# Patient Record
Sex: MALE | Race: WHITE | Employment: FULL TIME | URBAN - METROPOLITAN AREA
[De-identification: names, ages, dates, MRNs, and addresses within clinical notes are randomized per-mention and may not be internally consistent; named-entity substitution may affect disease eponyms.]

---

## 2022-06-07 ENCOUNTER — OFFICE VISIT (OUTPATIENT)
Dept: ORTHOPEDIC SURGERY | Age: 57
End: 2022-06-07
Payer: COMMERCIAL

## 2022-06-07 DIAGNOSIS — M25.561 PAIN IN BOTH KNEES, UNSPECIFIED CHRONICITY: Primary | ICD-10-CM

## 2022-06-07 DIAGNOSIS — M24.662 ARTHROFIBROSIS OF KNEE JOINT, LEFT: ICD-10-CM

## 2022-06-07 DIAGNOSIS — M00.062 STAPHYLOCOCCAL ARTHRITIS OF LEFT KNEE (HCC): ICD-10-CM

## 2022-06-07 DIAGNOSIS — M25.562 PAIN IN BOTH KNEES, UNSPECIFIED CHRONICITY: Primary | ICD-10-CM

## 2022-06-07 PROCEDURE — 99204 OFFICE O/P NEW MOD 45 MIN: CPT | Performed by: ORTHOPAEDIC SURGERY

## 2022-06-07 NOTE — PROGRESS NOTES
12 [facility]  History and Physical  Knee Pain    Date:  2022    Name:  Aron Mcguire  Address:  [place] [address]    :  1965      Age:   60 y.o.    SSN:  [id number]      Medical Record Number:  [id number]      Chief Complaint    Knee Pain (left knee pain/infection)      History of Present Illness:  Aron Mcguire is a 60 y.o. male who presents for left knee pain and stiffness. 78-year-old male patient presented with left knee pain and stiffness. Patient who is known to have left knee osteoarthritis and was booked for a total knee replacement in [facility] since he on 2022. Patient was  planning to visit [facility]. He received left knee steroid injection on 2022 before traveling. After few days from the injection he started to complain of increasing left knee pain and swelling. He went to the emergency department in [facility] where knee aspiration showed septic knee. He underwent left knee arthrotomy and washout on 2022. He was started on IV antibiotics for 5 days. Patient went back to [place] on . His flight back was 10 hours total.  Once he was in [place] he was started on IV antibiotics again and finished 6 weeks of antibiotics collectively. During his follow-up, his ID physician requested an ultrasound which showed left leg DVT and deep peroneal vein. He was started on Xarelto 15 mg. Patient now complains of severe stiffness and decreased range of motion associated with pain. Patient was instructed to use crutches to walk. He states that he was not in therapy since . Patient states that cultures showed MSSA infection. Last labs were taken on May 10, 2022 showed ESR of 11 and C-reactive protein of 4.4. The patient denies any new injury. The patient denies any clicking, popping, catching, giving way, joint locking, numbness, paresthesias, or weakness.       Pain Assessment  Location of Pain: Knee  Location Modifiers: Left  Severity of Pain: 0  Duration of Pain: A few minutes  Frequency of Pain: Intermittent  Aggravating Factors: Stretching,Bending  Limiting Behavior: Some  Result of Injury: No  Work-Related Injury: No  Are there other pain locations you wish to document?: No    No past medical history on file. No past surgical history on file. No family history on file. Social History     Socioeconomic History    Marital status:      Spouse name: None    Number of children: None    Years of education: None    Highest education level: None   Occupational History    None   Tobacco Use    Smoking status: Never Smoker    Smokeless tobacco: None   Substance and Sexual Activity    Alcohol use: None    Drug use: None    Sexual activity: None   Other Topics Concern    None   Social History Narrative    None     Social Determinants of Health     Financial Resource Strain:     Difficulty of Paying Living Expenses: Not on file   Food Insecurity:     Worried About Running Out of Food in the Last Year: Not on file    Maddi of Food in the Last Year: Not on file   Transportation Needs:     Lack of Transportation (Medical): Not on file    Lack of Transportation (Non-Medical):  Not on file   Physical Activity:     Days of Exercise per Week: Not on file    Minutes of Exercise per Session: Not on file   Stress:     Feeling of Stress : Not on file   Social Connections:     Frequency of Communication with Friends and Family: Not on file    Frequency of Social Gatherings with Friends and Family: Not on file    Attends Latter-day Services: Not on file    Active Member of Clubs or Organizations: Not on file    Attends Club or Organization Meetings: Not on file    Marital Status: Not on file   Intimate Partner Violence:     Fear of Current or Ex-Partner: Not on file    Emotionally Abused: Not on file    Physically Abused: Not on file   [place] Sexually Abused: Not on file   Housing Stability:     Unable to Pay for Housing in the Last Year: Not on file    Number of [place] in the Last Year: Not on file    Unstable Housing in the Last Year: Not on file       No current outpatient medications on file. No current facility-administered medications for this visit. No Known Allergies      Review of Systems:  A 14 point review of systems was completed by the patient and is available in the media section of the scanned medical record and was reviewed on 6/7/2022. The review is negative with the exception of those things mentioned in the HPI and Past Medical History     Review of Systems   Constitutional: Negative for fever and chills. HENT: Negative for congestion and eye pain. Eyes: Negative for blurred vision and double vision. Respiratory: Negative for cough, shortness of breath and wheezing. Cardiovascular: Negative for chest pain and palpitations. Gastrointestinal: Negative for nausea. Negative for vomiting. Musculoskeletal: Positive for myalgias and joint pain left knee. Skin: Negative for itching and rash. Neurological: Negative for dizziness, sensory change and headaches. Psychiatric/Behavioral: Negative for depression and suicidal ideas. Vital Signs: There were no vitals taken for this visit. General Exam:    General: Singh Becerra is a healthy and well appearing 60y.o. year old male who is sitting comfortably in our office in no acute distress. Neuro: Alert & Oriented x 3,  normal,  no focal deficits noted. Normal mood & affect. Knee Examination: Left    Inspection: Mild effusion, ecchymosis, discoloration, erythema, excessive warmth. , no signs of infection. Palpation: There is entrapment of patellofemoral joint, there is medial joint line tenderness. No other osseous or soft tissue tenderness around the knee.   Negative calf tenderness    Range of Motion:  40-80    Strength: Could not be completely assessed    Special Tests:   No ligament instability, valgus and varus stress test are stable at 0 and 30°. Lachman's exhibits a solid endpoint. Negative posterior drawer. Negative Homans sign    Gait: Walks with flexion on the left knee using crutches  Alignment: No varus deformity    Neuro: Sensation equal bilateral lower extremities    Vascular: 2+ posterior tibialis pulse      Contralateral Knee Comparison Examination: Left    Inspection:  No effusion, ecchymosis, discoloration, erythema, excessive warmth. no gross deformities, no signs of infection. Palpation: There is no patellofemoral crepitus, there is no joint line tenderness. No other osseous or soft tissue tenderness around the knee. Negative calf tenderness    Range of Motion:  0-130 degrees without pain or difficulty    Strength:  5/5 quadriceps, 5/5 hamstrings    Special Tests:   No ligament instability, valgus and varus stress test are stable at 0 and 30°. Lachman's exhibits a solid endpoint. Negative posterior drawer. Negative Homans sign    Gait:  Steady, Non antalgic, without the use of assistive devices    Alignment: No Varus deformity    Neuro: Sensation equal bilateral lower extremities    Vascular: 2+ posterior tibialis pulse      Radiology:     X-rays obtained and reviewed in office: AP and merchant view of both knees and a lateral of the bilateral.         Severe left knee tricompartmental osteoarthritis with mild patella and infra. Moderate right knee tricompartmental osteoarthritis      Office Procedures:  No orders of the defined types were placed in this encounter. Assessment: Patient is a 60 y.o. male post infectious left knee arthrofibrosis with pre-existing severe knee osteoarthritis        No orders of the defined types were placed in this encounter. Medical decision making:  Patient's workup and evaluation were reviewed with the patient in the office today.        All the patient's questions were answered while in the clinic. The patient is understanding of all instructions and agrees with the plan. Treatment Plan:    1. We discussed into details the nature of his problem which is pre-existing knee osteoarthritis with superimposed postinfectious knee arthrofibrosis. His knee clinically does not look infected today, though we will proceed with new labs(CBC, ESR) as baseline. Patient has more than one challenge. He has severe restriction of extension and flexion due to formation of scar tissue both at the anterior and posterior capsule. We believe that the most predictable outcome will be with early surgical intervention in the form of manipulation under anesthesia and limited arthroscopic adhesiolysis followed by drop out cast to regain as much as we can on the extension. We will follow that with physical therapy and reassessment before proceeding with extensive arthroscopic adhesiolysis to regain flexion. We discussed that this is a long procedure and it would not resolve the pre-existing knee osteoarthritis in his knee. They main target of treatment is to regain as much as possible of knee range of motion actively and passively to prepare him for next step in the future which is a knee replacement. 2. Activity modification  3. Ice 20 minutes ever 1-2 hours PRN. Patient will start using weights at home to stretch his knee till date of surgery  4. NSAIDs as needed  5. Rest   6. Elevation at least 2 inches above the heart with pillows supporting the joint underneath  7. Lightweight exercise/low impact exercise  8.  Appropriate diet/weight management      Follow up: Next week for TREVOR and arthroscopic release of adhesions    This patient exhibits high complexity given previous history and physical exam, review of previous surgeries, obtaining independent history from the patient, review of the x-ray imaging and independent interpretation of imaging, and coordinating care with the patient as well as another physician. The patient is high risk for planning of an elective surgery with multiple comorbidities/risks. Total time spent for evaluation, education, and development of treatment plan: 59 minutes. Anne-Marie Rahman MD  Orthopedic Surgeon  [facility]), Clinical Fellow, [address] [place] [address]   6/7/2022      This dictation was performed with a verbal recognition program [facility]) and it was checked for errors. It is possible that there are still dictated errors within this office note. If so, please bring any errors to my attention for an addendum. All efforts were made to ensure that this office note is accurate. This dictation was performed with a verbal recognition program ([employer]ON) and it was checked for errors. It is possible that there are still dictated errors within this office note. If so, please bring any errors to my attention for an addendum. All efforts were made to ensure that this office note is accurate. Supervising Physician Attestation:  I, Dr. Olive Link, personally performed the services described in this documentation as scribed above, and it is both accurate and complete and I agree with all pertinent clinical information. I personally interviewed the patient and performed a physical examination and medical decision making. I discussed the patient's condition and treatment options and have  reviewed and agree with the past medical, family and social history unless otherwise noted. All of the patient's questions were answered.       Board Certified Orthopaedic Surgeon  [address]  [facility] and [address] and [employer]  Professor of Deo Rae

## 2022-06-08 ENCOUNTER — TELEPHONE (OUTPATIENT)
Dept: ORTHOPEDIC SURGERY | Age: 57
End: 2022-06-08

## 2022-06-08 NOTE — TELEPHONE ENCOUNTER
REF FROM ELIZABETH RODGERS TO [address] SPOKE WITH PT. [facility] FOR TOMORROW AT  IN AM WITH TOMMY. ALL QUESTIONS ANSWERED.

## 2022-06-08 NOTE — TELEPHONE ENCOUNTER
Called patient per Dr Mallory Wu and told him putting his sx on hold until he talks to another physician regarding his case. Will get back with him after that.  Also told him received his paper work and will get that out for him

## 2022-06-09 ENCOUNTER — OFFICE VISIT (OUTPATIENT)
Dept: ORTHOPEDIC SURGERY | Age: 57
End: 2022-06-09
Payer: COMMERCIAL

## 2022-06-09 DIAGNOSIS — M24.662 ARTHROFIBROSIS OF KNEE JOINT, LEFT: ICD-10-CM

## 2022-06-09 DIAGNOSIS — M25.561 PAIN IN BOTH KNEES, UNSPECIFIED CHRONICITY: Primary | ICD-10-CM

## 2022-06-09 DIAGNOSIS — M25.562 PAIN IN BOTH KNEES, UNSPECIFIED CHRONICITY: Primary | ICD-10-CM

## 2022-06-09 PROCEDURE — 99215 OFFICE O/P EST HI 40 MIN: CPT | Performed by: ORTHOPAEDIC SURGERY

## 2022-06-13 ENCOUNTER — TELEPHONE (OUTPATIENT)
Dept: ORTHOPEDIC SURGERY | Age: 57
End: 2022-06-13

## 2022-06-13 NOTE — PROGRESS NOTES
6/9/2022     Reason for visit:  Left knee pain and decreased range of motion    History of Present Illness: The patient is a 39-year-old male who presents for evaluation of his left knee. He presents as a referral from my partner Dr. Tj Betts. The patient reports that he underwent a cortisone injection to his left knee from an outside orthopedic group and surgeon on April 4, 2022. This was prior to him traveling to [facility]. Once he got to [facility] he started developing pain and increased swelling. He was notified by the surgeons office telling him that the cortisone that was given was potentially contaminated with bacteria and patients were experiencing infection. He did present to the hospital there were open I&D was performed. He was placed on the antibiotics followed by IV antibiotics once he returned to the [facility]. However since then he has not had physical therapy and he has developed significant decreased range of motion and stiffness. He has also sustained another complication in the form of a DVT for which she takes Xarelto. 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 Medical History:  No past medical history on file. No past surgical history on file. No family history on file.    Social History     Socioeconomic History    Marital status:      Spouse name: Not on file    Number of children: Not on file    Years of education: Not on file    Highest education level: Not on file   Occupational History    Not on file   Tobacco Use    Smoking status: Never Smoker    Smokeless tobacco: Not on file   Substance and Sexual Activity    Alcohol use: Not on file    Drug use: Not on file    Sexual activity: Not on file   Other Topics Concern    Not on file   Social History Narrative    Not on file     Social Determinants of Health     Financial Resource Strain:     Difficulty of Paying Living Expenses: Not on file   Food Insecurity:     Worried About Running Out of Food in the Last Year: Not on file    Ran Out of Food in the Last Year: Not on file   Transportation Needs:     Lack of Transportation (Medical): Not on file    Lack of Transportation (Non-Medical): Not on file   Physical Activity:     Days of Exercise per Week: Not on file    Minutes of Exercise per Session: Not on file   Stress:     Feeling of Stress : Not on file   Social Connections:     Frequency of Communication with Friends and Family: Not on file    Frequency of Social Gatherings with Friends and Family: Not on file    Attends Episcopal Services: Not on file    Active Member of [address] [place] [address] [employer] or Organizations: Not on file    Attends Club or Organization Meetings: Not on file    Marital Status: Not on file   Intimate Partner Violence:     Fear of Current or Ex-Partner: Not on file    Emotionally Abused: Not on file    Physically Abused: Not on file    Sexually Abused: Not on file   Housing Stability:     Unable to Pay for Housing in the Last Year: Not on file    Number of [place] in the Last Year: Not on file    Unstable Housing in the Last Year: Not on file      No current outpatient medications on file prior to visit. No current facility-administered medications on file prior to visit. No Known Allergies     Review of Systems:  Constitutional: Patient is adequately groomed with no evidence of malnutrition  Mental Status: The patient is oriented to time, place and person. The patient's mood and affect are appropriate. Lymphatic: The lymphatic examination bilaterally reveals all areas to be without enlargement or induration. Vascular: Examination reveals no swelling or calf tenderness. Peripheral pulses are palpable and 2+. Neurological: The patient has good coordination. There is no weakness or sensory deficit. Skin:  Head/Neck: inspection reveals no rashes, ulcerations or lesions. Trunk: inspection reveals no rashes, ulcerations or lesions. Objective:   There were no vitals taken for this visit. Physical Exleftam:  The patient is well-appearing and in no apparent distress  Examination of the left knee   There is a small effusion, no gross deformity or skin changes  Range of motion reveals 45 to 80  neg lachman, negative posterior drawer, no pain or laxity with varus or valgus stress at 0 degrees and 30 degrees of flexion  + joint line tenderness  5 out of 5 strength throughout distal muscle groups  Sensation is intact to light touch throughout all distributions  There is no calf swelling or tenderness  Palpable DP pulse, brisk cap refill, 2+ symmetric reflexes    Imaging:  X-rays of his left knee were reviewed. Tricompartmental degenerative changes are present. Assessment:  Left knee degenerative joint disease with history of recent intra-articular infection followed by development of arthrofibrosis as well as recent history of DVT    Plan:  I had a very long discussion with the patient. He has a very complex constellation of findings. He has significantly decreased range of motion with both flexion and extension. This makes this very challenging. He also has advanced degeneration of the knee. He has not made progress with physical therapy and improving his motion recently. I do agree with Dr. Samir Mcconnell and the fact that the patient to likely require multiple surgeries to address his arthrofibrosis. He will require a posterior as well as an anterior surgery with releases and capsular lengthenings to hopefully get back full flexion extension. As result I will reach out to my vascular surgeon on staff to coordinate a potential combined posterior approach procedure in which vascular will safely perform the approach followed by capsular release by me. This will hopefully get him to extension. Once he recovers from that we would then proceed with an anterior base procedure by Dr. Samir Mcconnell to get his flexion back.   It is imperative that we restore his full motion prior to considering total knee arthroplasty and he is aware of this. The risk and benefits of the posterior based surgery were discussed. They were defined as but not limited to infection, bleeding, damage to blood vessels or nerves, continued to decreased range of motion, need for additional surgery, medical complications. He does understand elects proceed. I will be in touch once I coordinate. Greater than 45 minutes were spent with this encounter. Time spent included evaluating the patient's chart prior to arrival.  Evaluating the patient in the office including history, physical examination, imaging reviewing, and counseling on next steps. Lastly, time was spent discussing orders with my staff as well as providing documentation in the chart. Jaun Barragan MD            Orthopaedic Surgery Sports Medicine and [address] and [address] [facility]            Team Physician [facility]      Disclaimer: This note was dictated with voice recognition software. Though review and correction are routine, we apologize for any errors.

## 2022-06-22 ENCOUNTER — OFFICE VISIT (OUTPATIENT)
Dept: VASCULAR SURGERY | Age: 57
End: 2022-06-22
Payer: COMMERCIAL

## 2022-06-22 VITALS
DIASTOLIC BLOOD PRESSURE: 93 MMHG | SYSTOLIC BLOOD PRESSURE: 158 MMHG | HEIGHT: 69 IN | BODY MASS INDEX: 28.88 KG/M2 | WEIGHT: 195 LBS | HEART RATE: 79 BPM

## 2022-06-22 DIAGNOSIS — M24.562 KNEE CONTRACTURE, LEFT: Primary | ICD-10-CM

## 2022-06-22 PROCEDURE — 99204 OFFICE O/P NEW MOD 45 MIN: CPT | Performed by: SURGERY

## 2022-06-22 RX ORDER — MELOXICAM 15 MG/1
15 TABLET ORAL DAILY PRN
COMMUNITY
Start: 2018-04-20

## 2022-06-22 RX ORDER — LISINOPRIL 40 MG/1
40 TABLET ORAL DAILY
COMMUNITY
Start: 2022-06-16

## 2022-06-22 RX ORDER — CYCLOBENZAPRINE HCL 10 MG
TABLET ORAL 3 TIMES DAILY PRN
COMMUNITY
Start: 2018-06-20 | End: 2022-08-04

## 2022-06-22 RX ORDER — RIVAROXABAN 20 MG/1
TABLET, FILM COATED ORAL
COMMUNITY
Start: 2022-06-02

## 2022-06-22 RX ORDER — METOPROLOL SUCCINATE 50 MG/1
50 TABLET, EXTENDED RELEASE ORAL DAILY
COMMUNITY
Start: 2022-06-16

## 2022-06-22 RX ORDER — SILDENAFIL 50 MG/1
50 TABLET, FILM COATED ORAL PRN
COMMUNITY
Start: 2017-06-20

## 2022-06-22 RX ORDER — ACETAMINOPHEN 500 MG
500 TABLET ORAL EVERY 8 HOURS PRN
COMMUNITY
Start: 2022-02-11

## 2022-06-22 ASSESSMENT — ENCOUNTER SYMPTOMS
EYES NEGATIVE: 1
RESPIRATORY NEGATIVE: 1
GASTROINTESTINAL NEGATIVE: 1
ALLERGIC/IMMUNOLOGIC NEGATIVE: 1

## 2022-06-22 NOTE — PROGRESS NOTES
Subjective:      Patient ID: Chito Medina is a 60 y.o. male. HPI Referral from Yonatan Art MD for preop evaluation prior to planned L posterior popliteal approach for L posterior capsular release. 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 Prior to the injection he has significant degenerative joint disease which would require eventual knee replacement. The septic joint developed while on a vacation in [facility] and the patient underwent arthrotomy and drainage. No history of vascular disease or vascular interventions. Long-term the patient will require knee replacement once the contractures have been relieved. In May the patient was diagnosed as having an acute DVT of the peroneal vein left leg for which she was started on Xarelto. The swelling in the leg which prompted the DVT work-up has subsequently resolved significantly. History reviewed. No pertinent past medical history. History reviewed. No pertinent surgical history. No Known Allergies  Current Outpatient Medications   Medication Sig Dispense Refill    lisinopril (PRINIVIL;ZESTRIL) 40 MG tablet       metoprolol succinate (TOPROL XL) 50 MG extended release tablet       XARELTO 20 MG TABS tablet       meloxicam (MOBIC) 15 MG tablet Take 15 mg by mouth daily as needed      sildenafil (VIAGRA) 50 MG tablet Take 50 mg by mouth as needed      cyclobenzaprine (FLEXERIL) 10 MG tablet       esomeprazole (NEXIUM) 20 MG delayed release capsule Take 20 mg by mouth      acetaminophen (TYLENOL) 500 MG tablet Take 500 mg by mouth every 8 hours as needed       No current facility-administered medications for this visit.      Social History     Socioeconomic History    Marital status:      Spouse name: Not on file    Number of children: Not on file    Years of education: Not on file    Highest education level: Not on file   Occupational History    Not on file   Tobacco Use    Smoking status: Never Smoker    Smokeless tobacco: Never Used   Vaping Use    Vaping Use: Never used   Substance and Sexual Activity    Alcohol use: Yes     Alcohol/week: 10.0 - 12.0 standard drinks     Types: 10 - 12 Shots of liquor per week    Drug use: Never    Sexual activity: Not on file   Other Topics Concern    Not on file   Social History Narrative    Not on file     Social Determinants of Health     Financial Resource Strain:     Difficulty of Paying Living Expenses: Not on file   Food Insecurity:     Worried About Running Out of Food in the Last Year: Not on file    Maddi of Food in the Last Year: Not on file   Transportation Needs:     Lack of Transportation (Medical): Not on file    Lack of Transportation (Non-Medical): Not on file   Physical Activity:     Days of Exercise per Week: Not on file    Minutes of Exercise per Session: Not on file   Stress:     Feeling of Stress : Not on file   Social Connections:     Frequency of Communication with Friends and Family: Not on file    Frequency of Social Gatherings with Friends and Family: Not on file    Attends [employer] Services: Not on file    Active Member of [address] [place] [address] [employer] or Organizations: Not on file    Attends Club or Organization Meetings: Not on file    Marital Status: Not on file   Intimate Partner Violence:     Fear of Current or Ex-Partner: Not on file    Emotionally Abused: Not on file    Physically Abused: Not on file    Sexually Abused: Not on file   Housing Stability:     Unable to Pay for Housing in the Last Year: Not on file    Number of [place] in the Last Year: Not on file    Unstable Housing in the Last Year: Not on file     History reviewed. No pertinent family history. Review of Systems   Constitutional: Negative. HENT: Negative. Eyes: Negative. Respiratory: Negative. Cardiovascular: Negative. Gastrointestinal: Negative. Endocrine: Negative. Genitourinary: Negative. Musculoskeletal: Positive for joint swelling. Skin: Negative. Allergic/Immunologic: Negative. Hematological: Negative. Psychiatric/Behavioral: Negative. Objective:   Physical Exam  Vitals and nursing note reviewed. Exam conducted with a chaperone present (wife). Constitutional:       Appearance: Normal appearance. He is normal weight. HENT:      Head: Normocephalic and atraumatic. Right Ear: External ear normal.      Left Ear: External ear normal.      Nose: Nose normal.      Mouth/Throat:      Mouth: Mucous membranes are moist.      Pharynx: Oropharynx is clear. Eyes:      Extraocular Movements: Extraocular movements intact. Conjunctiva/sclera: Conjunctivae normal.   Cardiovascular:      Rate and Rhythm: Normal rate and regular rhythm. Heart sounds: Normal heart sounds. Pulmonary:      Effort: Pulmonary effort is normal.      Breath sounds: Normal breath sounds. Abdominal:      General: Abdomen is flat. Bowel sounds are normal.      Palpations: Abdomen is soft. There is no mass. Hernia: No hernia is present. Musculoskeletal:         General: Swelling (L knee) and deformity (L knee flexion contracture at 45 degrees with limited ROM) present. Cervical back: Normal range of motion. Right lower leg: No edema. Left lower leg: Edema (trace ankle) present. Skin:     General: Skin is warm and dry. Capillary Refill: Capillary refill takes less than 2 seconds. Findings: No erythema, lesion or rash. Neurological:      General: No focal deficit present. Mental Status: He is alert and oriented to person, place, and time. Cranial Nerves: No cranial nerve deficit. Sensory: No sensory deficit. Motor: No weakness. Coordination: Coordination normal.      Gait: Gait normal.   Psychiatric:         Mood and Affect: Mood normal.         Behavior: Behavior normal.         Thought Content:  Thought content normal.         Judgment: Judgment normal.       Pulses:   R bruit  L bruit   2   carotid 2    2   brachial 2    2   radial 2    2   femoral 2    2   popliteal cont    2   posterior tibial 2    2   dorsalis pedis 2    na   bypass graft na      Venous duplex scan 5/12/2022 at [address]* Acute, occlusive deep vein thrombosis of the left peroneal vein. No evidence of deep or superficial venous thrombosis in the remainder of the   sampled veins in the eft lower extremity or right common femoral vein. Assessment:      1) Contracture L knee S/P septic joint  2) DVT L peroneal vein      Plan:      Explained my role in the operation to Mr. Herrera and his wife. Associated risks include venous and arterial injury as well as nerve injury. Plan will be to perform a S shaped incision to prevent skin contracture. Long-term plans are clearly in the hands of our orthopedic colleagues. Prior to surgery he will undergo a venous duplex scan to see whether or not the peroneal clot has resolved or if it is extended into the popliteal vein which would be important to understand for this approach. If the clot has resolved discontinuation of the Xarelto will be possible around the time of surgery. Study will be obtained at our [facility] office and we will contact him for results. I will speak personally with Dr. Natalie Kang regarding approach specifics. Planning within the next 2 to 3 weeks at [facility].

## 2022-06-22 NOTE — Clinical Note
Clare Wood saw Marly Lezama in the office today for evaluation prior to the planned posterior approach for capsulotomy. I can see no surgical or vascular contraindications. He will undergo a venous duplex scan to reassess the previously documented peroneal clot as this may influence our approach and use of anticoagulants. I will speak you personally regards to timing. Thanks for asked me to be involved in his case. Also me know if I can help with any other patients in the future.     Nessa Lucero

## 2022-06-24 DIAGNOSIS — I82.402 ACUTE DEEP VEIN THROMBOSIS (DVT) OF LEFT LOWER EXTREMITY, UNSPECIFIED VEIN (HCC): Primary | ICD-10-CM

## 2022-06-28 ENCOUNTER — PROCEDURE VISIT (OUTPATIENT)
Dept: VASCULAR SURGERY | Age: 57
End: 2022-06-28
Payer: COMMERCIAL

## 2022-06-28 DIAGNOSIS — I82.402 ACUTE DEEP VEIN THROMBOSIS (DVT) OF LEFT LOWER EXTREMITY, UNSPECIFIED VEIN (HCC): ICD-10-CM

## 2022-06-28 PROCEDURE — 93971 EXTREMITY STUDY: CPT | Performed by: SURGERY

## 2022-06-29 ENCOUNTER — TELEPHONE (OUTPATIENT)
Dept: ORTHOPEDIC SURGERY | Age: 57
End: 2022-06-29

## 2022-06-29 DIAGNOSIS — M24.662 ARTHROFIBROSIS OF KNEE JOINT, LEFT: Primary | ICD-10-CM

## 2022-06-29 NOTE — TELEPHONE ENCOUNTER
Other PATIENT NEEDING ATTENDING PHYSICIAN STATEMENT FOR EMPLOYER WITH HOW MUCH LONGER HE WILL BE OUT OF WORK. STATES THAT HE SEEN THE VASCULAR MD A WEEK AGO AND HE HAD DOPPLER YESTERDAY.  PLS CALL TO ADVISE [phone]

## 2022-06-29 NOTE — TELEPHONE ENCOUNTER
I SPOKE WITH PT.  CONFIRMED SX INFO. I WILL SEND HIM A PACK WITH INFO AS WELL. HE NEEDS H&P AND TO TALK WITH INFECTIOUS DISEASE IN REGARDS TO [facility] PRIOR TO SX. WILL MAIL PACKET TODAY FOR PT.  SX 7/15 AT 7:30.  ARRIVE 6 AM.

## 2022-06-30 ENCOUNTER — TELEPHONE (OUTPATIENT)
Dept: ORTHOPEDIC SURGERY | Age: 57
End: 2022-06-30

## 2022-06-30 NOTE — TELEPHONE ENCOUNTER
PATIENT WOULD LIKE TO DO HIS PHYSICAL THERAPY STARTING Monday AFTER SX. I GAVE PATIENT INFO FOR YANELI MAURICE PT. ORDER PLACED.

## 2022-06-30 NOTE — TELEPHONE ENCOUNTER
CPT: 73086  BODY PART: left knee  STATUS: outpatient  LOCATION: [place]  AUTHORIZATION: [employer]    Per [employer] website, [address] [facility]

## 2022-07-07 ENCOUNTER — TELEPHONE (OUTPATIENT)
Dept: CARDIOTHORACIC SURGERY | Age: 57
End: 2022-07-07

## 2022-07-07 NOTE — TELEPHONE ENCOUNTER
Dr. Fabiola Marc office called regarding medications prior to the patient's upcoming surgery. Does the patient need to be put on Lovenox prior to sx, currently on Xarelto?

## 2022-07-11 NOTE — PROGRESS NOTES
Name_______________________________________Printed:____________________  Date and time of surgery_7/15/2022______0730_________________Arrival Time:__0600  masc______________   1. The instructions given regarding when and if a patient needs to stop oral intake prior to surgery varies. Follow the specific instructions you were given                  _x__Nothing to eat or to drink after Midnight the night before.                   ____Carbo loading or ERAS instructions will be given to select patients-if you have been given those instructions -please do the following                           The evening before your surgery after dinner before midnight drink 40 ounces of gatorade. If you are diabetic use sugar free. The morning of surgery drink 40 ounces of water. This needs to be finished 3 hours prior to your surgery start time. 2. Take the following pills with a small sip of water on the morning of surgery__metoprolol, nexium_________________________________________________                  Do not take blood pressure medications ending in pril or sartan the sarah prior to surgery or the morning of surgery_   3. Aspirin, Ibuprofen, Advil, Naproxen, Vitamin E and other Anti-inflammatory products and supplements should be stopped for 5 -7days before surgery or as directed by your physician. 4. Check with your Doctor regarding stopping Plavix, Coumadin,Eliquis, Lovenox,Effient,Pradaxa,Xarelto, Fragmin or other blood thinners and follow their instructions. 5. Do not smoke, and do not drink any alcoholic beverages 24 hours prior to surgery. This includes NA Beer. Refrain from the usage of any recreational drugs. 6. You may brush your teeth and gargle the morning of surgery. DO NOT SWALLOW WATER   7. You MUST make arrangements for a responsible adult to stay on site while you are here and take you home after your surgery. You will not be allowed to leave alone or drive yourself home.   It is strongly suggested someone stay with you the first 24 hrs. Your surgery will be cancelled if you do not have a ride home. 8. A parent/legal guardian must accompany a child scheduled for surgery and plan to stay at the hospital until the child is discharged. Please do not bring other children with you. 9. Please wear simple, loose fitting clothing to the hospital.  Scar Found not bring valuables (money, credit cards, checkbooks, etc.) Do not wear any makeup (including no eye makeup) or nail polish on your fingers or toes. 10. DO NOT wear any jewelry or piercings on day of surgery. All body piercing jewelry must be removed. 11. If you have ___dentures, they will be removed before going to the OR; we will provide you a container. If you wear ___contact lenses or ___glasses, they will be removed; please bring a case for them. 12. Please see your family doctor/pediatrician for a history & physical and/or concerning medications. Bring any test results/reports from your physician's office. PCP__________________Phone___________H&P Appt. Date________             13 If you  have a Living Will and Durable Power of  for Healthcare, please bring in a copy. 15. Notify your Surgeon if you develop any illness between now and surgery  time, cough, cold, fever, sore throat, nausea, vomiting, etc.  Please notify your surgeon if you experience dizziness, shortness of breath or blurred vision between now & the time of your surgery             15. DO NOT shave your operative site 96 hours prior to surgery. For face & neck surgery, men may use an electric razor 48 hours prior to surgery. 16. Shower the night before or morning of surgery using an antibacterial soap or as you have been instructed. 17. To provide excellent care visitors will be limited to one in the room at any given time. 18.  Please bring picture ID and insurance card.              19.  Visit our web site for additional information:  [email or web address]              20.During flu season no children under the age of 15 are permitted in the hospital for the safety of all patients. 21. If you take a long acting insulin in the evening only  take half of your usual  dose the night  before your procedure              22. If you use a c-pap please bring DOS if staying overnight,             23.For your convenience [facility] has a pharmacy on site to fill your prescriptions. 24. If you use oxygen and have a portable tank please bring it  with you the DOS             25. Bring a complete list of all your medications with name and dose include any supplements. 26. Other__________________________________________   *Please call pre admission testing if you any further questions   [facility]   [address]    [facility] [address]  [phone]   [address] [place] [address]       VISITOR POLICY(subject to change)    Current policy is 2 visitors per patient. No children. A mask is required. Visiting hours are 8a-8p. Overnight visitors will be at the discretion of the nurse. All above information reviewed with patient in person or by phone. Patient verbalizes understanding. All questions and concerns addressed.                                                                                                  Patient/Rep__patient__________________                                                                                                                                    PRE OP INSTRUCTIONS

## 2022-07-11 NOTE — PROGRESS NOTES
Per [facility] @ office, patient needs a new H&P.  VM left for patient, he needs new H&P, can not update outdated H&P.

## 2022-07-13 DIAGNOSIS — G89.18 POST-OPERATIVE PAIN: Primary | ICD-10-CM

## 2022-07-13 NOTE — TELEPHONE ENCOUNTER
Sent patient MyChart message with update to sx time.   Sx at [address] [place] [address], arrival of 6AM.

## 2022-07-14 RX ORDER — ONDANSETRON 4 MG/1
4 TABLET, FILM COATED ORAL EVERY 8 HOURS PRN
Qty: 21 TABLET | Refills: 0 | Status: SHIPPED | OUTPATIENT
Start: 2022-07-15

## 2022-07-14 RX ORDER — ASPIRIN 325 MG
325 TABLET, DELAYED RELEASE (ENTERIC COATED) ORAL DAILY
Qty: 14 TABLET | Refills: 0 | Status: SHIPPED | OUTPATIENT
Start: 2022-07-15 | End: 2022-07-29

## 2022-07-14 RX ORDER — HYDROCODONE BITARTRATE AND ACETAMINOPHEN 10; 325 MG/1; MG/1
1 TABLET ORAL EVERY 4 HOURS PRN
Qty: 30 TABLET | Refills: 0 | Status: SHIPPED | OUTPATIENT
Start: 2022-07-15 | End: 2022-07-20

## 2022-07-15 ENCOUNTER — HOSPITAL ENCOUNTER (OUTPATIENT)
Age: 57
Setting detail: OUTPATIENT SURGERY
Discharge: HOME | End: 2022-07-15
Attending: ORTHOPAEDIC SURGERY | Admitting: ORTHOPAEDIC SURGERY
Payer: COMMERCIAL

## 2022-07-15 ENCOUNTER — ANESTHESIA EVENT (OUTPATIENT)
Dept: OPERATING ROOM | Age: 57
End: 2022-07-15
Payer: COMMERCIAL

## 2022-07-15 ENCOUNTER — APPOINTMENT (OUTPATIENT)
Dept: GENERAL RADIOLOGY | Age: 57
End: 2022-07-15
Attending: ORTHOPAEDIC SURGERY
Payer: COMMERCIAL

## 2022-07-15 ENCOUNTER — ANESTHESIA (OUTPATIENT)
Dept: OPERATING ROOM | Age: 57
End: 2022-07-15
Payer: COMMERCIAL

## 2022-07-15 VITALS
RESPIRATION RATE: 16 BRPM | TEMPERATURE: 97 F | DIASTOLIC BLOOD PRESSURE: 72 MMHG | OXYGEN SATURATION: 100 % | HEIGHT: 69 IN | SYSTOLIC BLOOD PRESSURE: 118 MMHG | HEART RATE: 67 BPM | WEIGHT: 194 LBS | BODY MASS INDEX: 28.73 KG/M2

## 2022-07-15 DIAGNOSIS — M24.662 ARTHROFIBROSIS OF KNEE JOINT, LEFT: ICD-10-CM

## 2022-07-15 PROCEDURE — 87070 CULTURE OTHR SPECIMN AEROBIC: CPT

## 2022-07-15 PROCEDURE — 7100000010 HC PHASE II RECOVERY - FIRST 15 MIN: Performed by: ORTHOPAEDIC SURGERY

## 2022-07-15 PROCEDURE — 2580000003 HC RX 258: Performed by: ORTHOPAEDIC SURGERY

## 2022-07-15 PROCEDURE — 6360000002 HC RX W HCPCS: Performed by: ORTHOPAEDIC SURGERY

## 2022-07-15 PROCEDURE — 7100000011 HC PHASE II RECOVERY - ADDTL 15 MIN: Performed by: ORTHOPAEDIC SURGERY

## 2022-07-15 PROCEDURE — 3700000001 HC ADD 15 MINUTES (ANESTHESIA): Performed by: ORTHOPAEDIC SURGERY

## 2022-07-15 PROCEDURE — 6370000000 HC RX 637 (ALT 250 FOR IP): Performed by: ANESTHESIOLOGY

## 2022-07-15 PROCEDURE — 3209999900 FLUORO FOR SURGICAL PROCEDURES

## 2022-07-15 PROCEDURE — 7100000000 HC PACU RECOVERY - FIRST 15 MIN: Performed by: ORTHOPAEDIC SURGERY

## 2022-07-15 PROCEDURE — 3600000004 HC SURGERY LEVEL 4 BASE: Performed by: ORTHOPAEDIC SURGERY

## 2022-07-15 PROCEDURE — 2500000003 HC RX 250 WO HCPCS: Performed by: NURSE ANESTHETIST, CERTIFIED REGISTERED

## 2022-07-15 PROCEDURE — 2500000003 HC RX 250 WO HCPCS: Performed by: ORTHOPAEDIC SURGERY

## 2022-07-15 PROCEDURE — 3700000000 HC ANESTHESIA ATTENDED CARE: Performed by: ORTHOPAEDIC SURGERY

## 2022-07-15 PROCEDURE — 87076 CULTURE ANAEROBE IDENT EACH: CPT

## 2022-07-15 PROCEDURE — 3600000014 HC SURGERY LEVEL 4 ADDTL 15MIN: Performed by: ORTHOPAEDIC SURGERY

## 2022-07-15 PROCEDURE — 87075 CULTR BACTERIA EXCEPT BLOOD: CPT

## 2022-07-15 PROCEDURE — 2709999900 HC NON-CHARGEABLE SUPPLY: Performed by: ORTHOPAEDIC SURGERY

## 2022-07-15 PROCEDURE — 7100000001 HC PACU RECOVERY - ADDTL 15 MIN: Performed by: ORTHOPAEDIC SURGERY

## 2022-07-15 PROCEDURE — 6360000002 HC RX W HCPCS: Performed by: NURSE ANESTHETIST, CERTIFIED REGISTERED

## 2022-07-15 PROCEDURE — 37799 UNLISTED PX VASCULAR SURGERY: CPT | Performed by: SURGERY

## 2022-07-15 PROCEDURE — 87205 SMEAR GRAM STAIN: CPT

## 2022-07-15 RX ORDER — ACETAMINOPHEN 325 MG/1
650 TABLET ORAL ONCE
Status: COMPLETED | OUTPATIENT
Start: 2022-07-15 | End: 2022-07-15

## 2022-07-15 RX ORDER — PROPOFOL 10 MG/ML
INJECTION, EMULSION INTRAVENOUS PRN
Status: DISCONTINUED | OUTPATIENT
Start: 2022-07-15 | End: 2022-07-15 | Stop reason: SDUPTHER

## 2022-07-15 RX ORDER — CLINDAMYCIN HYDROCHLORIDE 300 MG/1
300 CAPSULE ORAL 2 TIMES DAILY
Qty: 14 CAPSULE | Refills: 0 | Status: SHIPPED | OUTPATIENT
Start: 2022-07-15 | End: 2022-07-20

## 2022-07-15 RX ORDER — MIDAZOLAM HYDROCHLORIDE 1 MG/ML
INJECTION INTRAMUSCULAR; INTRAVENOUS PRN
Status: DISCONTINUED | OUTPATIENT
Start: 2022-07-15 | End: 2022-07-15 | Stop reason: SDUPTHER

## 2022-07-15 RX ORDER — KETAMINE HCL IN NACL, ISO-OSM 100MG/10ML
SYRINGE (ML) INJECTION PRN
Status: DISCONTINUED | OUTPATIENT
Start: 2022-07-15 | End: 2022-07-15 | Stop reason: SDUPTHER

## 2022-07-15 RX ORDER — ONDANSETRON 2 MG/ML
INJECTION INTRAMUSCULAR; INTRAVENOUS PRN
Status: DISCONTINUED | OUTPATIENT
Start: 2022-07-15 | End: 2022-07-15 | Stop reason: SDUPTHER

## 2022-07-15 RX ORDER — OXYCODONE HYDROCHLORIDE 5 MG/1
5 TABLET ORAL
Status: COMPLETED | OUTPATIENT
Start: 2022-07-15 | End: 2022-07-15

## 2022-07-15 RX ORDER — LIDOCAINE HYDROCHLORIDE 10 MG/ML
1 INJECTION, SOLUTION EPIDURAL; INFILTRATION; INTRACAUDAL; PERINEURAL
Status: CANCELLED | OUTPATIENT
Start: 2022-07-15 | End: 2022-07-15

## 2022-07-15 RX ORDER — HYDROMORPHONE HCL 110MG/55ML
0.25 PATIENT CONTROLLED ANALGESIA SYRINGE INTRAVENOUS EVERY 5 MIN PRN
Status: DISCONTINUED | OUTPATIENT
Start: 2022-07-15 | End: 2022-07-15 | Stop reason: HOSPADM

## 2022-07-15 RX ORDER — LIDOCAINE HYDROCHLORIDE 20 MG/ML
INJECTION, SOLUTION EPIDURAL; INFILTRATION; INTRACAUDAL; PERINEURAL PRN
Status: DISCONTINUED | OUTPATIENT
Start: 2022-07-15 | End: 2022-07-15 | Stop reason: SDUPTHER

## 2022-07-15 RX ORDER — SODIUM CHLORIDE 9 MG/ML
INJECTION, SOLUTION INTRAVENOUS CONTINUOUS
Status: CANCELLED | OUTPATIENT
Start: 2022-07-15

## 2022-07-15 RX ORDER — HYDROMORPHONE HCL 110MG/55ML
0.5 PATIENT CONTROLLED ANALGESIA SYRINGE INTRAVENOUS EVERY 5 MIN PRN
Status: DISCONTINUED | OUTPATIENT
Start: 2022-07-15 | End: 2022-07-15 | Stop reason: HOSPADM

## 2022-07-15 RX ORDER — SODIUM CHLORIDE, SODIUM LACTATE, POTASSIUM CHLORIDE, CALCIUM CHLORIDE 600; 310; 30; 20 MG/100ML; MG/100ML; MG/100ML; MG/100ML
INJECTION, SOLUTION INTRAVENOUS CONTINUOUS
Status: DISCONTINUED | OUTPATIENT
Start: 2022-07-15 | End: 2022-07-15 | Stop reason: HOSPADM

## 2022-07-15 RX ORDER — ONDANSETRON 2 MG/ML
4 INJECTION INTRAMUSCULAR; INTRAVENOUS
Status: DISCONTINUED | OUTPATIENT
Start: 2022-07-15 | End: 2022-07-15 | Stop reason: HOSPADM

## 2022-07-15 RX ORDER — ENOXAPARIN SODIUM 300 MG/3ML
INJECTION INTRAVENOUS; SUBCUTANEOUS DAILY
COMMUNITY

## 2022-07-15 RX ORDER — PHENYLEPHRINE HYDROCHLORIDE 10 MG/ML
INJECTION INTRAVENOUS PRN
Status: DISCONTINUED | OUTPATIENT
Start: 2022-07-15 | End: 2022-07-15 | Stop reason: SDUPTHER

## 2022-07-15 RX ORDER — DEXAMETHASONE SODIUM PHOSPHATE 4 MG/ML
INJECTION, SOLUTION INTRA-ARTICULAR; INTRALESIONAL; INTRAMUSCULAR; INTRAVENOUS; SOFT TISSUE PRN
Status: DISCONTINUED | OUTPATIENT
Start: 2022-07-15 | End: 2022-07-15 | Stop reason: SDUPTHER

## 2022-07-15 RX ORDER — ROCURONIUM BROMIDE 10 MG/ML
INJECTION, SOLUTION INTRAVENOUS PRN
Status: DISCONTINUED | OUTPATIENT
Start: 2022-07-15 | End: 2022-07-15 | Stop reason: SDUPTHER

## 2022-07-15 RX ORDER — SODIUM CHLORIDE 9 MG/ML
INJECTION, SOLUTION INTRAVENOUS CONTINUOUS
Status: DISCONTINUED | OUTPATIENT
Start: 2022-07-15 | End: 2022-07-15 | Stop reason: HOSPADM

## 2022-07-15 RX ORDER — DEXMEDETOMIDINE HYDROCHLORIDE 100 UG/ML
INJECTION, SOLUTION INTRAVENOUS PRN
Status: DISCONTINUED | OUTPATIENT
Start: 2022-07-15 | End: 2022-07-15 | Stop reason: SDUPTHER

## 2022-07-15 RX ORDER — LIDOCAINE HYDROCHLORIDE 10 MG/ML
0.5 INJECTION, SOLUTION EPIDURAL; INFILTRATION; INTRACAUDAL; PERINEURAL ONCE
Status: DISCONTINUED | OUTPATIENT
Start: 2022-07-15 | End: 2022-07-15 | Stop reason: HOSPADM

## 2022-07-15 RX ORDER — FENTANYL CITRATE 50 UG/ML
INJECTION, SOLUTION INTRAMUSCULAR; INTRAVENOUS PRN
Status: DISCONTINUED | OUTPATIENT
Start: 2022-07-15 | End: 2022-07-15 | Stop reason: SDUPTHER

## 2022-07-15 RX ORDER — LABETALOL HYDROCHLORIDE 5 MG/ML
5 INJECTION, SOLUTION INTRAVENOUS
Status: DISCONTINUED | OUTPATIENT
Start: 2022-07-15 | End: 2022-07-15 | Stop reason: HOSPADM

## 2022-07-15 RX ADMIN — FENTANYL CITRATE 50 MCG: 50 INJECTION, SOLUTION INTRAMUSCULAR; INTRAVENOUS at 10:02

## 2022-07-15 RX ADMIN — PROPOFOL 40 MG: 10 INJECTION, EMULSION INTRAVENOUS at 08:24

## 2022-07-15 RX ADMIN — ONDANSETRON 4 MG: 2 INJECTION INTRAMUSCULAR; INTRAVENOUS at 09:34

## 2022-07-15 RX ADMIN — LIDOCAINE HYDROCHLORIDE 100 MG: 20 INJECTION, SOLUTION EPIDURAL; INFILTRATION; INTRACAUDAL; PERINEURAL at 08:18

## 2022-07-15 RX ADMIN — FENTANYL CITRATE 50 MCG: 50 INJECTION, SOLUTION INTRAMUSCULAR; INTRAVENOUS at 08:18

## 2022-07-15 RX ADMIN — MIDAZOLAM 2 MG: 1 INJECTION INTRAMUSCULAR; INTRAVENOUS at 08:15

## 2022-07-15 RX ADMIN — PROPOFOL 30 MG: 10 INJECTION, EMULSION INTRAVENOUS at 08:21

## 2022-07-15 RX ADMIN — SUGAMMADEX 200 MG: 100 INJECTION, SOLUTION INTRAVENOUS at 10:06

## 2022-07-15 RX ADMIN — SODIUM CHLORIDE, POTASSIUM CHLORIDE, SODIUM LACTATE AND CALCIUM CHLORIDE: 600; 310; 30; 20 INJECTION, SOLUTION INTRAVENOUS at 07:13

## 2022-07-15 RX ADMIN — ACETAMINOPHEN 650 MG: 325 TABLET ORAL at 07:34

## 2022-07-15 RX ADMIN — DEXMEDETOMIDINE HYDROCHLORIDE 8 MCG: 100 INJECTION, SOLUTION INTRAVENOUS at 09:27

## 2022-07-15 RX ADMIN — DEXMEDETOMIDINE HYDROCHLORIDE 8 MCG: 100 INJECTION, SOLUTION INTRAVENOUS at 09:34

## 2022-07-15 RX ADMIN — ROCURONIUM BROMIDE 30 MG: 10 INJECTION, SOLUTION INTRAVENOUS at 09:29

## 2022-07-15 RX ADMIN — DEXAMETHASONE SODIUM PHOSPHATE 10 MG: 4 INJECTION, SOLUTION INTRAMUSCULAR; INTRAVENOUS at 08:45

## 2022-07-15 RX ADMIN — PROPOFOL 180 MG: 10 INJECTION, EMULSION INTRAVENOUS at 08:18

## 2022-07-15 RX ADMIN — CEFAZOLIN 2000 MG: 2 INJECTION, POWDER, FOR SOLUTION INTRAMUSCULAR; INTRAVENOUS at 09:16

## 2022-07-15 RX ADMIN — OXYCODONE 5 MG: 5 TABLET ORAL at 11:32

## 2022-07-15 RX ADMIN — Medication 30 MG: at 08:45

## 2022-07-15 RX ADMIN — PHENYLEPHRINE HYDROCHLORIDE 50 MCG: 10 INJECTION INTRAVENOUS at 09:13

## 2022-07-15 RX ADMIN — ROCURONIUM BROMIDE 50 MG: 10 INJECTION, SOLUTION INTRAVENOUS at 08:18

## 2022-07-15 RX ADMIN — ROCURONIUM BROMIDE 20 MG: 10 INJECTION, SOLUTION INTRAVENOUS at 08:45

## 2022-07-15 RX ADMIN — PHENYLEPHRINE HYDROCHLORIDE 50 MCG: 10 INJECTION INTRAVENOUS at 09:10

## 2022-07-15 RX ADMIN — Medication 20 MG: at 09:31

## 2022-07-15 RX ADMIN — SODIUM CHLORIDE, POTASSIUM CHLORIDE, SODIUM LACTATE AND CALCIUM CHLORIDE: 600; 310; 30; 20 INJECTION, SOLUTION INTRAVENOUS at 09:40

## 2022-07-15 ASSESSMENT — PAIN DESCRIPTION - ORIENTATION
ORIENTATION: LEFT
ORIENTATION: LEFT

## 2022-07-15 ASSESSMENT — PAIN DESCRIPTION - LOCATION: LOCATION: KNEE

## 2022-07-15 ASSESSMENT — PAIN SCALES - GENERAL
PAINLEVEL_OUTOF10: 6
PAINLEVEL_OUTOF10: 6
PAINLEVEL_OUTOF10: 2

## 2022-07-15 ASSESSMENT — PAIN - FUNCTIONAL ASSESSMENT: PAIN_FUNCTIONAL_ASSESSMENT: 0-10

## 2022-07-15 ASSESSMENT — PAIN DESCRIPTION - DESCRIPTORS
DESCRIPTORS: ACHING
DESCRIPTORS: ACHING

## 2022-07-15 NOTE — PROGRESS NOTES
Pt awake and alert at this time. Pt on RA, and VSS. Pt denies nausea, tolerating PO. Pt states pain is tolerable at this time. Skin warm to left foot, palpable pulses and able to wiggle left toes. Pt meets criteria to be discharged from Phase 1.

## 2022-07-15 NOTE — ANESTHESIA PRE PROCEDURE
Department of Anesthesiology  Preprocedure Note       Name:  Karla Pop   Age:  60 y.o.  :  1965                                          MRN:  [id number]         Date:  7/15/2022      Surgeon: Lord Cheung):  MD Leon Putnam MD    Procedure: Procedure(s):  OPEN POSTERIOR KNEE DISSECTION, POSTERIOR CAPSULAR RELEASE  EXPOSE AND CLOSE    Medications prior to admission:   Prior to Admission medications    Medication Sig Start Date End Date Taking? Authorizing Provider   enoxaparin (LOVENOX) 300 MG/3ML injection Inject into the skin daily   Yes Historical Provider, MD   HYDROcodone-acetaminophen (NORCO)  MG per tablet Take 1 tablet by mouth every 4 hours as needed for Pain for up to 5 days.  7/15/22 7/20/22  Sariah Fleming MD   aspirin 325 MG EC tablet Take 1 tablet by mouth daily for 14 days 7/15/22 7/29/22  Sariah Fleming MD   ondansetron [facility]) 4 MG tablet Take 1 tablet by mouth every 8 hours as needed for Nausea  Patient not taking: Reported on 7/15/2022 7/15/22   Sariah Fleming MD   lisinopril (PRINIVIL;ZESTRIL) 40 MG tablet  22   Historical Provider, MD   metoprolol succinate (TOPROL XL) 50 MG extended release tablet  22   Historical Provider, MD   XARELTO 20 MG TABS tablet  22   Historical Provider, MD   meloxicam (MOBIC) 15 MG tablet Take 15 mg by mouth daily as needed  Patient not taking: Reported on 7/15/2022 4/20/18   Historical Provider, MD   sildenafil (VIAGRA) 50 MG tablet Take 50 mg by mouth as needed 17   Historical Provider, MD   cyclobenzaprine (FLEXERIL) 10 MG tablet 3 times daily as needed  18   Historical Provider, MD   esomeprazole (NEXIUM) 20 MG delayed release capsule Take 20 mg by mouth 08   Historical Provider, MD   acetaminophen (TYLENOL) 500 MG tablet Take 500 mg by mouth every 8 hours as needed 22   Historical Provider, MD       Current medications:    Current Facility-Administered Medications   Medication Dose Route Frequency Provider Last Rate Last Admin    lactated ringers infusion   IntraVENous Continuous Diane Lugo MD 50 mL/hr at 07/15/22 0713 New Bag at 07/15/22 0713    lidocaine PF 1 % injection 0.5 mL  0.5 mL IntraDERmal Once Diane Lugo MD        ceFAZolin (ANCEF) 2,000 mg in dextrose 5 % 50 mL IVPB (mini-bag)  2,000 mg IntraVENous On Call to [address] Willie Chandra MD           Allergies:  No Known Allergies    Problem List:    Patient Active Problem List   Diagnosis Code    GERD (gastroesophageal reflux disease) K21.9       Past Medical History:        Diagnosis Date    DVT of lower extremity (deep venous thrombosis) ([facility] [address]     GERD (gastroesophageal reflux disease)     Hypertension        Past Surgical History:        Procedure Laterality Date    CERVICAL FUSION      anterior fusion c6-7    COLONOSCOPY      FOOT AMPUTATION Right 1983    partial    KNEE ARTHROSCOPY      KNEE ARTHROSCOPY Right        Social History:    Social History     Tobacco Use    Smoking status: Never    Smokeless tobacco: Never   Substance Use Topics    Alcohol use:  Yes     Alcohol/week: 10.0 - 12.0 standard drinks     Types: 10 - 12 Shots of liquor per week                                Counseling given: Not Answered      Vital Signs (Current):   Vitals:    07/11/22 1022 07/15/22 0633   BP:  135/81   Pulse:  64   Resp:  16   Temp:  97.1 °F (36.2 °C)   TempSrc:  Temporal   SpO2:  97%   Weight: 195 lb (88.5 kg) 194 lb (88 kg)   Height: 5' 9\" (1.753 m) 5' 9\" (1.753 m)                                              BP Readings from Last 3 Encounters:   07/15/22 135/81   06/22/22 (!) 158/93       NPO Status: Time of last liquid consumption: 2100                        Time of last solid consumption: 1930                        Date of last liquid consumption: 07/14/22                        Date of last solid food consumption: 07/14/22    BMI:   Wt Readings from Last 3 Encounters:   07/15/22 194 lb (88 kg)   06/22/22 195 lb (88.5 kg)     Body mass index is 28.65 kg/m². CBC: No results found for: WBC, RBC, HGB, HCT, MCV, RDW, PLT    CMP: No results found for: NA, K, CL, CO2, BUN, CREATININE, GFRAA, AGRATIO, LABGLOM, GLUCOSE, GLU, PROT, CALCIUM, BILITOT, ALKPHOS, AST, ALT    POC Tests: No results for input(s): POCGLU, POCNA, POCK, POCCL, POCBUN, POCHEMO, POCHCT in the last 72 hours. Coags: No results found for: PROTIME, INR, APTT    HCG (If Applicable): No results found for: PREGTESTUR, PREGSERUM, HCG, HCGQUANT     ABGs: No results found for: PHART, PO2ART, FJT1FMX, TUT9UAX, BEART, F9XXFRDO     Type & Screen (If Applicable):  No results found for: LABABO, LABRH    Drug/Infectious Status (If Applicable):  No results found for: HIV, HEPCAB    COVID-19 Screening (If Applicable): No results found for: COVID19        Anesthesia Evaluation  Patient summary reviewed and Nursing notes reviewed no history of anesthetic complications:   Airway: Mallampati: II  TM distance: >3 FB   Neck ROM: full  Mouth opening: > = 3 FB   Dental: normal exam     Comment: Missing some lower teeth    Pulmonary: breath sounds clear to auscultation                            ROS comment: H/o covid. Has not used inhalers since recovered. Cardiovascular:    (+) hypertension:,     (-) CABG/stent, dysrhythmias and  angina      Rhythm: regular  Rate: normal                    Neuro/Psych:      (-) seizures, TIA and CVA            ROS comment: Cervical spine fusion GI/Hepatic/Renal:   (+) GERD: well controlled,           Endo/Other:    (+) blood dyscrasia: anticoagulation therapy:., .    (-) hypothyroidism, hyperthyroidism               Abdominal:             Vascular:   + DVT, . Other Findings:           Anesthesia Plan      general     ASA 2     (Video scope for intubation)  Induction: intravenous. MIPS: Postoperative opioids intended and Prophylactic antiemetics administered. Anesthetic plan and risks discussed with patient.       Plan discussed with Darryl Patricio MD   7/15/2022

## 2022-07-15 NOTE — PROGRESS NOTES
CLINICAL PHARMACY NOTE: MEDS TO BEDS    Total # of Prescriptions Filled: 4   The following medications were delivered to the patient:  Norco  mg  Zofran 4 mg  Aspirin 325 mg  Clindamycin 300 mg    Additional Documentation:  Delivered to patient wife=signed  Speedy Zapata CPhT

## 2022-07-15 NOTE — ANESTHESIA POSTPROCEDURE EVALUATION
Department of Anesthesiology  Postprocedure Note    Patient: Jovani Shaw  MRN: [id number]  YOB: 1965  Date of evaluation: 7/15/2022      Procedure Summary     Date: 07/15/22 Room / Location: [address]    Anesthesia Start: 0815 Anesthesia Stop: 1020    Procedures:       OPEN POSTERIOR KNEE DISSECTION, POSTERIOR CAPSULAR RELEASE (Left: Knee)      EXPOSE AND CLOSE (Left) Diagnosis:       Arthrofibrosis of knee joint, left      (M24.662 ARTHROFIBROSIS, LEFT KNEE)    Surgeons: Carolina Crow MD; Claudia Gibson MD Responsible Provider: Jean Claude Ordoñez MD    Anesthesia Type: general ASA Status: 2          Anesthesia Type: No value filed.     Joan Phase I: Joan Score: 10    Joan Phase II: Joan Score: 10      Anesthesia Post Evaluation    Patient location during evaluation: PACU  Patient participation: complete - patient participated  Level of consciousness: awake  Airway patency: patent  Nausea & Vomiting: no vomiting  Complications: no  Cardiovascular status: hemodynamically stable  Respiratory status: acceptable  Hydration status: euvolemic  Multimodal analgesia pain management approach

## 2022-07-15 NOTE — OP NOTE
[address]                     [facility], [address] [place] [address]                                OPERATIVE REPORT    PATIENT NAME: Ankit Esparza                       :        1965  MED REC NO:   [id number]                          ROOM:  ACCOUNT NO:   [de-identified]                           ADMIT DATE: 07/15/2022  PROVIDER:     Gus Garner MD    DATE OF PROCEDURE:  07/15/2022    PREOPERATIVE DIAGNOSIS:  Arthrofibrosis left knee with contraction. POSTOPERATIVE DIAGNOSIS:  Arthrofibrosis left knee with contraction. OPERATION PERFORMED:  Left popliteal fossa exposure for left knee  capsular release. SURGEON:  Gus Garner MD and Dr. Dre Hedrick. ANESTHESIA:  General endotracheal.    ESTIMATED BLOOD LOSS:  Less than 50 mL. HISTORY:  The patient is a 78-year-old gentleman who has suffered from  left knee septic arthritis and developed a knee flexion contracture. I  was asked to provide posterior popliteal fossa exposure to allow for  posterior capsular release on this patient. He understood my role in  the operation and risks associated with it. TECHNIQUE:  The patient was brought to the operating room and placed on  the operating room table in a prone position. The left knee and leg  were circumferentially prepped and draped. A S-shape incision was made  centered over the popliteal crease. Subcutaneous tissue was divided and  the lesser saphenous vein was identified. It was divided between 2-0  silks. The fascia of the popliteal fossa was then opened along the  course of this S-shaped incision using electrocautery. The tibial nerve  and its large branches were identified and carefully dissected  preserving all branches. It was circumferentially controlled with a  large vessel loop. Deep to this was identified the popliteal vein. It  was mobilized both laterally and medially dividing one large venous  branch with 2-0 silks.   The popliteal artery was then identified deep to  this and left minimally dissected. After adequate mobilization of the  neurovascular bundle both laterally and medially, it was protected with  a finger retractor. Remaining soft tissue in the posterior fossa  between the vessels and the knee was divided using electrocautery and  blunt dissection. At this point, Dr. Dre Hedrick marked his desired level  of capsular release and it was confirmed with fluoroscopy. This portion  of the procedure will be dictated separately by Dr. Dre Hedrick. After  having completed his release, a round channel drain was placed deep in  the fossa for drainage of any blood collection and brought to the  inferior flap and sutured in place with a 2-0 silk and applied  eventually to suction. There was noted to be good dorsalis pedis pulse  palpable through the foot dressing. There was no evidence of any  vascular injury or nerve injury. The fascia of the popliteal fossa was  then closed using interrupted 3-0 Vicryls. 3-0 Vicryls were used for  subcuticular tissues closure and then the skin was closed using a  running 4-0 Monocryl with Prineo dressing. Clean sterile dressing was  applied with the knee immobilized per Dr. Montana Walden instructions.         Yoly Sin MD    D: 07/15/2022 10:05:03       T: 07/15/2022 14:02:37     [id number]  Job#: [id number]     Doc#: [id number]    CC:

## 2022-07-15 NOTE — DISCHARGE INSTRUCTIONS
Orthopedic Surgery Discharge Instructions    Medications:   A pain medication has been prescribed for you. Please take this as instructed by the pharmacist.  An anti-nausea medication has been prescribed for you to use as needed for nausea. Either aspirin or a blood thinner medication may have been prescribed for you. Please take this as instructed. An antibiotic has been prescribed for you, please take as instructed and finish completely. You may restart your Xarelto tomorrow. Activity:  Weightbearing as tolerated with crutches as needed. Must remain in knee immobilizer at all times except for hygiene. Incision/dressings:  Please keep knee brace, dressing and drain intact until follow up appointment on Monday. Please keep dressing clean and dry. Following removal dressing please apply dry dressing daily. You may shower in 72 days and allow the water to run over the incision. However no submerging underwater or getting in pools. A drain has been placed within the posterior aspect of your knee. Please emptied his drain as instructed by staff when full. This will be removed at your follow up on Monday. Follow-up:  You need to go to the office on Monday. If you do not already have a postoperative follow-up appointment scheduled you should call to schedule an appointment within 10 to 14 days of surgery. Emergency or after hours questions:  Please call the main call center at [phone] for all questions or concerns during evening and weekend hours. The call center will direct your call to the on-call physician to answer your questions and concerns. During weekly office hours you may call my assistant, Anjana, at [phone].                            Megan Pak MD            Orthopaedic Surgery Sports Medicine and [address] [place] [address] Team Physician [facility] ([place]

## 2022-07-15 NOTE — PROGRESS NOTES
Discharge instructions review with patient and pt wife bedside with pt. All home medications have been reviewed, pt v/u. Pt discharged via wheelchair. Pt discharged with instructions, prescriptions delivered by pharmacy and all belongings, including home crutches. Wife taking stable pt home.

## 2022-07-18 ENCOUNTER — EVALUATION (OUTPATIENT)
Dept: PHYSICAL THERAPY | Age: 57
End: 2022-07-18

## 2022-07-18 ENCOUNTER — HOSPITAL ENCOUNTER (OUTPATIENT)
Dept: PHYSICAL THERAPY | Age: 57
Setting detail: THERAPIES SERIES
Discharge: HOME | End: 2022-07-18

## 2022-07-18 ENCOUNTER — OFFICE VISIT (OUTPATIENT)
Dept: ORTHOPEDIC SURGERY | Age: 57
End: 2022-07-18

## 2022-07-18 ENCOUNTER — TELEPHONE (OUTPATIENT)
Dept: ORTHOPEDIC SURGERY | Age: 57
End: 2022-07-18

## 2022-07-18 VITALS — HEIGHT: 69 IN | WEIGHT: 194 LBS | BODY MASS INDEX: 28.73 KG/M2

## 2022-07-18 DIAGNOSIS — G89.18 POST-OPERATIVE PAIN: Primary | ICD-10-CM

## 2022-07-18 DIAGNOSIS — M25.562 LEFT KNEE PAIN, UNSPECIFIED CHRONICITY: Primary | ICD-10-CM

## 2022-07-18 PROCEDURE — 99024 POSTOP FOLLOW-UP VISIT: CPT | Performed by: ORTHOPAEDIC SURGERY

## 2022-07-18 NOTE — PROGRESS NOTES
7/18/2022     Reason for visit:  Status post left knee posterior capsular release on 7/15/2022    History of Present Illness: The patient returns for postop evaluation. Overall he is doing well. He denies fever or chills. No numbness or tingling. He has been using the knee immobilizer as instructed. Objective:  Ht 5' 9\" (1.753 m)   Wt 194 lb (88 kg)   BMI 28.65 kg/m²      Physical Exam:  The patient is well-appearing and in no apparent distress  Examination of the left knee   There is a small effusion, no gross deformity or skin changes  Incision sites are well-healed  Patient does still lack about 15 to 20 degrees of extension today in the office  5 out of 5 strength throughout distal muscle groups  Sensation is intact to light touch throughout all distributions  There is no calf swelling or tenderness  Palpable DP pulse, brisk cap refill, 2+ symmetric reflexes     Assessment:  Status post left knee posterior capsular release on 7/15/2022    Plan:  I did tell the patient that in the operative room we were able to get him close to near full extension. At this point the drain was removed successfully. We will coordinate a visit with our physical therapist tomorrow in order to hopefully start serial casting in order to hopefully get him to full extension over the course the next several weeks. He will return to see me as next scheduled appointment. Darrel Rivas MD            Orthopaedic Surgery Sports Medicine and [address] and [address] [facility]            Team Physician [facility] ([place]      Disclaimer: This note was dictated with voice recognition software. Though review and correction are routine, we apologize for any errors.

## 2022-07-18 NOTE — OP NOTE
stiffness. He has also sustained another complication in the form of a DVT for which she takes Xarelto. 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 I had a very long discussion with the patient. He has a very complex constellation of findings. He has significantly decreased range of motion with both flexion and extension. This makes this very challenging. He also has advanced degeneration of the knee. He has not made progress with physical therapy and improving his motion recently. I do agree with Dr. Faby David and the fact that the patient to likely require multiple surgeries to address his arthrofibrosis. He will require a posterior as well as an anterior surgery with releases and capsular lengthenings to hopefully get back full flexion extension. As result I will reach out to my vascular surgeon on staff to coordinate a potential combined posterior approach procedure in which vascular will safely perform the approach followed by capsular release by me. This will hopefully get him to extension. Once he recovers from that we would then proceed with an anterior base procedure by Dr. Faby David to get his flexion back. It is imperative that we restore his full motion prior to considering total knee arthroplasty and he is aware of this. The risk and benefits of the posterior based surgery were discussed. They were defined as but not limited to infection, bleeding, damage to blood vessels or nerves, continued to decreased range of motion, need for additional surgery, medical complications. He does understand elects proceed. Please note that this was a combined surgery. Dr. Amor Connolly will be dictating the portion of his case separately. The patient did undergo initial posterior approach by Dr. Amor Connolly. I was present for this portion of the case. Once Dr. Amor Connolly was able to safely bring me down to the posterior apsule I then proceeded with my portion of the case.     At that point fluoroscopy was used in order to determine where we are at in regards to the joint line. I did perform a capsulotomy medially and laterally at the level of the distal femur just below the origin of the gastrocnemius medially and laterally. We did obtain cultures given his history of previous infection. Following this capsulotomy I did use a Meek elevator in order to perform periosteal elevation and stripping to perform a thorough capsular release both medially and laterally. Throughout this we did ensure we were protecting the neurovascular bundle. Following this I did perform a gentle manipulation under anesthesia. Following this we were able to bring the patient close to full extension. We were probably about 5 degrees shy of this. Following that the wound was thoroughly irrigated. Hemostasis was achieved. Closure was performed per Dr. Ladi Bird. Following the case the patient was successfully exposed and taken recovery room in excellent condition. At the end of procedure all counts were correct and I was present for entire case. Postoperatively the patient was placed into a knee immobilizer and we sent him home with a drain with instructions. He will return to see me on Monday. At that time we will plan to remove the drain and start serial casting.     Electronically signed by Crissy Villa MD on 7/18/2022 at 1:31 PM

## 2022-07-18 NOTE — PROGRESS NOTES
[facility]   Phone: [phone]    Fax: [phone]            Physical Therapy  Cancellation/No-show Note  Patient Name:  Maicol Galdamez  :  1965   Date:  2022  Cancelled visits to date: 1  No-shows to date: 0    For today's appointment patient:  [x]  Cancelled  []  Rescheduled appointment  []  No-show     Reason given by patient:  []  Patient ill  []  Conflicting appointment  []  No transportation    []  Conflict with work  []  No reason given  [x]  Other:  Pt still had drains in L knee after surg and MD did not want him seen until they were removed. Pt was seeing MD today so pt will schedule a PT appt after approval by MD.    Comments:      Phone call information:   []  Phone call made today to patient at _ time at number provided:      []  Patient answered, conversation as follows:    []  Patient did not answer, message left as follows:  []  Phone call not made today  [x]  Phone call not needed - pt contacted us to cancel and provided reason for cancellation.      Electronically signed by:  Pat Russ, PT, Tiffany Knox 85, OMT-C    Physical Therapist [place] license #[id number]  Physical Therapist [place] license #[id number]

## 2022-07-19 ENCOUNTER — OFFICE VISIT (OUTPATIENT)
Dept: ORTHOPEDIC SURGERY | Age: 57
End: 2022-07-19
Payer: COMMERCIAL

## 2022-07-19 VITALS — BODY MASS INDEX: 28.73 KG/M2 | HEIGHT: 69 IN | WEIGHT: 194 LBS

## 2022-07-19 DIAGNOSIS — M25.662 DECREASED ROM OF LEFT KNEE: ICD-10-CM

## 2022-07-19 DIAGNOSIS — Z98.890 STATUS POST LEFT KNEE SURGERY: ICD-10-CM

## 2022-07-19 DIAGNOSIS — Z09 POSTOP CHECK: Primary | ICD-10-CM

## 2022-07-19 PROCEDURE — 29365 APPL CYLINDER CAST: CPT | Performed by: PHYSICIAN ASSISTANT

## 2022-07-19 PROCEDURE — 99024 POSTOP FOLLOW-UP VISIT: CPT | Performed by: PHYSICIAN ASSISTANT

## 2022-07-19 NOTE — PROGRESS NOTES
This patient is 4 days status post a left open posterior knee dissection and posterior capsular release by Dr. Jong Benson. Preoperatively the patient had decreased range of motion due to arthrofibrosis. His range of motion preoperatively was 45 to 80 degrees. He presents today for a placement of a cylinder cast to assist in achieving additional left knee extension. Cylinder cast placement with Beth Hubbard PTA and Larisa Isidro  After risks and benefits were discussed and informed consent acquired the patient was positioned supine on the treatment table. The left lower extremity was ensured to be clean with out any ulcers or open wounds. Postop wounds exhibit no evidence of infection or dehiscence. The leg extended and propped up at the heel a stockinette was placed over the right lower extremity. Drain has been pulled and Steri-Strip placed over the drain site. Sterile bandaging was placed over the surgical site with additional gauze. Cast padding was applied from the proximal thigh to the ankle. Several rolls of casting tape were applied to the left lower extremity from the mid thigh to 1 inch below the ankle joint line. Cast was smoothed over ensuring no bulges, divots or pressure points. All sharp edges were covered with tape. Patient tolerated the procedure well. The patient was neurovascularly intact after cast application. Capsule remain in place for 48 hours. The patient has contact information to reach us for any questions or concerns. We are able to remove the cath sooner if needed. The patient should go to the emergency department should he feel he is having a life or limb threatening condition. Patient was neurovascularly intact prior to and after cast placement. The patient expressed understanding of all instructions and agrees with this plan. Encounter Diagnoses   Name Primary?     Postop check Yes    Status post left knee surgery     Decreased ROM of left knee

## 2022-07-21 ENCOUNTER — OFFICE VISIT (OUTPATIENT)
Dept: ORTHOPEDIC SURGERY | Age: 57
End: 2022-07-21
Payer: COMMERCIAL

## 2022-07-21 VITALS — WEIGHT: 194 LBS | HEIGHT: 69 IN | BODY MASS INDEX: 28.73 KG/M2

## 2022-07-21 DIAGNOSIS — M24.662 ARTHROFIBROSIS OF KNEE JOINT, LEFT: ICD-10-CM

## 2022-07-21 DIAGNOSIS — Z09 POSTOP CHECK: Primary | ICD-10-CM

## 2022-07-21 DIAGNOSIS — M25.662 DECREASED ROM OF LEFT KNEE: ICD-10-CM

## 2022-07-21 DIAGNOSIS — Z98.890 STATUS POST LEFT KNEE SURGERY: ICD-10-CM

## 2022-07-21 PROCEDURE — 29705 RMVL/BIVLV FULL ARM/LEG CAST: CPT | Performed by: PHYSICIAN ASSISTANT

## 2022-07-21 PROCEDURE — 99024 POSTOP FOLLOW-UP VISIT: CPT | Performed by: PHYSICIAN ASSISTANT

## 2022-07-25 ENCOUNTER — OFFICE VISIT (OUTPATIENT)
Dept: ORTHOPEDIC SURGERY | Age: 57
End: 2022-07-25

## 2022-07-25 ENCOUNTER — PATIENT MESSAGE (OUTPATIENT)
Dept: ORTHOPEDIC SURGERY | Age: 57
End: 2022-07-25

## 2022-07-25 ENCOUNTER — HOSPITAL ENCOUNTER (OUTPATIENT)
Dept: PHYSICAL THERAPY | Age: 57
Setting detail: THERAPIES SERIES
Discharge: HOME | End: 2022-07-25

## 2022-07-25 VITALS — BODY MASS INDEX: 28.73 KG/M2 | HEIGHT: 69 IN | WEIGHT: 194 LBS

## 2022-07-25 DIAGNOSIS — M25.559 HIP PAIN: Primary | ICD-10-CM

## 2022-07-25 PROCEDURE — 99024 POSTOP FOLLOW-UP VISIT: CPT | Performed by: ORTHOPAEDIC SURGERY

## 2022-07-25 RX ORDER — METHYLPREDNISOLONE 4 MG/1
4 TABLET ORAL SEE ADMIN INSTRUCTIONS
Qty: 1 KIT | Refills: 0 | Status: CANCELLED | OUTPATIENT
Start: 2022-07-25 | End: 2022-07-31

## 2022-07-25 NOTE — TELEPHONE ENCOUNTER
From: Karla Pop  To: Dr. Fregoso Merissa: 7/25/2022 2:10 PM EDT  Subject: Knee Surgery    During my post Op visit I was meaning to ask if there would be any issue with me getting in our swimming pool? I wanted to make sure theres no issue with the incision.

## 2022-07-25 NOTE — TELEPHONE ENCOUNTER
I spoke with regarding his questions about going into the pool. Per Dr. Luis Manuel Draper he is ok with the Pt going into the pool 2 weeks from date of sx which was 7/15/22.  Pt understood and all questions were answered

## 2022-07-26 NOTE — PROGRESS NOTES
7/25/2022     Reason for visit:  Status post left knee posterior capsular release on 7/15/2022    History of Present Illness: The patient returns for postop evaluation. Overall he is doing well. He denies fever or chills. No numbness or tingling. Have started serial casting with our physical therapist athletic trainers. He does report that he is seeing improvements in his extension. Objective:  Ht 5' 9\" (1.753 m)   Wt 194 lb (88 kg)   BMI 28.65 kg/m²      Physical Exam:  The patient is well-appearing and in no apparent distress  Examination of the left knee   There is a small effusion, no gross deformity or skin changes  Incision sites are well-healed  Patient does still lack about 10-15 degrees of extension today in the office  5 out of 5 strength throughout distal muscle groups  Sensation is intact to light touch throughout all distributions  There is no calf swelling or tenderness  Palpable DP pulse, brisk cap refill, 2+ symmetric reflexes     Assessment:  Status post left knee posterior capsular release on 7/15/2022    Plan:  Overall the patient does report improvements which I have seen as well. However this is a slow process. We will continue serial casting. We will also incorporate some more active physical therapy as well as an extension chad device at home. Return to see me in 1 week. Carolina Crow MD            Orthopaedic Surgery Sports Medicine and [address] and [address] [facility]            Team Physician [facility] ([place])      Disclaimer: This note was dictated with voice recognition software. Though review and correction are routine, we apologize for any errors.

## 2022-07-27 ENCOUNTER — EVALUATION (OUTPATIENT)
Dept: PHYSICAL THERAPY | Age: 57
End: 2022-07-27
Payer: COMMERCIAL

## 2022-07-27 DIAGNOSIS — R26.2 DIFFICULTY WALKING: ICD-10-CM

## 2022-07-27 DIAGNOSIS — M24.662 FIBROSIS OF KNEE JOINT, LEFT: ICD-10-CM

## 2022-07-27 DIAGNOSIS — M25.562 LEFT KNEE PAIN, UNSPECIFIED CHRONICITY: Primary | ICD-10-CM

## 2022-07-27 PROCEDURE — 97162 PT EVAL MOD COMPLEX 30 MIN: CPT | Performed by: PHYSICAL THERAPIST

## 2022-07-27 PROCEDURE — 97530 THERAPEUTIC ACTIVITIES: CPT | Performed by: PHYSICAL THERAPIST

## 2022-07-27 PROCEDURE — 97110 THERAPEUTIC EXERCISES: CPT | Performed by: PHYSICAL THERAPIST

## 2022-07-27 NOTE — PROGRESS NOTES
[facility]   Phone: [phone]    Fax: [phone]          Physical Therapy Certification       Dear Referring Practitioner: Dr. Randall Moreno,    We had the pleasure of evaluating the following patient for physical therapy services at [address] [place] [address]. A summary of our findings can be found in the initial assessment below. This includes our plan of care. If you have any questions or concerns regarding these findings, please do not hesitate to contact me at the office phone number checked above. Thank you for the referral.       Physician Signature:_______________________________Date:__________________  By signing above (or electronic signature), therapists plan is approved by physician      Patient: Santina Favre   : 1965   MRN: [id number]  Referring Physician: Referring Practitioner: Dr. 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   [] Yes, Patient intake triggered further evaluation      [] C-SSRS Screening completed  [] PCP notified via Plan of Care  [] Emergency services notified       Preferred Language for Healthcare:   [x]English       []other:      SUBJECTIVE: Patient stated complaint: L knee pain. Pt has been having pain in L knee for awhile and had an cortisone injection into L knee in April prior to taking a trip to [facility].  Once pt was in [place], he started having more pain in L knee and went to ER. Pt had an infection and underwent a debridement of L knee and put on an IV antibiotic. He continued w/ antibiotic for 6 weeks. Pt underwent a posterior capsular debridement on 7/18/22 due to L knee flex contracture. Pt has been started on serial casting and wearing cast at home. He took it off today so he could drive to clinic. DVT diagnosis after plane ride back and was on blood thinner. He is taking aspirin as directed since surg. Pt is managing pain w/ OTC and icing currently. Pt lives at home w/ wife for assistance. Relevant Medical History:prostate cancer, controlled HTN; COVID x2 w/ full r  Functional Scale/Score: FOTO 29    Pain Scale: 2-6/10  Easing factors: activity modification  Provocative factors: straightening L knee, WBing,      Type: [x]Constant   []Intermittent  []Radiating [x]Localized []other:     Numbness/Tingling: tingling in posterior calf since surg into the ankle, laterally; Occupation/School: - sedentary but walking on concrete and gravel and stairs; fishing, hunting and boating, lifting wts;     Living Status/Prior Level of Function: Independent with ADLs and IADLs, unlimited w/ ADL's and function since onset of recent symptoms;    OBJECTIVE:     Flexibility L R Comment   Hamstring severe tightness Mon tightness 7/27/2022   Gastroc Mod tightness Min tightness    ITB      Quad                ROM LEFT RIGHT   HIP Flex 75%+ WNL   HIP Abd     HIP Ext     HIP IR     HIP ER 50% 75%   Knee ext -20 Passive; -15 Active -2 active   Knee Flex  125 deg   Ankle PF     Ankle DF     Ankle In     Ankle Ev     Strength  LEFT RIGHT   HIP Flexors     HIP Abductors     HIP Ext     Hip ER     Knee EXT (quad) fair good   Knee Flex (HS)     Ankle DF     Ankle PF     Ankle Inv     Ankle EV          Circumference  Mid apex  7 cm prox             Reflexes/Sensation:    []Dermatomes/Myotomes intact    []Reflexes equal and normal bilaterally   [x]Other: intact to light touch; Joint mobility:    []Normal    [x]Hypo L knee   []Hyper    Palpation: edema noted L knee region around joint; Functional Mobility/Transfers: cautious w/ extended time for transfers w/o UE needed;    Posture: mesomorph body structure;    Bandages/Dressings/Incisions: clear taping    Gait: (include devices/WB status) amb w/ 2 crutches and TTWB w/o knee ext and slow kushal;    Orthopedic Special Tests: 7/27/22 deferred due to surg;         TEST    GOAL   SINGLE LEG STANCE TIME    >25 SECONDS   TIMED UP And GO    61-75 y/o: <9sec  66-76 y/o: <10sec  80-81 y/o: <12 sec   6 MINUTE WALK TEST                      M             F          60-68 y/o: >.31mile,  >.30mile  66-76 y/o: >.28 mile, >.26 mile  80-81 y/o: >.21mile,  >.20 mile   STAIR CLIMBING TEST    < 13 SEC (M&F)                        [x] Patient history, allergies, meds reviewed. Medical chart reviewed. See intake form. Review Of Systems (ROS):  [x]Performed Review of systems (Integumentary, CardioPulmonary, Neurological) by intake and observation. Intake form has been scanned into medical record. Patient has been instructed to contact their primary care physician regarding ROS issues if not already being addressed at this time.       Co-morbidities/Complexities (which will affect course of rehabilitation):   []None           Arthritic conditions   []Rheumatoid arthritis (M05.9)  []Osteoarthritis (M19.91)   Cardiovascular conditions   [x]Hypertension (I10)  []Hyperlipidemia (E78.5)  []Angina pectoris (I20)  []Atherosclerosis (I70)  []CVA Musculoskeletal conditions   []Disc pathology   []Congenital spine pathologies   []Prior surgical intervention  []Osteoporosis (M81.8)  []Osteopenia (M85.8)   Endocrine conditions   []Hypothyroid (E03.9)  []Hyperthyroid Gastrointestinal conditions   []Constipation (N64.82)   Metabolic conditions   []Morbid obesity (E66.01)  []Diabetes type 1(E10.65) or 2 (E11.65)   []Neuropathy (G60.9)     Pulmonary conditions []Asthma (J45)  []Coughing   []COPD (J44.9)   Psychological Disorders  []Anxiety (F41.9)  []Depression (F32.9)   []Other:   []Other:     Prostate CA  COVID     Barriers to/and or personal factors that will affect rehab potential:              []Age  []Sex    []Smoker              [x]Motivation/Lack of Motivation                        [x]Co-Morbidities              [x]Cognitive Function, education/learning barriers              []Environmental, home barriers              []profession/work barriers  []past PT/medical experience  []other:  Justification:     Falls Risk Assessment (30 days):   [x] Falls Risk assessed and no intervention required. [] Falls Risk assessed and Patient requires intervention due to being higher risk   TUG score (>12s at risk):     [] Falls education provided, including         ASSESSMENT: Pt is 61 y/o male s/p L knee posterior capsule release 1 week ago. Pt has had limitations in L knee for both flex and ext ranges. Pain is primary around ant L knee. Poor patella movement due to old scar medially and contracture that limits superior/inferior direction. Pt has very complex condition that will need extensive therapy to address deficits. Pt is highly motivated and in otherwise, good physical shape. Pt should benefit from therapy to address deficits in L knee.      Functional Impairments:     []Noted lumbar/proximal hip/LE hypomobility   [x]Decreased LE functional ROM   [x]Decreased core/proximal hip strength and neuromuscular control   [x]Decreased LE functional strength   [x]Reduced balance/proprioceptive control   []other:      Functional Activity Limitations (from functional questionnaire and intake)   []Reduced ability to tolerate prolonged functional positions   [x]Reduced ability or difficulty with changes of positions or transfers between positions   []Reduced ability to maintain good posture and demonstrate good body mechanics with sitting, bending, and lifting   []Reduced ability to sleep   [x] Reduced ability or tolerance with driving and/or computer work   [x]Reduced ability to perform lifting, carrying tasks   [x]Reduced ability to squat   [x]Reduced ability to forward bend   [x]Reduced ability to ambulate prolonged functional periods/distances/surfaces   [x]Reduced ability to ascend/descend stairs   [x]Reduced ability to run, hop or jump   []other:     Participation Restrictions   [x]Reduced participation in self care activities   [x]Reduced participation in home management activities   [x]Reduced participation in work activities   [x]Reduced participation in social activities. [x]Reduced participation in sport activities. Classification :    [x]Signs/symptoms consistent with post-surgical status including decreased ROM, strength and function.    []Signs/symptoms consistent with joint sprain/strain   []Signs/symptoms consistent with patella-femoral syndrome   [x]Signs/symptoms consistent with knee OA/hip OA   [x]Signs/symptoms consistent with internal derangement of knee/Hip   [x]Signs/symptoms consistent with functional hip weakness/NMR control      []Signs/symptoms consistent with tendinitis/tendinosis    []signs/symptoms consistent with pathology which may benefit from Dry needling      []other:      Prognosis/Rehab Potential:      []Excellent   [x]Good    []Fair   []Poor    Tolerance of evaluation/treatment:    []Excellent   [x]Good    [x]Fair   []Poor    Physical Therapy Evaluation Complexity Justification  [x] A history of present problem with:  [] no personal factors and/or comorbidities that impact the plan of care;  [x]1-2 personal factors and/or comorbidities that impact the plan of care  []3 personal factors and/or comorbidities that impact the plan of care  [x] An examination of body systems using standardized tests and measures addressing any of the following: body structures and functions (impairments), activity limitations, and/or participation restrictions;:  [] a total of 1-2 or more elements   [] a total of 3 or more elements   [x] a total of 4 or more elements   [x] A clinical presentation with:  [] stable and/or uncomplicated characteristics   [x] evolving clinical presentation with changing characteristics  [] unstable and unpredictable characteristics;   [x] Clinical decision making of [] low, [x] moderate, [] high complexity using standardized patient assessment instrument and/or measurable assessment of functional outcome. [] EVAL (LOW) 13142 (typically 20 minutes face-to-face)  [x] EVAL (MOD) 61431 (typically 30 minutes face-to-face)  [] EVAL (HIGH) 36792 (typically 45 minutes face-to-face)  [] RE-EVAL     PLAN:  Frequency/Duration:  3 days per week for 6 Weeks:  Interventions:  [x]  Therapeutic exercise including: strength training, ROM, for Lower extremity and core   [x]  NMR activation and proprioception for LE, Glutes and Core   [x]  Manual therapy as indicated for LE, Hip and spine to include: Dry Needling/IASTM, STM, PROM, Gr I-IV mobilizations, manipulation. [x] Modalities as needed that may include: thermal agents, E-stim, Biofeedback, US, iontophoresis as indicated  [x] Patient education on joint protection, postural re-education, activity modification, progression of HEP. HEP instruction: Instructed patient in a HEP. Patient demonstrated exercises correctly. Handout with  pictures and # of reps/sets was given to pt. Exercises included those listed above. PT's business card with information given to pt for any questions or problems that may occur before next visit. GOALS:  Patient stated goal: return to outdoor activities for walking and climbing, along with work  [] Progressing: [] Met: [] Not Met: [] Adjusted    Therapist goals for Patient:   Short Term Goals: To be achieved in: 2 weeks  1. Independent in HEP and progression per patient tolerance, in order to prevent re-injury. [] Progressing: [] Met: [] Not Met: [] Adjusted  2.  Patient will have a decrease in pain to facilitate improvement in movement, function, and ADLs as indicated by Functional Deficits. [] Progressing: [] Met: [] Not Met: [] Adjusted    Long Term Goals: To be achieved in: 12+ weeks  1. Functional index score of 67 or more for FOTO to assist with reaching prior level of function. [] Progressing: [] Met: [] Not Met: [] Adjusted  2. Patient will demonstrate increased AROM to 0-125 deg to allow for proper joint functioning as indicated by patients Functional Deficits. [] Progressing: [] Met: [] Not Met: [] Adjusted  3. Patient will demonstrate an increase in Strength to good proximal hip strength and control, within 5lb HHD in LE to allow for proper functional mobility as indicated by patients Functional Deficits. [] Progressing: [] Met: [] Not Met: [] Adjusted  4. Patient will return to 90% functional activities without increased symptoms or restriction. [] Progressing: [] Met: [] Not Met: [] Adjusted  5.  Pt will be able to walk 45 min on uneven surfaces for hiking and climbing. (patient specific functional goal)    [] Progressing: [] Met: [] Not Met: [] Adjusted     Electronically signed by:  Kike Gates PT, Tiffany Knox 85, OMT-C    Physical Therapist [id number] [address] license #[id number]  Physical Therapist [place] license #[id number]

## 2022-07-27 NOTE — PROGRESS NOTES
[facility]   Phone: [phone]    Fax: [phone]      Physical Therapy Treatment Note/ Progress Report:       Date:  2022    Patient Name:  Frank Mojica    :  1965  MRN: [id number]  Restrictions/Precautions:    Medical/Treatment Diagnosis Information:  Diagnosis: M24.662 arthofibrous L knee   DOS 22 posterior capsule release  Treating Diagnosis: limited walking, limited knee ROM, pain in knee, weakness in L LE    Insurance/Certification information:  PT Insurance Information: anthem  Physician Information:  Referring Practitioner: Dr. Sofía Clay  Has the plan of care been signed (Y/N):        []  Yes  [x]  No     Date of Patient follow up with Physician: 22      Is this a Progress Report:     []  Yes  [x]  No        If Yes:  Date Range for reporting period:  Beginning 22  Ending 22    Progress report will be due (10 Rx or 30 days whichever is less): 52       Recertification will be due (POC Duration  / 90 days whichever is less): 22        Visit # Insurance Allowable Auth Required   1 BMN []  Yes [x]  No        Functional Scale: FOTO 29; LEFS 13;    Date assessed:  22      Latex Allergy:  [x]NO      []YES  Preferred Language for Healthcare:   [x]English       []other:    Pain level:  3-6/10     SUBJECTIVE:  See eval    OBJECTIVE: See eval  Observation:   Test measurements:      RESTRICTIONS/PRECAUTIONS: 22 posterior L knee capsule release; Limited ROM In L knee after injection in April that was limited prior to surg for flex and ext;     Exercises/Interventions:   Exercise/Equipment Resistance/Repetitions Other comments   Cardio/Warm-up     Bike NPV- warm up  oscillations    Treadmill          Stretching     Hamstring Seated propped 10\"x10    Hip Flexion     ITB     Grion     Quad     Inclined Calf     Towel Pull 30\"x5         ROM     Passive ERMI 10\"x10 for flex    Active     Weight Shift     Weight Hangs     Sheet Pulls     Ankle Pumps      Extensionator W/ icing using strap for overpressure b/c cuff would not hold air         Patellar Glides     Medial X 10    Superior X 10    Inferior X 10         STRENGTH     SLR     Supine     Prone     Abduction     Adducton     SLR+          Isometrics     Quad sets 10\"x10 over foam roll         CKC     Calf raises     Wall sits     Step ups     1 leg stand     Squatting     CC TKE     Balance          PRE     Extension  RANGE:   Flexion  RANGE:   Leg Press  RANGE:        Cable Column     Ed given on POC, expectations and goals x10'         Manual/Modalities                 Therapeutic Exercise and NMR EXR  [x] (31767) Provided verbal/tactile cueing for activities related to strengthening, flexibility, endurance, ROM for improvements in LE, proximal hip, and core control with self care, mobility, lifting, ambulation.  [] (43602) Provided verbal/tactile cueing for activities related to improving balance, coordination, kinesthetic sense, posture, motor skill, proprioception  to assist with LE, proximal hip, and core control in self care, mobility, lifting, ambulation and eccentric single leg control.      NMR and Therapeutic Activities:    [x] (28573 or 91820) Provided verbal/tactile cueing for activities related to improving balance, coordination, kinesthetic sense, posture, motor skill, proprioception and motor activation to allow for proper function of core, proximal hip and LE with self care and ADLs  [] (80641) Gait Re-education- Provided training and instruction to the patient for proper LE, core and proximal hip recruitment and positioning and eccentric body weight control with ambulation re-education including up and down stairs     Home Exercise Program:    [x] (10372) Reviewed/Progressed HEP activities related to strengthening, flexibility, endurance, ROM of core, proximal hip and LE for functional self-care, mobility, lifting and ambulation/stair navigation            Written HEP est  [] (61971)Reviewed/Progressed HEP activities related to improving balance, coordination, kinesthetic sense, posture, motor skill, proprioception of core, proximal hip and LE for self care, mobility, lifting, and ambulation/stair navigation      Manual Treatments:  PROM / STM / Oscillations-Mobs:  G-I, II, III, IV (PA's, Inf., Post.)  [] (46143) Provided manual therapy to mobilize LE, proximal hip and/or LS spine soft tissue/joints for the purpose of modulating pain, promoting relaxation,  increasing ROM, reducing/eliminating soft tissue swelling/inflammation/restriction, improving soft tissue extensibility and allowing for proper ROM for normal function with self care, mobility, lifting and ambulation. Modalities:  CP x10 min w/ ext stretch in extensionator   [] GAME READY (VASO)- for significant edema, swelling, pain control. Charges:  Timed Code Treatment Minutes: 35   Total Treatment Minutes: 60     [] EVAL (LOW) 04109 (typically 20 minutes face-to-face)  [x] EVAL (MOD) 43218 (typically 30 minutes face-to-face)  [] EVAL (HIGH) 42599 (typically 45 minutes face-to-face)  [] RE-EVAL   [x] DH(13016) 1x  20'   [] IONTO  [] NMR (46432) x     [] VASO  [] Manual (35126) x      [] Other:  [x] TA (91563) 1x   15'   [] Mech Traction (29727)  [] ES(attended) (78673)      [] ES (un) (49089):     GOALS:  Patient stated goal: return to outdoor activities for walking and climbing, along with work  [] Progressing: [] Met: [] Not Met: [] Adjusted     Therapist goals for Patient:  Short Term Goals: To be achieved in: 2 weeks  1. Independent in HEP and progression per patient tolerance, in order to prevent re-injury. [] Progressing: [] Met: [] Not Met: [] Adjusted  2. Patient will have a decrease in pain to facilitate improvement in movement, function, and ADLs as indicated by Functional Deficits. [] Progressing: [] Met: [] Not Met: [] Adjusted     Long Term Goals: To be achieved in: 12+ weeks  1.  Functional index score of 67 or more for FOTO to assist with reaching prior level of function. [] Progressing: [] Met: [] Not Met: [] Adjusted  2. Patient will demonstrate increased AROM to 0-125 deg to allow for proper joint functioning as indicated by patients Functional Deficits. [] Progressing: [] Met: [] Not Met: [] Adjusted  3. Patient will demonstrate an increase in Strength to good proximal hip strength and control, within 5lb HHD in LE to allow for proper functional mobility as indicated by patients Functional Deficits. [] Progressing: [] Met: [] Not Met: [] Adjusted  4. Patient will return to 90% functional activities without increased symptoms or restriction. [] Progressing: [] Met: [] Not Met: [] Adjusted  5. Pt will be able to walk 45 min on uneven surfaces for hiking and climbing. (patient specific functional goal)    [] Progressing: [] Met: [] Not Met: [] Adjusted         Overall Progression Towards Functional goals/ Treatment Progress Update:  [] Patient is progressing as expected towards functional goals listed. [] Progression is slowed due to complexities/Impairments listed. [] Progression has been slowed due to co-morbidities.   [x] Plan just implemented, too soon to assess goals progression <30days   [] Goals require adjustment due to lack of progress  [] Patient is not progressing as expected and requires additional follow up with physician  [] Other    ASSESSMENT:  See eval    Treatment/Activity Tolerance:  [] Patient tolerated treatment well [] Patient limited by fatique  [] Patient limited by pain  [x] Patient limited by other medical complications- tightness  [] Other:     Prognosis: [x] Good [x] Fair  [] Poor    Patient Requires Follow-up: [x] Yes  [] No    PLAN: See eval  [x] Continue per plan of care [] Alter current plan (see comments)  [x] Plan of care initiated [] Hold pending MD visit [] Discharge    Electronically signed by: Rob Vela, PT, MS, OMT-C    Physical Therapist [facility] license #[id number]  Physical Therapist [place] license #[id number]    Note: If patient does not return for scheduled/ recommended follow up visits, this note will serve as a discharge from care along with most recent update on progress.

## 2022-07-28 ENCOUNTER — TELEPHONE (OUTPATIENT)
Dept: ORTHOPEDIC SURGERY | Age: 57
End: 2022-07-28

## 2022-07-29 ENCOUNTER — TREATMENT (OUTPATIENT)
Dept: PHYSICAL THERAPY | Age: 57
End: 2022-07-29
Payer: COMMERCIAL

## 2022-07-29 DIAGNOSIS — M24.662 FIBROSIS OF KNEE JOINT, LEFT: ICD-10-CM

## 2022-07-29 DIAGNOSIS — M25.562 LEFT KNEE PAIN, UNSPECIFIED CHRONICITY: Primary | ICD-10-CM

## 2022-07-29 PROCEDURE — 97110 THERAPEUTIC EXERCISES: CPT | Performed by: PHYSICAL THERAPIST

## 2022-07-29 PROCEDURE — 97140 MANUAL THERAPY 1/> REGIONS: CPT | Performed by: PHYSICAL THERAPIST

## 2022-07-29 PROCEDURE — 97530 THERAPEUTIC ACTIVITIES: CPT | Performed by: PHYSICAL THERAPIST

## 2022-07-29 NOTE — PROGRESS NOTES
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 Joaquim Guzman  Has the plan of care been signed (Y/N):        []  Yes  [x]  No     Date of Patient follow up with Physician: 22      Is this a Progress Report:     []  Yes  [x]  No        If Yes:  Date Range for reporting period:  Beginning 22  Ending 22    Progress report will be due (10 Rx or 30 days whichever is less):        Recertification will be due (POC Duration  / 90 days whichever is less): 22        Visit # Insurance Allowable Auth Required   2 BMN []  Yes [x]  No        Functional Scale: FOTO 29; LEFS 13;    Date assessed:  22      Latex Allergy:  [x]NO      []YES  Preferred Language for Healthcare:   [x]English       []other:    Pain level:  3-6/10     SUBJECTIVE:  2 weeks post op  Order for [facility] extensionator has been submitted to [facility] rep. Awaiting response. He complains of numbness/tingling in the back of his calf. Also says that most of his knee pain is in the front of his knee. He did take some Tylenol before he came into therapy today. He does still use his serial cast at home for extension. He tries to use it 3-4 hours at a time (8-12 hours total per day). States he is unable to sleep in it.          OBJECTIVE:       ROM PROM AROM Overpressure Comment    L R L R L R 2022   Flexion 85° ERMI  70° cold 125°      Extension -18° after stretch -30° cold   -2°        Girth  7/29/2022 L R   Mid Patella 44 cm 39.3 cm   Suprapatellar     5cm above     15cm above         Strength L R Comment: majority deferred secondary to surgery   Quad      Hamstring      Abduction      Quad tone FAIR GOOD 7/29/2022       Special Test Results/Comment: majority deferred secondary to surgery   Homans  7/29/2022 (-)         RESTRICTIONS/PRECAUTIONS: 7/18/22 posterior L knee capsule release; Limited ROM In L knee after injection in April that was limited prior to surg for flex and ext;  DVT left lower leg (still on blood thinners till about Oct)    Exercises/Interventions:   Exercise/Equipment Resistance/Repetitions Other comments   Cardio/Warm-up     Bike Rec Seat #8 x5' warm up oscillations   Treadmill          Stretching     Hamstring Seated propped 10\"x10 5# overpressure   Hip Flexion     ITB     Grion     Quad     Inclined Calf     Towel Pull 30\"x5 5# overpressure        ROM     Passive ERMI 10\"x10 for flex    Active     Prone knee hang x6'   3# OP on ankle Inc weight/time npv   Weight Hangs     Sheet Pulls     Ankle Pumps      Extensionator x10' W/ icing using strap for overpressure b/c cuff would not hold air 10# overpressure        Patellar Glides     Medial x2'    Superior x2'    Inferior x2'         STRENGTH     SLR     Supine     Prone     Abduction     Adducton     SLR+          Isometrics     Quad sets 10\"x10 over 1/2 foam roll         CKC     Calf raises     Wall sits     Step ups     1 leg stand     Squatting     CC TKE     Balance          PRE     Extension  RANGE:   Flexion  RANGE:   Leg Press  RANGE:        Cable Column     Ed given on POC, expectations and goals x10'         Manual/Modalities Manual stretching sitting off edge/distractions 10'                Therapeutic Exercise and NMR EXR  [x] (63276) Provided verbal/tactile cueing for activities related to strengthening, flexibility, endurance, ROM for improvements in LE, proximal hip, and core control with self care, mobility, lifting, ambulation.  [] (11176) Provided verbal/tactile cueing for activities related to improving balance, coordination, kinesthetic sense, posture, motor skill, proprioception  to assist with LE, proximal hip, and core control in self care, mobility, lifting, ambulation and eccentric single leg control. NMR and Therapeutic Activities:    [x] (82331 or 54589) Provided verbal/tactile cueing for activities related to improving balance, coordination, kinesthetic sense, posture, motor skill, proprioception and motor activation to allow for proper function of core, proximal hip and LE with self care and ADLs  [] (20107) Gait Re-education- Provided training and instruction to the patient for proper LE, core and proximal hip recruitment and positioning and eccentric body weight control with ambulation re-education including up and down stairs     Home Exercise Program:    [x] (87820) Reviewed/Progressed HEP activities related to strengthening, flexibility, endurance, ROM of core, proximal hip and LE for functional self-care, mobility, lifting and ambulation/stair navigation            Written HEP est  [] (85816)Reviewed/Progressed HEP activities related to improving balance, coordination, kinesthetic sense, posture, motor skill, proprioception of core, proximal hip and LE for self care, mobility, lifting, and ambulation/stair navigation      Manual Treatments:  PROM / STM / Oscillations-Mobs:  G-I, II, III, IV (PA's, Inf., Post.)  [x] (01806) Provided manual therapy to mobilize LE, proximal hip and/or LS spine soft tissue/joints for the purpose of modulating pain, promoting relaxation,  increasing ROM, reducing/eliminating soft tissue swelling/inflammation/restriction, improving soft tissue extensibility and allowing for proper ROM for normal function with self care, mobility, lifting and ambulation.      Modalities:  CP x10 min w/ ext stretch in extensionator   [] GAME READY (VASO)- for significant edema, swelling, pain control. Charges:  Timed Code Treatment Minutes: 45   Total Treatment Minutes: 60     [] EVAL (LOW) 02989 (typically 20 minutes face-to-face)  [] EVAL (MOD) 13464 (typically 30 minutes face-to-face)  [] EVAL (HIGH) 45657 (typically 45 minutes face-to-face)  [] RE-EVAL   [x] YD(10435) 1x  20'   [] IONTO  [] NMR (44999) x     [] VASO  [x] Manual (72300) x 1  x10'     [] Other:  [x] TA (93486) 1x   15'   [] Mech Traction (78524)  [] ES(attended) (03218)      [] ES (un) (96688):     GOALS:  Patient stated goal: return to outdoor activities for walking and climbing, along with work  [] Progressing: [] Met: [] Not Met: [] Adjusted     Therapist goals for Patient:  Short Term Goals: To be achieved in: 2 weeks  1. Independent in HEP and progression per patient tolerance, in order to prevent re-injury. [] Progressing: [] Met: [] Not Met: [] Adjusted  2. Patient will have a decrease in pain to facilitate improvement in movement, function, and ADLs as indicated by Functional Deficits. [] Progressing: [] Met: [] Not Met: [] Adjusted     Long Term Goals: To be achieved in: 12+ weeks  1. Functional index score of 67 or more for FOTO to assist with reaching prior level of function. [] Progressing: [] Met: [] Not Met: [] Adjusted  2. Patient will demonstrate increased AROM to 0-125 deg to allow for proper joint functioning as indicated by patients Functional Deficits. [] Progressing: [] Met: [] Not Met: [] Adjusted  3. Patient will demonstrate an increase in Strength to good proximal hip strength and control, within 5lb HHD in LE to allow for proper functional mobility as indicated by patients Functional Deficits. [] Progressing: [] Met: [] Not Met: [] Adjusted  4. Patient will return to 90% functional activities without increased symptoms or restriction. [] Progressing: [] Met: [] Not Met: [] Adjusted  5.  Pt will be able to walk 45 min on uneven surfaces for hiking and climbing. (patient specific functional goal)    [] Progressing: [] Met: [] Not Met: [] Adjusted         Overall Progression Towards Functional goals/ Treatment Progress Update:  [] Patient is progressing as expected towards functional goals listed. [] Progression is slowed due to complexities/Impairments listed. [] Progression has been slowed due to co-morbidities. [x] Plan just implemented, too soon to assess goals progression <30days   [] Goals require adjustment due to lack of progress  [] Patient is not progressing as expected and requires additional follow up with physician  [] Other    ASSESSMENT:    Reviewed his HEP today. ROM is significantly limited into both directions. He is able to recruit his quads some with isometric setting. He states he feels more muscle activation now than he did 2 weeks ago. His ROM did improve with stretching and manual techniques. He has good tolerance to activities, but says he feels it all in the front of the knee, even when doing extension stretching. Treatment/Activity Tolerance:  [] Patient tolerated treatment well [] Patient limited by fatique  [] Patient limited by pain  [x] Patient limited by other medical complications- tightness  [] Other:     Prognosis: [x] Good [x] Fair  [] Poor    Patient Requires Follow-up: [x] Yes  [] No    PLAN: See eval.   Cont 3x/week for ROM restoration. WB progression/gait activities  Home extensionator has been ordered  [x] Continue per plan of care [] Alter current plan (see comments)  [x] Plan of care initiated [] Hold pending MD visit [] Discharge    Electronically signed by:     Naye Mcduffie PT      Board Certified Orthopaedic Clinical Specialist  [facility] Certified  Physical Therapist    [place] PT #[id number]  [place] PT #[id number]        Note: If patient does not return for scheduled/ recommended follow up visits, this note will serve as a discharge from care along with most recent update on progress.

## 2022-08-01 ENCOUNTER — TREATMENT (OUTPATIENT)
Dept: PHYSICAL THERAPY | Age: 57
End: 2022-08-01

## 2022-08-01 DIAGNOSIS — R26.2 DIFFICULTY WALKING: ICD-10-CM

## 2022-08-01 DIAGNOSIS — M24.662 FIBROSIS OF KNEE JOINT, LEFT: ICD-10-CM

## 2022-08-01 DIAGNOSIS — M25.562 LEFT KNEE PAIN, UNSPECIFIED CHRONICITY: Primary | ICD-10-CM

## 2022-08-01 NOTE — PROGRESS NOTES
Evan Porter [facility]   Phone: [phone]    Fax: [phone]            Physical Therapy  Cancellation/No-show Note  Patient Name:  Clare Mckinney  :  1965   Date:  2022  Cancelled visits to date: 1  No-shows to date: 0    For today's appointment patient:  [x]  Cancelled  []  Rescheduled appointment  []  No-show     Reason given by patient:  []  Patient ill  []  Conflicting appointment  []  No transportation    []  Conflict with work  []  No reason given  [x]  Other:  Pt had a  to attend in [place] and was unable to make it back in [facility] for therapy visit. Pt is scheduled for follow up appt on Wed and will focus on HEP until then, along w/ casting at home. Comments:      Phone call information:   []  Phone call made today to patient at _ time at number provided:      []  Patient answered, conversation as follows:    []  Patient did not answer, message left as follows:  []  Phone call not made today  [x]  Phone call not needed - pt contacted us to cancel and provided reason for cancellation.      Electronically signed by:  Mary Lopez PT, Tiffany Knox 85, OMT-C    Physical Therapist [place] license #[id number]  Physical Therapist [place] license #[id number]

## 2022-08-03 ENCOUNTER — TELEPHONE (OUTPATIENT)
Dept: ORTHOPEDIC SURGERY | Age: 57
End: 2022-08-03

## 2022-08-03 ENCOUNTER — TREATMENT (OUTPATIENT)
Dept: PHYSICAL THERAPY | Age: 57
End: 2022-08-03
Payer: COMMERCIAL

## 2022-08-03 DIAGNOSIS — R26.2 DIFFICULTY WALKING: ICD-10-CM

## 2022-08-03 DIAGNOSIS — M25.562 LEFT KNEE PAIN, UNSPECIFIED CHRONICITY: Primary | ICD-10-CM

## 2022-08-03 DIAGNOSIS — M24.662 FIBROSIS OF KNEE JOINT, LEFT: ICD-10-CM

## 2022-08-03 LAB
ANAEROBIC CULTURE: ABNORMAL
CULTURE SURGICAL: ABNORMAL
CULTURE SURGICAL: ABNORMAL
GRAM STAIN RESULT: ABNORMAL
ORGANISM: ABNORMAL

## 2022-08-03 PROCEDURE — 97110 THERAPEUTIC EXERCISES: CPT | Performed by: PHYSICAL THERAPIST

## 2022-08-03 PROCEDURE — 97140 MANUAL THERAPY 1/> REGIONS: CPT | Performed by: PHYSICAL THERAPIST

## 2022-08-03 PROCEDURE — 97530 THERAPEUTIC ACTIVITIES: CPT | Performed by: PHYSICAL THERAPIST

## 2022-08-03 NOTE — PROGRESS NOTES
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 Ruchi Manrique  Has the plan of care been signed (Y/N):        []  Yes  [x]  No     Date of Patient follow up with Physician:       Is this a Progress Report:     []  Yes  [x]  No        If Yes:  Date Range for reporting period:  Beginning 22  Ending 22    Progress report will be due (10 Rx or 30 days whichever is less): 89       Recertification will be due (POC Duration  / 90 days whichever is less): 22        Visit # Insurance Allowable Auth Required   3 BMN []  Yes [x]  No        Functional Scale: FOTO 29; LEFS 13;    Date assessed:  22      Latex Allergy:  [x]NO      []YES  Preferred Language for Healthcare:   [x]English       []other:    Pain level:  3-6/10     SUBJECTIVE:  2+ weeks post op  Pt was contacted by [facility] for extensionator. He declined to take it b/c insurance was not going to cover it and it was $150 fitting fee and $300 per month rental. Pt has been compliant w/ stretching and casting as directed. He is going to MD visit on Thursday instead of next week. Pt is managing pain well. Pt does have home muscle stim machine that he is using regularly for quad contraction. He does still use his serial cast at home for extension. He tries to use it 3-4 hours at a time (8-12 hours total per day). States he is unable to sleep in it. F/u surg is planned to work on flex and remove scar tissue that has not been scheduled yet;       OBJECTIVE:   Swelling noted in ankle and lower leg on 8/3/22; incision healing w/ prineo covering site and no signs or symptoms of infection noted; amb w/ flex knee using 2 crutches;      ROM PROM AROM Overpressure Comment    L 8/3/22 R L R L R 7/29/2022   Flexion 85° ERMI  70° cold 125°      Extension -10° after stretch w/ op  -30° cold   -2°        Girth  7/29/2022 L R   Mid Patella 44 cm 39.3 cm   Suprapatellar     5cm above     15cm above         Strength L R Comment: majority deferred secondary to surgery   Quad      Hamstring      Abduction      Quad tone FAIR GOOD 7/29/2022       Special Test Results/Comment: majority deferred secondary to surgery   Homans  7/29/2022 (-)         RESTRICTIONS/PRECAUTIONS: 7/18/22 posterior L knee capsule release; Limited ROM In L knee after injection in April that was limited prior to surg for flex and ext;  DVT left lower leg (still on blood thinners till about Oct)    Exercises/Interventions:   Exercise/Equipment Resistance/Repetitions Other comments   Cardio/Warm-up     Bike Rec Seat #8 x5' warm up oscillations   Treadmill          Stretching     Hamstring Seated propped 10\"x10 5# overpressure   Hip Flexion     ITB     Grion     Quad     Inclined Calf On floor 10\"x10    Towel Pull 30\"x5 over foam roll 5# overpressure        ROM     Passive ERMI 10\"x10 for flex    Active     Prone knee hang x10'   5# OP on ankle    Weight Hangs     Sheet Pulls     Ankle Pumps      Extensionator x10' W/ icing using strap for overpressure b/c cuff would not hold air 10# overpressure        Patellar Glides     Medial x2'    Superior x2'    Inferior x2'         STRENGTH     SLR     Supine     Prone     Abduction     Adducton     SLR+          Isometrics     Quad sets 10\"x10 over 1/2 foam roll         CKC     Calf raises     Wall sits     Step ups     1 leg stand     Squatting     CC TKE 25# 10\"x10 Silver TB given for HEP   Balance          PRE     Extension  RANGE:   Flexion  RANGE:   Leg Press  RANGE:        Cable Column     Ed given on POC, expectations and goals X10'; 8/3/22 reviewed    Gt training  x5' Using crutches but focus on knee ext        Manual/Modalities     Manual stretching in sitting off edge/distraction x10'    Deep tissue work to HS in prone position x5'                Therapeutic Exercise and NMR EXR  [x] (27115) Provided verbal/tactile cueing for activities related to strengthening, flexibility, endurance, ROM for improvements in LE, proximal hip, and core control with self care, mobility, lifting, ambulation.  [] (11475) Provided verbal/tactile cueing for activities related to improving balance, coordination, kinesthetic sense, posture, motor skill, proprioception  to assist with LE, proximal hip, and core control in self care, mobility, lifting, ambulation and eccentric single leg control.      NMR and Therapeutic Activities:    [x] (24001 or 81179) Provided verbal/tactile cueing for activities related to improving balance, coordination, kinesthetic sense, posture, motor skill, proprioception and motor activation to allow for proper function of core, proximal hip and LE with self care and ADLs  [] (73478) Gait Re-education- Provided training and instruction to the patient for proper LE, core and proximal hip recruitment and positioning and eccentric body weight control with ambulation re-education including up and down stairs     Home Exercise Program:    [x] (30038) Reviewed/Progressed HEP activities related to strengthening, flexibility, endurance, ROM of core, proximal hip and LE for functional self-care, mobility, lifting and ambulation/stair navigation            Written HEP est  [] (95706)Reviewed/Progressed HEP activities related to improving balance, coordination, kinesthetic sense, posture, motor skill, proprioception of core, proximal hip and LE for self care, mobility, lifting, and ambulation/stair navigation      Manual Treatments:  PROM / STM / Oscillations-Mobs:  G-I, II, III, IV (PA's, Inf., Post.)  [x] (34108) Provided manual therapy to mobilize LE, proximal hip and/or LS spine soft tissue/joints for the purpose of modulating pain, promoting relaxation,  increasing ROM, reducing/eliminating soft tissue swelling/inflammation/restriction, improving soft tissue extensibility and allowing for proper ROM for normal function with self care, mobility, lifting and ambulation. Modalities:  CP x10 min w/ ext stretch in extensionator   [] GAME READY (VASO)- for significant edema, swelling, pain control. Charges:  Timed Code Treatment Minutes: 45   Total Treatment Minutes: 60     [] EVAL (LOW) 00800 (typically 20 minutes face-to-face)  [] EVAL (MOD) 91641 (typically 30 minutes face-to-face)  [] EVAL (HIGH) 48342 (typically 45 minutes face-to-face)  [] RE-EVAL   [x] KU(38574) 1x  20'   [] IONTO  [] NMR (46125) x     [] VASO  [x] Manual (69498) x 1  x10'     [] Other:  [x] TA (74267) 1x   15'   [] Mech Traction (19172)  [] ES(attended) (67202)      [] ES (un) (13098):     GOALS:  Patient stated goal: return to outdoor activities for walking and climbing, along with work  [] Progressing: [] Met: [] Not Met: [] Adjusted     Therapist goals for Patient:  Short Term Goals: To be achieved in: 2 weeks  1. Independent in HEP and progression per patient tolerance, in order to prevent re-injury. [] Progressing: [] Met: [] Not Met: [] Adjusted  2. Patient will have a decrease in pain to facilitate improvement in movement, function, and ADLs as indicated by Functional Deficits. [] Progressing: [] Met: [] Not Met: [] Adjusted     Long Term Goals: To be achieved in: 12+ weeks  1. Functional index score of 67 or more for FOTO to assist with reaching prior level of function. [] Progressing: [] Met: [] Not Met: [] Adjusted  2.  Patient will demonstrate increased AROM to 0-125 deg to allow for proper joint functioning as indicated by patients Functional Deficits. [] Progressing: [] Met: [] Not Met: [] Adjusted  3. Patient will demonstrate an increase in Strength to good proximal hip strength and control, within 5lb HHD in LE to allow for proper functional mobility as indicated by patients Functional Deficits. [] Progressing: [] Met: [] Not Met: [] Adjusted  4. Patient will return to 90% functional activities without increased symptoms or restriction. [] Progressing: [] Met: [] Not Met: [] Adjusted  5. Pt will be able to walk 45 min on uneven surfaces for hiking and climbing. (patient specific functional goal)    [] Progressing: [] Met: [] Not Met: [] Adjusted         Overall Progression Towards Functional goals/ Treatment Progress Update:  [] Patient is progressing as expected towards functional goals listed. [] Progression is slowed due to complexities/Impairments listed. [] Progression has been slowed due to co-morbidities. [x] Plan just implemented, too soon to assess goals progression <30days   [] Goals require adjustment due to lack of progress  [] Patient is not progressing as expected and requires additional follow up with physician  [] Other    ASSESSMENT:    Pt was able to tolerate a more aggressive stretch into ext today. ROM into flex is still limtied w/ pt struggling to reach prior range. Pt did have more edema noted in ankle and L knee region. Incision has adhesions so scar massage performed through covering w/ distal incision the tightest. Pt was walking on flex knee w/ crutches and early heel off. Cuing and practice given for walking deviation that should help w/ carry over from sessions to gain knee ext. Quad contraction visible today w/ quad sets. Pt is handling pain very well.       Treatment/Activity Tolerance:  [] Patient tolerated treatment well [] Patient limited by fatique  [] Patient limited by pain  [x] Patient limited by other medical complications- tightness  [] Other: Prognosis: [x] Good [x] Fair  [] Poor    Patient Requires Follow-up: [x] Yes  [] No    PLAN: See eval.   Cont 3x/week for ROM restoration. WB progression/gait activities    [x] Continue per plan of care [] Alter current plan (see comments)  [x] Plan of care initiated [] Hold pending MD visit [] Discharge    Electronically signed by: Zarina Spivey PT, MS, OMT-C    Physical Therapist [place] license #[id number]  Physical Therapist [place] license #[id number]             Note: If patient does not return for scheduled/ recommended follow up visits, this note will serve as a discharge from care along with most recent update on progress.

## 2022-08-03 NOTE — TELEPHONE ENCOUNTER
SPOKE WITH PT AND RELAYED INFO FROM DR. SANDERS THAT CULTURE FROM SX CAME BACK POSITIVE AND HE WOULD LIKE TO SEE PATIENT PRIOR TO BEING OUT OF OFFICE. PATIENT IS SCHEDULED FOR TOMORROW AT 10AM AT  OFFICE. ALL QUESTIONS ANSWERED. PATIENT EXPRESSED UNDERSTANDING.

## 2022-08-04 ENCOUNTER — TELEPHONE (OUTPATIENT)
Dept: ORTHOPEDIC SURGERY | Age: 57
End: 2022-08-04

## 2022-08-04 ENCOUNTER — OFFICE VISIT (OUTPATIENT)
Dept: ORTHOPEDIC SURGERY | Age: 57
End: 2022-08-04

## 2022-08-04 VITALS — BODY MASS INDEX: 28.73 KG/M2 | WEIGHT: 194 LBS | HEIGHT: 69 IN

## 2022-08-04 DIAGNOSIS — M00.9 INFECTION OF KNEE (HCC): ICD-10-CM

## 2022-08-04 DIAGNOSIS — M00.9 INFECTION OF KNEE (HCC): Primary | ICD-10-CM

## 2022-08-04 DIAGNOSIS — Z86.718 HISTORY OF DVT (DEEP VEIN THROMBOSIS): Primary | ICD-10-CM

## 2022-08-04 DIAGNOSIS — G89.18 POST-OPERATIVE PAIN: Primary | ICD-10-CM

## 2022-08-04 PROCEDURE — 99024 POSTOP FOLLOW-UP VISIT: CPT | Performed by: ORTHOPAEDIC SURGERY

## 2022-08-04 RX ORDER — HYDROCODONE BITARTRATE AND ACETAMINOPHEN 10; 325 MG/1; MG/1
1 TABLET ORAL EVERY 4 HOURS PRN
Qty: 30 TABLET | Refills: 0 | Status: SHIPPED | OUTPATIENT
Start: 2022-08-04 | End: 2022-08-09

## 2022-08-04 RX ORDER — AMOXICILLIN 500 MG/1
500 CAPSULE ORAL 2 TIMES DAILY
Qty: 42 CAPSULE | Refills: 0 | Status: SHIPPED | OUTPATIENT
Start: 2022-08-05 | End: 2022-08-26

## 2022-08-04 RX ORDER — ONDANSETRON 4 MG/1
4 TABLET, FILM COATED ORAL EVERY 8 HOURS PRN
Qty: 21 TABLET | Refills: 0 | Status: SHIPPED | OUTPATIENT
Start: 2022-08-04

## 2022-08-04 NOTE — DISCHARGE INSTRUCTIONS
Orthopedic Surgery Discharge Instructions    Medications:   A pain medication has been prescribed for you. Please take this as instructed by the pharmacist.  An anti-nausea medication has been prescribed for you to use as needed for nausea. Either aspirin or a blood thinner medication may have been prescribed for you. Please take this as instructed. Activity:  Weightbearing as tolerated with crutches as needed. Should stay in a knee immobilizer for the first 24 hours. After that can transition back into your brace that has been constructed by a physical therapist to work on full extension. Incision/dressings: You may remove your dressing in 72 hours. Until then the dressing needs to remain clean and dry. Following removal dressing please apply dry dressing daily. You may shower in 72 hours and allow the water to run over the incision. However no submerging underwater or getting in pools. Follow-up: If you do not already have a postoperative follow-up appointment scheduled you should call to schedule an appointment within 10 to 14 days of surgery. Emergency or after hours questions:  Please call the main call center at [phone] for all questions or concerns during evening and weekend hours. The call center will direct your call to the on-call physician to answer your questions and concerns. During weekly office hours you may call my assistant, Anjana, at [phone]. Maggie Yee MD            Orthopaedic Surgery Sports Medicine and [address] and [address] [facility] Physician [facility]      Rmasey Rivero    Follow your surgeons instructions. Make follow-up appointment.   Observe operative area for signs of excessive bleeding such as a slow general ooze that saturates the dressing or bright red bleeding. In either case, apply pressure to the area and elevate if possible and call your surgeon right away. Observe the affected extremity for circulation or nerve impairment such as a change in color, numbness, tingling, coldness or increased pain. If any of these symptoms are present call your surgeon. Observe operative site for any signs of infection such as increased pain, redness, fever greater than 101 degrees, swelling, foul odor or drainage. Contact surgeon if any of these symptoms are present. If you become short of breath call your surgeon or go to the nearest emergency room. Remove dressing if directed by surgeon. Leave steristips or sutures or staples in place. You may loosen your ace wrap if it feels too tight, or if you have severe pain, or if it has swelling. Elevate extremity as directed by surgeon. You may shower when directed by surgeon. Use ice pack as directed by surgeon. Do not use heat. Avoid stress to suture line such as pulling, pushing or tugging. Use crutches as directed by surgeon  Take medications as ordered. Take pain medication with food. Do not drive or operate machinery while taking narcotics. Call your surgeon for any questions or problems. ANESTHESIA DISCHARGE INSTRUCTIONS    Wear your seatbelt home. You are under the influence of drugs-do not drink alcohol, drive, operate machinery, make any important decisions or sign any legal documents for 24 hours. Children should not ride bikes, [place] or play on gym sets for 24 hours after surgery. A responsible adult needs to be with you for 24 hours. You may experience lightheadedness, dizziness, or sleepiness following surgery. Rest at home today- increase activity as tolerated. Progress slowly to a regular diet unless your physician has instructed you otherwise. Drink plenty of water. If persistent nausea and vomiting becomes a problem, call your physician.   Coughing, sore throat and muscle aches are other side effects of anesthesia, and should improve with time. Do not drive or operate machinery while taking narcotics. Females of childbearing potential and on hormonal birth control, should use two forms of contraception following procedure if given a medication called sugammadex and/or emend. Additional contraception should be used for 7 days for sugammadex and/or 28 days for emend. These medications have a potential to reduce the effectiveness of hormonal birth control.

## 2022-08-04 NOTE — H&P
Reason for visit:  Status post left knee posterior capsular release on 7/15/2022     History of Present Illness: The patient returns for postop evaluation. Overall he is doing well. He denies fever or chills. No numbness or tingling. Have started serial casting with our physical therapist athletic trainers. He does report that he is seeing improvements in his extension. He is being seen today because recent cultures from his knee has been positive for C acnes. Objective:  Ht 5' 9\" (1.753 m)   Wt 194 lb (88 kg)   BMI 28.65 kg/m²       Physical Exam:  The patient is well-appearing and in no apparent distress  CV-RRR  Pulm-CTAB    Examination of the left knee  There is a small effusion, no gross deformity or skin changes  Incision sites are well-healed  Patient does still lack about 10-15 degrees of extension today in the office  5 out of 5 strength throughout distal muscle groups  Sensation is intact to light touch throughout all distributions  There is no calf swelling or tenderness  Palpable DP pulse, brisk cap refill, 2+ symmetric reflexes     Assessment:  Status post left knee posterior capsular release on 7/15/2022     Plan:  I had a very long discussion with the patient. The cultures from the recent surgery did become positive for C acnes. This point it is difficult to discern whether or not that is a true pathologic infection versus contaminant. I did speak with his infectious disease doctor and we both agree that the best approach would be to assume that this is a true pathogen and proceed with treatment. I would recommend a left knee arthroscopy with arthroscopic I&D with also lysis of adhesions and manipulation under anesthesia. Prior to that we would obtain cultures. We would prophylactically treat him for a presumed C acnes infection with amoxicillin per his infectious disease doctor.   The results from this procedure would dictate further additional treatment with IV antibiotics versus discontinuing the oral antibiotic because in that case it would likely be a contaminant if the cultures are negative. The risk were defined as but not limited to infection, bleeding, damage with exertion nerves, need for additional surgery. He does understand like proceed. Lastly, he does have a history of DVT. He is on treatment for this. He will hold his anticoagulant today. We will get a stat ultrasound to see if the DVT is still present. If it is then I would advocate for an IVC filter prior to surgical intervention. He is in agreement with this.

## 2022-08-04 NOTE — PROGRESS NOTES
Name_______________________________________Printed:____________________  Date and time of surgery__8/5/2022____1600__________________Arrival Time:_1200__( prior to 1300 IVCF insertion)_____________   1. The instructions given regarding when and if a patient needs to stop oral intake prior to surgery varies. Follow the specific instructions you were given                  __x_Nothing to eat or to drink after Midnight the night before.                   ____Carbo loading or ERAS instructions will be given to select patients-if you have been given those instructions -please do the following                           The evening before your surgery after dinner before midnight drink 40 ounces of gatorade. If you are diabetic use sugar free. The morning of surgery drink 40 ounces of water. This needs to be finished 3 hours prior to your surgery start time. 2. Take the following pills with a small sip of water on the morning of surgery__metoprolol, nexium_________________________________________________                  Do not take blood pressure medications ending in pril or sartan the sarah prior to surgery or the morning of surgery_   3. Aspirin, Ibuprofen, Advil, Naproxen, Vitamin E and other Anti-inflammatory products and supplements should be stopped for 5 -7days before surgery or as directed by your physician. 4. Check with your Doctor regarding stopping Plavix, Coumadin,Eliquis, Lovenox,Effient,Pradaxa,Xarelto, Fragmin or other blood thinners and follow their instructions. 5. Do not smoke, and do not drink any alcoholic beverages 24 hours prior to surgery. This includes NA Beer. Refrain from the usage of any recreational drugs. 6. You may brush your teeth and gargle the morning of surgery. DO NOT SWALLOW WATER   7. You MUST make arrangements for a responsible adult to stay on site while you are here and take you home after your surgery. You will not be allowed to leave alone or drive yourself home.   It is strongly suggested someone stay with you the first 24 hrs. Your surgery will be cancelled if you do not have a ride home. 8. A parent/legal guardian must accompany a child scheduled for surgery and plan to stay at the hospital until the child is discharged. Please do not bring other children with you. 9. Please wear simple, loose fitting clothing to the hospital.  Crystal Morrell not bring valuables (money, credit cards, checkbooks, etc.) Do not wear any makeup (including no eye makeup) or nail polish on your fingers or toes. 10. DO NOT wear any jewelry or piercings on day of surgery. All body piercing jewelry must be removed. 11. If you have ___dentures, they will be removed before going to the OR; we will provide you a container. If you wear ___contact lenses or ___glasses, they will be removed; please bring a case for them. 12. Please see your family doctor/pediatrician for a history & physical and/or concerning medications. Bring any test results/reports from your physician's office. PCP__________________Phone___________H&P Appt. Date________             13 If you  have a Living Will and Durable Power of  for Healthcare, please bring in a copy. 15. Notify your Surgeon if you develop any illness between now and surgery  time, cough, cold, fever, sore throat, nausea, vomiting, etc.  Please notify your surgeon if you experience dizziness, shortness of breath or blurred vision between now & the time of your surgery             15. DO NOT shave your operative site 96 hours prior to surgery. For face & neck surgery, men may use an electric razor 48 hours prior to surgery. 16. Shower the night before or morning of surgery using an antibacterial soap or as you have been instructed. 17. To provide excellent care visitors will be limited to one in the room at any given time. 18.  Please bring picture ID and insurance card. 19.  Visit our web site for additional information:  [email or web address]              20.During flu season no children under the age of 15 are permitted in the hospital for the safety of all patients. 21. If you take a long acting insulin in the evening only  take half of your usual  dose the night  before your procedure              22. If you use a c-pap please bring DOS if staying overnight,             23.For your convenience [id number] [facility] has a pharmacy on site to fill your prescriptions. 24. If you use oxygen and have a portable tank please bring it  with you the DOS             25. Bring a complete list of all your medications with name and dose include any supplements. 26. Other__________________________________________   *Please call pre admission testing if you any further questions   [address]    [facility] HEART AND LUNG [place]  [phone]   [address] [place] [address]       VISITOR POLICY(subject to change)    Current policy is 2 visitors per patient. No children. A mask is required. Visiting hours are 8a-8p. Overnight visitors will be at the discretion of the nurse. All above information reviewed with patient in person or by phone. Patient verbalizes understanding. All questions and concerns addressed.                                                                                                  Patient/Rep__patient__________________                                                                                                                                    PRE OP INSTRUCTIONS

## 2022-08-04 NOTE — TELEPHONE ENCOUNTER
SPOKE WITH INTERVENTIONAL RADIOLOGY. PATIENT PERONEAL CLOT IS STILL PRESENT. HE WILL NEED AN IVC PRIOR TO SX. HE IS TO ARRIVE AT [facility] AT 12 PM. HIS SX IS AT 4 PM.  RELAYED THIS INFORMATION TO PATIENT. ALL QUESTIONS ANSWERED. PATIENT EXPRESSED UNDERSTANDING.

## 2022-08-04 NOTE — PROGRESS NOTES
Spoke with patient. Cant talk now. Having an [address]. Also having IVCF placed tomorrow @ 1300.

## 2022-08-05 ENCOUNTER — HOSPITAL ENCOUNTER (OUTPATIENT)
Age: 57
Setting detail: OUTPATIENT SURGERY
Discharge: HOME | End: 2022-08-05
Attending: ORTHOPAEDIC SURGERY | Admitting: ORTHOPAEDIC SURGERY
Payer: COMMERCIAL

## 2022-08-05 ENCOUNTER — ANESTHESIA EVENT (OUTPATIENT)
Dept: OPERATING ROOM | Age: 57
End: 2022-08-05
Payer: COMMERCIAL

## 2022-08-05 ENCOUNTER — HOSPITAL ENCOUNTER (OUTPATIENT)
Dept: INTERVENTIONAL RADIOLOGY/VASCULAR | Age: 57
Discharge: HOME | End: 2022-08-05
Payer: COMMERCIAL

## 2022-08-05 ENCOUNTER — ANESTHESIA (OUTPATIENT)
Dept: OPERATING ROOM | Age: 57
End: 2022-08-05
Payer: COMMERCIAL

## 2022-08-05 VITALS
HEIGHT: 69 IN | WEIGHT: 195 LBS | HEART RATE: 65 BPM | DIASTOLIC BLOOD PRESSURE: 88 MMHG | BODY MASS INDEX: 28.88 KG/M2 | SYSTOLIC BLOOD PRESSURE: 163 MMHG | TEMPERATURE: 97 F | OXYGEN SATURATION: 98 % | RESPIRATION RATE: 16 BRPM

## 2022-08-05 VITALS
SYSTOLIC BLOOD PRESSURE: 154 MMHG | RESPIRATION RATE: 18 BRPM | DIASTOLIC BLOOD PRESSURE: 77 MMHG | HEART RATE: 86 BPM | TEMPERATURE: 98.8 F | OXYGEN SATURATION: 98 %

## 2022-08-05 DIAGNOSIS — Z86.718 HISTORY OF DVT (DEEP VEIN THROMBOSIS): ICD-10-CM

## 2022-08-05 DIAGNOSIS — M00.9 INFECTION OF LEFT KNEE (HCC): ICD-10-CM

## 2022-08-05 LAB
ANION GAP SERPL CALCULATED.3IONS-SCNC: 10 MMOL/L (ref 3–16)
APTT: 26.2 SEC (ref 23–34.3)
BUN BLDV-MCNC: 15 MG/DL (ref 7–20)
CALCIUM SERPL-MCNC: 9.4 MG/DL (ref 8.3–10.6)
CHLORIDE BLD-SCNC: 105 MMOL/L (ref 99–110)
CO2: 28 MMOL/L (ref 21–32)
CREAT SERPL-MCNC: 0.9 MG/DL (ref 0.9–1.3)
EKG ATRIAL RATE: 73 BPM
EKG DIAGNOSIS: NORMAL
EKG P AXIS: -17 DEGREES
EKG P-R INTERVAL: 150 MS
EKG Q-T INTERVAL: 396 MS
EKG QRS DURATION: 90 MS
EKG QTC CALCULATION (BAZETT): 436 MS
EKG R AXIS: 0 DEGREES
EKG T AXIS: -9 DEGREES
EKG VENTRICULAR RATE: 73 BPM
GFR AFRICAN AMERICAN: >60
GFR NON-AFRICAN AMERICAN: >60
GLUCOSE BLD-MCNC: 111 MG/DL (ref 70–99)
HCT VFR BLD CALC: 40.1 % (ref 40.5–52.5)
HEMOGLOBIN: 13.6 G/DL (ref 13.5–17.5)
INR BLD: 1 (ref 0.87–1.14)
MCH RBC QN AUTO: 28.9 PG (ref 26–34)
MCHC RBC AUTO-ENTMCNC: 33.9 G/DL (ref 31–36)
MCV RBC AUTO: 85.1 FL (ref 80–100)
PDW BLD-RTO: 13.3 % (ref 12.4–15.4)
PLATELET # BLD: 277 K/UL (ref 135–450)
PMV BLD AUTO: 8 FL (ref 5–10.5)
POTASSIUM SERPL-SCNC: 4.6 MMOL/L (ref 3.5–5.1)
PROTHROMBIN TIME: 13.1 SEC (ref 11.7–14.5)
RBC # BLD: 4.71 M/UL (ref 4.2–5.9)
SODIUM BLD-SCNC: 143 MMOL/L (ref 136–145)
WBC # BLD: 5.8 K/UL (ref 4–11)

## 2022-08-05 PROCEDURE — 2709999900 IR GUIDED IVC FILTER PLACEMENT

## 2022-08-05 PROCEDURE — 6360000002 HC RX W HCPCS: Performed by: ANESTHESIOLOGY

## 2022-08-05 PROCEDURE — 37191 INS ENDOVAS VENA CAVA FILTR: CPT

## 2022-08-05 PROCEDURE — 6360000002 HC RX W HCPCS: Performed by: RADIOLOGY

## 2022-08-05 PROCEDURE — 7100000011 HC PHASE II RECOVERY - ADDTL 15 MIN: Performed by: ORTHOPAEDIC SURGERY

## 2022-08-05 PROCEDURE — 85610 PROTHROMBIN TIME: CPT

## 2022-08-05 PROCEDURE — 85730 THROMBOPLASTIN TIME PARTIAL: CPT

## 2022-08-05 PROCEDURE — 93010 ELECTROCARDIOGRAM REPORT: CPT | Performed by: INTERNAL MEDICINE

## 2022-08-05 PROCEDURE — 7100000001 HC PACU RECOVERY - ADDTL 15 MIN: Performed by: ORTHOPAEDIC SURGERY

## 2022-08-05 PROCEDURE — 7100000000 HC PACU RECOVERY - FIRST 15 MIN: Performed by: ORTHOPAEDIC SURGERY

## 2022-08-05 PROCEDURE — 2500000003 HC RX 250 WO HCPCS: Performed by: RADIOLOGY

## 2022-08-05 PROCEDURE — 2580000003 HC RX 258: Performed by: ORTHOPAEDIC SURGERY

## 2022-08-05 PROCEDURE — 80048 BASIC METABOLIC PNL TOTAL CA: CPT

## 2022-08-05 PROCEDURE — 87076 CULTURE ANAEROBE IDENT EACH: CPT

## 2022-08-05 PROCEDURE — 99152 MOD SED SAME PHYS/QHP 5/>YRS: CPT

## 2022-08-05 PROCEDURE — 2500000003 HC RX 250 WO HCPCS: Performed by: NURSE ANESTHETIST, CERTIFIED REGISTERED

## 2022-08-05 PROCEDURE — 87176 TISSUE HOMOGENIZATION CULTR: CPT

## 2022-08-05 PROCEDURE — 99153 MOD SED SAME PHYS/QHP EA: CPT

## 2022-08-05 PROCEDURE — 3600000014 HC SURGERY LEVEL 4 ADDTL 15MIN: Performed by: ORTHOPAEDIC SURGERY

## 2022-08-05 PROCEDURE — 6360000002 HC RX W HCPCS: Performed by: ORTHOPAEDIC SURGERY

## 2022-08-05 PROCEDURE — 6360000002 HC RX W HCPCS: Performed by: NURSE ANESTHETIST, CERTIFIED REGISTERED

## 2022-08-05 PROCEDURE — 93005 ELECTROCARDIOGRAM TRACING: CPT | Performed by: RADIOLOGY

## 2022-08-05 PROCEDURE — 2709999900 HC NON-CHARGEABLE SUPPLY: Performed by: ORTHOPAEDIC SURGERY

## 2022-08-05 PROCEDURE — 87075 CULTR BACTERIA EXCEPT BLOOD: CPT

## 2022-08-05 PROCEDURE — 3600000004 HC SURGERY LEVEL 4 BASE: Performed by: ORTHOPAEDIC SURGERY

## 2022-08-05 PROCEDURE — 7100000010 HC PHASE II RECOVERY - FIRST 15 MIN: Performed by: ORTHOPAEDIC SURGERY

## 2022-08-05 PROCEDURE — 3700000001 HC ADD 15 MINUTES (ANESTHESIA): Performed by: ORTHOPAEDIC SURGERY

## 2022-08-05 PROCEDURE — 6360000004 HC RX CONTRAST MEDICATION: Performed by: RADIOLOGY

## 2022-08-05 PROCEDURE — 2500000003 HC RX 250 WO HCPCS: Performed by: ORTHOPAEDIC SURGERY

## 2022-08-05 PROCEDURE — 87205 SMEAR GRAM STAIN: CPT

## 2022-08-05 PROCEDURE — 3700000000 HC ANESTHESIA ATTENDED CARE: Performed by: ORTHOPAEDIC SURGERY

## 2022-08-05 PROCEDURE — 87070 CULTURE OTHR SPECIMN AEROBIC: CPT

## 2022-08-05 PROCEDURE — 36415 COLL VENOUS BLD VENIPUNCTURE: CPT

## 2022-08-05 PROCEDURE — 85027 COMPLETE CBC AUTOMATED: CPT

## 2022-08-05 RX ORDER — LIDOCAINE HYDROCHLORIDE 10 MG/ML
1 INJECTION, SOLUTION EPIDURAL; INFILTRATION; INTRACAUDAL; PERINEURAL
Status: DISCONTINUED | OUTPATIENT
Start: 2022-08-05 | End: 2022-08-05 | Stop reason: HOSPADM

## 2022-08-05 RX ORDER — LABETALOL HYDROCHLORIDE 5 MG/ML
5 INJECTION, SOLUTION INTRAVENOUS
Status: DISCONTINUED | OUTPATIENT
Start: 2022-08-05 | End: 2022-08-05 | Stop reason: HOSPADM

## 2022-08-05 RX ORDER — FENTANYL CITRATE 50 UG/ML
INJECTION, SOLUTION INTRAMUSCULAR; INTRAVENOUS
Status: COMPLETED | OUTPATIENT
Start: 2022-08-05 | End: 2022-08-05

## 2022-08-05 RX ORDER — HYDROMORPHONE HCL 110MG/55ML
0.5 PATIENT CONTROLLED ANALGESIA SYRINGE INTRAVENOUS EVERY 5 MIN PRN
Status: DISCONTINUED | OUTPATIENT
Start: 2022-08-05 | End: 2022-08-05 | Stop reason: HOSPADM

## 2022-08-05 RX ORDER — DEXAMETHASONE SODIUM PHOSPHATE 4 MG/ML
INJECTION, SOLUTION INTRA-ARTICULAR; INTRALESIONAL; INTRAMUSCULAR; INTRAVENOUS; SOFT TISSUE PRN
Status: DISCONTINUED | OUTPATIENT
Start: 2022-08-05 | End: 2022-08-05 | Stop reason: SDUPTHER

## 2022-08-05 RX ORDER — LIDOCAINE HYDROCHLORIDE 10 MG/ML
10 INJECTION, SOLUTION EPIDURAL; INFILTRATION; INTRACAUDAL; PERINEURAL ONCE
Status: COMPLETED | OUTPATIENT
Start: 2022-08-05 | End: 2022-08-05

## 2022-08-05 RX ORDER — LIDOCAINE HYDROCHLORIDE 10 MG/ML
0.5 INJECTION, SOLUTION EPIDURAL; INFILTRATION; INTRACAUDAL; PERINEURAL ONCE
Status: DISCONTINUED | OUTPATIENT
Start: 2022-08-05 | End: 2022-08-05 | Stop reason: HOSPADM

## 2022-08-05 RX ORDER — MIDAZOLAM HYDROCHLORIDE 5 MG/ML
INJECTION, SOLUTION INTRAMUSCULAR; INTRAVENOUS
Status: COMPLETED | OUTPATIENT
Start: 2022-08-05 | End: 2022-08-05

## 2022-08-05 RX ORDER — HYDROMORPHONE HCL 110MG/55ML
0.25 PATIENT CONTROLLED ANALGESIA SYRINGE INTRAVENOUS EVERY 5 MIN PRN
Status: DISCONTINUED | OUTPATIENT
Start: 2022-08-05 | End: 2022-08-05 | Stop reason: HOSPADM

## 2022-08-05 RX ORDER — BUPIVACAINE HYDROCHLORIDE 5 MG/ML
INJECTION, SOLUTION EPIDURAL; INTRACAUDAL
Status: COMPLETED | OUTPATIENT
Start: 2022-08-05 | End: 2022-08-05

## 2022-08-05 RX ORDER — ACETAMINOPHEN 325 MG/1
650 TABLET ORAL EVERY 4 HOURS PRN
Status: DISCONTINUED | OUTPATIENT
Start: 2022-08-05 | End: 2022-08-06 | Stop reason: HOSPADM

## 2022-08-05 RX ORDER — FENTANYL CITRATE 50 UG/ML
INJECTION, SOLUTION INTRAMUSCULAR; INTRAVENOUS PRN
Status: DISCONTINUED | OUTPATIENT
Start: 2022-08-05 | End: 2022-08-05 | Stop reason: SDUPTHER

## 2022-08-05 RX ORDER — ONDANSETRON 2 MG/ML
4 INJECTION INTRAMUSCULAR; INTRAVENOUS
Status: DISCONTINUED | OUTPATIENT
Start: 2022-08-05 | End: 2022-08-05 | Stop reason: HOSPADM

## 2022-08-05 RX ORDER — HEPARIN SODIUM 200 [USP'U]/100ML
30 INJECTION, SOLUTION INTRAVENOUS CONTINUOUS
Status: DISCONTINUED | OUTPATIENT
Start: 2022-08-05 | End: 2022-08-05 | Stop reason: HOSPADM

## 2022-08-05 RX ORDER — PROPOFOL 10 MG/ML
INJECTION, EMULSION INTRAVENOUS PRN
Status: DISCONTINUED | OUTPATIENT
Start: 2022-08-05 | End: 2022-08-05 | Stop reason: SDUPTHER

## 2022-08-05 RX ORDER — OXYCODONE HYDROCHLORIDE 5 MG/1
5 TABLET ORAL
Status: DISCONTINUED | OUTPATIENT
Start: 2022-08-05 | End: 2022-08-05 | Stop reason: HOSPADM

## 2022-08-05 RX ORDER — ONDANSETRON 2 MG/ML
INJECTION INTRAMUSCULAR; INTRAVENOUS PRN
Status: DISCONTINUED | OUTPATIENT
Start: 2022-08-05 | End: 2022-08-05 | Stop reason: SDUPTHER

## 2022-08-05 RX ORDER — KETAMINE HCL IN NACL, ISO-OSM 100MG/10ML
SYRINGE (ML) INJECTION PRN
Status: DISCONTINUED | OUTPATIENT
Start: 2022-08-05 | End: 2022-08-05 | Stop reason: SDUPTHER

## 2022-08-05 RX ORDER — SODIUM CHLORIDE 9 MG/ML
INJECTION, SOLUTION INTRAVENOUS CONTINUOUS
Status: DISCONTINUED | OUTPATIENT
Start: 2022-08-05 | End: 2022-08-05 | Stop reason: HOSPADM

## 2022-08-05 RX ORDER — SODIUM CHLORIDE, SODIUM LACTATE, POTASSIUM CHLORIDE, CALCIUM CHLORIDE 600; 310; 30; 20 MG/100ML; MG/100ML; MG/100ML; MG/100ML
INJECTION, SOLUTION INTRAVENOUS CONTINUOUS
Status: DISCONTINUED | OUTPATIENT
Start: 2022-08-05 | End: 2022-08-05 | Stop reason: HOSPADM

## 2022-08-05 RX ORDER — MIDAZOLAM HYDROCHLORIDE 1 MG/ML
INJECTION INTRAMUSCULAR; INTRAVENOUS PRN
Status: DISCONTINUED | OUTPATIENT
Start: 2022-08-05 | End: 2022-08-05 | Stop reason: SDUPTHER

## 2022-08-05 RX ADMIN — Medication 30 MG: at 17:25

## 2022-08-05 RX ADMIN — MIDAZOLAM HYDROCHLORIDE 1 MG: 5 INJECTION, SOLUTION INTRAMUSCULAR; INTRAVENOUS at 13:33

## 2022-08-05 RX ADMIN — HEPARIN SODIUM IN SODIUM CHLORIDE 30 ML/HR: 200 INJECTION INTRAVENOUS at 14:15

## 2022-08-05 RX ADMIN — MIDAZOLAM 2 MG: 1 INJECTION INTRAMUSCULAR; INTRAVENOUS at 17:13

## 2022-08-05 RX ADMIN — LIDOCAINE HYDROCHLORIDE 10 ML: 10 INJECTION, SOLUTION EPIDURAL; INFILTRATION; INTRACAUDAL; PERINEURAL at 14:17

## 2022-08-05 RX ADMIN — SODIUM CHLORIDE, POTASSIUM CHLORIDE, SODIUM LACTATE AND CALCIUM CHLORIDE: 600; 310; 30; 20 INJECTION, SOLUTION INTRAVENOUS at 14:50

## 2022-08-05 RX ADMIN — MIDAZOLAM HYDROCHLORIDE 1 MG: 5 INJECTION, SOLUTION INTRAMUSCULAR; INTRAVENOUS at 13:37

## 2022-08-05 RX ADMIN — ONDANSETRON 4 MG: 2 INJECTION INTRAMUSCULAR; INTRAVENOUS at 17:22

## 2022-08-05 RX ADMIN — PROPOFOL 250 MG: 10 INJECTION, EMULSION INTRAVENOUS at 17:17

## 2022-08-05 RX ADMIN — FENTANYL CITRATE 50 MCG: 50 INJECTION, SOLUTION INTRAMUSCULAR; INTRAVENOUS at 13:33

## 2022-08-05 RX ADMIN — HYDROMORPHONE HYDROCHLORIDE 0.5 MG: 2 INJECTION, SOLUTION INTRAMUSCULAR; INTRAVENOUS; SUBCUTANEOUS at 18:48

## 2022-08-05 RX ADMIN — HYDROMORPHONE HYDROCHLORIDE 0.5 MG: 2 INJECTION, SOLUTION INTRAMUSCULAR; INTRAVENOUS; SUBCUTANEOUS at 19:02

## 2022-08-05 RX ADMIN — Medication 20 MG: at 17:39

## 2022-08-05 RX ADMIN — FENTANYL CITRATE 100 MCG: 50 INJECTION, SOLUTION INTRAMUSCULAR; INTRAVENOUS at 17:17

## 2022-08-05 RX ADMIN — FENTANYL CITRATE 50 MCG: 50 INJECTION, SOLUTION INTRAMUSCULAR; INTRAVENOUS at 13:37

## 2022-08-05 RX ADMIN — IOPAMIDOL 50 ML: 755 INJECTION, SOLUTION INTRAVENOUS at 14:16

## 2022-08-05 RX ADMIN — CEFAZOLIN 2000 MG: 2 INJECTION, POWDER, FOR SOLUTION INTRAMUSCULAR; INTRAVENOUS at 17:43

## 2022-08-05 RX ADMIN — DEXAMETHASONE SODIUM PHOSPHATE 8 MG: 4 INJECTION, SOLUTION INTRAMUSCULAR; INTRAVENOUS at 17:22

## 2022-08-05 ASSESSMENT — PAIN DESCRIPTION - ORIENTATION
ORIENTATION: LEFT

## 2022-08-05 ASSESSMENT — PAIN DESCRIPTION - DESCRIPTORS
DESCRIPTORS: ACHING

## 2022-08-05 ASSESSMENT — PAIN DESCRIPTION - LOCATION
LOCATION: KNEE

## 2022-08-05 ASSESSMENT — PAIN SCALES - GENERAL
PAINLEVEL_OUTOF10: 2
PAINLEVEL_OUTOF10: 7
PAINLEVEL_OUTOF10: 8

## 2022-08-05 ASSESSMENT — PAIN - FUNCTIONAL ASSESSMENT: PAIN_FUNCTIONAL_ASSESSMENT: 0-10

## 2022-08-05 NOTE — ANESTHESIA PRE PROCEDURE
Department of Anesthesiology  Preprocedure Note       Name:  Santina Favre   Age:  60 y.o.  :  1965                                          MRN:  [id number]         Date:  2022      Surgeon: Sawyer Perez):  Betty Ramírez MD    Procedure: Procedure(s):  ARTHROSCOPIC INCISION AND DRAINAGE LEFT KNEE    Medications prior to admission:   Prior to Admission medications    Medication Sig Start Date End Date Taking? Authorizing Provider   ondansetron (ZOFRAN) 4 MG tablet Take 1 tablet by mouth every 8 hours as needed for Nausea 22   Betty Ramírez MD   HYDROcodone-acetaminophen (NORCO)  MG per tablet Take 1 tablet by mouth every 4 hours as needed for Pain for up to 5 days. 22  Betty Ramírez MD   amoxicillin (AMOXIL) 500 MG capsule Take 1 capsule by mouth in the morning and 1 capsule before bedtime. Do all this for 21 days.  22  Betty Ramírez MD   enoxaparin (LOVENOX) 300 MG/3ML injection Inject into the skin daily  Patient not taking: Reported on 2022    Historical Provider, MD   aspirin 325 MG EC tablet Take 1 tablet by mouth daily for 14 days  Patient not taking: Reported on 2022 7/15/22 7/29/22  Betty Ramírez MD   ondansetron [facility]) 4 MG tablet Take 1 tablet by mouth every 8 hours as needed for Nausea  Patient not taking: Reported on 7/15/2022 7/15/22   Betty Ramírez MD   lisinopril (PRINIVIL;ZESTRIL) 40 MG tablet  22   Historical Provider, MD   metoprolol succinate (TOPROL XL) 50 MG extended release tablet  22   Historical Provider, MD   XARELTO 20 MG TABS tablet  22   Historical Provider, MD   meloxicam (MOBIC) 15 MG tablet Take 15 mg by mouth daily as needed  Patient not taking: Reported on 7/15/2022 4/20/18   Historical Provider, MD   sildenafil (VIAGRA) 50 MG tablet Take 50 mg by mouth as needed 17   Historical Provider, MD   esomeprazole (NEXIUM) 20 MG delayed release capsule Take 20 mg by mouth 08   Historical Provider, MD acetaminophen (TYLENOL) 500 MG tablet Take 500 mg by mouth every 8 hours as needed 2/11/22   Historical Provider, MD       Current medications:    No current facility-administered medications for this encounter. Facility-Administered Medications Ordered in Other Encounters   Medication Dose Route Frequency Provider Last Rate Last Admin    acetaminophen (TYLENOL) tablet 650 mg  650 mg Oral Q4H PRN Corwin Sanchez MD        heparin infusion 2 units/mL in 0.9% NaCl  30 mL/hr IntraVENous Continuous Corwin Sanchez MD 30 mL/hr at 08/05/22 1415 30 mL/hr at 08/05/22 1415       Allergies:  No Known Allergies    Problem List:    Patient Active Problem List   Diagnosis Code    GERD (gastroesophageal reflux disease) K21.9    Arthrofibrosis of knee joint, left M24.662       Past Medical History:        Diagnosis Date    DVT of lower extremity (deep venous thrombosis) (HCC)     left lower leg, diagnosed 5/2022    GERD (gastroesophageal reflux disease)     Hyperlipidemia     Hypertension        Past Surgical History:        Procedure Laterality Date    CERVICAL FUSION      anterior fusion c6-7    COLONOSCOPY      FOOT AMPUTATION Right 1983    partial    KNEE ARTHROSCOPY      KNEE ARTHROSCOPY Right     KNEE ARTHROTOMY Left 7/15/2022    OPEN POSTERIOR KNEE DISSECTION, POSTERIOR CAPSULAR RELEASE performed by Landon Muller MD at [address] [place] [address] History:    Social History     Tobacco Use    Smoking status: Never    Smokeless tobacco: Never   Substance Use Topics    Alcohol use:  Yes     Alcohol/week: 10.0 - 12.0 standard drinks     Types: 10 - 12 Shots of liquor per week                                Counseling given: Not Answered      Vital Signs (Current):   Vitals:    08/04/22 1555 08/04/22 1618   Weight: 195 lb (88.5 kg) 195 lb (88.5 kg)   Height: 5' 9\" (1.753 m) 5' 9\" (1.753 m)                                              BP Readings from Last 3 Encounters:   08/05/22 (!) 154/77   07/15/22 118/72 06/22/22 (!) 158/93       NPO Status:                                                                                 BMI:   Wt Readings from Last 3 Encounters:   08/04/22 195 lb (88.5 kg)   08/04/22 194 lb (88 kg)   07/25/22 194 lb (88 kg)     Body mass index is 28.8 kg/m². CBC:   Lab Results   Component Value Date/Time    WBC 5.8 08/05/2022 12:20 PM    RBC 4.71 08/05/2022 12:20 PM    HGB 13.6 08/05/2022 12:20 PM    HCT 40.1 08/05/2022 12:20 PM    MCV 85.1 08/05/2022 12:20 PM    RDW 13.3 08/05/2022 12:20 PM     08/05/2022 12:20 PM       CMP:   Lab Results   Component Value Date/Time     08/05/2022 12:20 PM    K 4.6 08/05/2022 12:20 PM     08/05/2022 12:20 PM    CO2 28 08/05/2022 12:20 PM    BUN 15 08/05/2022 12:20 PM    CREATININE 0.9 08/05/2022 12:20 PM    GFRAA >60 08/05/2022 12:20 PM    LABGLOM >60 08/05/2022 12:20 PM    GLUCOSE 111 08/05/2022 12:20 PM    CALCIUM 9.4 08/05/2022 12:20 PM       POC Tests: No results for input(s): POCGLU, POCNA, POCK, POCCL, POCBUN, POCHEMO, POCHCT in the last 72 hours.     Coags:   Lab Results   Component Value Date/Time    PROTIME 13.1 08/05/2022 12:20 PM    INR 1.00 08/05/2022 12:20 PM    APTT 26.2 08/05/2022 12:20 PM       HCG (If Applicable): No results found for: PREGTESTUR, PREGSERUM, HCG, HCGQUANT     ABGs: No results found for: PHART, PO2ART, KIM2SVV, OIJ6YXI, BEART, O7SQXLKV     Type & Screen (If Applicable):  No results found for: LABABO, LABRH    Drug/Infectious Status (If Applicable):  No results found for: HIV, HEPCAB    COVID-19 Screening (If Applicable): No results found for: COVID19        Anesthesia Evaluation  Patient summary reviewed and Nursing notes reviewed no history of anesthetic complications:   Airway: Mallampati: II  TM distance: >3 FB   Neck ROM: full  Mouth opening: > = 3 FB   Dental: normal exam         Pulmonary:       (-) COPD, asthma and wheezes                           Cardiovascular:    (+) hypertension:,     (-) CABG/stent, dysrhythmias and  angina      Rhythm: regular  Rate: normal                    Neuro/Psych:      (-) seizures, TIA and CVA           GI/Hepatic/Renal:   (+) GERD:,          ROS comment: Denies gerd sx or n/v today. Endo/Other:    (+) blood dyscrasia: anticoagulation therapy:., .                 Abdominal:             Vascular:   + DVT, .  - PE.       ROS comment: IVC filter placed today. Other Findings:           Anesthesia Plan      general     ASA 3       Induction: intravenous. MIPS: Postoperative opioids intended and Prophylactic antiemetics administered. Anesthetic plan and risks discussed with patient. Plan discussed with CRNA.                     Elfida Brittle, MD   8/5/2022

## 2022-08-05 NOTE — PRE SEDATION
Sedation Pre-Procedure Note    Patient Name: Britney Mckeon   YOB: 1965  Room/Bed: Room/bed info not found  Medical Record Number: [id number]  Date: 8/5/2022   Time: 2:06 PM       Indication:  IVC filter placement. Consent: I have discussed with the patient and/or the patient representative the indication, alternatives, and the possible risks and/or complications of the planned procedure and the anesthesia methods. The patient and/or patient representative appear to understand and agree to proceed. Vital Signs:   Vitals:    08/05/22 1356   BP: (!) 154/77   Pulse: 86   Resp: 18   Temp:    SpO2: 98%       Past Medical History:   has a past medical history of DVT of lower extremity (deep venous thrombosis) ([address] GERD (gastroesophageal reflux disease), Hyperlipidemia, and Hypertension. Past Surgical History:   has a past surgical history that includes Knee arthroscopy; Colonoscopy; Knee arthroscopy (Right); cervical fusion; Foot Amputation (Right, 1983); and Knee Arthrotomy (Left, 7/15/2022). Medications:   Scheduled Meds:   Continuous Infusions:   PRN Meds:   Home Meds:   Prior to Admission medications    Medication Sig Start Date End Date Taking? Authorizing Provider   ondansetron (ZOFRAN) 4 MG tablet Take 1 tablet by mouth every 8 hours as needed for Nausea 8/4/22   Gayle Cole MD   HYDROcodone-acetaminophen (NORCO)  MG per tablet Take 1 tablet by mouth every 4 hours as needed for Pain for up to 5 days. 8/4/22 8/9/22  Gayle Cole MD   amoxicillin (AMOXIL) 500 MG capsule Take 1 capsule by mouth in the morning and 1 capsule before bedtime. Do all this for 21 days.  8/5/22 8/26/22  Gayle Cole MD   enoxaparin (LOVENOX) 300 MG/3ML injection Inject into the skin daily  Patient not taking: Reported on 8/4/2022    Historical Provider, MD   aspirin 325 MG EC tablet Take 1 tablet by mouth daily for 14 days  Patient not taking: Reported on 8/4/2022 7/15/22 7/29/22  Gayle Cole MD

## 2022-08-05 NOTE — ANESTHESIA POSTPROCEDURE EVALUATION
Department of Anesthesiology  Postprocedure Note    Patient: Diana Hdz  MRN: [id number]  YOB: 1965  Date of evaluation: 8/5/2022      Procedure Summary     Date: 08/05/22 Room / Location: [address]    Anesthesia Start: 4253 Anesthesia Stop: 1805    Procedure: ARTHROSCOPIC INCISION AND DRAINAGE LEFT KNEE (Left: Knee) Diagnosis:       Infection of left knee ([address]      (Infection of left knee ([address] [M00.9])    Surgeons: Raz Goodwin MD Responsible Provider: Maria Teresa Durán MD    Anesthesia Type: general ASA Status: 3          Anesthesia Type: No value filed.     Joan Phase I: Joan Score: 7    Joan Phase II:        Anesthesia Post Evaluation    Patient location during evaluation: PACU  Patient participation: complete - patient participated  Level of consciousness: awake and alert  Airway patency: patent  Nausea & Vomiting: no nausea and no vomiting  Complications: no  Cardiovascular status: blood pressure returned to baseline  Respiratory status: acceptable  Hydration status: euvolemic  Multimodal analgesia pain management approach

## 2022-08-05 NOTE — PROGRESS NOTES
Katelyn RN for Interventional radiology phoned over @ 0, stated patient may remove right groin dressing in 24 hrs, no soaking site in tub or pool x least week & call [facility] radiology for any problems & patient and his wife verbalized understanding.

## 2022-08-05 NOTE — PROGRESS NOTES
Discharge instructions went over with patient and patient's wife. Patient has crutches. Prescriptions filled. Call out to Dr. Niranjan Beckford re: when to resume xarelto.

## 2022-08-05 NOTE — PROGRESS NOTES
Patient arrived from OR to PACU. Oral airway in place. NSR on the monitor. VSS. On 6 L simple mask. Drsg to left leg CDI. Immobilizer in place.

## 2022-08-05 NOTE — PROGRESS NOTES
Dr Ortiz Faith stated aware L leg clot still showing up by ultrasound yesterday, stated can use jamin avila, DAMION R leg for surgery, so applied.

## 2022-08-05 NOTE — PROGRESS NOTES
Patient's awake and alert. On room air. NSR on the monitor. VSS. Drsg to left leg CDI. Skin warm, brisk cap refill, pedal pulse palpable. Pain tolerable. Rating 2/10. Denies nausea, tolerating PO. Patient meets criteria to be discharged from phase 1.  Will prepare for discharge home in phase 2

## 2022-08-08 ENCOUNTER — TREATMENT (OUTPATIENT)
Dept: PHYSICAL THERAPY | Age: 57
End: 2022-08-08
Payer: COMMERCIAL

## 2022-08-08 ENCOUNTER — TELEPHONE (OUTPATIENT)
Dept: ORTHOPEDIC SURGERY | Age: 57
End: 2022-08-08

## 2022-08-08 DIAGNOSIS — M24.662 FIBROSIS OF KNEE JOINT, LEFT: ICD-10-CM

## 2022-08-08 DIAGNOSIS — R26.2 DIFFICULTY WALKING: ICD-10-CM

## 2022-08-08 DIAGNOSIS — M25.562 LEFT KNEE PAIN, UNSPECIFIED CHRONICITY: Primary | ICD-10-CM

## 2022-08-08 PROCEDURE — 97110 THERAPEUTIC EXERCISES: CPT | Performed by: PHYSICAL THERAPIST

## 2022-08-08 PROCEDURE — 97530 THERAPEUTIC ACTIVITIES: CPT | Performed by: PHYSICAL THERAPIST

## 2022-08-08 PROCEDURE — 97140 MANUAL THERAPY 1/> REGIONS: CPT | Performed by: PHYSICAL THERAPIST

## 2022-08-08 PROCEDURE — 97164 PT RE-EVAL EST PLAN CARE: CPT | Performed by: PHYSICAL THERAPIST

## 2022-08-08 NOTE — TELEPHONE ENCOUNTER
=================2522=================    Taken 07:35:19 pm 08/05/22 Oper. 07    Dlvrd 07:36:51 pm 08/05/22 To CMD            ALPHA-NUMERIC PAGE               Terminal No. : 30          Pager Number : [phone]         Message : Nicci MCKEON/DEEJAY HAAS [id number]    [id number] PT:REFUGIO MILLER [birth year] HA    D PROCEDURE WITH DR.CHILEILLI INDER AVELAR ON MEDICATION PLEASE CALL.    ___________________________________    LM FOR PT TO RETURN CALL IF HE HAS ADDITIONAL MEDICATION QUESTIONS

## 2022-08-08 NOTE — PROGRESS NOTES
sleep in it.       F/u surg is planned to work on flex and remove scar tissue that has not been scheduled yet;       OBJECTIVE:   Swelling noted in ankle and lower leg on 8/8/22; incision in posterior joint healing w/o any signs or symptoms of infection; dressings removed from knee and portal sites in ant knee w/o signs or symptoms of infection noted;     ROM PROM AROM Overpressure Comment    L 8/8/22 R L R L R 7/29/2022   Flexion 85° ERMI  70° cold 125°      Extension -5° after stretch w/ op  -30° cold   -2°        Girth  7/29/2022 L R   Mid Patella 44 cm 39.3 cm   Suprapatellar     5cm above     15cm above         Strength L R Comment: majority deferred secondary to surgery   Quad      Hamstring      Abduction      Quad tone FA[facility] GOOD 8/8/2022       Special Test Results/Comment: majority deferred secondary to surgery   Homans  8/8/2022 Known DVT in calf       RESTRICTIONS/PRECAUTIONS: 7/18/22 posterior L knee capsule release; Limited ROM In L knee after injection in April that was limited prior to surg for flex and ext;  DVT left lower leg (still on blood thinners till about Oct)    Exercises/Interventions:   Exercise/Equipment Resistance/Repetitions Other comments   Cardio/Warm-up     Bike Treadmill          Stretching     Hamstring Seated propped 10\"x10 Hip Flexion     ITB     Grion     Quad     Inclined Calf    Towel Pull 30\"x5 over foam roll      ROM     Passive    Active     ROM @EOB Flex & ext x10    Prone knee hang    Weight Hangs     Sheet Pulls     Ankle Pumps      Extensionator x10' W/ icing using strap for overpressure b/c cuff would not hold air      Patellar Glides     Medial    Superior x2'    Inferior x2'         STRENGTH     SLR     Supine     Prone     Abduction     Adducton     SLR+          Isometrics     Quad sets 10\"x10' over 1/2 foam roll W/ biofeedback        CKC     Calf raises     Wall sits     Step ups     1 leg stand     Squatting     CC TKE Silver 10\"x10    Balance          PRE Extension  RANGE:   Flexion  RANGE:   Leg Press  RANGE:        Cable Column     Ed given on POC, expectations and goals X10'; 8/3/22 reviewed    Gt training  x5' Using crutches but focus on knee ext w/ cuing        Manual/Modalities     Manual stretching in sitting off edge/distraction x10'    Deep tissue work to HS in prone position                Therapeutic Exercise and NMR EXR  [x] (48422) Provided verbal/tactile cueing for activities related to strengthening, flexibility, endurance, ROM for improvements in LE, proximal hip, and core control with self care, mobility, lifting, ambulation.  [] (87183) Provided verbal/tactile cueing for activities related to improving balance, coordination, kinesthetic sense, posture, motor skill, proprioception  to assist with LE, proximal hip, and core control in self care, mobility, lifting, ambulation and eccentric single leg control.      NMR and Therapeutic Activities:    [x] (52979 or 38928) Provided verbal/tactile cueing for activities related to improving balance, coordination, kinesthetic sense, posture, motor skill, proprioception and motor activation to allow for proper function of core, proximal hip and LE with self care and ADLs  [] (26920) Gait Re-education- Provided training and instruction to the patient for proper LE, core and proximal hip recruitment and positioning and eccentric body weight control with ambulation re-education including up and down stairs     Home Exercise Program:    [x] (76839) Reviewed/Progressed HEP activities related to strengthening, flexibility, endurance, ROM of core, proximal hip and LE for functional self-care, mobility, lifting and ambulation/stair navigation            Written HEP est  [] (63382)Reviewed/Progressed HEP activities related to improving balance, coordination, kinesthetic sense, posture, motor skill, proprioception of core, proximal hip and LE for self care, mobility, lifting, and ambulation/stair navigation      Manual Treatments:  PROM / STM / Oscillations-Mobs:  G-I, II, III, IV (PA's, Inf., Post.)  [x] (04916) Provided manual therapy to mobilize LE, proximal hip and/or LS spine soft tissue/joints for the purpose of modulating pain, promoting relaxation,  increasing ROM, reducing/eliminating soft tissue swelling/inflammation/restriction, improving soft tissue extensibility and allowing for proper ROM for normal function with self care, mobility, lifting and ambulation. Modalities:  CP x10 min w/ ext stretch in extensionator   [] GAME READY (VASO)- for significant edema, swelling, pain control. Charges:  Timed Code Treatment Minutes: 60   Total Treatment Minutes: 70     [] EVAL (LOW) 17877 (typically 20 minutes face-to-face)  [] EVAL (MOD) 80272 (typically 30 minutes face-to-face)  [] EVAL (HIGH) 05582 (typically 45 minutes face-to-face)  [x] RE-EVAL   [x] SR(08071) 1x  20'   [] IONTO  [] NMR (06287) x     [] VASO  [x] Manual (29312) x 1  x10'     [] Other:  [x] TA (58594) 1x   15'   [] Mech Traction (32634)  [] ES(attended) (47775)      [] ES (un) (80264):     GOALS:  Patient stated goal: return to outdoor activities for walking and climbing, along with work  [] Progressing: [] Met: [] Not Met: [] Adjusted     Therapist goals for Patient:  Short Term Goals: To be achieved in: 2 weeks  1. Independent in HEP and progression per patient tolerance, in order to prevent re-injury. [] Progressing: [] Met: [] Not Met: [] Adjusted  2. Patient will have a decrease in pain to facilitate improvement in movement, function, and ADLs as indicated by Functional Deficits. [] Progressing: [] Met: [] Not Met: [] Adjusted     Long Term Goals: To be achieved in: 12+ weeks  1. Functional index score of 67 or more for FOTO to assist with reaching prior level of function. [] Progressing: [] Met: [] Not Met: [] Adjusted  2.  Patient will demonstrate increased AROM to 0-125 deg to allow for proper joint functioning as indicated by patients Functional Deficits. [] Progressing: [] Met: [] Not Met: [] Adjusted  3. Patient will demonstrate an increase in Strength to good proximal hip strength and control, within 5lb HHD in LE to allow for proper functional mobility as indicated by patients Functional Deficits. [] Progressing: [] Met: [] Not Met: [] Adjusted  4. Patient will return to 90% functional activities without increased symptoms or restriction. [] Progressing: [] Met: [] Not Met: [] Adjusted  5. Pt will be able to walk 45 min on uneven surfaces for hiking and climbing. (patient specific functional goal)    [] Progressing: [] Met: [] Not Met: [] Adjusted         Overall Progression Towards Functional goals/ Treatment Progress Update:  [] Patient is progressing as expected towards functional goals listed. [] Progression is slowed due to complexities/Impairments listed. [] Progression has been slowed due to co-morbidities. [x] Plan just implemented, too soon to assess goals progression <30days   [] Goals require adjustment due to lack of progress  [] Patient is not progressing as expected and requires additional follow up with physician  [] Other    ASSESSMENT:  Pt did very well s/p surg on Friday. ROM in L knee was improved to the best he has gotten since either surg w/ 5 deg from extension w/ mod overpressure. Pt was able to improve quad activation and training w/ concentration and stimulation. Gait practice in clinic using crutches w/ mod concentration needed for full knee ext when [address] [facility]. Pt handled pain well w/ program.          Treatment/Activity Tolerance:  [] Patient tolerated treatment well [] Patient limited by fatique  [] Patient limited by pain  [x] Patient limited by other medical complications- tightness  [] Other:     Prognosis: [x] Good [x] Fair  [] Poor    Patient Requires Follow-up: [x] Yes  [] No    PLAN: See eval.   Cont 3x/week for ROM restoration.     WB progression/gait activities    [x] Continue per plan of care [] Sadiq [place] current plan (see comments)  [x] Plan of care initiated [] Hold pending MD visit [] Discharge    Electronically signed by: Rosy Burgos, PT, MS, OMT-C    Physical Therapist [facility] license #[id number]  Physical Therapist [place] license #[id number]             Note: If patient does not return for scheduled/ recommended follow up visits, this note will serve as a discharge from care along with most recent update on progress.

## 2022-08-08 NOTE — OP NOTE
Operative Note      Patient: Marco Barriga  YOB: 1965  MRN: [id number]    Date of Procedure: 8/5/2022    Pre-Op Diagnosis: Infection of left knee ([address] [M00.9]    Post-Op Diagnosis: Same       Procedure(s):  ARTHROSCOPIC INCISION AND DRAINAGE LEFT KNEE    Surgeon(s):  Osman Serna MD    Assistant:   * No surgical staff found *    Anesthesia: General    Estimated Blood Loss (mL): Minimal    Complications: None    Specimens:   ID Type Source Tests Collected by Time Destination   1 :  Tissue Tissue CULTURE, TISSUE (Canceled) Osman Serna MD 8/5/2022 1741    2 :  Tissue Tissue CULTURE, TISSUE Osman Serna MD 8/5/2022 1741    3 :  Tissue Tissue CULTURE, TISSUE (Canceled) Osman Serna MD 8/5/2022 1746        Implants:  * No implants in log *      Drains:   [REMOVED] Closed/Suction Drain Left;Posterior Knee Bulb (Removed)   Site Description Clean, dry & intact 07/15/22 1105   Dressing Status Intact 07/15/22 1105   Drainage Appearance Bloody 07/15/22 1105   Drain Status To bulb suction 07/15/22 1105       Findings: per dictation    Detailed Description of Procedure: The patient was met in the preoperative holding area. Surgical site was identified marked. Informed consent was obtained. He was taken back to the operative room and placed in the supine position. General anesthesia was performed. Lower extremity was examined. There is a negative pivot shift examination. Grade 1A Lachman. Range of motion was 15 to 6 0degrees. Tourniquet was placed in the lower extremities prepped and draped using standard sterile technique. Formal timeout was performed in which the correct patient, surgical site, and procedure was reaffirmed. Preoperative antibiotics were given within 30 minutes of skin incision. Tourniquet was insufflated to 250 mmHg. Anterior inferior lateral incision was made. Trocar was introduced into the notch and then into the patellofemoral joint. Arthroscope was introduced. Diagnostic arthroscopy was performed. Patellofemoral extensive degeneration was present. .  There were adhesions throughout the suprapatellar pouch as well as within the medial and lateral gutters. Arthroscope was taken into the notch. Again noted were significant adhesions anterior the ACL graft although the graft was intact. Anterior inferior medial incision was made. At that point we performed lysis of adhesions and synovectomy starting from the medial compartment. We then proceeded into the notch. As noted the ACL and the PCL were intact. Medial lateral meniscus structures were intact as well as chondral surfaces. The lateral compartment debridement was performed with lysis of adhesions and extensive synovectomy. We did perform a diffuse 3 compartment synovectomy. Once the appropriate amount of debridement and synovectomy was performed the arthroscopic instruments were removed. The knee was taken through a range of motion. The extension and flexion did significantly increase. Range of motion at the end of the case was 10 to 90°. Postoperatively we will start physical therapy immediately. He will be weight-bear as tolerated. We will start aggressive patellar mobilization and range of motion exercises. Antibiotics per infectious disease.        Electronically signed by Mil Shipley MD on 8/8/2022 at 6:04 PM

## 2022-08-09 ENCOUNTER — TELEPHONE (OUTPATIENT)
Dept: ORTHOPEDIC SURGERY | Age: 57
End: 2022-08-09

## 2022-08-10 ENCOUNTER — TREATMENT (OUTPATIENT)
Dept: PHYSICAL THERAPY | Age: 57
End: 2022-08-10
Payer: COMMERCIAL

## 2022-08-10 DIAGNOSIS — R26.2 DIFFICULTY WALKING: ICD-10-CM

## 2022-08-10 DIAGNOSIS — M24.662 FIBROSIS OF KNEE JOINT, LEFT: ICD-10-CM

## 2022-08-10 DIAGNOSIS — M25.562 LEFT KNEE PAIN, UNSPECIFIED CHRONICITY: Primary | ICD-10-CM

## 2022-08-10 PROCEDURE — 97140 MANUAL THERAPY 1/> REGIONS: CPT | Performed by: PHYSICAL THERAPIST

## 2022-08-10 PROCEDURE — 97014 ELECTRIC STIMULATION THERAPY: CPT | Performed by: PHYSICAL THERAPIST

## 2022-08-10 PROCEDURE — 97530 THERAPEUTIC ACTIVITIES: CPT | Performed by: PHYSICAL THERAPIST

## 2022-08-10 PROCEDURE — 97110 THERAPEUTIC EXERCISES: CPT | Performed by: PHYSICAL THERAPIST

## 2022-08-10 NOTE — PROGRESS NOTES
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 Sofía Clay  Has the plan of care been signed (Y/N):        []  Yes  [x]  No     Date of Patient follow up with Physician: 22      Is this a Progress Report:     []  Yes  [x]  No        If Yes:  Date Range for reporting period:  Beginning 22  Ending 22    Progress report will be due (10 Rx or 30 days whichever is less): 2/15/99       Recertification will be due (POC Duration  / 90 days whichever is less): 22        Visit # Insurance Allowable Auth Required   5 BMN []  Yes [x]  No        Functional Scale: FOTO 29; LEFS 13;    Date assessed:  22      Latex Allergy:  [x]NO      []YES  Preferred Language for Healthcare:   [x]English       []other:    Pain level:  3-6/10     SUBJECTIVE:   5 days from debridement; 4 weeks post op from posterior capsule release;  Pt reports that he \"may have done too much\" yesterday. He was sore last night but better today. Pt has been compliant w/ HEP and stretching. He does still use his serial cast at home for extension. He tries to use it 3-4 hours at a time (8-12 hours total per day). States he is unable to sleep in it.       F/u surg is planned to work on flex and remove scar tissue that has not been scheduled yet; above)   Balance          PRE     Extension  RANGE:   Flexion  RANGE:   Leg Press  RANGE:        Cable Column     Ed given on POC, expectations and goals X10'; 8/3/22 reviewed    Gt training  x5' Using crutches but focus on knee ext w/ cuing        Manual/Modalities     Manual stretching into ext & sitting off edge/distraction x10'    Deep tissue work to HS in prone position                Therapeutic Exercise and NMR EXR  [x] (15622) Provided verbal/tactile cueing for activities related to strengthening, flexibility, endurance, ROM for improvements in LE, proximal hip, and core control with self care, mobility, lifting, ambulation.  [] (03931) Provided verbal/tactile cueing for activities related to improving balance, coordination, kinesthetic sense, posture, motor skill, proprioception  to assist with LE, proximal hip, and core control in self care, mobility, lifting, ambulation and eccentric single leg control.      NMR and Therapeutic Activities:    [x] (48717 or 16813) Provided verbal/tactile cueing for activities related to improving balance, coordination, kinesthetic sense, posture, motor skill, proprioception and motor activation to allow for proper function of core, proximal hip and LE with self care and ADLs  [] (06150) Gait Re-education- Provided training and instruction to the patient for proper LE, core and proximal hip recruitment and positioning and eccentric body weight control with ambulation re-education including up and down stairs     Home Exercise Program:    [x] (34304) Reviewed/Progressed HEP activities related to strengthening, flexibility, endurance, ROM of core, proximal hip and LE for functional self-care, mobility, lifting and ambulation/stair navigation            Written HEP est  [] (16167)Reviewed/Progressed HEP activities related to improving balance, coordination, kinesthetic sense, posture, motor skill, proprioception of core, proximal hip and LE for self care, mobility, lifting, and ambulation/stair navigation      Manual Treatments:  PROM / STM / Oscillations-Mobs:  G-I, II, III, IV (PA's, Inf., Post.)  [x] (97451) Provided manual therapy to mobilize LE, proximal hip and/or LS spine soft tissue/joints for the purpose of modulating pain, promoting relaxation,  increasing ROM, reducing/eliminating soft tissue swelling/inflammation/restriction, improving soft tissue extensibility and allowing for proper ROM for normal function with self care, mobility, lifting and ambulation. Modalities:  CP x10 min propped and 10# over knee   [] GAME READY (VASO)- for significant edema, swelling, pain control. Charges:  Timed Code Treatment Minutes: 60   Total Treatment Minutes: 70     [] EVAL (LOW) 93109 (typically 20 minutes face-to-face)  [] EVAL (MOD) 89092 (typically 30 minutes face-to-face)  [] EVAL (HIGH) 75524 (typically 45 minutes face-to-face)  [] RE-EVAL   [x] FM(24515) 1x  20'   [] IONTO  [] NMR (00597) x     [] VASO  [x] Manual (82741) x 1  x10'     [] Other:  [x] TA (80794) 1x   15'   [] Mech Traction (69795)  [] ES(attended) (37284)      [x] ES (un) (78998): biofeedback    GOALS:  Patient stated goal: return to outdoor activities for walking and climbing, along with work  [] Progressing: [] Met: [] Not Met: [] Adjusted     Therapist goals for Patient:  Short Term Goals: To be achieved in: 2 weeks  1. Independent in HEP and progression per patient tolerance, in order to prevent re-injury. [] Progressing: [] Met: [] Not Met: [] Adjusted  2. Patient will have a decrease in pain to facilitate improvement in movement, function, and ADLs as indicated by Functional Deficits. [] Progressing: [] Met: [] Not Met: [] Adjusted     Long Term Goals: To be achieved in: 12+ weeks  1. Functional index score of 67 or more for FOTO to assist with reaching prior level of function. [] Progressing: [] Met: [] Not Met: [] Adjusted  2.  Patient will demonstrate increased AROM to 0-125 deg to allow for proper joint functioning as indicated by patients Functional Deficits. [] Progressing: [] Met: [] Not Met: [] Adjusted  3. Patient will demonstrate an increase in Strength to good proximal hip strength and control, within 5lb HHD in LE to allow for proper functional mobility as indicated by patients Functional Deficits. [] Progressing: [] Met: [] Not Met: [] Adjusted  4. Patient will return to 90% functional activities without increased symptoms or restriction. [] Progressing: [] Met: [] Not Met: [] Adjusted  5. Pt will be able to walk 45 min on uneven surfaces for hiking and climbing. (patient specific functional goal)    [] Progressing: [] Met: [] Not Met: [] Adjusted         Overall Progression Towards Functional goals/ Treatment Progress Update:  [] Patient is progressing as expected towards functional goals listed. [] Progression is slowed due to complexities/Impairments listed. [] Progression has been slowed due to co-morbidities. [x] Plan just implemented, too soon to assess goals progression <30days   [] Goals require adjustment due to lack of progress  [] Patient is not progressing as expected and requires additional follow up with physician  [] Other    ASSESSMENT:  Pt reached full knee ext today w/ overpressure by PT after stretching. Pt is managing pain very well. He still has early heel off w/ swing phase limiting knee ext and quad contraction w/ WBing. Pt performed biofeedback w/ TKE to assist w/ training of quad activation w/ WBing and avoid excess hip motion as substitution pattern. Pt fatigued quickly from SLR w/ focus on distal contraction to hold knee ext. Pt has had great progress in therapy this week with motion.        Treatment/Activity Tolerance:  [] Patient tolerated treatment well [] Patient limited by fatique  [] Patient limited by pain  [x] Patient limited by other medical complications- tightness  [] Other:     Prognosis: [x] Good [x] Fair  [] Poor    Patient Requires Follow-up: [x] Yes  [] No    PLAN: See eval.   Cont 3x/week for ROM restoration focus on gt training for function. [x] Continue per plan of care [] Alter current plan (see comments)  [x] Plan of care initiated [] Hold pending MD visit [] Discharge    Electronically signed by: Edwin Bermudez PT, MS, OMT-C    Physical Therapist [place] license #[id number]  Physical Therapist [place] license #[id number]             Note: If patient does not return for scheduled/ recommended follow up visits, this note will serve as a discharge from care along with most recent update on progress.

## 2022-08-12 ENCOUNTER — TREATMENT (OUTPATIENT)
Dept: PHYSICAL THERAPY | Age: 57
End: 2022-08-12
Payer: COMMERCIAL

## 2022-08-12 DIAGNOSIS — M25.562 LEFT KNEE PAIN, UNSPECIFIED CHRONICITY: Primary | ICD-10-CM

## 2022-08-12 DIAGNOSIS — R26.2 DIFFICULTY WALKING: ICD-10-CM

## 2022-08-12 DIAGNOSIS — M24.662 FIBROSIS OF KNEE JOINT, LEFT: ICD-10-CM

## 2022-08-12 PROCEDURE — 97016 VASOPNEUMATIC DEVICE THERAPY: CPT | Performed by: PHYSICAL THERAPIST

## 2022-08-12 PROCEDURE — 97110 THERAPEUTIC EXERCISES: CPT | Performed by: PHYSICAL THERAPIST

## 2022-08-12 PROCEDURE — 97014 ELECTRIC STIMULATION THERAPY: CPT | Performed by: PHYSICAL THERAPIST

## 2022-08-12 PROCEDURE — 97530 THERAPEUTIC ACTIVITIES: CPT | Performed by: PHYSICAL THERAPIST

## 2022-08-12 PROCEDURE — 97140 MANUAL THERAPY 1/> REGIONS: CPT | Performed by: PHYSICAL THERAPIST

## 2022-08-12 NOTE — PROGRESS NOTES
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 Ean Arshad  Has the plan of care been signed (Y/N):        []  Yes  [x]  No     Date of Patient follow up with Physician: 22      Is this a Progress Report:     []  Yes  [x]  No        If Yes:  Date Range for reporting period:  Beginning 22  Ending 22    Progress report will be due (10 Rx or 30 days whichever is less):        Recertification will be due (POC Duration  / 90 days whichever is less): 22        Visit # Insurance Allowable Auth Required   6 BMN []  Yes [x]  No        Functional Scale: FOTO 29; LEFS 13;    Date assessed:  22      Latex Allergy:  [x]NO      []YES  Preferred Language for Healthcare:   [x]English       []other:    Pain level:  3-6/10     SUBJECTIVE:   1 week post op from debridement; 4 weeks post op from posterior capsule release  Patient reports that he doesn't have much pain other than when we are pushing his range of motion. He is taking blood thinners and antibiotics. Still uses 2 crutches with gait in and out of his house. He does still use his serial cast at home for extension. He tries to use it 3-4 hours at a time (8-12 hours total per day). States he is unable to sleep in it.       F/u surg is planned to work on flex and remove scar tissue that has not been scheduled yet;       OBJECTIVE:   Slight swelling noted in ankle and lower leg on 8/8/22; incision in posterior joint healing w/o any signs or symptoms of infection; incision w/ steri strips in place w/o signs or symptoms of infection noted;     ROM PROM AROM Overpressure Comment    L 8/12/22 R L R L R    Flexion 92° ERMI  70° cold 125°      Extension 0° after stretch w/ op  -30° cold   -2°        Girth  7/29/2022 L R   Mid Patella 44 cm 39.3 cm   Suprapatellar     5cm above     15cm above         Strength L R Comment: majority deferred secondary to surgery   Quad      Hamstring      Abduction      Quad tone FAIR GOOD 8/8/2022       Special Test Results/Comment: majority deferred secondary to surgery   Homans  8/8/2022 Known DVT in calf       RESTRICTIONS/PRECAUTIONS: 7/18/22 posterior L knee capsule release; Limited ROM In L knee after injection in April that was limited prior to surg for flex and ext;  DVT left lower leg (still on blood thinners till about Oct)    Exercises/Interventions:   Exercise/Equipment Resistance/Repetitions Other comments   Cardio/Warm-up     Bike Treadmill          Stretching     Hamstring Seated propped 10\"x10 10# overpressure   Hip Flexion     ITB     Grion     Quad     Inclined Calf    Towel Pull 30\"x5 over foam roll 10# overpressure        ROM     Passive ERMI 10\"x10 for flex    Active     ROM @EOB Flex & ext x10    Prone knee hang x7'   10# OP on ankle    Weight Hangs     Sheet Pulls     Ankle Pumps           Patellar Glides     Medial    Superior x2'    Inferior x2'         STRENGTH     SLR     Supine X15 over foam roll Concentration on distal contraction w/ cuing  Also with EMG/STIM   Prone     Abduction     Adducton     SLR+          Isometrics     Quad sets 10\"x10' over 1/2 foam roll W/ biofeedback 5 min sitting and standing w/ TKE        CKC     Calf raises     Wall sits     Step ups     1 leg stand     Squatting     CC TKE Silver 10\"x10 W/ biofeedback (see above)   Balance          PRE     Extension  RANGE:   Flexion  RANGE:   Leg Press  RANGE:        Cable Column     Ed given on POC, expectations and goals X10'; 8/3/22 reviewed    Gt training  x5' Using crutches but focus on knee ext w/ cuing        Manual/Modalities     Manual stretching into ext & sitting off edge/distraction x10'    Deep tissue work to HS/scar massage in prone position x5'                Therapeutic Exercise and NMR EXR  [x] (30102) Provided verbal/tactile cueing for activities related to strengthening, flexibility, endurance, ROM for improvements in LE, proximal hip, and core control with self care, mobility, lifting, ambulation.  [] (00744) Provided verbal/tactile cueing for activities related to improving balance, coordination, kinesthetic sense, posture, motor skill, proprioception  to assist with LE, proximal hip, and core control in self care, mobility, lifting, ambulation and eccentric single leg control.      NMR and Therapeutic Activities:    [x] (90335 or 13938) Provided verbal/tactile cueing for activities related to improving balance, coordination, kinesthetic sense, posture, motor skill, proprioception and motor activation to allow for proper function of core, proximal hip and LE with self care and ADLs  [] (01096) Gait Re-education- Provided training and instruction to the patient for proper LE, core and proximal hip recruitment and positioning and eccentric body weight control with ambulation re-education including up and down stairs     Home Exercise Program:    [x] (17955) Reviewed/Progressed HEP activities related to strengthening, flexibility, endurance, ROM of core, proximal hip and LE for functional self-care, mobility, lifting and ambulation/stair navigation            Written HEP est  [] (20014)Reviewed/Progressed HEP activities related to improving balance, coordination, kinesthetic sense, posture, motor skill, proprioception of core, proximal hip and LE for self care, mobility, lifting, and ambulation/stair navigation      Manual Treatments:  PROM / STM / Oscillations-Mobs:  G-I, II, III, IV (PA's, Inf., Post.)  [x] (70541) Provided manual therapy to mobilize LE, proximal hip and/or LS spine soft tissue/joints for the purpose of modulating pain, promoting relaxation,  increasing ROM, reducing/eliminating soft tissue swelling/inflammation/restriction, improving soft tissue extensibility and allowing for proper ROM for normal function with self care, mobility, lifting and ambulation. Modalities:   [] GAME READY (VASO)- for significant edema, swelling, pain control. Charges:  Timed Code Treatment Minutes: 55   Total Treatment Minutes: 75     [] EVAL (LOW) 17295 (typically 20 minutes face-to-face)  [] EVAL (MOD) 17945 (typically 30 minutes face-to-face)  [] EVAL (HIGH) 41141 (typically 45 minutes face-to-face)  [] RE-EVAL   [x] GK(48410) 1x  20'   [] IONTO  [] NMR (64136) x     [x] VASO x10'  [x] Manual (43579) x 1  x15'     [] Other:  [x] TA (64467) 1x   15'   [] Mech Traction (26659)  [] ES(attended) (17720)      [x] ES (un) (63785): EMG/Stim combo x20'    GOALS:  Patient stated goal: return to outdoor activities for walking and climbing, along with work  [] Progressing: [] Met: [] Not Met: [] Adjusted     Therapist goals for Patient:  Short Term Goals: To be achieved in: 2 weeks  1. Independent in HEP and progression per patient tolerance, in order to prevent re-injury. [] Progressing: [] Met: [] Not Met: [] Adjusted  2. Patient will have a decrease in pain to facilitate improvement in movement, function, and ADLs as indicated by Functional Deficits. [] Progressing: [] Met: [] Not Met: [] Adjusted     Long Term Goals: To be achieved in: 12+ weeks  1. Functional index score of 67 or more for FOTO to assist with reaching prior level of function. [] Progressing: [] Met: [] Not Met: [] Adjusted  2.  Patient will demonstrate increased AROM to 0-125 deg to allow for proper joint functioning as indicated by patients Functional Deficits. [] Progressing: [] Met: [] Not Met: [] Adjusted  3. Patient will demonstrate an increase in Strength to good proximal hip strength and control, within 5lb HHD in LE to allow for proper functional mobility as indicated by patients Functional Deficits. [] Progressing: [] Met: [] Not Met: [] Adjusted  4. Patient will return to 90% functional activities without increased symptoms or restriction. [] Progressing: [] Met: [] Not Met: [] Adjusted  5. Pt will be able to walk 45 min on uneven surfaces for hiking and climbing. (patient specific functional goal)    [] Progressing: [] Met: [] Not Met: [] Adjusted         Overall Progression Towards Functional goals/ Treatment Progress Update:  [] Patient is progressing as expected towards functional goals listed. [] Progression is slowed due to complexities/Impairments listed. [] Progression has been slowed due to co-morbidities. [x] Plan just implemented, too soon to assess goals progression <30days   [] Goals require adjustment due to lack of progress  [] Patient is not progressing as expected and requires additional follow up with physician  [] Other    ASSESSMENT:    Started with the EMG goal at 25, stim set to 50. His quad tone improved as time went on, able to increase the target and he tolerates the stim very well. He spent about 20 minutes total on the EMG/Stim combo and it really helps his quad recruitment. He was a little more sore towards the end of the session with extension overpressure. Improvement noted in knee flexion on the ERMI today. We used the vasopneumatic pump on his LE to help with swelling control as well as hold him into extension for a period of time.         Treatment/Activity Tolerance:  [] Patient tolerated treatment well [] Patient limited by fatique  [] Patient limited by pain  [x] Patient limited by other medical complications- tightness  [] Other:     Prognosis: [x] Good [x] Fair  [] Poor    Patient Requires Follow-up: [x] Yes  [] No    PLAN: See eval.   Cont 3x/week for ROM restoration focus on gt training for function. [x] Continue per plan of care [] Alter current plan (see comments)  [x] Plan of care initiated [] Hold pending MD visit [] Discharge    Electronically signed by:     Merry Gray, PT      Board Certified Orthopaedic Clinical Specialist  [facility] Certified  Physical Therapist    [place] PT #[id number]  [place] PT #[id number]      Note: If patient does not return for scheduled/ recommended follow up visits, this note will serve as a discharge from care along with most recent update on progress.

## 2022-08-15 ENCOUNTER — TREATMENT (OUTPATIENT)
Dept: PHYSICAL THERAPY | Age: 57
End: 2022-08-15
Payer: COMMERCIAL

## 2022-08-15 DIAGNOSIS — R26.2 DIFFICULTY WALKING: ICD-10-CM

## 2022-08-15 DIAGNOSIS — M25.562 LEFT KNEE PAIN, UNSPECIFIED CHRONICITY: Primary | ICD-10-CM

## 2022-08-15 DIAGNOSIS — M24.662 FIBROSIS OF KNEE JOINT, LEFT: ICD-10-CM

## 2022-08-15 PROCEDURE — 97014 ELECTRIC STIMULATION THERAPY: CPT | Performed by: PHYSICAL THERAPIST

## 2022-08-15 PROCEDURE — 97110 THERAPEUTIC EXERCISES: CPT | Performed by: PHYSICAL THERAPIST

## 2022-08-15 PROCEDURE — 97530 THERAPEUTIC ACTIVITIES: CPT | Performed by: PHYSICAL THERAPIST

## 2022-08-15 PROCEDURE — 97016 VASOPNEUMATIC DEVICE THERAPY: CPT | Performed by: PHYSICAL THERAPIST

## 2022-08-15 PROCEDURE — 97140 MANUAL THERAPY 1/> REGIONS: CPT | Performed by: PHYSICAL THERAPIST

## 2022-08-15 NOTE — PROGRESS NOTES
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 Krista Hairston  Has the plan of care been signed (Y/N):        []  Yes  [x]  No     Date of Patient follow up with Physician: 22      Is this a Progress Report:     []  Yes  [x]  No        If Yes:  Date Range for reporting period:  Beginning 22  Ending 22    Progress report will be due (10 Rx or 30 days whichever is less):        Recertification will be due (POC Duration  / 90 days whichever is less): 22        Visit # Insurance Allowable Auth Required   7 BMN []  Yes [x]  No        Functional Scale: FOTO 29; LEFS 13;    Date assessed:  22      Latex Allergy:  [x]NO      []YES  Preferred Language for Healthcare:   [x]English       []other:    Pain level:  3-6/10     SUBJECTIVE:   1+ weeks post op from debridement; 4+ weeks post op from posterior capsule release  Patient reports that he had an ok weekend. Didn't do too much out of the house. Continues to use crutches with gait, thinking more about proper heel to toe. He liked the vaso ice machine that we used here last time. He does still use his serial cast at home for extension. He tries to use it 3-4 hours at a time (8-12 hours total per day). States he is unable to sleep in it.       F/u surg is planned to work on flex and remove scar tissue that has not been scheduled yet;       OBJECTIVE:   Slight swelling noted in ankle and lower leg on 8/8/22; incision in posterior joint healing w/o any signs or symptoms of infection; incision w/ steri strips in place w/o signs or symptoms of infection noted;     ROM PROM AROM Overpressure Comment    L 8/15/22 R L R L R    Flexion 92° ERMI  70° cold 125°      Extension ~-5° after stretch w/ op  -30° cold   -2°        Girth  7/29/2022 L R   Mid Patella 44 cm 39.3 cm   Suprapatellar     5cm above     15cm above         Strength L R Comment: majority deferred secondary to surgery   Quad      Hamstring      Abduction      Quad tone FAIR GOOD 8/8/2022       Special Test Results/Comment: majority deferred secondary to surgery   Homans  8/8/2022 Known DVT in calf       RESTRICTIONS/PRECAUTIONS: 7/18/22 posterior L knee capsule release; Limited ROM In L knee after injection in April that was limited prior to surg for flex and ext;  DVT left lower leg (still on blood thinners till about Oct)    Exercises/Interventions:   Exercise/Equipment Resistance/Repetitions Other comments   Cardio/Warm-up     Bike Treadmill          Stretching     Hamstring Seated propped 10\"x10 10# overpressure   Hip Flexion     ITB     Grion     Quad     Inclined Calf    Towel Pull 30\"x5 over foam roll 10# overpressure        ROM     Passive ERMI 10\"x10 for flex    Active     ROM @EOB Flex & ext x10    Prone knee hang x7'   10# OP on ankle    Weight Hangs     Sheet Pulls     Ankle Pumps           Patellar Glides     Medial    Superior x2'    Inferior x2'         STRENGTH     SLR     Supine X15 over foam roll Concentration on distal contraction w/ cuing  Also with EMG/STIM   Prone     Abduction     Adducton     SLR+          Isometrics     Quad sets 10\"x10' over 1/2 foam roll W/ biofeedback 5 min sitting and standing w/ TKE        CKC     Calf raises     Wall sits     Step ups     1 leg stand Squatting     CC TKE Silver 10\"x10 W/ biofeedback (see above)   Balance          PRE     Extension  RANGE:   Flexion  RANGE:   Leg Press  RANGE:        Cable Column     Ed given on POC, expectations and goals X10'; 8/3/22 reviewed    Gt training  x5' Using crutches but focus on knee ext w/ cuing        Manual/Modalities     Manual stretching into ext & sitting off edge/distraction x10'    Deep tissue work to HS/scar massage in prone position x5'                Therapeutic Exercise and NMR EXR  [x] (68460) Provided verbal/tactile cueing for activities related to strengthening, flexibility, endurance, ROM for improvements in LE, proximal hip, and core control with self care, mobility, lifting, ambulation.  [] (49680) Provided verbal/tactile cueing for activities related to improving balance, coordination, kinesthetic sense, posture, motor skill, proprioception  to assist with LE, proximal hip, and core control in self care, mobility, lifting, ambulation and eccentric single leg control.      NMR and Therapeutic Activities:    [x] (60761 or 58569) Provided verbal/tactile cueing for activities related to improving balance, coordination, kinesthetic sense, posture, motor skill, proprioception and motor activation to allow for proper function of core, proximal hip and LE with self care and ADLs  [] (99413) Gait Re-education- Provided training and instruction to the patient for proper LE, core and proximal hip recruitment and positioning and eccentric body weight control with ambulation re-education including up and down stairs     Home Exercise Program:    [x] (26155) Reviewed/Progressed HEP activities related to strengthening, flexibility, endurance, ROM of core, proximal hip and LE for functional self-care, mobility, lifting and ambulation/stair navigation            Written HEP est  [] (80224)Reviewed/Progressed HEP activities related to improving balance, coordination, kinesthetic sense, posture, motor skill, proprioception of core, proximal hip and LE for self care, mobility, lifting, and ambulation/stair navigation      Manual Treatments:  PROM / STM / Oscillations-Mobs:  G-I, II, III, IV (PA's, Inf., Post.)  [x] (18686) Provided manual therapy to mobilize LE, proximal hip and/or LS spine soft tissue/joints for the purpose of modulating pain, promoting relaxation,  increasing ROM, reducing/eliminating soft tissue swelling/inflammation/restriction, improving soft tissue extensibility and allowing for proper ROM for normal function with self care, mobility, lifting and ambulation. Modalities:   [] GAME READY (VASO)- for significant edema, swelling, pain control. Charges:  Timed Code Treatment Minutes: 45   Total Treatment Minutes: 75     [] EVAL (LOW) 56018 (typically 20 minutes face-to-face)  [] EVAL (MOD) 39662 (typically 30 minutes face-to-face)  [] EVAL (HIGH) 20192 (typically 45 minutes face-to-face)  [] RE-EVAL   [x] VS(20126) 1x  15'   [] IONTO  [] NMR (03068) x     [x] VASO x15'  [x] Manual (31559) x 1  x15'     [] Other:  [x] TA (84670) 1x   15'   [] Mech Traction (92324)  [] ES(attended) (64974)      [x] ES (un) (19368): EMG/Stim combo x15'    GOALS:  Patient stated goal: return to outdoor activities for walking and climbing, along with work  [] Progressing: [] Met: [] Not Met: [] Adjusted     Therapist goals for Patient:  Short Term Goals: To be achieved in: 2 weeks  1. Independent in HEP and progression per patient tolerance, in order to prevent re-injury. [] Progressing: [] Met: [] Not Met: [] Adjusted  2. Patient will have a decrease in pain to facilitate improvement in movement, function, and ADLs as indicated by Functional Deficits. [] Progressing: [] Met: [] Not Met: [] Adjusted     Long Term Goals: To be achieved in: 12+ weeks  1. Functional index score of 67 or more for FOTO to assist with reaching prior level of function. [] Progressing: [] Met: [] Not Met: [] Adjusted  2.  Patient will demonstrate increased AROM to 0-125 deg to allow for proper joint functioning as indicated by patients Functional Deficits. [] Progressing: [] Met: [] Not Met: [] Adjusted  3. Patient will demonstrate an increase in Strength to good proximal hip strength and control, within 5lb HHD in LE to allow for proper functional mobility as indicated by patients Functional Deficits. [] Progressing: [] Met: [] Not Met: [] Adjusted  4. Patient will return to 90% functional activities without increased symptoms or restriction. [] Progressing: [] Met: [] Not Met: [] Adjusted  5. Pt will be able to walk 45 min on uneven surfaces for hiking and climbing. (patient specific functional goal)    [] Progressing: [] Met: [] Not Met: [] Adjusted         Overall Progression Towards Functional goals/ Treatment Progress Update:  [] Patient is progressing as expected towards functional goals listed. [] Progression is slowed due to complexities/Impairments listed. [] Progression has been slowed due to co-morbidities. [x] Plan just implemented, too soon to assess goals progression <30days   [] Goals require adjustment due to lack of progress  [] Patient is not progressing as expected and requires additional follow up with physician  [] Other    ASSESSMENT:    Quad tone continues to improve with EMG/STIM combo. Seeing more muscle contraction and ability to actively extend his knee is slowly improving as well. Unable to get full knee extension today. He complains of pain more anterior knee at the joint line. Little to no discomfort posterior. He did say his knee felt better and looser to walk after stretching compared to when he first came in therapy.           Treatment/Activity Tolerance:  [] Patient tolerated treatment well [] Patient limited by fatique  [] Patient limited by pain  [x] Patient limited by other medical complications- tightness  [] Other:     Prognosis: [x] Good [x] Fair  [] Poor    Patient Requires Follow-up: [x] Yes  [] No    PLAN: See eval.   Cont 3x/week for ROM restoration focus on gt training for function. [x] Continue per plan of care [] Alter current plan (see comments)  [x] Plan of care initiated [] Hold pending MD visit [] Discharge    Electronically signed by:     Reza Quiroga, PT      Board Certified Orthopaedic Clinical Specialist  [facility] Certified  Physical Therapist    [place] PT #[id number]  [facility] PT #[id number]      Note: If patient does not return for scheduled/ recommended follow up visits, this note will serve as a discharge from care along with most recent update on progress.

## 2022-08-17 ENCOUNTER — TREATMENT (OUTPATIENT)
Dept: PHYSICAL THERAPY | Age: 57
End: 2022-08-17
Payer: COMMERCIAL

## 2022-08-17 DIAGNOSIS — R26.2 DIFFICULTY WALKING: ICD-10-CM

## 2022-08-17 DIAGNOSIS — M24.662 FIBROSIS OF KNEE JOINT, LEFT: ICD-10-CM

## 2022-08-17 DIAGNOSIS — M25.562 LEFT KNEE PAIN, UNSPECIFIED CHRONICITY: Primary | ICD-10-CM

## 2022-08-17 LAB
ANAEROBIC CULTURE: ABNORMAL
ANAEROBIC CULTURE: ABNORMAL
CULTURE SURGICAL: ABNORMAL
CULTURE SURGICAL: ABNORMAL
GRAM STAIN RESULT: ABNORMAL
ORGANISM: ABNORMAL
ORGANISM: ABNORMAL
WOUND/ABSCESS: ABNORMAL

## 2022-08-17 PROCEDURE — 97110 THERAPEUTIC EXERCISES: CPT | Performed by: PHYSICAL THERAPIST

## 2022-08-17 PROCEDURE — 97530 THERAPEUTIC ACTIVITIES: CPT | Performed by: PHYSICAL THERAPIST

## 2022-08-17 PROCEDURE — 97140 MANUAL THERAPY 1/> REGIONS: CPT | Performed by: PHYSICAL THERAPIST

## 2022-08-17 PROCEDURE — 97014 ELECTRIC STIMULATION THERAPY: CPT | Performed by: PHYSICAL THERAPIST

## 2022-08-17 NOTE — PROGRESS NOTES
been scheduled yet;       OBJECTIVE:   Slight swelling noted in ankle and lower leg on 8/8/22; incision in posterior joint healing w/o any signs or symptoms of infection; incision w/ steri strips in place w/o signs or symptoms of infection noted;     ROM PROM AROM Overpressure Comment    L 8/17/22 R L R L R    Flexion 92° ERMI  70° cold 125°      Extension 3° after stretch w/ op  -30° cold   -2°        Girth  7/29/2022 L R   Mid Patella 44 cm 39.3 cm   Suprapatellar     5cm above     15cm above         Strength L R Comment: majority deferred secondary to surgery   Quad      Hamstring      Abduction      Quad tone FAIR GOOD 8/8/2022       Special Test Results/Comment: majority deferred secondary to surgery   Homans  8/8/2022 Known DVT in calf       RESTRICTIONS/PRECAUTIONS: 7/18/22 posterior L knee capsule release; Limited ROM In L knee after injection in April that was limited prior to surg for flex and ext;  DVT left lower leg (still on blood thinners till about Oct)    Exercises/Interventions:   Exercise/Equipment Resistance/Repetitions Other comments   Cardio/Warm-up     Bike Treadmill          Stretching     Hamstring Seated propped 10\"x10 10# overpressure   Hip Flexion     ITB     Grion     Quad     Inclined Calf    Towel Pull 30\"x5 over foam roll 10# overpressure        ROM     Passive ERMI 10\"x10 for flex    Active     ROM @EOB Flex & ext x10    Prone knee hang x7'   10# OP on ankle    Weight Hangs     Sheet Pulls     Ankle Pumps           Patellar Glides     Medial    Superior x2'    Inferior x2'         STRENGTH     SLR     Supine Prone     Abduction     Adducton     SLR+          Isometrics     Quad sets 10\" on/off x10' W/ biofeedback 5 min sitting and standing w/ TKE        CKC     Calf raises     Wall sits     Step ups     1 leg stand     Squatting     CC TKE Silver 10\"x10 W/ biofeedback (see above)   Balance          PRE     Extension  RANGE:   Flexion  RANGE:   Leg Press  RANGE:        Cable Column Ed given on POC, expectations and goals X10'; 8/3/22 reviewed    Gt training  x5' Using crutches but focus on knee ext w/ cuing        Manual/Modalities     Manual stretching into ext & sitting off edge/distraction x10'    Deep tissue work to HS/scar massage in prone position x5'                Therapeutic Exercise and NMR EXR  [x] (01200) Provided verbal/tactile cueing for activities related to strengthening, flexibility, endurance, ROM for improvements in LE, proximal hip, and core control with self care, mobility, lifting, ambulation.  [] (57024) Provided verbal/tactile cueing for activities related to improving balance, coordination, kinesthetic sense, posture, motor skill, proprioception  to assist with LE, proximal hip, and core control in self care, mobility, lifting, ambulation and eccentric single leg control.      NMR and Therapeutic Activities:    [x] (82327 or 86918) Provided verbal/tactile cueing for activities related to improving balance, coordination, kinesthetic sense, posture, motor skill, proprioception and motor activation to allow for proper function of core, proximal hip and LE with self care and ADLs  [] (40851) Gait Re-education- Provided training and instruction to the patient for proper LE, core and proximal hip recruitment and positioning and eccentric body weight control with ambulation re-education including up and down stairs     Home Exercise Program:    [x] (26148) Reviewed/Progressed HEP activities related to strengthening, flexibility, endurance, ROM of core, proximal hip and LE for functional self-care, mobility, lifting and ambulation/stair navigation            Written HEP est  [] (31078)Reviewed/Progressed HEP activities related to improving balance, coordination, kinesthetic sense, posture, motor skill, proprioception of core, proximal hip and LE for self care, mobility, lifting, and ambulation/stair navigation      Manual Treatments:  PROM / STM / Oscillations-Mobs:  G-I, II, III, IV (PA's, Inf., Post.)  [x] (68881) Provided manual therapy to mobilize LE, proximal hip and/or LS spine soft tissue/joints for the purpose of modulating pain, promoting relaxation,  increasing ROM, reducing/eliminating soft tissue swelling/inflammation/restriction, improving soft tissue extensibility and allowing for proper ROM for normal function with self care, mobility, lifting and ambulation. Modalities:  CP x10 min propped and 10# over knee   [] GAME READY (VASO)- for significant edema, swelling, pain control. Charges:  Timed Code Treatment Minutes: 55   Total Treatment Minutes: 75     [] EVAL (LOW) 94084 (typically 20 minutes face-to-face)  [] EVAL (MOD) 31143 (typically 30 minutes face-to-face)  [] EVAL (HIGH) 04661 (typically 45 minutes face-to-face)  [] RE-EVAL   [x] QG(30502) 1x  15'   [] IONTO  [] NMR (32421) x     [] VASO x15'  [x] Manual (13739) x 1  x15'     [] Other:  [x] TA (90689) 1x   15'   [] Mech Traction (10158)  [] ES(attended) (21120)      [x] ES (un) (85059): EMG/Stim combo x15'    GOALS:  Patient stated goal: return to outdoor activities for walking and climbing, along with work  [] Progressing: [] Met: [] Not Met: [] Adjusted     Therapist goals for Patient:  Short Term Goals: To be achieved in: 2 weeks  1. Independent in HEP and progression per patient tolerance, in order to prevent re-injury. [] Progressing: [] Met: [] Not Met: [] Adjusted  2. Patient will have a decrease in pain to facilitate improvement in movement, function, and ADLs as indicated by Functional Deficits. [] Progressing: [] Met: [] Not Met: [] Adjusted     Long Term Goals: To be achieved in: 12+ weeks  1. Functional index score of 67 or more for FOTO to assist with reaching prior level of function. [] Progressing: [] Met: [] Not Met: [] Adjusted  2. Patient will demonstrate increased AROM to 0-125 deg to allow for proper joint functioning as indicated by patients Functional Deficits.   [] Progressing: [] Met: [] Not Met: [] Adjusted  3. Patient will demonstrate an increase in Strength to good proximal hip strength and control, within 5lb HHD in LE to allow for proper functional mobility as indicated by patients Functional Deficits. [] Progressing: [] Met: [] Not Met: [] Adjusted  4. Patient will return to 90% functional activities without increased symptoms or restriction. [] Progressing: [] Met: [] Not Met: [] Adjusted  5. Pt will be able to walk 45 min on uneven surfaces for hiking and climbing. (patient specific functional goal)    [] Progressing: [] Met: [] Not Met: [] Adjusted         Overall Progression Towards Functional goals/ Treatment Progress Update:  [] Patient is progressing as expected towards functional goals listed. [] Progression is slowed due to complexities/Impairments listed. [] Progression has been slowed due to co-morbidities. [x] Plan just implemented, too soon to assess goals progression <30days   [] Goals require adjustment due to lack of progress  [] Patient is not progressing as expected and requires additional follow up with physician  [] Other    ASSESSMENT:    More swelling noted in L knee than on previous session when this PT treated him. Pt still struggles w/ distal quad contraction. Pt was tight at end range ext and reached 3 deg. Patella mobility improved after stretching. Pt amb's into clinic w/ knee is flexed position that I believe is contributing to edema in L knee and poor quad contraction. Pt continues to manage the pain well despite therapy typically for this situation very painful. Treatment/Activity Tolerance:  [] Patient tolerated treatment well [] Patient limited by fatique  [] Patient limited by pain  [x] Patient limited by other medical complications- tightness  [] Other:     Prognosis: [x] Good [x] Fair  [] Poor    Patient Requires Follow-up: [x] Yes  [] No    PLAN: See eval.   Cont 3x/week for ROM restoration focus on gt training for function.      [x] Continue per plan of care [] Alter current plan (see comments)  [x] Plan of care initiated [] Hold pending MD visit [] Discharge    Electronically signed by: Minnie Daniel PT, MS, OMT-C    Physical Therapist [facility] license #[id number]  Physical Therapist [place] license #[id number]           Note: If patient does not return for scheduled/ recommended follow up visits, this note will serve as a discharge from care along with most recent update on progress.

## 2022-08-18 ENCOUNTER — TELEPHONE (OUTPATIENT)
Dept: ORTHOPEDIC SURGERY | Age: 57
End: 2022-08-18

## 2022-08-18 NOTE — TELEPHONE ENCOUNTER
Infectious disease wanted to update Dr. Ean Arshad on this pt. Per infectious disease cultures grew from sx. PT will now have picc line & IV antibiotics. Please call MD if you have any additional questions.  [phone]

## 2022-08-19 ENCOUNTER — TREATMENT (OUTPATIENT)
Dept: PHYSICAL THERAPY | Age: 57
End: 2022-08-19
Payer: COMMERCIAL

## 2022-08-19 DIAGNOSIS — M24.662 FIBROSIS OF KNEE JOINT, LEFT: ICD-10-CM

## 2022-08-19 DIAGNOSIS — R26.2 DIFFICULTY WALKING: ICD-10-CM

## 2022-08-19 DIAGNOSIS — M25.562 LEFT KNEE PAIN, UNSPECIFIED CHRONICITY: Primary | ICD-10-CM

## 2022-08-19 PROCEDURE — 97014 ELECTRIC STIMULATION THERAPY: CPT | Performed by: PHYSICAL THERAPIST

## 2022-08-19 PROCEDURE — 97530 THERAPEUTIC ACTIVITIES: CPT | Performed by: PHYSICAL THERAPIST

## 2022-08-19 PROCEDURE — 97110 THERAPEUTIC EXERCISES: CPT | Performed by: PHYSICAL THERAPIST

## 2022-08-19 PROCEDURE — 97140 MANUAL THERAPY 1/> REGIONS: CPT | Performed by: PHYSICAL THERAPIST

## 2022-08-19 NOTE — PROGRESS NOTES
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 Tapan Honeycutt  Has the plan of care been signed (Y/N):        []  Yes  [x]  No     Date of Patient follow up with Physician: 22      Is this a Progress Report:     []  Yes  [x]  No        If Yes:  Date Range for reporting period:  Beginning 22  Ending 22    Progress report will be due (10 Rx or 30 days whichever is less):        Recertification will be due (POC Duration  / 90 days whichever is less): 22        Visit # Insurance Allowable Auth Required   9 BMN []  Yes [x]  No        Functional Scale: FOTO 29; LEFS 13;    Date assessed:  22      Latex Allergy:  [x]NO      []YES  Preferred Language for Healthcare:   [x]English       []other:    Pain level:  3-6/10     SUBJECTIVE:   3 weeks post op from debridement; 6 weeks post op from posterior capsule release  Patient reports that his knee was not too bad after last session. Swelling was down yesterday. He received test results that were positive and he is having an IV antibiotic put in this afternoon. He does still use his serial cast at home for extension. He tries to use it 3-4 hours at a time (8-12 hours total per day). States he is unable to sleep in it.       F/u surg is planned to work on flex and remove scar tissue that has not been scheduled yet;       OBJECTIVE:   Slight swelling noted in ankle and lower leg on 8/8/22; incision in posterior joint healing w/o any signs or symptoms of infection; incision w/o steri strips in place w/ some min redness noted around stitches; stitches will be removed on Monday's appt w/ MD;    ROM PROM AROM Overpressure Comment    L 8/19/22 R L R L R    Flexion 92° ERMI  70° cold 125°      Extension 0° after stretch w/ op  -30° cold   -2°        Girth  7/29/2022 L R   Mid Patella 44 cm 39.3 cm   Suprapatellar     5cm above     15cm above         Strength L R Comment: majority deferred secondary to surgery   Quad      Hamstring      Abduction      Quad tone FAIR GOOD 8/8/2022       Special Test Results/Comment: majority deferred secondary to surgery   Homans  8/8/2022 Known DVT in calf       RESTRICTIONS/PRECAUTIONS: 7/18/22 posterior L knee capsule release; Limited ROM In L knee after injection in April that was limited prior to surg for flex and ext;  DVT left lower leg (still on blood thinners till about Oct)    Exercises/Interventions:   Exercise/Equipment Resistance/Repetitions Other comments   Cardio/Warm-up     Bike Treadmill          Stretching     Hamstring Seated propped 10\"x10 10# overpressure   Hip Flexion     ITB     Grion     Quad     Inclined Calf On floor 10\"x10    Towel Pull 30\"x5 over foam roll 10# overpressure        ROM     Passive ERMI 10\"x10 for flex    Active     ROM @EOB Flex & ext x10    Prone knee hang x10'   10# OP on ankle    Weight Hangs     Sheet Pulls     Ankle Pumps           Patellar Glides     Medial    Superior x2'    Inferior x2'         STRENGTH     SLR     Supine X30 over foam roll Concentration on distal contraction w/ cuing  Prone     Abduction     Adducton     SLR+          Isometrics     Quad sets 10\" on/off x10' W/ biofeedback 5 min sitting and standing w/ TKE        CKC     Calf raises     Wall sits     Step ups     1 leg stand Squatting     CC TKE Silver 10\"x10 W/ biofeedback (see above)   Balance          PRE     Extension  RANGE:   Flexion  RANGE:   Leg Press  RANGE:        Cable Column     Ed given on POC, expectations and goals X10'; 8/3/22 reviewed    Gt training  x5' Using crutches but focus on knee ext w/ cuing        Manual/Modalities     Manual stretching into ext & sitting off edge/distraction x10'    Deep tissue work to HS/scar massage in prone position x5'                Therapeutic Exercise and NMR EXR  [x] (96382) Provided verbal/tactile cueing for activities related to strengthening, flexibility, endurance, ROM for improvements in LE, proximal hip, and core control with self care, mobility, lifting, ambulation.  [] (98288) Provided verbal/tactile cueing for activities related to improving balance, coordination, kinesthetic sense, posture, motor skill, proprioception  to assist with LE, proximal hip, and core control in self care, mobility, lifting, ambulation and eccentric single leg control.      NMR and Therapeutic Activities:    [x] (45569 or 82524) Provided verbal/tactile cueing for activities related to improving balance, coordination, kinesthetic sense, posture, motor skill, proprioception and motor activation to allow for proper function of core, proximal hip and LE with self care and ADLs  [] (92876) Gait Re-education- Provided training and instruction to the patient for proper LE, core and proximal hip recruitment and positioning and eccentric body weight control with ambulation re-education including up and down stairs     Home Exercise Program:    [x] (50267) Reviewed/Progressed HEP activities related to strengthening, flexibility, endurance, ROM of core, proximal hip and LE for functional self-care, mobility, lifting and ambulation/stair navigation            Written HEP est  [] (08649)Reviewed/Progressed HEP activities related to improving balance, coordination, kinesthetic sense, posture, motor skill, Met: [] Adjusted  2. Patient will demonstrate increased AROM to 0-125 deg to allow for proper joint functioning as indicated by patients Functional Deficits. [] Progressing: [] Met: [] Not Met: [] Adjusted  3. Patient will demonstrate an increase in Strength to good proximal hip strength and control, within 5lb HHD in LE to allow for proper functional mobility as indicated by patients Functional Deficits. [] Progressing: [] Met: [] Not Met: [] Adjusted  4. Patient will return to 90% functional activities without increased symptoms or restriction. [] Progressing: [] Met: [] Not Met: [] Adjusted  5. Pt will be able to walk 45 min on uneven surfaces for hiking and climbing. (patient specific functional goal)    [] Progressing: [] Met: [] Not Met: [] Adjusted         Overall Progression Towards Functional goals/ Treatment Progress Update:  [] Patient is progressing as expected towards functional goals listed. [] Progression is slowed due to complexities/Impairments listed. [] Progression has been slowed due to co-morbidities. [x] Plan just implemented, too soon to assess goals progression <30days   [] Goals require adjustment due to lack of progress  [] Patient is not progressing as expected and requires additional follow up with physician  [] Other    ASSESSMENT:  pt had less edema noted in knee on arrival than last session. Pt was able to make a stronger contraction in quads w/ higher number on biofeedback machine. Pt is amb better w/ knee in a more ext position w/ WBing. Pt was able to reach full knee ext w/ overpressure by PT. Pt is progressing well toward goals. Pt is working really hard at home to maintain carryover between sessions. Good management of pain by pt.       Treatment/Activity Tolerance:  [] Patient tolerated treatment well [] Patient limited by fatique  [] Patient limited by pain  [x] Patient limited by other medical complications- tightness  [] Other:     Prognosis: [x] Good [x] Fair  [] Poor    Patient Requires Follow-up: [x] Yes  [] No    PLAN: See eval.   Cont 3x/week for ROM restoration focus on gt training for function. [x] Continue per plan of care [] Alter current plan (see comments)  [x] Plan of care initiated [] Hold pending MD visit [] Discharge    Electronically signed by: Radha Eaton PT, MS, OMT-C    Physical Therapist [id number] [address] license #[id number]  Physical Therapist [place] license #[id number]           Note: If patient does not return for scheduled/ recommended follow up visits, this note will serve as a discharge from care along with most recent update on progress.

## 2022-08-22 ENCOUNTER — TREATMENT (OUTPATIENT)
Dept: PHYSICAL THERAPY | Age: 57
End: 2022-08-22
Payer: COMMERCIAL

## 2022-08-22 ENCOUNTER — OFFICE VISIT (OUTPATIENT)
Dept: ORTHOPEDIC SURGERY | Age: 57
End: 2022-08-22

## 2022-08-22 VITALS — HEIGHT: 69 IN | BODY MASS INDEX: 28.88 KG/M2 | WEIGHT: 195 LBS

## 2022-08-22 DIAGNOSIS — M25.562 LEFT KNEE PAIN, UNSPECIFIED CHRONICITY: Primary | ICD-10-CM

## 2022-08-22 DIAGNOSIS — M24.662 FIBROSIS OF KNEE JOINT, LEFT: ICD-10-CM

## 2022-08-22 DIAGNOSIS — G89.18 POST-OPERATIVE PAIN: Primary | ICD-10-CM

## 2022-08-22 DIAGNOSIS — R26.2 DIFFICULTY WALKING: ICD-10-CM

## 2022-08-22 PROCEDURE — 97110 THERAPEUTIC EXERCISES: CPT | Performed by: PHYSICAL THERAPIST

## 2022-08-22 PROCEDURE — 97014 ELECTRIC STIMULATION THERAPY: CPT | Performed by: PHYSICAL THERAPIST

## 2022-08-22 PROCEDURE — 99024 POSTOP FOLLOW-UP VISIT: CPT | Performed by: ORTHOPAEDIC SURGERY

## 2022-08-22 PROCEDURE — 97530 THERAPEUTIC ACTIVITIES: CPT | Performed by: PHYSICAL THERAPIST

## 2022-08-22 PROCEDURE — 97140 MANUAL THERAPY 1/> REGIONS: CPT | Performed by: PHYSICAL THERAPIST

## 2022-08-22 NOTE — PROGRESS NOTES
[facility] ([facility]      Disclaimer: This note was dictated with voice recognition software. Though review and correction are routine, we apologize for any errors.

## 2022-08-22 NOTE — PROGRESS NOTES
and remove scar tissue that has not been scheduled yet;       OBJECTIVE:   Slight swelling noted in ankle and lower leg on 8/22/22; incision in posterior joint healing w/o any signs or symptoms of infection; incision w/o steri strips in place w/ some min redness noted around stitches; stitches will be removed on today at appt w/ MD;    ROM PROM AROM Overpressure Comment    L 8/22/22 R L R L R    Flexion 92° ERMI  70° cold 125°      Extension 0° after stretch w/ op  -30° cold   -2°        Girth  7/29/2022 L R   Mid Patella 44 cm 39.3 cm   Suprapatellar     5cm above     15cm above         Strength L R Comment: majority deferred secondary to surgery   Quad      Hamstring      Abduction      Quad tone FA[facility] 8/8/2022       Special Test Results/Comment: majority deferred secondary to surgery   Homans  8/8/2022 Known DVT in calf       RESTRICTIONS/PRECAUTIONS: 7/18/22 posterior L knee capsule release; Limited ROM In L knee after injection in April that was limited prior to surg for flex and ext;  DVT left lower leg (still on blood thinners till about Oct)    Exercises/Interventions:   Exercise/Equipment Resistance/Repetitions Other comments   Cardio/Warm-up     Bike Treadmill          Stretching     Hamstring Seated propped 10\"x10 10# overpressure   Hip Flexion     ITB     Grion     Quad     Inclined Calf On floor 10\"x10    Towel Pull 30\"x5 over foam roll 10# overpressure        ROM     Passive ERMI 10\"x10 for flex    Active     ROM @EOB Flex & ext x10    Prone knee hang x10'   10# OP on ankle    Weight Hangs     Sheet Pulls     Ankle Pumps           Patellar Glides     Medial    Superior x2'    Inferior x2'         STRENGTH     SLR     Supine X30 over foam roll Concentration on distal contraction w/ cuing  Prone     Abduction     Adducton     SLR+          Isometrics     Quad sets 10\" on/off x10' W/ biofeedback 5 min sitting and standing w/ TKE        CKC     Calf raises     Wall sits     Step ups     1 leg stand Squatting     CC TKE Silver 10\"x10 W/ biofeedback (see above)   Balance          PRE     Extension  RANGE:   Flexion  RANGE:   Leg Press  RANGE:        Cable Column     Ed given on POC, expectations and goals X10'; 8/3/22 reviewed    Gt training  x5' Using crutches but focus on knee ext w/ cuing        Manual/Modalities     Manual stretching into ext & sitting off edge/distraction x10'    Deep tissue work to HS/scar massage in prone position x5'                Therapeutic Exercise and NMR EXR  [x] (19936) Provided verbal/tactile cueing for activities related to strengthening, flexibility, endurance, ROM for improvements in LE, proximal hip, and core control with self care, mobility, lifting, ambulation.  [] (25905) Provided verbal/tactile cueing for activities related to improving balance, coordination, kinesthetic sense, posture, motor skill, proprioception  to assist with LE, proximal hip, and core control in self care, mobility, lifting, ambulation and eccentric single leg control.      NMR and Therapeutic Activities:    [x] (04877 or 77406) Provided verbal/tactile cueing for activities related to improving balance, coordination, kinesthetic sense, posture, motor skill, proprioception and motor activation to allow for proper function of core, proximal hip and LE with self care and ADLs  [] (82154) Gait Re-education- Provided training and instruction to the patient for proper LE, core and proximal hip recruitment and positioning and eccentric body weight control with ambulation re-education including up and down stairs     Home Exercise Program:    [x] (93336) Reviewed/Progressed HEP activities related to strengthening, flexibility, endurance, ROM of core, proximal hip and LE for functional self-care, mobility, lifting and ambulation/stair navigation            Written HEP est  [] (18587)Reviewed/Progressed HEP activities related to improving balance, coordination, kinesthetic sense, posture, motor skill, Met: [] Adjusted  2. Patient will demonstrate increased AROM to 0-125 deg to allow for proper joint functioning as indicated by patients Functional Deficits. [] Progressing: [] Met: [] Not Met: [] Adjusted  3. Patient will demonstrate an increase in Strength to good proximal hip strength and control, within 5lb HHD in LE to allow for proper functional mobility as indicated by patients Functional Deficits. [] Progressing: [] Met: [] Not Met: [] Adjusted  4. Patient will return to 90% functional activities without increased symptoms or restriction. [] Progressing: [] Met: [] Not Met: [] Adjusted  5. Pt will be able to walk 45 min on uneven surfaces for hiking and climbing. (patient specific functional goal)    [] Progressing: [] Met: [] Not Met: [] Adjusted         Overall Progression Towards Functional goals/ Treatment Progress Update:  [] Patient is progressing as expected towards functional goals listed. [] Progression is slowed due to complexities/Impairments listed. [] Progression has been slowed due to co-morbidities. [x] Plan just implemented, too soon to assess goals progression <30days   [] Goals require adjustment due to lack of progress  [] Patient is not progressing as expected and requires additional follow up with physician  [] Other    ASSESSMENT:  Pt tight w/ stretching to end range but maintained full ext w/ overpressure. Quad contraction improved but distal quad still slow to activate. Patella mobility slightly improved as pt able to reach greater ranges into ext. Edema in knee improved after activity. Progressing well for ext.       Treatment/Activity Tolerance:  [] Patient tolerated treatment well [] Patient limited by fatique  [] Patient limited by pain  [x] Patient limited by other medical complications- tightness  [] Other:     Prognosis: [x] Good [x] Fair  [] Poor    Patient Requires Follow-up: [x] Yes  [] No    PLAN: See eval.   Cont 3x/week for ROM restoration focus on gt training for function. [x] Continue per plan of care [] Alter current plan (see comments)  [x] Plan of care initiated [] Hold pending MD visit [] Discharge    Electronically signed by: Kike Gates PT, MS, OMT-C    Physical Therapist [id number] [address] license #[id number]  Physical Therapist [place] license #[id number]           Note: If patient does not return for scheduled/ recommended follow up visits, this note will serve as a discharge from care along with most recent update on progress.

## 2022-08-23 NOTE — OP NOTE
Operative Note      Patient: Britney Mckeon  YOB: 1965  MRN: [id number]    Date of Procedure: 8/5/2022    Pre-Op Diagnosis: Infection of left knee ([address] [M00.9]    Post-Op Diagnosis: Same       Procedure(s):  ARTHROSCOPIC INCISION AND DRAINAGE LEFT KNEE    Surgeon(s):  Gayle Cole MD    Assistant:   * No surgical staff found *    Anesthesia: General    Estimated Blood Loss (mL): Minimal    Complications: None    Specimens:   ID Type Source Tests Collected by Time Destination   1 :  Tissue Tissue CULTURE, TISSUE (Canceled) Gayle Cole MD 8/5/2022 1741    2 :  Tissue Tissue CULTURE, TISSUE Gayle Cole MD 8/5/2022 1741    3 :  Tissue Tissue CULTURE, TISSUE (Canceled) Gayle Cole MD 8/5/2022 1746        Implants:  * No implants in log *      Drains:   [REMOVED] Closed/Suction Drain Left;Posterior Knee Bulb (Removed)   Site Description Clean, dry & intact 07/15/22 1105   Dressing Status Intact 07/15/22 1105   Drainage Appearance Bloody 07/15/22 1105   Drain Status To bulb suction 07/15/22 1105       Findings: per dictation    Detailed Description of Procedure: The patient was identified in the preoperative holding area. Surgical site was confirmed and marked. Informed consent was obtained. He was taken back to the operative room placed on the table in the supine position. General anesthesia was performed. The lower extremity was examined. There is no instability. His range of motion demonstrated a lack of about 10 to 15 degrees of extension and flexion to approximately 80 degrees. Titrate as placed. Lower extremities prepped and draped using sterile technique. Preoperative antibiotics were held until cultures. Tourniquet was taken up to 250 mmHg. Anterior inferior lateral incision was made and we attempted to place a trocar within the notch and the patellofemoral joint. We were having difficulty due to scar tissue.   Therefore we did elect to proceed with a superior lateral

## 2022-08-24 ENCOUNTER — TREATMENT (OUTPATIENT)
Dept: PHYSICAL THERAPY | Age: 57
End: 2022-08-24
Payer: COMMERCIAL

## 2022-08-24 DIAGNOSIS — R26.2 DIFFICULTY WALKING: ICD-10-CM

## 2022-08-24 DIAGNOSIS — M25.562 LEFT KNEE PAIN, UNSPECIFIED CHRONICITY: Primary | ICD-10-CM

## 2022-08-24 DIAGNOSIS — M24.662 FIBROSIS OF KNEE JOINT, LEFT: ICD-10-CM

## 2022-08-24 PROCEDURE — 97530 THERAPEUTIC ACTIVITIES: CPT | Performed by: PHYSICAL THERAPIST

## 2022-08-24 PROCEDURE — 97140 MANUAL THERAPY 1/> REGIONS: CPT | Performed by: PHYSICAL THERAPIST

## 2022-08-24 PROCEDURE — 97110 THERAPEUTIC EXERCISES: CPT | Performed by: PHYSICAL THERAPIST

## 2022-08-24 PROCEDURE — 97014 ELECTRIC STIMULATION THERAPY: CPT | Performed by: PHYSICAL THERAPIST

## 2022-08-24 NOTE — PROGRESS NOTES
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 Sofía Clay  Has the plan of care been signed (Y/N):        []  Yes  [x]  No     Date of Patient follow up with Physician: 22      Is this a Progress Report:     []  Yes  [x]  No        If Yes:  Date Range for reporting period:  Beginning 22  Ending 22    Progress report will be due (10 Rx or 30 days whichever is less):        Recertification will be due (POC Duration  / 90 days whichever is less): 22        Visit # Insurance Allowable Auth Required   11 BMN []  Yes [x]  No        Functional Scale: FOTO 29; LEFS 13;    Date assessed:  22      Latex Allergy:  [x]NO      []YES  Preferred Language for Healthcare:   [x]English       []other:    Pain level:  3-6/10     SUBJECTIVE:   2+ weeks post op from debridement; 5 + weeks post op from posterior capsule release  MD appt went well and sutures removed. Pt is return to MD in 2 weeks to reassess when next surg will be planned to remove adhesions in knee. Pt is still managing symptoms and feels that knee is improving. He does still use his serial cast at home for extension. He tries to use it 3-4 hours at a time (8-12 hours total per day). States he is unable to sleep in it.       F/u surg is planned to work on flex and remove scar tissue that has not been scheduled yet;       OBJECTIVE:   Slight swelling noted in ankle and lower leg on 8/22/22; incision in posterior joint healing w/o any signs or symptoms of infection; incision w/o steri strips in place w/ some min redness noted around stitches; stitches will be removed on today at appt w/ MD;    ROM PROM AROM Overpressure Comment    L 8/24/22 R L R L R    Flexion 92° ERMI  70° cold 125°      Extension 0° after stretch w/ op  -30° cold   -2°        Girth  7/29/2022 L R   Mid Patella 44 cm 39.3 cm   Suprapatellar     5cm above     15cm above         Strength L R Comment: majority deferred secondary to surgery   Quad      Hamstring      Abduction      Quad tone FAIR GOOD 8/8/2022       Special Test Results/Comment: majority deferred secondary to surgery   Homans  8/8/2022 Known DVT in calf       RESTRICTIONS/PRECAUTIONS: 7/18/22 posterior L knee capsule release; Limited ROM In L knee after injection in April that was limited prior to surg for flex and ext;  DVT left lower leg (still on blood thinners till about Oct)    Exercises/Interventions:   Exercise/Equipment Resistance/Repetitions Other comments   Cardio/Warm-up     Bike Treadmill          Stretching     Hamstring Seated propped 10\"x10 10# overpressure   Hip Flexion     ITB     Grion     Quad     Inclined Calf On floor 10\"x10    Towel Pull 30\"x5 over foam roll 10# overpressure        ROM     Passive ERMI 10\"x10 for flex    Active     ROM @EOB Flex & ext x10    Prone knee hang x10'   10# OP on ankle    Weight Hangs     Sheet Pulls     Ankle Pumps           Patellar Glides     Medial    Superior x2'    Inferior x2'         STRENGTH     SLR     Supine X30 over foam roll Concentration on distal contraction w/ cuing  Prone     Abduction     Adducton     SLR+          Isometrics     Quad sets 10\" on/off x10' W/ biofeedback 5 min sitting and standing w/ TKE        CKC     Calf raises     Wall sits     Step ups     1 leg stand     Squatting     CC TKE x5' W/ biofeedback (see above)   Balance          PRE     Extension  RANGE:   Flexion  RANGE:   Leg Press  RANGE:        Cable Column     Ed given on POC, expectations and goals X10'; 8/3/22 reviewed    Gt training  x5' Using crutches but focus on knee ext w/ cuing        Manual/Modalities     Manual stretching into ext & sitting off edge/distraction x10'    Deep tissue work to HS/scar massage in prone position x5'                Therapeutic Exercise and NMR EXR  [x] (47428) Provided verbal/tactile cueing for activities related to strengthening, flexibility, endurance, ROM for improvements in LE, proximal hip, and core control with self care, mobility, lifting, ambulation.  [] (66796) Provided verbal/tactile cueing for activities related to improving balance, coordination, kinesthetic sense, posture, motor skill, proprioception  to assist with LE, proximal hip, and core control in self care, mobility, lifting, ambulation and eccentric single leg control.      NMR and Therapeutic Activities:    [x] (12602 or 19321) Provided verbal/tactile cueing for activities related to improving balance, coordination, kinesthetic sense, posture, motor skill, proprioception and motor activation to allow for proper function of core, proximal hip and LE with self care and ADLs  [] (36968) Gait Re-education- Provided training and instruction to the patient for proper LE, core and proximal hip recruitment and positioning and eccentric body weight control with ambulation re-education including up and down stairs     Home Exercise Program:    [x] (29793) Reviewed/Progressed HEP activities related to strengthening, flexibility, endurance, ROM of core, proximal hip and LE for functional self-care, mobility, lifting and ambulation/stair navigation            Written HEP est  [] (58239)Reviewed/Progressed HEP activities related to improving balance, coordination, kinesthetic sense, posture, motor skill, proprioception of core, proximal hip and LE for self care, mobility, lifting, and ambulation/stair navigation      Manual Treatments:  PROM / STM / Oscillations-Mobs:  G-I, II, III, IV (PA's, Inf., Post.)  [x] (09381) Provided manual therapy to mobilize LE, proximal hip and/or LS spine soft tissue/joints for the purpose of modulating pain, promoting relaxation,  increasing ROM, reducing/eliminating soft tissue swelling/inflammation/restriction, improving soft tissue extensibility and allowing for proper ROM for normal function with self care, mobility, lifting and ambulation. Modalities:  CP x10 min propped and 10# over knee   [] GAME READY (VASO)- for significant edema, swelling, pain control. Charges:  Timed Code Treatment Minutes: 55   Total Treatment Minutes: 75     [] EVAL (LOW) 16829 (typically 20 minutes face-to-face)  [] EVAL (MOD) 58255 (typically 30 minutes face-to-face)  [] EVAL (HIGH) 21073 (typically 45 minutes face-to-face)  [] RE-EVAL   [x] HF(03372) 1x  15'   [] IONTO  [] NMR (61175) x     [] VASO x15'  [x] Manual (70738) x 1  x15'     [] Other:  [x] TA (66361) 1x   15'   [] Mech Traction (33501)  [] ES(attended) (77798)      [x] ES (un) (56330): EMG/Stim combo x15'    GOALS:  Patient stated goal: return to outdoor activities for walking and climbing, along with work  [] Progressing: [] Met: [] Not Met: [] Adjusted     Therapist goals for Patient:  Short Term Goals: To be achieved in: 2 weeks  1. Independent in HEP and progression per patient tolerance, in order to prevent re-injury. [] Progressing: [] Met: [] Not Met: [] Adjusted  2. Patient will have a decrease in pain to facilitate improvement in movement, function, and ADLs as indicated by Functional Deficits. [] Progressing: [] Met: [] Not Met: [] Adjusted     Long Term Goals: To be achieved in: 12+ weeks  1. Functional index score of 67 or more for FOTO to assist with reaching prior level of function.   [] Progressing: [] Met: [] Not Met: [] Adjusted  2. Patient will demonstrate increased AROM to 0-125 deg to allow for proper joint functioning as indicated by patients Functional Deficits. [] Progressing: [] Met: [] Not Met: [] Adjusted  3. Patient will demonstrate an increase in Strength to good proximal hip strength and control, within 5lb HHD in LE to allow for proper functional mobility as indicated by patients Functional Deficits. [] Progressing: [] Met: [] Not Met: [] Adjusted  4. Patient will return to 90% functional activities without increased symptoms or restriction. [] Progressing: [] Met: [] Not Met: [] Adjusted  5. Pt will be able to walk 45 min on uneven surfaces for hiking and climbing. (patient specific functional goal)    [] Progressing: [] Met: [] Not Met: [] Adjusted         Overall Progression Towards Functional goals/ Treatment Progress Update:  [] Patient is progressing as expected towards functional goals listed. [] Progression is slowed due to complexities/Impairments listed. [] Progression has been slowed due to co-morbidities. [x] Plan just implemented, too soon to assess goals progression <30days   [] Goals require adjustment due to lack of progress  [] Patient is not progressing as expected and requires additional follow up with physician  [] Other    ASSESSMENT:  Less edema in L knee on arrival. Contraction of quads have improved by pt and carrying over to [facility] w/ crutches and walking. Incision sites are healing well w/ stitches removed and no redness noted. Pt is working hard at home to maintain carry over.  Patella mobility has improved in superior/inferior direction but still very limited      Treatment/Activity Tolerance:  [] Patient tolerated treatment well [] Patient limited by fatique  [] Patient limited by pain  [x] Patient limited by other medical complications- tightness  [] Other:     Prognosis: [x] Good [x] Fair  [] Poor    Patient Requires Follow-up: [x] Yes  [] No    PLAN: See eval.   Cont 3x/week for ROM restoration focus on gt training for function. [x] Continue per plan of care [] Alter current plan (see comments)  [x] Plan of care initiated [] Hold pending MD visit [] Discharge    Electronically signed by: Yovanny Vicente PT, MS, OMT-C    Physical Therapist [place] license #[id number]  Physical Therapist [place] license #[id number]           Note: If patient does not return for scheduled/ recommended follow up visits, this note will serve as a discharge from care along with most recent update on progress.

## 2022-08-26 ENCOUNTER — TREATMENT (OUTPATIENT)
Dept: PHYSICAL THERAPY | Age: 57
End: 2022-08-26
Payer: COMMERCIAL

## 2022-08-26 DIAGNOSIS — M25.562 LEFT KNEE PAIN, UNSPECIFIED CHRONICITY: Primary | ICD-10-CM

## 2022-08-26 DIAGNOSIS — M24.662 FIBROSIS OF KNEE JOINT, LEFT: ICD-10-CM

## 2022-08-26 DIAGNOSIS — R26.2 DIFFICULTY WALKING: ICD-10-CM

## 2022-08-26 PROCEDURE — 97530 THERAPEUTIC ACTIVITIES: CPT | Performed by: PHYSICAL THERAPIST

## 2022-08-26 PROCEDURE — 97014 ELECTRIC STIMULATION THERAPY: CPT | Performed by: PHYSICAL THERAPIST

## 2022-08-26 PROCEDURE — 97140 MANUAL THERAPY 1/> REGIONS: CPT | Performed by: PHYSICAL THERAPIST

## 2022-08-26 PROCEDURE — 97016 VASOPNEUMATIC DEVICE THERAPY: CPT | Performed by: PHYSICAL THERAPIST

## 2022-08-26 PROCEDURE — 97110 THERAPEUTIC EXERCISES: CPT | Performed by: PHYSICAL THERAPIST

## 2022-08-26 NOTE — PROGRESS NOTES
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 Randall Moreno  Has the plan of care been signed (Y/N):        []  Yes  [x]  No     Date of Patient follow up with Physician: 22      Is this a Progress Report:     []  Yes  [x]  No        If Yes:  Date Range for reporting period:  Beginning 22  Ending 22    Progress report will be due (10 Rx or 30 days whichever is less):        Recertification will be due (POC Duration  / 90 days whichever is less): 22        Visit # Insurance Allowable Auth Required   12 BMN []  Yes [x]  No        Functional Scale: FOTO 29; LEFS 13;    Date assessed:  22      Latex Allergy:  [x]NO      []YES  Preferred Language for Healthcare:   [x]English       []other:    Pain level:  3-6/10     SUBJECTIVE:   3 weeks post op from debridement; 6 weeks post op from posterior capsule release  Patient reports that he has been doing ok with the IV antiobiotics. He has to have this Rx for 3-4 weeks. He does feel like he lacks some energy. He does feel a little more swelling today. He states he pretty much used 1 crutch in his house yesterday. He does still use his serial cast at home for extension. He tries to use it 3-4 hours at a time (8-12 hours total per day).    States he is unable to sleep in it.       F/u surg is planned to work on flex and remove scar tissue that has not been scheduled yet;       OBJECTIVE:   Slight swelling noted in ankle and lower leg on 8/22/22; incision in posterior joint healing w/o any signs or symptoms of infection; incision w/o steri strips in place w/ some min redness noted around stitches; stitches will be removed on today at appt w/ MD;    ROM PROM AROM Overpressure Comment    L 8/24/22 R L R L R    Flexion 92° ERMI  70° cold 125°      Extension 0° after stretch w/ op  -30° cold   -2°        Girth  7/29/2022 L R   Mid Patella 44 cm 39.3 cm   Suprapatellar     5cm above     15cm above         Strength L R Comment: majority deferred secondary to surgery   Quad      Hamstring      Abduction      Quad tone FAIR GOOD 8/8/2022       Special Test Results/Comment: majority deferred secondary to surgery   Homans  8/8/2022 Known DVT in calf       RESTRICTIONS/PRECAUTIONS: 7/18/22 posterior L knee capsule release; Limited ROM In L knee after injection in April that was limited prior to surg for flex and ext;  DVT left lower leg (still on blood thinners till about Oct)    Exercises/Interventions:   Exercise/Equipment Resistance/Repetitions Other comments   Cardio/Warm-up     Bike Treadmill          Stretching     Hamstring Seated propped 10\"x10 10# overpressure   Hip Flexion     ITB     Grion     Quad     Inclined Calf On floor 10\"x10    Towel Pull 30\"x5 over foam roll 10# overpressure        ROM     Passive ERMI 10\"x10 for flex    Active     ROM @EOB Flex & ext x10    Prone knee hang x10'   10# OP on ankle    Weight Hangs     Sheet Pulls     Ankle Pumps           Patellar Glides     Medial    Superior x2'    Inferior x2'         STRENGTH     SLR     Supine X30 over foam roll Concentration on distal contraction w/ cuing  Prone     Abduction     Adducton     SLR+          Isometrics     Quad sets 10\" on/off x10' W/ biofeedback 5 min sitting and standing w/ TKE CKC     Calf raises     Wall sits     Step ups     1 leg stand     Squatting     CC TKE x5' W/ biofeedback (see above)   Balance          PRE     Extension  RANGE:   Flexion  RANGE:   Leg Press  RANGE:        Cable Column     Ed given on POC, expectations and goals X10'; 8/3/22 reviewed    Gt training  x5' Using crutches but focus on knee ext w/ cuing        Manual/Modalities     Manual stretching into ext & sitting off edge/distraction x10'    Deep tissue work to HS/scar massage in prone position x5'                Therapeutic Exercise and NMR EXR  [x] (11233) Provided verbal/tactile cueing for activities related to strengthening, flexibility, endurance, ROM for improvements in LE, proximal hip, and core control with self care, mobility, lifting, ambulation.  [] (23691) Provided verbal/tactile cueing for activities related to improving balance, coordination, kinesthetic sense, posture, motor skill, proprioception  to assist with LE, proximal hip, and core control in self care, mobility, lifting, ambulation and eccentric single leg control.      NMR and Therapeutic Activities:    [x] (16561 or 21397) Provided verbal/tactile cueing for activities related to improving balance, coordination, kinesthetic sense, posture, motor skill, proprioception and motor activation to allow for proper function of core, proximal hip and LE with self care and ADLs  [] (78139) Gait Re-education- Provided training and instruction to the patient for proper LE, core and proximal hip recruitment and positioning and eccentric body weight control with ambulation re-education including up and down stairs     Home Exercise Program:    [x] (95784) Reviewed/Progressed HEP activities related to strengthening, flexibility, endurance, ROM of core, proximal hip and LE for functional self-care, mobility, lifting and ambulation/stair navigation            Written HEP est  [] (39127)Reviewed/Progressed HEP activities related to improving balance, coordination, kinesthetic sense, posture, motor skill, proprioception of core, proximal hip and LE for self care, mobility, lifting, and ambulation/stair navigation      Manual Treatments:  PROM / STM / Oscillations-Mobs:  G-I, II, III, IV (PA's, Inf., Post.)  [x] (60502) Provided manual therapy to mobilize LE, proximal hip and/or LS spine soft tissue/joints for the purpose of modulating pain, promoting relaxation,  increasing ROM, reducing/eliminating soft tissue swelling/inflammation/restriction, improving soft tissue extensibility and allowing for proper ROM for normal function with self care, mobility, lifting and ambulation. Modalities:  10# over knee   [x] GAME READY (VASO)- for significant edema, swelling, pain control. Charges:  Timed Code Treatment Minutes: 55   Total Treatment Minutes: 75     [] EVAL (LOW) 21719 (typically 20 minutes face-to-face)  [] EVAL (MOD) 91792 (typically 30 minutes face-to-face)  [] EVAL (HIGH) 00284 (typically 45 minutes face-to-face)  [] RE-EVAL   [x] OY(72012) 1x  15'   [] IONTO  [] NMR (72394) x     [x] VASO x15'  [x] Manual (08268) x 1  x15'     [] Other:  [x] TA (22800) 1x   15'   [] Mech Traction (50584)  [] ES(attended) (49418)      [x] ES (un) (21673): EMG/Stim combo x15'    GOALS:  Patient stated goal: return to outdoor activities for walking and climbing, along with work  [] Progressing: [] Met: [] Not Met: [] Adjusted     Therapist goals for Patient:  Short Term Goals: To be achieved in: 2 weeks  1. Independent in HEP and progression per patient tolerance, in order to prevent re-injury. [] Progressing: [] Met: [] Not Met: [] Adjusted  2. Patient will have a decrease in pain to facilitate improvement in movement, function, and ADLs as indicated by Functional Deficits. [] Progressing: [] Met: [] Not Met: [] Adjusted     Long Term Goals: To be achieved in: 12+ weeks  1.  Functional index score of 67 or more for FOTO to assist with reaching prior level of function. [] Progressing: [] Met: [] Not Met: [] Adjusted  2. Patient will demonstrate increased AROM to 0-125 deg to allow for proper joint functioning as indicated by patients Functional Deficits. [] Progressing: [] Met: [] Not Met: [] Adjusted  3. Patient will demonstrate an increase in Strength to good proximal hip strength and control, within 5lb HHD in LE to allow for proper functional mobility as indicated by patients Functional Deficits. [] Progressing: [] Met: [] Not Met: [] Adjusted  4. Patient will return to 90% functional activities without increased symptoms or restriction. [] Progressing: [] Met: [] Not Met: [] Adjusted  5. Pt will be able to walk 45 min on uneven surfaces for hiking and climbing. (patient specific functional goal)    [] Progressing: [] Met: [] Not Met: [] Adjusted         Overall Progression Towards Functional goals/ Treatment Progress Update:  [] Patient is progressing as expected towards functional goals listed. [] Progression is slowed due to complexities/Impairments listed. [] Progression has been slowed due to co-morbidities. [x] Plan just implemented, too soon to assess goals progression <30days   [] Goals require adjustment due to lack of progress  [] Patient is not progressing as expected and requires additional follow up with physician  [] Other    ASSESSMENT:    He feels most discomfort across the bottom of his knee when doing quad sets and pushing into extension. Quad tone is improving as his ability to hit the EMG target is much higher. Scar tissue remains a bit thickened posterior/medial aspect of his knee.           Treatment/Activity Tolerance:  [] Patient tolerated treatment well [] Patient limited by fatique  [] Patient limited by pain  [x] Patient limited by other medical complications- tightness  [] Other:     Prognosis: [x] Good [x] Fair  [] Poor    Patient Requires Follow-up: [x] Yes  [] No    PLAN: See eval.   Cont 3x/week for ROM restoration focus

## 2022-08-29 ENCOUNTER — TREATMENT (OUTPATIENT)
Dept: PHYSICAL THERAPY | Age: 57
End: 2022-08-29
Payer: COMMERCIAL

## 2022-08-29 DIAGNOSIS — M25.562 LEFT KNEE PAIN, UNSPECIFIED CHRONICITY: Primary | ICD-10-CM

## 2022-08-29 DIAGNOSIS — R26.2 DIFFICULTY WALKING: ICD-10-CM

## 2022-08-29 DIAGNOSIS — M24.662 FIBROSIS OF KNEE JOINT, LEFT: ICD-10-CM

## 2022-08-29 PROCEDURE — 97530 THERAPEUTIC ACTIVITIES: CPT | Performed by: PHYSICAL THERAPIST

## 2022-08-29 PROCEDURE — 97110 THERAPEUTIC EXERCISES: CPT | Performed by: PHYSICAL THERAPIST

## 2022-08-29 PROCEDURE — 97140 MANUAL THERAPY 1/> REGIONS: CPT | Performed by: PHYSICAL THERAPIST

## 2022-08-29 PROCEDURE — 97014 ELECTRIC STIMULATION THERAPY: CPT | Performed by: PHYSICAL THERAPIST

## 2022-08-29 NOTE — PROGRESS NOTES
[facility]   Phone: [phone]    Fax: [phone]   Physical Therapy Re-Certification Plan of Care    Dear  Dr. Tyrel Figueroa,    We had the pleasure of treating the following patient for physical therapy services at [address] [place] [address]. A summary of our findings can be found in the updated assessment below. This includes our plan of care. If you have any questions or concerns regarding these findings, please do not hesitate to contact me at the office phone number checked above. Thank you for the referral.     Physician Signature:________________________________Date:__________________  By signing above (or electronic signature), therapists plan is approved by physician      Overall Response to Treatment:   []Patient is responding well to treatment and improvement is noted with regards  to goals   []Patient should continue to improve in reasonable time if they continue HEP   []Patient has plateaued and is no longer responding to skilled PT intervention    []Patient is getting worse and would benefit from return to referring MD   []Patient unable to adhere to initial POC   []Other: Pt is progressing in therapy to reach full knee ext. Pt has been working hard at home to maintain advancements that he has achieved in clinic for improved knee ext. Quad contraction has improved but pt still struggles w/ distal quad contraction and tries to substitute w/ prox contraction. Edema in knee resolved slightly w/ improved patella mobility in superior to inferior direction. Gt skills progressing w/ using 2 crutches still and mod concentration to contract distal quad when WBing. Therapy continues to focus on motion and muscle contraction. Recommend that pt continue therapy to address deficits.      Physical Therapy Treatment Note/ Progress Report:       Date:  2022    Patient Name:  Georgette Marroquin    :  1965  MRN: [id number]  Restrictions/Precautions:  DVT cold 125°      Extension 0° after stretch w/ op  -30° cold   -2°        Girth  7/29/2022 L R   Mid Patella 44 cm 39.3 cm   Suprapatellar     5cm above     15cm above         Strength L R Comment: majority deferred secondary to surgery   Quad      Hamstring      Abduction      Quad tone FAIR GOOD 8/8/2022       Special Test Results/Comment: majority deferred secondary to surgery   Homans  8/29/2022 Known DVT in calf       RESTRICTIONS/PRECAUTIONS: 7/18/22 posterior L knee capsule release; Limited ROM In L knee after injection in April that was limited prior to surg for flex and ext;  DVT left lower leg (still on blood thinners till about Oct)    Exercises/Interventions:   Exercise/Equipment Resistance/Repetitions Other comments   Cardio/Warm-up     Bike Treadmill          Stretching     Hamstring Seated propped 10\"x10 10# overpressure   Hip Flexion     ITB     Grion     Quad     Inclined Calf On floor 10\"x10    Towel Pull 30\"x5 over foam roll 10# overpressure        ROM     Passive ERMI 10\"x10 for flex    Active     ROM @EOB Flex & ext x10    Prone knee hang x10'   10# OP on ankle    Weight Hangs     Sheet Pulls     Ankle Pumps           Patellar Glides     Medial    Superior x2'    Inferior x2'         STRENGTH     SLR     Supine X30 over foam roll Concentration on distal contraction w/ cuing  Prone     Abduction     Adducton     SLR+          Isometrics     Quad sets 10\" on/off x10' W/ biofeedback 5 min sitting and standing w/ TKE        CKC     Calf raises     Wall sits     Step ups     1 leg stand     Squatting     CC TKE x5' W/ biofeedback (see above)   Balance          PRE     Extension  RANGE:   Flexion  RANGE:   Leg Press  RANGE:        Cable Column     Ed given on POC, expectations and goals X10'; 8/3/22 reviewed    Gt training  x5' Using crutches but focus on knee ext w/ cuing        Manual/Modalities     Manual stretching into ext & sitting off edge/distraction x10'    Deep tissue work to HS/scar massage swelling/inflammation/restriction, improving soft tissue extensibility and allowing for proper ROM for normal function with self care, mobility, lifting and ambulation. Modalities:  CP x10 min propped and 10# over knee   [] GAME READY (VASO)- for significant edema, swelling, pain control. Charges:  Timed Code Treatment Minutes: 55   Total Treatment Minutes: 75     [] EVAL (LOW) 73525 (typically 20 minutes face-to-face)  [] EVAL (MOD) 70134 (typically 30 minutes face-to-face)  [] EVAL (HIGH) 26286 (typically 45 minutes face-to-face)  [] RE-EVAL   [x] ZP(90824) 1x  15'   [] IONTO  [] NMR (04081) x     [] VASO x15'  [x] Manual (55506) x 1  x15'     [] Other:  [x] TA (40284) 1x   15'   [] Mech Traction (07892)  [] ES(attended) (36158)      [x] ES (un) (30954): EMG/Stim combo x15'    GOALS:  Patient stated goal: return to outdoor activities for walking and climbing, along with work  [] Progressing: [] Met: [] Not Met: [] Adjusted     Therapist goals for Patient:  Short Term Goals: To be achieved in: 2 weeks  1. Independent in HEP and progression per patient tolerance, in order to prevent re-injury. [] Progressing: [] Met: [] Not Met: [] Adjusted  2. Patient will have a decrease in pain to facilitate improvement in movement, function, and ADLs as indicated by Functional Deficits. [] Progressing: [] Met: [] Not Met: [] Adjusted     Long Term Goals: To be achieved in: 12+ weeks  1. Functional index score of 67 or more for FOTO to assist with reaching prior level of function. [] Progressing: [] Met: [] Not Met: [] Adjusted  2. Patient will demonstrate increased AROM to 0-125 deg to allow for proper joint functioning as indicated by patients Functional Deficits. [] Progressing: [] Met: [] Not Met: [] Adjusted  3. Patient will demonstrate an increase in Strength to good proximal hip strength and control, within 5lb HHD in LE to allow for proper functional mobility as indicated by patients Functional Deficits.   [] Progressing: [] Met: [] Not Met: [] Adjusted  4. Patient will return to 90% functional activities without increased symptoms or restriction. [] Progressing: [] Met: [] Not Met: [] Adjusted  5. Pt will be able to walk 45 min on uneven surfaces for hiking and climbing. (patient specific functional goal)    [] Progressing: [] Met: [] Not Met: [] Adjusted         Overall Progression Towards Functional goals/ Treatment Progress Update:  [] Patient is progressing as expected towards functional goals listed. [] Progression is slowed due to complexities/Impairments listed. [] Progression has been slowed due to co-morbidities. [x] Plan just implemented, too soon to assess goals progression <30days   [] Goals require adjustment due to lack of progress  [] Patient is not progressing as expected and requires additional follow up with physician  [] Other    ASSESSMENT:    Patella has the most mobility today than he has had since starting therapy. Pt struggles for distal quad contraction and needs concentration. Gt patterned improved when pt is concentration but mod concentration and going very slowly, pt is able to correct some deviations. No edema noted in ankle region. Progressing well w/ carry over between sessions. Treatment/Activity Tolerance:  [] Patient tolerated treatment well [] Patient limited by fatique  [] Patient limited by pain  [x] Patient limited by other medical complications- tightness  [] Other:     Prognosis: [x] Good [x] Fair  [] Poor    Patient Requires Follow-up: [x] Yes  [] No    PLAN: See eval.   Cont 3x/week for ROM restoration focus on gt training for function.      [x] Continue per plan of care [] Alter current plan (see comments)  [x] Plan of care initiated [] Hold pending MD visit [] Discharge    Electronically signed by: Farida Trinidad, PT, MS, OMT-C    Physical Therapist [place] license #[id number]  Physical Therapist [facility] license #[id number]         Note: If patient does not return for scheduled/ recommended follow up visits, this note will serve as a discharge from care along with most recent update on progress.

## 2022-08-30 ENCOUNTER — TELEPHONE (OUTPATIENT)
Dept: ORTHOPEDIC SURGERY | Age: 57
End: 2022-08-30

## 2022-08-31 ENCOUNTER — TREATMENT (OUTPATIENT)
Dept: PHYSICAL THERAPY | Age: 57
End: 2022-08-31
Payer: COMMERCIAL

## 2022-08-31 DIAGNOSIS — M24.662 FIBROSIS OF KNEE JOINT, LEFT: ICD-10-CM

## 2022-08-31 DIAGNOSIS — R26.2 DIFFICULTY WALKING: ICD-10-CM

## 2022-08-31 DIAGNOSIS — M25.562 LEFT KNEE PAIN, UNSPECIFIED CHRONICITY: Primary | ICD-10-CM

## 2022-08-31 PROCEDURE — 97110 THERAPEUTIC EXERCISES: CPT | Performed by: PHYSICAL THERAPIST

## 2022-08-31 PROCEDURE — 97140 MANUAL THERAPY 1/> REGIONS: CPT | Performed by: PHYSICAL THERAPIST

## 2022-08-31 PROCEDURE — 97530 THERAPEUTIC ACTIVITIES: CPT | Performed by: PHYSICAL THERAPIST

## 2022-08-31 PROCEDURE — 97014 ELECTRIC STIMULATION THERAPY: CPT | Performed by: PHYSICAL THERAPIST

## 2022-08-31 NOTE — PROGRESS NOTES
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 Neville Davison  Has the plan of care been signed (Y/N):        []  Yes  [x]  No     Date of Patient follow up with Physician: 22      Is this a Progress Report:     []  Yes  [x]  No        If Yes:  Date Range for reporting period:  Beginning 22  Ending     Progress report will be due (10 Rx or 30 days whichever is less):        Recertification will be due (POC Duration  / 90 days whichever is less): 22        Visit # Insurance Allowable Auth Required   14 BMN []  Yes [x]  No        Functional Scale: FOTO 29; LEFS 13;    Date assessed:  22 FOTO 43; LEFS 27.2     Latex Allergy:  [x]NO      []YES  Preferred Language for Healthcare:   [x]English       []other:    Pain level:  3-6/10     SUBJECTIVE:   4 weeks post op from debridement; 7 weeks post op from posterior capsule release  Patient reports that his knee felt good yesterday and he was able to walk all day on 1 crutch w/o problems. He has been focusing on walking pattern. IV antibiotics going well. Pt did go up steps yesterday w/ step over gt pattern using cane.           F/u surg is planned to work on flex and remove scar tissue that has not been scheduled yet;       OBJECTIVE:   No swelling noted in ankle today 8/29/22; incision in posterior joint healing w/o any signs or symptoms of infection;     ROM PROM AROM Overpressure Comment    L 8/31/22 R L R L R    Flexion 92° ERMI  70° cold 125°      Extension 0° after stretch w/ op  -30° cold   -2°        Girth  7/29/2022 L R   Mid Patella 44 cm 39.3 cm   Suprapatellar     5cm above     15cm above         Strength L R Comment: majority deferred secondary to surgery   Quad      Hamstring      Abduction      Quad tone FAIR GOOD 8/8/2022       Special Test Results/Comment: majority deferred secondary to surgery   Homans  8/29/2022 Known DVT in calf       RESTRICTIONS/PRECAUTIONS: 7/18/22 posterior L knee capsule release; Limited ROM In L knee after injection in April that was limited prior to surg for flex and ext;  DVT left lower leg (still on blood thinners till about Oct)    Exercises/Interventions:   Exercise/Equipment Resistance/Repetitions Other comments   Cardio/Warm-up     Bike Treadmill          Stretching     Hamstring Seated propped 10\"x10 10# overpressure   Hip Flexion     ITB     Grion     Quad     Inclined Calf On floor 10\"x10    Towel Pull 30\"x5 over foam roll 10# overpressure        ROM     Passive ERMI 10\"x10 for flex    Active     ROM @EOB Flex & ext x10    Prone knee hang x10'   10# OP on ankle    Weight Hangs     Sheet Pulls     Ankle Pumps           Patellar Glides     Medial    Superior x2'    Inferior x2'         STRENGTH     SLR     Supine X30 over foam roll Concentration on distal contraction w/ cuing  Prone     Abduction     Adducton     SLR+          Isometrics     Quad sets 10\" on/off x10' full foam roll W/ biofeedback      CKC     Calf raises     Wall sits     Step ups     1 leg stand     Squatting Mini squat at counter x30    CC TKE x5' W/ biofeedback (see above)   Balance          PRE     Extension  RANGE:   Flexion  RANGE:   Leg Press  RANGE:        Cable Column     Ed given on POC, expectations and goals X10'; 8/3/22 reviewed    Lashawn Trinh training  x5' Using crutches but focus on knee ext w/ cuing        Manual/Modalities     Manual stretching into ext & sitting off edge/distraction x10'    Deep tissue work to HS/scar massage in prone position x5'                Therapeutic Exercise and NMR EXR  [x] (38007) Provided verbal/tactile cueing for activities related to strengthening, flexibility, endurance, ROM for improvements in LE, proximal hip, and core control with self care, mobility, lifting, ambulation.  [] (70958) Provided verbal/tactile cueing for activities related to improving balance, coordination, kinesthetic sense, posture, motor skill, proprioception  to assist with LE, proximal hip, and core control in self care, mobility, lifting, ambulation and eccentric single leg control.      NMR and Therapeutic Activities:    [x] (12544 or 20877) Provided verbal/tactile cueing for activities related to improving balance, coordination, kinesthetic sense, posture, motor skill, proprioception and motor activation to allow for proper function of core, proximal hip and LE with self care and ADLs  [] (95365) Gait Re-education- Provided training and instruction to the patient for proper LE, core and proximal hip recruitment and positioning and eccentric body weight control with ambulation re-education including up and down stairs     Home Exercise Program:    [x] (85970) Reviewed/Progressed HEP activities related to strengthening, flexibility, endurance, ROM of core, proximal hip and LE for functional self-care, mobility, lifting and ambulation/stair navigation            Written HEP est  [] (40313)Reviewed/Progressed HEP activities related to improving balance, coordination, kinesthetic sense, posture, motor skill, proprioception of core, proximal hip and LE for self care, mobility, lifting, and ambulation/stair navigation      Manual Treatments:  PROM / STM / Oscillations-Mobs:  G-I, II, III, IV (PA's, Inf., Post.)  [x] (72195) Provided manual therapy to for ROM restoration focus on gt training for function. [x] Continue per plan of care [] Alter current plan (see comments)  [x] Plan of care initiated [] Hold pending MD visit [] Discharge    Electronically signed by: Mary Lopez PT, MS, OMT-C    Physical Therapist [place] license #[id number]  Physical Therapist [place] license #[id number]         Note: If patient does not return for scheduled/ recommended follow up visits, this note will serve as a discharge from care along with most recent update on progress.

## 2022-09-02 ENCOUNTER — TREATMENT (OUTPATIENT)
Dept: PHYSICAL THERAPY | Age: 57
End: 2022-09-02
Payer: COMMERCIAL

## 2022-09-02 DIAGNOSIS — R26.2 DIFFICULTY WALKING: ICD-10-CM

## 2022-09-02 DIAGNOSIS — M24.662 FIBROSIS OF KNEE JOINT, LEFT: ICD-10-CM

## 2022-09-02 DIAGNOSIS — M25.562 LEFT KNEE PAIN, UNSPECIFIED CHRONICITY: Primary | ICD-10-CM

## 2022-09-02 PROCEDURE — 97014 ELECTRIC STIMULATION THERAPY: CPT | Performed by: PHYSICAL THERAPIST

## 2022-09-02 PROCEDURE — 97110 THERAPEUTIC EXERCISES: CPT | Performed by: PHYSICAL THERAPIST

## 2022-09-02 PROCEDURE — 97140 MANUAL THERAPY 1/> REGIONS: CPT | Performed by: PHYSICAL THERAPIST

## 2022-09-02 PROCEDURE — 97116 GAIT TRAINING THERAPY: CPT | Performed by: PHYSICAL THERAPIST

## 2022-09-02 NOTE — PROGRESS NOTES
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 Randall Moreno  Has the plan of care been signed (Y/N):        []  Yes  [x]  No     Date of Patient follow up with Physician: 22      Is this a Progress Report:     []  Yes  [x]  No        If Yes:  Date Range for reporting period:  Beginning 22  Ending     Progress report will be due (10 Rx or 30 days whichever is less):        Recertification will be due (POC Duration  / 90 days whichever is less): 22        Visit # Insurance Allowable Auth Required   15 BMN []  Yes [x]  No        Functional Scale: FOTO 29; LEFS 13;    Date assessed:  22 FOTO 43; LEFS 27.2     Latex Allergy:  [x]NO      []YES  Preferred Language for Healthcare:   [x]English       []other:    Pain level:  3-6/10     SUBJECTIVE:   4 weeks post op from debridement; 7 weeks post op from posterior capsule release  Patient reports that knee is feeling good yesterday but it was sore last night after a lot of activity. Pt feels that his knee is stiff today.           F/u surg is planned to work on flex and remove scar tissue that has not been scheduled yet;       OBJECTIVE:   No swelling noted in ankle today 22; incision in posterior joint healing w/o any signs or symptoms of infection; ROM PROM AROM Overpressure Comment    L 8/31/22 R L R L R    Flexion 92° ERMI  70° cold 125°      Extension 0° after stretch w/ op  -30° cold   -2°        Girth  7/29/2022 L R   Mid Patella 44 cm 39.3 cm   Suprapatellar     5cm above     15cm above         Strength L R Comment: majority deferred secondary to surgery   Quad      Hamstring      Abduction      Quad tone FAIR GOOD 8/8/2022       Special Test Results/Comment: majority deferred secondary to surgery   Homans  8/29/2022 Known DVT in calf       RESTRICTIONS/PRECAUTIONS: 7/18/22 posterior L knee capsule release; Limited ROM In L knee after injection in April that was limited prior to surg for flex and ext;  DVT left lower leg (still on blood thinners till about Oct)    Exercises/Interventions:   Exercise/Equipment Resistance/Repetitions Other comments   Cardio/Warm-up     Bike Treadmill          Stretching     Hamstring Seated propped 10\"x10 10# overpressure   Hip Flexion     ITB     Grion     Quad     Inclined Calf On floor 10\"x10    Towel Pull 30\"x5 over foam roll 10# overpressure        ROM     Passive ERMI 10\"x10 for flex    Active     ROM @EOB Flex & ext x10    Prone knee hang x10'   10# OP on ankle    Weight Hangs     Sheet Pulls     Ankle Pumps           Patellar Glides     Medial    Superior x2'    Inferior x2'         STRENGTH     SLR     Supine X30 over foam roll Concentration on distal contraction w/ cuing  Prone     Abduction     Adducton     SLR+          Isometrics     Quad sets 10\" on/off x10' full foam roll W/ biofeedback      CKC     Calf raises     Wall sits     Step ups     1 leg stand     Squatting    CC TKE x5' W/ biofeedback (see above)   Balance          PRE     Extension  RANGE:   Flexion  RANGE:   Leg Press  RANGE:        Cable Column     Ed given on POC, expectations and goals X10'; 8/3/22 reviewed    Gt training  X5'; tried walking backward x5 laps Using crutches but focus on knee ext w/ cuing        Manual/Modalities     Manual stretching into ext & sitting off edge/distraction x10'    Deep tissue work to HS/scar massage in prone position x5'                Therapeutic Exercise and NMR EXR  [x] (39402) Provided verbal/tactile cueing for activities related to strengthening, flexibility, endurance, ROM for improvements in LE, proximal hip, and core control with self care, mobility, lifting, ambulation.  [] (82234) Provided verbal/tactile cueing for activities related to improving balance, coordination, kinesthetic sense, posture, motor skill, proprioception  to assist with LE, proximal hip, and core control in self care, mobility, lifting, ambulation and eccentric single leg control.      NMR and Therapeutic Activities:    [x] (40528 or 27753) Provided verbal/tactile cueing for activities related to improving balance, coordination, kinesthetic sense, posture, motor skill, proprioception and motor activation to allow for proper function of core, proximal hip and LE with self care and ADLs  [] (31017) Gait Re-education- Provided training and instruction to the patient for proper LE, core and proximal hip recruitment and positioning and eccentric body weight control with ambulation re-education including up and down stairs     Home Exercise Program:    [x] (35586) Reviewed/Progressed HEP activities related to strengthening, flexibility, endurance, ROM of core, proximal hip and LE for functional self-care, mobility, lifting and ambulation/stair navigation            Written HEP est  [] (96454)Reviewed/Progressed HEP activities related to improving balance, coordination, kinesthetic sense, posture, motor skill, proprioception of core, proximal hip and LE for self care, mobility, lifting, and ambulation/stair navigation      Manual Treatments:  PROM / STM / Oscillations-Mobs:  G-I, II, III, IV (PA's, Inf., Post.)  [x] (94743) Provided manual therapy to mobilize LE, proximal hip and/or LS spine soft tissue/joints for the purpose of modulating pain, promoting relaxation,  increasing ROM, reducing/eliminating soft tissue swelling/inflammation/restriction, improving soft tissue extensibility and allowing for proper ROM for normal function with self care, mobility, lifting and ambulation. Modalities:  CP x10 min propped and 10# over knee   [] GAME READY (VASO)- for significant edema, swelling, pain control. Charges:  Timed Code Treatment Minutes: 42   Total Treatment Minutes: 75     [] EVAL (LOW) 91245 (typically 20 minutes face-to-face)  [] EVAL (MOD) 88106 (typically 30 minutes face-to-face)  [] EVAL (HIGH) 92951 (typically 45 minutes face-to-face)  [] RE-EVAL   [x] LP(33314) 1x  15'   [] IONTO  [] NMR (93355) x     [] VASO x15'  [x] Manual (09819) x 1  x15'     [x] Other: GT x12'  [] TA (80977) 1x   15'   [] Mech Traction (06287)  [] ES(attended) (96539)      [x] ES (un) (21363): EMG/Stim combo x15'    GOALS:  Patient stated goal: return to outdoor activities for walking and climbing, along with work  [] Progressing: [] Met: [] Not Met: [] Adjusted     Therapist goals for Patient:  Short Term Goals: To be achieved in: 2 weeks  1. Independent in HEP and progression per patient tolerance, in order to prevent re-injury. [] Progressing: [] Met: [] Not Met: [] Adjusted  2. Patient will have a decrease in pain to facilitate improvement in movement, function, and ADLs as indicated by Functional Deficits. [] Progressing: [] Met: [] Not Met: [] Adjusted     Long Term Goals: To be achieved in: 12+ weeks  1. Functional index score of 67 or more for FOTO to assist with reaching prior level of function. [] Progressing: [] Met: [] Not Met: [] Adjusted  2. Patient will demonstrate increased AROM to 0-125 deg to allow for proper joint functioning as indicated by patients Functional Deficits. [] Progressing: [] Met: [] Not Met: [] Adjusted  3.  Patient will demonstrate an increase in Strength to good proximal hip strength and control, within 5lb HHD in LE to allow for proper functional mobility as indicated by patients Functional Deficits. [] Progressing: [] Met: [] Not Met: [] Adjusted  4. Patient will return to 90% functional activities without increased symptoms or restriction. [] Progressing: [] Met: [] Not Met: [] Adjusted  5. Pt will be able to walk 45 min on uneven surfaces for hiking and climbing. (patient specific functional goal)    [] Progressing: [] Met: [] Not Met: [] Adjusted         Overall Progression Towards Functional goals/ Treatment Progress Update:  [] Patient is progressing as expected towards functional goals listed. [] Progression is slowed due to complexities/Impairments listed. [] Progression has been slowed due to co-morbidities. [x] Plan just implemented, too soon to assess goals progression <30days   [] Goals require adjustment due to lack of progress  [] Patient is not progressing as expected and requires additional follow up with physician  [] Other    ASSESSMENT:    Knee had inc stiffness in knee w/ stretching into ext. Patella mobility had more tightness today. Quad contraction good and improving in distal muscle group. Min edema in knee. Gt skills progressing but pt still needs  2 crutches to focus on correct tech. Progressing well toward goal of full ext. Treatment/Activity Tolerance:  [] Patient tolerated treatment well [] Patient limited by fatique  [] Patient limited by pain  [x] Patient limited by other medical complications- tightness  [] Other:     Prognosis: [x] Good [x] Fair  [] Poor    Patient Requires Follow-up: [x] Yes  [] No    PLAN: See eval.   Cont 3x/week for ROM restoration focus on gt training for function. MD appt on Tuesday, Sept 6th and then he will return to clinic.     [x] Continue per plan of care [] Alter current plan (see comments)  [x] Plan of care initiated [] Hold pending MD visit [] Discharge    Electronically signed by: Mary Anne Buitrago, PT, MS, OMT-C    Physical Therapist [place] license #[id number]  Physical

## 2022-09-06 ENCOUNTER — OFFICE VISIT (OUTPATIENT)
Dept: ORTHOPEDIC SURGERY | Age: 57
End: 2022-09-06

## 2022-09-06 VITALS — BODY MASS INDEX: 28.88 KG/M2 | WEIGHT: 195 LBS | HEIGHT: 69 IN

## 2022-09-06 DIAGNOSIS — G89.18 POST-OPERATIVE PAIN: Primary | ICD-10-CM

## 2022-09-06 PROCEDURE — 99024 POSTOP FOLLOW-UP VISIT: CPT | Performed by: ORTHOPAEDIC SURGERY

## 2022-09-07 ENCOUNTER — TREATMENT (OUTPATIENT)
Dept: PHYSICAL THERAPY | Age: 57
End: 2022-09-07
Payer: COMMERCIAL

## 2022-09-07 DIAGNOSIS — M25.562 LEFT KNEE PAIN, UNSPECIFIED CHRONICITY: Primary | ICD-10-CM

## 2022-09-07 DIAGNOSIS — M24.662 FIBROSIS OF KNEE JOINT, LEFT: ICD-10-CM

## 2022-09-07 DIAGNOSIS — R26.2 DIFFICULTY WALKING: ICD-10-CM

## 2022-09-07 PROCEDURE — 97110 THERAPEUTIC EXERCISES: CPT | Performed by: PHYSICAL THERAPIST

## 2022-09-07 PROCEDURE — 97014 ELECTRIC STIMULATION THERAPY: CPT | Performed by: PHYSICAL THERAPIST

## 2022-09-07 PROCEDURE — 97140 MANUAL THERAPY 1/> REGIONS: CPT | Performed by: PHYSICAL THERAPIST

## 2022-09-07 PROCEDURE — 97530 THERAPEUTIC ACTIVITIES: CPT | Performed by: PHYSICAL THERAPIST

## 2022-09-07 NOTE — PROGRESS NOTES
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 Abrahan Winkler  Has the plan of care been signed (Y/N):        []  Yes  [x]  No     Date of Patient follow up with Physician: 22      Is this a Progress Report:     []  Yes  [x]  No        If Yes:  Date Range for reporting period:  Beginning 22  Ending     Progress report will be due (10 Rx or 30 days whichever is less):        Recertification will be due (POC Duration  / 90 days whichever is less): 22        Visit # Insurance Allowable Auth Required   16 BMN []  Yes [x]  No        Functional Scale: FOTO 29; LEFS 13;    Date assessed:  22 FOTO 43; LEFS 27.2     Latex Allergy:  [x]NO      []YES  Preferred Language for Healthcare:   [x]English       []other:    Pain level:  3-6/10     SUBJECTIVE:   5 weeks post op from debridement; 8 weeks post op from posterior capsule release  Patient reports that knee is feeling good and did well over the long weekend w/ family visiting. He did have MD appts yesterday w/ plan for debridement surg planned for Dr. Abrahan Winkler and Dr. Daniel Sapp together. IV antibiotic will stay in for 4 weeks to prepare for next surg.           F/u surg is planned to work on flex and remove scar tissue that has not been scheduled yet; OBJECTIVE:   No swelling noted in ankle today 8/29/22; incision in posterior joint healing w/o any signs or symptoms of infection;     ROM PROM AROM Overpressure Comment    L 8/31/22 R L R L R    Flexion 92° ERMI  70° cold 125°      Extension 0° after stretch w/ op  -30° cold   -2°        Girth  7/29/2022 L R   Mid Patella 44 cm 39.3 cm   Suprapatellar     5cm above     15cm above         Strength L R Comment: majority deferred secondary to surgery   Quad      Hamstring      Abduction      Quad tone FAIR GOOD 8/8/2022       Special Test Results/Comment: majority deferred secondary to surgery   Homans  8/29/2022 Known DVT in calf       RESTRICTIONS/PRECAUTIONS: 7/18/22 posterior L knee capsule release; Limited ROM In L knee after injection in April that was limited prior to surg for flex and ext;  DVT left lower leg (still on blood thinners till about Oct)    Exercises/Interventions:   Exercise/Equipment Resistance/Repetitions Other comments   Cardio/Warm-up     Bike Treadmill          Stretching     Hamstring Seated propped 10\"x10 10# overpressure   Hip Flexion     ITB     Grion     Quad     Inclined Calf On floor 10\"x10    Towel Pull 30\"x5 over foam roll 10# overpressure        ROM     Passive ERMI 10\"x10 for flex    Active     ROM @EOB Flex & ext x10    Prone knee hang x10'   10# OP on ankle    Weight Hangs     Sheet Pulls     Ankle Pumps           Patellar Glides     Medial    Superior x2'    Inferior x2'         STRENGTH     SLR     Supine 4x10 over foam roll Concentration on distal contraction w/ cuing  Prone     Abduction     Adducton     SLR+          Isometrics     Quad sets 10\" on/off x10' full foam roll W/ biofeedback      CKC     Calf raises     Wall sits     Step ups     1 leg stand Marching x20 Focus on knee flex and then WBing on L LE   Squatting    CC TKE x5' W/ biofeedback (see above)   Balance          PRE     Extension  RANGE:   Flexion  RANGE:   Leg Press  RANGE:        Cable Column     Ed given on POC, expectations and goals X10'; 8/3/22 reviewed    Gt training  X5'; tried walking backward x5 laps Using crutches but focus on knee ext w/ cuing        Manual/Modalities     Manual stretching into ext & sitting off edge/distraction x10'    Deep tissue work to HS/scar massage in prone position x5'                Therapeutic Exercise and NMR EXR  [x] (66978) Provided verbal/tactile cueing for activities related to strengthening, flexibility, endurance, ROM for improvements in LE, proximal hip, and core control with self care, mobility, lifting, ambulation.  [] (28718) Provided verbal/tactile cueing for activities related to improving balance, coordination, kinesthetic sense, posture, motor skill, proprioception  to assist with LE, proximal hip, and core control in self care, mobility, lifting, ambulation and eccentric single leg control.      NMR and Therapeutic Activities:    [x] (15697 or 85685) Provided verbal/tactile cueing for activities related to improving balance, coordination, kinesthetic sense, posture, motor skill, proprioception and motor activation to allow for proper function of core, proximal hip and LE with self care and ADLs  [] (67632) Gait Re-education- Provided training and instruction to the patient for proper LE, core and proximal hip recruitment and positioning and eccentric body weight control with ambulation re-education including up and down stairs     Home Exercise Program:    [x] (66766) Reviewed/Progressed HEP activities related to strengthening, flexibility, endurance, ROM of core, proximal hip and LE for functional self-care, mobility, lifting and ambulation/stair navigation            Written HEP est  [] (80666)Reviewed/Progressed HEP activities related to improving balance, coordination, kinesthetic sense, posture, motor skill, proprioception of core, proximal hip and LE for self care, mobility, lifting, and ambulation/stair navigation      Manual Treatments:  PROM / STM / Oscillations-Mobs:  G-I, II, III, IV (PA's, Inf., Post.)  [x] (02761) Provided manual therapy to mobilize LE, proximal hip and/or LS spine soft tissue/joints for the purpose of modulating pain, promoting relaxation,  increasing ROM, reducing/eliminating soft tissue swelling/inflammation/restriction, improving soft tissue extensibility and allowing for proper ROM for normal function with self care, mobility, lifting and ambulation. Modalities:  CP x10 min propped and 10# over knee   [] GAME READY (VASO)- for significant edema, swelling, pain control. Charges:  Timed Code Treatment Minutes: 45   Total Treatment Minutes: 75     [] EVAL (LOW) 36498 (typically 20 minutes face-to-face)  [] EVAL (MOD) 10819 (typically 30 minutes face-to-face)  [] EVAL (HIGH) 73796 (typically 45 minutes face-to-face)  [] RE-EVAL   [x] WL(42626) 1x  15'   [] IONTO  [] NMR (20395) x     [] VASO x15'  [x] Manual (02681) x 1  x15'     [] Other: GT x12'  [x] TA (38819) 1x   15'   [] Mech Traction (07609)  [] ES(attended) (98113)      [x] ES (un) (98520): EMG/Stim combo x15'    GOALS:  Patient stated goal: return to outdoor activities for walking and climbing, along with work  [] Progressing: [] Met: [] Not Met: [] Adjusted     Therapist goals for Patient:  Short Term Goals: To be achieved in: 2 weeks  1. Independent in HEP and progression per patient tolerance, in order to prevent re-injury. [] Progressing: [] Met: [] Not Met: [] Adjusted  2. Patient will have a decrease in pain to facilitate improvement in movement, function, and ADLs as indicated by Functional Deficits. [] Progressing: [] Met: [] Not Met: [] Adjusted     Long Term Goals: To be achieved in: 12+ weeks  1. Functional index score of 67 or more for FOTO to assist with reaching prior level of function. [] Progressing: [] Met: [] Not Met: [] Adjusted  2.  Patient will demonstrate increased AROM to 0-125 deg to allow for proper joint functioning as indicated by patients Functional Deficits. [] Progressing: [] Met: [] Not Met: [] Adjusted  3. Patient will demonstrate an increase in Strength to good proximal hip strength and control, within 5lb HHD in LE to allow for proper functional mobility as indicated by patients Functional Deficits. [] Progressing: [] Met: [] Not Met: [] Adjusted  4. Patient will return to 90% functional activities without increased symptoms or restriction. [] Progressing: [] Met: [] Not Met: [] Adjusted  5. Pt will be able to walk 45 min on uneven surfaces for hiking and climbing. (patient specific functional goal)    [] Progressing: [] Met: [] Not Met: [] Adjusted         Overall Progression Towards Functional goals/ Treatment Progress Update:  [] Patient is progressing as expected towards functional goals listed. [] Progression is slowed due to complexities/Impairments listed. [] Progression has been slowed due to co-morbidities. [x] Plan just implemented, too soon to assess goals progression <30days   [] Goals require adjustment due to lack of progress  [] Patient is not progressing as expected and requires additional follow up with physician  [] Other    ASSESSMENT:    Quad contraction improved on L as noted on muscle ability w/ biofeedback testing. Patella mobility is the best it has been since starting therapy. ROM in L knee ext improved but some tightness noted end range. Pt was able to tolerate single leg balancing for short period w/ marching activity. Knee flex to ~90 w/ marching exercise. Good progress especially after long weekend. Treatment/Activity Tolerance:  [] Patient tolerated treatment well [] Patient limited by fatique  [] Patient limited by pain  [x] Patient limited by other medical complications- tightness  [] Other:     Prognosis: [x] Good [x] Fair  [] Poor    Patient Requires Follow-up: [x] Yes  [] No    PLAN: See eval.   Cont 3x/week for ROM restoration focus on gt training for function.    [x] Continue per plan of care [] Alter current plan (see comments)  [x] Plan of care initiated [] Hold pending MD visit [] Discharge    Electronically signed by: Eligio Olmstead PT, MS, OMT-C    Physical Therapist [id number] [address] license #[id number]  Physical Therapist [place] license #[id number]         Note: If patient does not return for scheduled/ recommended follow up visits, this note will serve as a discharge from care along with most recent update on progress.

## 2022-09-08 NOTE — PROGRESS NOTES
9/6/2022     Reason for visit:  Status post left knee posterior capsular release on 7/15/2022  Status post left knee arthroscopy with I&D and lysis of adhesions on 8/5/2022    History of Present Illness: The patient returns for postop evaluation. Overall he is doing well. He denies fever or chills. No numbness or tingling. Continues to get physical therapy. He does report improvements with his extension and flexion. Objective:  Ht 5' 9\" (1.753 m)   Wt 195 lb (88.5 kg)   BMI 28.80 kg/m²      Physical Exam:  The patient is well-appearing and in no apparent distress  Examination of the left knee   There is a small effusion, no gross deformity or skin changes  Incision sites are well-healed  Patient does still lack about 10 degrees of extension today in the office unable to flex him to about 85 to 90 degrees  5 out of 5 strength throughout distal muscle groups  Sensation is intact to light touch throughout all distributions  There is no calf swelling or tenderness  Palpable DP pulse, brisk cap refill, 2+ symmetric reflexes     Assessment:  Status post left knee posterior capsular release on 7/15/2022  Status post left knee arthroscopy with I&D and lysis of adhesions on 8/5/2022    Plan:  Overall the patient is doing very well. He is made a lot of progress with his extension as well as flexion. We did place an IVC filter prior to his surgery due to history of a DVT. He is on a anticoagulant for that. In addition the cultures from the date of the surgical intervention were positive for C acnes. He reached out to his infectious disease doctor who placed to have PICC line and started him on IV antibiotics. At this point he has improved significantly. I do think that we could consider open retinacular lengthening with lysis adhesions fairly soon. I will further discuss this with one of my partners, Dr. Erma Wang.                   Claude Richters MD            Orthopaedic Surgery Sports Medicine and Arthroscopy            [address] [facility] [address] [facility]            Team Physician [place]enny ([place]      Disclaimer: This note was dictated with voice recognition software. Though review and correction are routine, we apologize for any errors.

## 2022-09-09 ENCOUNTER — TREATMENT (OUTPATIENT)
Dept: PHYSICAL THERAPY | Age: 57
End: 2022-09-09
Payer: COMMERCIAL

## 2022-09-09 ENCOUNTER — TELEPHONE (OUTPATIENT)
Dept: ORTHOPEDIC SURGERY | Age: 57
End: 2022-09-09

## 2022-09-09 ENCOUNTER — PATIENT MESSAGE (OUTPATIENT)
Dept: ORTHOPEDIC SURGERY | Age: 57
End: 2022-09-09

## 2022-09-09 DIAGNOSIS — R26.2 DIFFICULTY WALKING: ICD-10-CM

## 2022-09-09 DIAGNOSIS — M25.562 LEFT KNEE PAIN, UNSPECIFIED CHRONICITY: Primary | ICD-10-CM

## 2022-09-09 DIAGNOSIS — M24.662 FIBROSIS OF KNEE JOINT, LEFT: ICD-10-CM

## 2022-09-09 PROCEDURE — 97016 VASOPNEUMATIC DEVICE THERAPY: CPT | Performed by: PHYSICAL THERAPIST

## 2022-09-09 PROCEDURE — 97110 THERAPEUTIC EXERCISES: CPT | Performed by: PHYSICAL THERAPIST

## 2022-09-09 PROCEDURE — 97014 ELECTRIC STIMULATION THERAPY: CPT | Performed by: PHYSICAL THERAPIST

## 2022-09-09 PROCEDURE — 97140 MANUAL THERAPY 1/> REGIONS: CPT | Performed by: PHYSICAL THERAPIST

## 2022-09-09 NOTE — PROGRESS NOTES
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 Sunday Degroot  Has the plan of care been signed (Y/N):        []  Yes  [x]  No     Date of Patient follow up with Physician: 22      Is this a Progress Report:     []  Yes  [x]  No        If Yes:  Date Range for reporting period:  Beginning 22  Ending     Progress report will be due (10 Rx or 30 days whichever is less):        Recertification will be due (POC Duration  / 90 days whichever is less): 22        Visit # Insurance Allowable Auth Required   17 BMN []  Yes [x]  No        Functional Scale: FOTO 29; LEFS 13;    Date assessed:  22 FOTO 43; LEFS 27.2     Latex Allergy:  [x]NO      []YES  Preferred Language for Healthcare:   [x]English       []other:    Pain level:  3-6/10     SUBJECTIVE:   5 weeks post op from debridement; 8 weeks post op from posterior capsule release  Patient reports that knee is feeling good and not too sore after last session. He is using 1 crutch more consistently at home for gt. He has not heard from MD about surg date.           F/u surg is planned to work on flex and remove scar tissue that has not been scheduled yet;       OBJECTIVE:   No swelling noted in ankle today 22; incision in posterior joint healing w/o any signs or symptoms of infection;     ROM PROM AROM Overpressure Comment    L 9/9/22 R L R L R    Flexion 97° on SOB  70° cold 125°      Extension 0° after stretch w/ op  -30° cold   -2°        Girth  7/29/2022 L R   Mid Patella 44 cm 39.3 cm   Suprapatellar     5cm above     15cm above         Strength L R Comment: majority deferred secondary to surgery   Quad      Hamstring      Abduction      Quad tone FAIR GOOD 8/8/2022       Special Test Results/Comment: majority deferred secondary to surgery   Homans  8/29/2022 Known DVT in calf       RESTRICTIONS/PRECAUTIONS: 7/18/22 posterior L knee capsule release; Limited ROM In L knee after injection in April that was limited prior to surg for flex and ext;  DVT left lower leg (still on blood thinners till about Oct)    Exercises/Interventions:   Exercise/Equipment Resistance/Repetitions Other comments   Cardio/Warm-up     Bike Treadmill          Stretching     Hamstring Seated propped 10\"x10 10# overpressure   Hip Flexion     ITB     Grion     Quad     Inclined Calf On floor 10\"x10    Towel Pull 30\"x5 over foam roll 10# overpressure        ROM     Passive ERMI 10\"x10 for flex    Active     ROM @EOB Flex & ext x10    Prone knee hang x10'   10# OP on ankle    Weight Hangs     Sheet Pulls     Ankle Pumps           Patellar Glides     Medial    Superior x2'    Inferior x2'         STRENGTH     SLR     Supine 4x15 over foam roll Concentration on distal contraction w/ cuing  Prone     Abduction     Adducton     SLR+          Isometrics     Quad sets 10\" on/off x10' full foam roll W/ biofeedback      CKC     Calf raises     Wall sits     Step ups L1 on tech x10 Using wall and crutch for assistance   1 leg stand Focus on knee flex and then WBing on L LE   Squatting    CC TKE x5' W/ biofeedback (see above)   Balance          PRE     Extension  RANGE:   Flexion  RANGE:   Leg Press  RANGE:        Cable Column     Ed given on POC, expectations and goals X10'; 8/3/22 reviewed    Gt training Patient will demonstrate an increase in Strength to good proximal hip strength and control, within 5lb HHD in LE to allow for proper functional mobility as indicated by patients Functional Deficits. [] Progressing: [] Met: [] Not Met: [] Adjusted  4. Patient will return to 90% functional activities without increased symptoms or restriction. [] Progressing: [] Met: [] Not Met: [] Adjusted  5. Pt will be able to walk 45 min on uneven surfaces for hiking and climbing. (patient specific functional goal)    [] Progressing: [] Met: [] Not Met: [] Adjusted         Overall Progression Towards Functional goals/ Treatment Progress Update:  [] Patient is progressing as expected towards functional goals listed. [] Progression is slowed due to complexities/Impairments listed. [] Progression has been slowed due to co-morbidities. [x] Plan just implemented, too soon to assess goals progression <30days   [] Goals require adjustment due to lack of progress  [] Patient is not progressing as expected and requires additional follow up with physician  [] Other    ASSESSMENT:  Pt is getting knee straight consistently w/ overpressure. Quad contraction distally is better but concentration needed. Progressed pt to step ups for knee ROM and quad activation but mod UE support needed for tech. Overall, edema in knee resolved some. Gt skills progressing but crutch needed due to limited quad strength in [facility]. Pt will contact MD today to see if surg hs been established. Treatment/Activity Tolerance:  [] Patient tolerated treatment well [] Patient limited by fatique  [] Patient limited by pain  [x] Patient limited by other medical complications- tightness  [] Other:     Prognosis: [x] Good [x] Fair  [] Poor    Patient Requires Follow-up: [x] Yes  [] No    PLAN: See eval.   Cont 3x/week for ROM restoration focus on gt training for function.    [x] Continue per plan of care [] Alter current plan (see comments)  [x] Plan of care initiated [] Hold pending MD visit [] Discharge    Electronically signed by: Edwin Bermudez PT, MS, OMT-C    Physical Therapist [place] license #[id number]  Physical Therapist [place] license #[id number]         Note: If patient does not return for scheduled/ recommended follow up visits, this note will serve as a discharge from care along with most recent update on progress.

## 2022-09-12 ENCOUNTER — TREATMENT (OUTPATIENT)
Dept: PHYSICAL THERAPY | Age: 57
End: 2022-09-12
Payer: COMMERCIAL

## 2022-09-12 ENCOUNTER — TELEPHONE (OUTPATIENT)
Dept: ORTHOPEDIC SURGERY | Age: 57
End: 2022-09-12

## 2022-09-12 DIAGNOSIS — M24.662 ARTHROFIBROSIS OF KNEE JOINT, LEFT: Primary | ICD-10-CM

## 2022-09-12 PROCEDURE — 97110 THERAPEUTIC EXERCISES: CPT | Performed by: PHYSICAL THERAPIST

## 2022-09-12 PROCEDURE — 97014 ELECTRIC STIMULATION THERAPY: CPT | Performed by: PHYSICAL THERAPIST

## 2022-09-12 PROCEDURE — 97140 MANUAL THERAPY 1/> REGIONS: CPT | Performed by: PHYSICAL THERAPIST

## 2022-09-12 PROCEDURE — 97530 THERAPEUTIC ACTIVITIES: CPT | Performed by: PHYSICAL THERAPIST

## 2022-09-12 NOTE — PROGRESS NOTES
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 Angela Hardin  Has the plan of care been signed (Y/N):        []  Yes  [x]  No     Date of Patient follow up with Physician: 22      Is this a Progress Report:     []  Yes  [x]  No        If Yes:  Date Range for reporting period:  Beginning 22  Ending     Progress report will be due (10 Rx or 30 days whichever is less): 3/54/39       Recertification will be due (POC Duration  / 90 days whichever is less): 22        Visit # Insurance Allowable Auth Required   18 BMN []  Yes [x]  No        Functional Scale: FOTO 29; LEFS 13;    Date assessed:  22 FOTO 43; LEFS 27.2     Latex Allergy:  [x]NO      []YES  Preferred Language for Healthcare:   [x]English       []other:    Pain level:  3-6/10     SUBJECTIVE:   5+ weeks post op from debridement; 8+ weeks post op from posterior capsule release  Patient reports that knee is doing well and \"feeling good\". Compliant w/ HEP. Was sitting 4 hrs for football game and had swelling in ankle but elevated and ice and symptoms resolved. Pt will stay on antibiotics until surg.      F/u surg is planned to work on flex and remove scar tissue that has not been scheduled yet; 22 Pt is still waiting to hear a date for follow up schedule with Dr. Moustapha Law and Dr. Drew Live.       OBJECTIVE:   No swelling noted in ankle today 8/29/22; incision in posterior joint healing w/o any signs or symptoms of infection;     ROM PROM AROM Overpressure Comment    L 9/9/22 R L R L R    Flexion 97° on SOB  70° cold 125°      Extension 0° after stretch w/ op  -30° cold   -2°        Girth  7/29/2022 L R   Mid Patella 44 cm 39.3 cm   Suprapatellar     5cm above     15cm above         Strength L R Comment: majority deferred secondary to surgery   Quad      Hamstring      Abduction      Quad tone FAIR+ GOOD 9/12//2022       Special Test Results/Comment: majority deferred secondary to surgery   Homans  8/29/2022 Known DVT in calf       RESTRICTIONS/PRECAUTIONS: 7/18/22 posterior L knee capsule release; Limited ROM In L knee after injection in April that was limited prior to surg for flex and ext;  DVT left lower leg (still on blood thinners till about Oct)    Exercises/Interventions:   Exercise/Equipment Resistance/Repetitions Other comments   Cardio/Warm-up     Bike Treadmill          Stretching     Hamstring Seated propped 10\"x10 10# overpressure   Hip Flexion     ITB     Grion     Quad     Inclined Calf On floor 10\"x10    Towel Pull 30\"x5 over foam roll 10# overpressure        ROM     Passive ERMI 10\"x10 for flex    Active     ROM @EOB Flex & ext x10    Prone knee hang x10'   10# OP on ankle    Weight Hangs     Sheet Pulls     Ankle Pumps           Patellar Glides     Medial    Superior x2'    Inferior x2'         STRENGTH     SLR     Supine 4x15 over foam roll Concentration on distal contraction w/ cuing  Prone     Abduction     Adducton     SLR+          Isometrics     Quad sets 10\" on/off x10' full foam roll W/ biofeedback      CKC     Calf raises     Wall sits     Step ups L1 on tech x10 Using wall and crutch for assistance   1 leg stand Focus on knee flex and then WBing on L LE   Squatting    CC TKE x5' W/ biofeedback (see above)   Balance POS L9- NPV PRE     Extension  RANGE:   Flexion  RANGE:   Leg Press  RANGE:        Cable Column     Ed given on POC, expectations and goals X10'; 8/3/22 reviewed    Gt training  X5'; Using crutches but focus on knee ext w/ cuing        Manual/Modalities     Manual stretching into ext & sitting off edge/distraction x10'    Deep tissue work to HS/scar massage in prone position x5'                Therapeutic Exercise and NMR EXR  [x] (74800) Provided verbal/tactile cueing for activities related to strengthening, flexibility, endurance, ROM for improvements in LE, proximal hip, and core control with self care, mobility, lifting, ambulation.  [] (88399) Provided verbal/tactile cueing for activities related to improving balance, coordination, kinesthetic sense, posture, motor skill, proprioception  to assist with LE, proximal hip, and core control in self care, mobility, lifting, ambulation and eccentric single leg control.      NMR and Therapeutic Activities:    [x] (71960 or 62636) Provided verbal/tactile cueing for activities related to improving balance, coordination, kinesthetic sense, posture, motor skill, proprioception and motor activation to allow for proper function of core, proximal hip and LE with self care and ADLs  [] (49957) Gait Re-education- Provided training and instruction to the patient for proper LE, core and proximal hip recruitment and positioning and eccentric body weight control with ambulation re-education including up and down stairs     Home Exercise Program:    [x] (93834) Reviewed/Progressed HEP activities related to strengthening, flexibility, endurance, ROM of core, proximal hip and LE for functional self-care, mobility, lifting and ambulation/stair navigation            Written HEP est  [] (39618)Reviewed/Progressed HEP activities related to improving balance, coordination, kinesthetic sense, posture, motor skill, proprioception of core, proximal hip and LE for self care, mobility, lifting, and ambulation/stair navigation      Manual Treatments:  PROM / STM / Oscillations-Mobs:  G-I, II, III, IV (PA's, Inf., Post.)  [x] (40784) Provided manual therapy to mobilize LE, proximal hip and/or LS spine soft tissue/joints for the purpose of modulating pain, promoting relaxation,  increasing ROM, reducing/eliminating soft tissue swelling/inflammation/restriction, improving soft tissue extensibility and allowing for proper ROM for normal function with self care, mobility, lifting and ambulation. Modalities:  CP x10 min propped and 10# over knee   [] GAME READY (VASO)- for significant edema, swelling, pain control. Charges:  Timed Code Treatment Minutes: 45   Total Treatment Minutes: 75     [] EVAL (LOW) 94606 (typically 20 minutes face-to-face)  [] EVAL (MOD) 07267 (typically 30 minutes face-to-face)  [] EVAL (HIGH) 00657 (typically 45 minutes face-to-face)  [] RE-EVAL   [x] HX(16828) 1x  15'   [] IONTO  [] NMR (99657) x     [] VASO x15'  [x] Manual (63943) x 1  x15'     [] Other: GT x12'  [x] TA (71837) 1x   15'   [] Mech Traction (28962)  [] ES(attended) (00232)      [x] ES (un) (76159): EMG/Stim combo x15'    GOALS:  Patient stated goal: return to outdoor activities for walking and climbing, along with work  [] Progressing: [] Met: [] Not Met: [] Adjusted     Therapist goals for Patient:  Short Term Goals: To be achieved in: 2 weeks  1. Independent in HEP and progression per patient tolerance, in order to prevent re-injury. [] Progressing: [] Met: [] Not Met: [] Adjusted  2. Patient will have a decrease in pain to facilitate improvement in movement, function, and ADLs as indicated by Functional Deficits. [] Progressing: [] Met: [] Not Met: [] Adjusted     Long Term Goals: To be achieved in: 12+ weeks  1. Functional index score of 67 or more for FOTO to assist with reaching prior level of function. [] Progressing: [] Met: [] Not Met: [] Adjusted  2.  Patient will demonstrate increased AROM to 0-125 deg to allow for proper joint functioning as indicated by patients Functional Deficits. [] Progressing: [] Met: [] Not Met: [] Adjusted  3. Patient will demonstrate an increase in Strength to good proximal hip strength and control, within 5lb HHD in LE to allow for proper functional mobility as indicated by patients Functional Deficits. [] Progressing: [] Met: [] Not Met: [] Adjusted  4. Patient will return to 90% functional activities without increased symptoms or restriction. [] Progressing: [] Met: [] Not Met: [] Adjusted  5. Pt will be able to walk 45 min on uneven surfaces for hiking and climbing. (patient specific functional goal)    [] Progressing: [] Met: [] Not Met: [] Adjusted         Overall Progression Towards Functional goals/ Treatment Progress Update:  [] Patient is progressing as expected towards functional goals listed. [] Progression is slowed due to complexities/Impairments listed. [] Progression has been slowed due to co-morbidities. [x] Plan just implemented, too soon to assess goals progression <30days   [] Goals require adjustment due to lack of progress  [] Patient is not progressing as expected and requires additional follow up with physician  [] Other    ASSESSMENT:  Pt is getting knee straight easier each session w/ less time to reach ext  and less pain. Full knee ext reached w/ overpressure. Quad contraction progressing w/ pt able to reach close to full ext actively. Gt skills improved but still needs the crutches for safety when walking. Treatment/Activity Tolerance:  [] Patient tolerated treatment well [] Patient limited by fatique  [] Patient limited by pain  [x] Patient limited by other medical complications- tightness  [] Other:     Prognosis: [x] Good [x] Fair  [] Poor    Patient Requires Follow-up: [x] Yes  [] No    PLAN: See eval.   Cont 3x/week for ROM restoration focus on gt training for function.    [x] Continue per plan of care [] Alter current plan (see comments)  [x] Plan of care initiated [] Hold pending MD visit [] Discharge    Electronically signed by: Daniel Tyler PT, MS, OMT-C    Physical Therapist [id number] [address] license #[id number]  Physical Therapist [place] license #[id number]         Note: If patient does not return for scheduled/ recommended follow up visits, this note will serve as a discharge from care along with most recent update on progress.

## 2022-09-13 NOTE — TELEPHONE ENCOUNTER
Message from patient:     Any update on when the next surgery may be scheduled? Dr. Kyle Ayala hoped to have me a date by the end of last week.       Just checking in!

## 2022-09-14 ENCOUNTER — TREATMENT (OUTPATIENT)
Dept: PHYSICAL THERAPY | Age: 57
End: 2022-09-14
Payer: COMMERCIAL

## 2022-09-14 DIAGNOSIS — R26.2 DIFFICULTY WALKING: ICD-10-CM

## 2022-09-14 DIAGNOSIS — M24.662 ARTHROFIBROSIS OF KNEE JOINT, LEFT: Primary | ICD-10-CM

## 2022-09-14 DIAGNOSIS — M25.562 LEFT KNEE PAIN, UNSPECIFIED CHRONICITY: ICD-10-CM

## 2022-09-14 PROCEDURE — 97014 ELECTRIC STIMULATION THERAPY: CPT | Performed by: PHYSICAL THERAPIST

## 2022-09-14 PROCEDURE — 97140 MANUAL THERAPY 1/> REGIONS: CPT | Performed by: PHYSICAL THERAPIST

## 2022-09-14 PROCEDURE — 97530 THERAPEUTIC ACTIVITIES: CPT | Performed by: PHYSICAL THERAPIST

## 2022-09-14 PROCEDURE — 97110 THERAPEUTIC EXERCISES: CPT | Performed by: PHYSICAL THERAPIST

## 2022-09-14 NOTE — PROGRESS NOTES
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 Jong Benson  Has the plan of care been signed (Y/N):        []  Yes  [x]  No     Date of Patient follow up with Physician: 22      Is this a Progress Report:     []  Yes  [x]  No        If Yes:  Date Range for reporting period:  Beginning 22  Ending     Progress report will be due (10 Rx or 30 days whichever is less): 81       Recertification will be due (POC Duration  / 90 days whichever is less): 22        Visit # Insurance Allowable Auth Required   19 BMN []  Yes [x]  No        Functional Scale: FOTO 29; LEFS 13;    Date assessed:  22 FOTO 43; LEFS 27.2     Latex Allergy:  [x]NO      []YES  Preferred Language for Healthcare:   [x]English       []other:    Pain level:  3-6/10     SUBJECTIVE:   5+ weeks post op from debridement; 8+ weeks post op from posterior capsule release  Patient reports that knee is still doing well. He had discussion w/ MD yesterday and some hesitation about scheduling surg. Pt will have f/u appt on Tuesday to reassess situation and future surgery.      F/u surg is planned to work on flex and remove scar tissue that has not been scheduled yet; 22 Pt is still waiting to hear a date for follow up schedule with Dr. Elisabet Gonzalez and Extension  RANGE:   Flexion  RANGE:   Leg Press  RANGE:        Cable Column     Ed given on POC, expectations and goals X10'; 8/3/22 reviewed    Gt training  X5'; standing upright w/o lean onto crutch Using crutches but focus on knee ext w/ cuing        Manual/Modalities     Manual stretching into ext & sitting off edge/distraction x10'    Deep tissue work to HS/scar massage in prone position x5'                Therapeutic Exercise and NMR EXR  [x] (33046) Provided verbal/tactile cueing for activities related to strengthening, flexibility, endurance, ROM for improvements in LE, proximal hip, and core control with self care, mobility, lifting, ambulation.  [] (48300) Provided verbal/tactile cueing for activities related to improving balance, coordination, kinesthetic sense, posture, motor skill, proprioception  to assist with LE, proximal hip, and core control in self care, mobility, lifting, ambulation and eccentric single leg control.      NMR and Therapeutic Activities:    [x] (97665 or 13423) Provided verbal/tactile cueing for activities related to improving balance, coordination, kinesthetic sense, posture, motor skill, proprioception and motor activation to allow for proper function of core, proximal hip and LE with self care and ADLs  [] (99486) Gait Re-education- Provided training and instruction to the patient for proper LE, core and proximal hip recruitment and positioning and eccentric body weight control with ambulation re-education including up and down stairs     Home Exercise Program:    [x] (19954) Reviewed/Progressed HEP activities related to strengthening, flexibility, endurance, ROM of core, proximal hip and LE for functional self-care, mobility, lifting and ambulation/stair navigation            Written HEP est  [] (51316)Reviewed/Progressed HEP activities related to improving balance, coordination, kinesthetic sense, posture, motor skill, proprioception of core, proximal hip and LE for self care, mobility, lifting, and ambulation/stair navigation      Manual Treatments:  PROM / STM / Oscillations-Mobs:  G-I, II, III, IV (PA's, Inf., Post.)  [x] (40317) Provided manual therapy to mobilize LE, proximal hip and/or LS spine soft tissue/joints for the purpose of modulating pain, promoting relaxation,  increasing ROM, reducing/eliminating soft tissue swelling/inflammation/restriction, improving soft tissue extensibility and allowing for proper ROM for normal function with self care, mobility, lifting and ambulation. Modalities:  CP x10 min propped and 10# over knee   [] GAME READY (VASO)- for significant edema, swelling, pain control. Charges:  Timed Code Treatment Minutes: 45   Total Treatment Minutes: 75     [] EVAL (LOW) 20306 (typically 20 minutes face-to-face)  [] EVAL (MOD) 40566 (typically 30 minutes face-to-face)  [] EVAL (HIGH) 68831 (typically 45 minutes face-to-face)  [] RE-EVAL   [x] HU(55863) 1x  15'   [] IONTO  [] NMR (97711) x     [] VASO x15'  [x] Manual (71767) x 1  x15'     [] Other: GT x12'  [x] TA (92573) 1x   15'   [] Mech Traction (23599)  [] ES(attended) (65840)      [x] ES (un) (16620): EMG/Stim combo x15'    GOALS:  Patient stated goal: return to outdoor activities for walking and climbing, along with work  [] Progressing: [] Met: [] Not Met: [] Adjusted     Therapist goals for Patient:  Short Term Goals: To be achieved in: 2 weeks  1. Independent in HEP and progression per patient tolerance, in order to prevent re-injury. [] Progressing: [] Met: [] Not Met: [] Adjusted  2. Patient will have a decrease in pain to facilitate improvement in movement, function, and ADLs as indicated by Functional Deficits. [] Progressing: [] Met: [] Not Met: [] Adjusted     Long Term Goals: To be achieved in: 12+ weeks  1. Functional index score of 67 or more for FOTO to assist with reaching prior level of function. [] Progressing: [] Met: [] Not Met: [] Adjusted  2.  Patient will demonstrate increased AROM to 0-125 deg to allow for proper joint functioning as indicated by patients Functional Deficits. [] Progressing: [] Met: [] Not Met: [] Adjusted  3. Patient will demonstrate an increase in Strength to good proximal hip strength and control, within 5lb HHD in LE to allow for proper functional mobility as indicated by patients Functional Deficits. [] Progressing: [] Met: [] Not Met: [] Adjusted  4. Patient will return to 90% functional activities without increased symptoms or restriction. [] Progressing: [] Met: [] Not Met: [] Adjusted  5. Pt will be able to walk 45 min on uneven surfaces for hiking and climbing. (patient specific functional goal)    [] Progressing: [] Met: [] Not Met: [] Adjusted         Overall Progression Towards Functional goals/ Treatment Progress Update:  [] Patient is progressing as expected towards functional goals listed. [] Progression is slowed due to complexities/Impairments listed. [] Progression has been slowed due to co-morbidities. [x] Plan just implemented, too soon to assess goals progression <30days   [] Goals require adjustment due to lack of progress  [] Patient is not progressing as expected and requires additional follow up with physician  [] Other    ASSESSMENT:  Pt is getting knee straight easier each session w/ less time to reach ext  and less pain. Cuing given on steps to get L LE under pt w/o lean onto UE's for assistance. Trial of Green taping to assist w/ pain in lat L knee on steps that helped but adjusted position that helped more. Quad contraction continues to improve distally but concentration needed.       Treatment/Activity Tolerance:  [] Patient tolerated treatment well [] Patient limited by fatique  [] Patient limited by pain  [x] Patient limited by other medical complications- tightness  [] Other:     Prognosis: [x] Good [x] Fair  [] Poor    Patient Requires Follow-up: [x] Yes  [] No    PLAN: See eval.   Cont 3x/week for ROM restoration focus on gt training for function. [x] Continue per plan of care [] Alter current plan (see comments)  [x] Plan of care initiated [] Hold pending MD visit [] Discharge    Electronically signed by: John Field PT, MS, OMT-C    Physical Therapist [place] license #[id number]  Physical Therapist [place] license #[id number]         Note: If patient does not return for scheduled/ recommended follow up visits, this note will serve as a discharge from care along with most recent update on progress.

## 2022-09-16 ENCOUNTER — TREATMENT (OUTPATIENT)
Dept: PHYSICAL THERAPY | Age: 57
End: 2022-09-16
Payer: COMMERCIAL

## 2022-09-16 DIAGNOSIS — M24.662 FIBROSIS OF KNEE JOINT, LEFT: ICD-10-CM

## 2022-09-16 DIAGNOSIS — M24.662 ARTHROFIBROSIS OF KNEE JOINT, LEFT: Primary | ICD-10-CM

## 2022-09-16 DIAGNOSIS — M25.562 LEFT KNEE PAIN, UNSPECIFIED CHRONICITY: ICD-10-CM

## 2022-09-16 DIAGNOSIS — R26.2 DIFFICULTY WALKING: ICD-10-CM

## 2022-09-16 PROCEDURE — 97530 THERAPEUTIC ACTIVITIES: CPT | Performed by: PHYSICAL THERAPIST

## 2022-09-16 PROCEDURE — 97140 MANUAL THERAPY 1/> REGIONS: CPT | Performed by: PHYSICAL THERAPIST

## 2022-09-16 PROCEDURE — 97110 THERAPEUTIC EXERCISES: CPT | Performed by: PHYSICAL THERAPIST

## 2022-09-16 PROCEDURE — 97014 ELECTRIC STIMULATION THERAPY: CPT | Performed by: PHYSICAL THERAPIST

## 2022-09-16 NOTE — PROGRESS NOTES
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 Randall Moreno  Has the plan of care been signed (Y/N):        []  Yes  [x]  No     Date of Patient follow up with Physician: 22      Is this a Progress Report:     []  Yes  [x]  No        If Yes:  Date Range for reporting period:  Beginning 22  Ending     Progress report will be due (10 Rx or 30 days whichever is less):        Recertification will be due (POC Duration  / 90 days whichever is less): 22        Visit # Insurance Allowable Auth Required   20 BMN []  Yes [x]  No        Functional Scale: FOTO 29; LEFS 13;    Date assessed:  22 FOTO 43; LEFS 27.2     Latex Allergy:  [x]NO      []YES  Preferred Language for Healthcare:   [x]English       []other:    Pain level:  3-6/10     SUBJECTIVE:   5+ weeks post op from debridement; 8+ weeks post op from posterior capsule release  Patient reports that tomorrow is his last day with the IV antibiotics. He also states he has been practicing with one crutch at home and feels pretty good with it. States that he can't maintain full knee extension after he is here. He continues to feel all discomfort in the front of his knee.     He is seeing Dr Randall Moreno again next week to discuss next Balance          PRE     Extension  RANGE:   Flexion  RANGE:   Leg Press  RANGE:        Cable Column     Ed given on POC, expectations and goals X10'; 8/3/22 reviewed    Gt training  X5'; standing upright w/o lean onto crutch Using crutches but focus on knee ext w/ cuing        Manual/Modalities     Manual stretching into ext & sitting off edge/distraction x10'    Deep tissue work to HS/scar massage in prone position x5'                Therapeutic Exercise and NMR EXR  [x] (63564) Provided verbal/tactile cueing for activities related to strengthening, flexibility, endurance, ROM for improvements in LE, proximal hip, and core control with self care, mobility, lifting, ambulation.  [] (24440) Provided verbal/tactile cueing for activities related to improving balance, coordination, kinesthetic sense, posture, motor skill, proprioception  to assist with LE, proximal hip, and core control in self care, mobility, lifting, ambulation and eccentric single leg control.      NMR and Therapeutic Activities:    [x] (05271 or 81664) Provided verbal/tactile cueing for activities related to improving balance, coordination, kinesthetic sense, posture, motor skill, proprioception and motor activation to allow for proper function of core, proximal hip and LE with self care and ADLs  [] (56009) Gait Re-education- Provided training and instruction to the patient for proper LE, core and proximal hip recruitment and positioning and eccentric body weight control with ambulation re-education including up and down stairs     Home Exercise Program:    [x] (10671) Reviewed/Progressed HEP activities related to strengthening, flexibility, endurance, ROM of core, proximal hip and LE for functional self-care, mobility, lifting and ambulation/stair navigation            Written HEP est  [] (74347)Reviewed/Progressed HEP activities related to improving balance, coordination, kinesthetic sense, posture, motor skill, proprioception of core, proximal hip and LE for self care, mobility, lifting, and ambulation/stair navigation      Manual Treatments:  PROM / STM / Oscillations-Mobs:  G-I, II, III, IV (PA's, Inf., Post.)  [x] (12262) Provided manual therapy to mobilize LE, proximal hip and/or LS spine soft tissue/joints for the purpose of modulating pain, promoting relaxation,  increasing ROM, reducing/eliminating soft tissue swelling/inflammation/restriction, improving soft tissue extensibility and allowing for proper ROM for normal function with self care, mobility, lifting and ambulation. Modalities:  CP x10 min propped and 15# over knee   [] GAME READY (VASO)- for significant edema, swelling, pain control. Charges:  Timed Code Treatment Minutes: 45   Total Treatment Minutes: 75     [] EVAL (LOW) 61436 (typically 20 minutes face-to-face)  [] EVAL (MOD) 18345 (typically 30 minutes face-to-face)  [] EVAL (HIGH) 58704 (typically 45 minutes face-to-face)  [] RE-EVAL   [x] NM(32090) 1x  15'   [] IONTO  [] NMR (97644) x     [] VASO x15'  [x] Manual (40767) x 1  x15'     [] Other: GT x12'  [x] TA (35531) 1x   15'   [] Mech Traction (87993)  [] ES(attended) (69097)      [x] ES (un) (33506): EMG/Stim combo x15'    GOALS:  Patient stated goal: return to outdoor activities for walking and climbing, along with work  [] Progressing: [] Met: [] Not Met: [] Adjusted     Therapist goals for Patient:  Short Term Goals: To be achieved in: 2 weeks  1. Independent in HEP and progression per patient tolerance, in order to prevent re-injury. [] Progressing: [] Met: [] Not Met: [] Adjusted  2. Patient will have a decrease in pain to facilitate improvement in movement, function, and ADLs as indicated by Functional Deficits. [] Progressing: [] Met: [] Not Met: [] Adjusted     Long Term Goals: To be achieved in: 12+ weeks  1. Functional index score of 67 or more for FOTO to assist with reaching prior level of function. [] Progressing: [] Met: [] Not Met: [] Adjusted  2. Patient will demonstrate increased AROM to 0-125 deg to allow for proper joint functioning as indicated by patients Functional Deficits. [] Progressing: [] Met: [] Not Met: [] Adjusted  3. Patient will demonstrate an increase in Strength to good proximal hip strength and control, within 5lb HHD in LE to allow for proper functional mobility as indicated by patients Functional Deficits. [] Progressing: [] Met: [] Not Met: [] Adjusted  4. Patient will return to 90% functional activities without increased symptoms or restriction. [] Progressing: [] Met: [] Not Met: [] Adjusted  5. Pt will be able to walk 45 min on uneven surfaces for hiking and climbing. (patient specific functional goal)    [] Progressing: [] Met: [] Not Met: [] Adjusted         Overall Progression Towards Functional goals/ Treatment Progress Update:  [] Patient is progressing as expected towards functional goals listed. [] Progression is slowed due to complexities/Impairments listed. [] Progression has been slowed due to co-morbidities. [x] Plan just implemented, too soon to assess goals progression <30days   [] Goals require adjustment due to lack of progress  [] Patient is not progressing as expected and requires additional follow up with physician  [] Other    ASSESSMENT:    Patient tolerated increase in weights with overpressure well today. Better quad recruitment noted with EMG/STIM combo. Able to get the goal up over 200 with TKEs today. He is able to get full extension in therapy, but not quite getting solid carryover from day to day. He demonstrated good form with step ups, takes his time and doesn't cannon through it.         Treatment/Activity Tolerance:  [] Patient tolerated treatment well [] Patient limited by fatique  [] Patient limited by pain  [x] Patient limited by other medical complications- tightness  [] Other:     Prognosis: [x] Good [x] Fair  [] Poor    Patient Requires Follow-up: [x] Yes  [] No    PLAN: See eval.   Cont 3x/week for ROM restoration focus on gt training for function. [x] Continue per plan of care [] Alter current plan (see comments)  [x] Plan of care initiated [] Hold pending MD visit [] Discharge    Electronically signed by:     Rufina Dakin, PT      Board Certified Orthopaedic Clinical Specialist  [facility] Certified  Physical Therapist    [place] PT #[id number]  [place] PT #[id number]            Note: If patient does not return for scheduled/ recommended follow up visits, this note will serve as a discharge from care along with most recent update on progress.

## 2022-09-19 ENCOUNTER — TREATMENT (OUTPATIENT)
Dept: PHYSICAL THERAPY | Age: 57
End: 2022-09-19
Payer: COMMERCIAL

## 2022-09-19 DIAGNOSIS — R26.2 DIFFICULTY WALKING: ICD-10-CM

## 2022-09-19 DIAGNOSIS — M25.562 LEFT KNEE PAIN, UNSPECIFIED CHRONICITY: ICD-10-CM

## 2022-09-19 DIAGNOSIS — M24.662 FIBROSIS OF KNEE JOINT, LEFT: ICD-10-CM

## 2022-09-19 DIAGNOSIS — M24.662 ARTHROFIBROSIS OF KNEE JOINT, LEFT: Primary | ICD-10-CM

## 2022-09-19 PROCEDURE — 97014 ELECTRIC STIMULATION THERAPY: CPT | Performed by: PHYSICAL THERAPIST

## 2022-09-19 PROCEDURE — 97140 MANUAL THERAPY 1/> REGIONS: CPT | Performed by: PHYSICAL THERAPIST

## 2022-09-19 PROCEDURE — 97110 THERAPEUTIC EXERCISES: CPT | Performed by: PHYSICAL THERAPIST

## 2022-09-19 PROCEDURE — 97530 THERAPEUTIC ACTIVITIES: CPT | Performed by: PHYSICAL THERAPIST

## 2022-09-19 NOTE — PROGRESS NOTES
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 Nikita Malik  Has the plan of care been signed (Y/N):        []  Yes  [x]  No     Date of Patient follow up with Physician: 22      Is this a Progress Report:     []  Yes  [x]  No        If Yes:  Date Range for reporting period:  Beginning 22  Ending     Progress report will be due (10 Rx or 30 days whichever is less):        Recertification will be due (POC Duration  / 90 days whichever is less): 22        Visit # Insurance Allowable Auth Required   21 BMN []  Yes [x]  No        Functional Scale: FOTO 29; LEFS 13;    Date assessed:  22 FOTO 43; LEFS 27.2     Latex Allergy:  [x]NO      []YES  Preferred Language for Healthcare:   [x]English       []other:    Pain level:  3-6/10     SUBJECTIVE:   5+ weeks post op from debridement; 8+ weeks post op from posterior capsule release  Patient reports     MD appt w/ Dr Nikita Malik on 22 to discuss next surgery.       OBJECTIVE:   incision in posterior joint healing w/o any signs or symptoms of infection;     ROM PROM AROM Overpressure Comment    L 22 R L R L R    Flexion 97° on SOB  70° cold 125°      Extension 0° after stretch w/ op  -30° cold   -2°        Girth  2022 L R   Mid Patella 44 cm 39.3 cm Suprapatellar     5cm above     15cm above         Strength L R Comment: majority deferred secondary to surgery   Quad      Hamstring      Abduction      Quad tone FAIR+ GOOD 9/12//2022       Special Test Results/Comment: majority deferred secondary to surgery   Homans  8/29/2022 Known DVT in calf       RESTRICTIONS/PRECAUTIONS: 7/18/22 posterior L knee capsule release; Limited ROM In L knee after injection in April that was limited prior to surg for flex and ext;  DVT left lower leg (still on blood thinners till about Oct)    Exercises/Interventions:   Exercise/Equipment Resistance/Repetitions Other comments   Cardio/Warm-up     Bike Treadmill          Stretching     Hamstring Seated propped 10\"x10 15# overpressure   Hip Flexion     ITB     Grion     Quad     Inclined Calf On floor 10\"x10    Towel Pull 30\"x5 over foam roll 15# overpressure        ROM     Passive ERMI 10\"x10 for flex    Active     ROM @EOB Flex & ext x10    Prone knee hang x10'   13# OP on ankle    Weight Hangs     Sheet Pulls     Ankle Pumps           Patellar Glides     Medial    Superior x2'    Inferior x2'         STRENGTH     SLR     Supine 3x10 over foam roll 1# Concentration on distal contraction w/ cuing  Prone     Abduction     Adducton     SLR+          Isometrics     Quad sets 10\" on/off x10' full foam roll W/ biofeedback        CKC     Calf raises     Wall sits     Step ups Using wall and crutch for assistance   1 leg stand Marching x20 Focus on knee flex and then WBing on L LE   Squatting Mini squat at counter x30    CC TKE x5' W/ biofeedback (see above)   Balance Fitter w/ mini squat fwd/bwd'; static 1' side to side         PRE     Extension  RANGE:   Flexion  RANGE:   Leg Press  RANGE:        Cable Column     Ed given on POC, expectations and goals    Gt training  X5'; standing upright w/o lean onto crutch Using crutches but focus on knee ext w/ cuing        Manual/Modalities     Manual stretching into ext & sitting off edge/distraction x10'    Deep tissue work to HS/scar massage in prone position x5'                Therapeutic Exercise and NMR EXR  [x] (13557) Provided verbal/tactile cueing for activities related to strengthening, flexibility, endurance, ROM for improvements in LE, proximal hip, and core control with self care, mobility, lifting, ambulation.  [] (23112) Provided verbal/tactile cueing for activities related to improving balance, coordination, kinesthetic sense, posture, motor skill, proprioception  to assist with LE, proximal hip, and core control in self care, mobility, lifting, ambulation and eccentric single leg control.      NMR and Therapeutic Activities:    [x] (39543 or 44322) Provided verbal/tactile cueing for activities related to improving balance, coordination, kinesthetic sense, posture, motor skill, proprioception and motor activation to allow for proper function of core, proximal hip and LE with self care and ADLs  [] (98790) Gait Re-education- Provided training and instruction to the patient for proper LE, core and proximal hip recruitment and positioning and eccentric body weight control with ambulation re-education including up and down stairs     Home Exercise Program:    [x] (50085) Reviewed/Progressed HEP activities related to strengthening, flexibility, endurance, ROM of core, proximal hip and LE for functional self-care, mobility, lifting and ambulation/stair navigation            Written HEP est    [] (60977)Reviewed/Progressed HEP activities related to improving balance, coordination, kinesthetic sense, posture, motor skill, proprioception of core, proximal hip and LE for self care, mobility, lifting, and ambulation/stair navigation      Manual Treatments:  PROM / STM / Oscillations-Mobs:  G-I, II, III, IV (PA's, Inf., Post.)  [x] (41984) Provided manual therapy to mobilize LE, proximal hip and/or LS spine soft tissue/joints for the purpose of modulating pain, promoting relaxation, increasing ROM, reducing/eliminating soft tissue swelling/inflammation/restriction, improving soft tissue extensibility and allowing for proper ROM for normal function with self care, mobility, lifting and ambulation. Modalities:  CP x10 min propped and 15# over knee   [] GAME READY (VASO)- for significant edema, swelling, pain control. Charges:  Timed Code Treatment Minutes: 45   Total Treatment Minutes: 75     [] EVAL (LOW) 47947 (typically 20 minutes face-to-face)  [] EVAL (MOD) 04852 (typically 30 minutes face-to-face)  [] EVAL (HIGH) 09536 (typically 45 minutes face-to-face)  [] RE-EVAL   [x] AS(02682) 1x  15'   [] IONTO  [] NMR (52972) x     [] VASO x15'  [x] Manual (75198) x 1  x15'     [] Other: GT x12'  [x] TA (84242) 1x   15'   [] Mech Traction (39027)  [] ES(attended) (63705)      [x] ES (un) (64189): EMG/Stim combo x15'    GOALS:  Patient stated goal: return to outdoor activities for walking and climbing, along with work  [] Progressing: [] Met: [] Not Met: [] Adjusted     Therapist goals for Patient:  Short Term Goals: To be achieved in: 2 weeks  1. Independent in HEP and progression per patient tolerance, in order to prevent re-injury. [] Progressing: [] Met: [] Not Met: [] Adjusted  2. Patient will have a decrease in pain to facilitate improvement in movement, function, and ADLs as indicated by Functional Deficits. [] Progressing: [] Met: [] Not Met: [] Adjusted     Long Term Goals: To be achieved in: 12+ weeks  1. Functional index score of 67 or more for FOTO to assist with reaching prior level of function. [] Progressing: [] Met: [] Not Met: [] Adjusted  2. Patient will demonstrate increased AROM to 0-125 deg to allow for proper joint functioning as indicated by patients Functional Deficits. [] Progressing: [] Met: [] Not Met: [] Adjusted  3.  Patient will demonstrate an increase in Strength to good proximal hip strength and control, within 5lb HHD in LE to allow for proper functional OMT-C    Physical Therapist Jose Eduardo Barrios license #[id number]  Physical Therapist [place] license #[id number]               Note: If patient does not return for scheduled/ recommended follow up visits, this note will serve as a discharge from care along with most recent update on progress.

## 2022-09-20 ENCOUNTER — OFFICE VISIT (OUTPATIENT)
Dept: ORTHOPEDIC SURGERY | Age: 57
End: 2022-09-20

## 2022-09-20 VITALS — HEIGHT: 69 IN | BODY MASS INDEX: 28.88 KG/M2 | WEIGHT: 195 LBS

## 2022-09-20 DIAGNOSIS — M24.662 ARTHROFIBROSIS OF KNEE JOINT, LEFT: Primary | ICD-10-CM

## 2022-09-20 PROCEDURE — 99024 POSTOP FOLLOW-UP VISIT: CPT | Performed by: ORTHOPAEDIC SURGERY

## 2022-09-21 ENCOUNTER — TELEPHONE (OUTPATIENT)
Dept: ORTHOPEDIC SURGERY | Age: 57
End: 2022-09-21

## 2022-09-21 ENCOUNTER — TREATMENT (OUTPATIENT)
Dept: PHYSICAL THERAPY | Age: 57
End: 2022-09-21
Payer: COMMERCIAL

## 2022-09-21 DIAGNOSIS — M24.662 ARTHROFIBROSIS OF KNEE JOINT, LEFT: Primary | ICD-10-CM

## 2022-09-21 DIAGNOSIS — M25.562 LEFT KNEE PAIN, UNSPECIFIED CHRONICITY: ICD-10-CM

## 2022-09-21 DIAGNOSIS — M24.662 FIBROSIS OF KNEE JOINT, LEFT: ICD-10-CM

## 2022-09-21 DIAGNOSIS — R26.2 DIFFICULTY WALKING: ICD-10-CM

## 2022-09-21 PROCEDURE — 97140 MANUAL THERAPY 1/> REGIONS: CPT | Performed by: PHYSICAL THERAPIST

## 2022-09-21 PROCEDURE — 97110 THERAPEUTIC EXERCISES: CPT | Performed by: PHYSICAL THERAPIST

## 2022-09-21 PROCEDURE — 97530 THERAPEUTIC ACTIVITIES: CPT | Performed by: PHYSICAL THERAPIST

## 2022-09-21 PROCEDURE — 97014 ELECTRIC STIMULATION THERAPY: CPT | Performed by: PHYSICAL THERAPIST

## 2022-09-21 NOTE — PROGRESS NOTES
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 Sofía Clay  Has the plan of care been signed (Y/N):        []  Yes  [x]  No     Date of Patient follow up with Physician: 10/10/22      Is this a Progress Report:     []  Yes  [x]  No        If Yes:  Date Range for reporting period:  Beginning 7/27/22  Ending     Progress report will be due (10 Rx or 30 days whichever is less): 4/64/57       Recertification will be due (POC Duration  / 90 days whichever is less): 9/27/22        Visit # Insurance Allowable Auth Required   22 BMN []  Yes [x]  No        Functional Scale: FOTO 29; LEFS 13;    Date assessed:  7/27/22 8/29/22 FOTO 43; LEFS 27.2     Latex Allergy:  [x]NO      []YES  Preferred Language for Healthcare:   [x]English       []other:    Pain level:  10/10 w/ stretching; 4-5/10 w/ general activities;    SUBJECTIVE:   5+ weeks post op from debridement; 8+ weeks post op from posterior capsule release  Patient reports that he had MD appt yesterday. MD wants pt to try dynasplint for 2 weeks and then return to MD for assessment. Pt received a message from his job that he will have to go on long term disability if he does not have a RTW date. This will lower his pay to 60%. Pt is managing pain. Contact has been made w/ holly to get device for pt ASAP and paperwork from this visit will be sent as soon as visit is complete. Pt does have significant limitation in motion when he wakes up in AM but then knee loosens up over the course of the day and stays looser. Knee pain is always ant and medial patella region.        OBJECTIVE:   incision in posterior joint healing w/o any signs or symptoms of infection; general edema around L knee region;    ROM PROM AROM Overpressure Comment    L 9/21/22 R L R L R    Flexion 97° on SOB  70° cold 125°      Extension -1° after stretch w/ op  -11° cold   -2°        Girth  7/29/2022 L R   Mid Patella 44 cm 39.3 cm   Suprapatellar     5cm above     15cm above         Strength L R Comment: majority deferred secondary to surgery   Quad      Hamstring      Abduction      Quad tone FAIR+ GOOD 9/12//2022       Special Test Results/Comment: majority deferred secondary to surgery   Homans  8/29/2022 Known DVT in calf       RESTRICTIONS/PRECAUTIONS: 7/18/22 posterior L knee capsule release; Limited ROM In L knee after injection in April that was limited prior to surg for flex and ext;  DVT left lower leg (still on blood thinners till about Oct)    Exercises/Interventions:   Exercise/Equipment Resistance/Repetitions Other comments   Cardio/Warm-up     Bike Treadmill          Stretching     Hamstring Seated propped 10\"x10 15# overpressure   Hip Flexion     ITB     Grion     Quad     Inclined Calf On floor 10\"x10    Towel Pull 30\"x5 over foam roll 15# overpressure        ROM     Passive    Active     ROM @EOB Flex & ext x10    Prone knee hang x10'   13# OP on ankle    Weight Hangs     Sheet Pulls     Ankle Pumps           Patellar Glides     Medial    Superior x2'    Inferior x2'         STRENGTH     SLR     Supine 3x10 over foam roll 1# Concentration on distal contraction w/ cuing  Prone     Abduction     Adducton     SLR+          Isometrics     Quad sets 10\" on/off x10' full foam roll  W/ biofeedback        CKC     Calf raises     Wall sits     Step ups Using wall and crutch for assistance   1 leg stand Squatting    CC TKE Silver 10\"x10 x5' Balance         PRE     Extension  RANGE:   Flexion  RANGE:   Leg Press  RANGE:        Cable Column     Ed given on POC, expectations and goals    Gt training  X5'; standing upright w/o lean onto crutch Using crutches but focus on knee ext w/ cuing        Manual/Modalities     Manual stretching into ext & sitting off edge/distraction x10'    Deep tissue work to HS/scar massage in prone position x5'                Therapeutic Exercise and NMR EXR  [x] (37030) Provided verbal/tactile cueing for activities related to strengthening, flexibility, endurance, ROM for improvements in LE, proximal hip, and core control with self care, mobility, lifting, ambulation.  [] (07187) Provided verbal/tactile cueing for activities related to improving balance, coordination, kinesthetic sense, posture, motor skill, proprioception  to assist with LE, proximal hip, and core control in self care, mobility, lifting, ambulation and eccentric single leg control.      NMR and Therapeutic Activities:    [x] (89288 or 76124) Provided verbal/tactile cueing for activities related to improving balance, coordination, kinesthetic sense, posture, motor skill, proprioception and motor activation to allow for proper function of core, proximal hip and LE with self care and ADLs  [] (45605) Gait Re-education- Provided training and instruction to the patient for proper LE, core and proximal hip recruitment and positioning and eccentric body weight control with ambulation re-education including up and down stairs     Home Exercise Program:    [x] (24711) Reviewed/Progressed HEP activities related to strengthening, flexibility, endurance, ROM of core, proximal hip and LE for functional self-care, mobility, lifting and ambulation/stair navigation            Written HEP est    [] (89693)Reviewed/Progressed HEP activities related to improving balance, coordination, kinesthetic sense, posture, motor skill, proprioception of core, proximal hip and LE for self care, mobility, lifting, and ambulation/stair navigation      Manual Treatments:  PROM / STM / Oscillations-Mobs:  G-I, II, III, IV (PA's, Inf., Post.)  [x] (45794) Provided manual therapy to mobilize LE, proximal hip and/or LS spine soft tissue/joints for the purpose of modulating pain, promoting relaxation,  increasing ROM, reducing/eliminating soft tissue swelling/inflammation/restriction, improving soft tissue extensibility and allowing for proper ROM for normal function with self care, mobility, lifting and ambulation. Modalities:  CP x10 min propped and 15# over knee   [] GAME READY (VASO)- for significant edema, swelling, pain control. Charges:  Timed Code Treatment Minutes: 45   Total Treatment Minutes: 75     [] EVAL (LOW) 31491 (typically 20 minutes face-to-face)  [] EVAL (MOD) 26563 (typically 30 minutes face-to-face)  [] EVAL (HIGH) 56648 (typically 45 minutes face-to-face)  [] RE-EVAL   [x] AX(23830) 1x  15'   [] IONTO  [] NMR (60026) x     [] VASO x15'  [x] Manual (49188) x 1  x15'     [] Other: GT x12'  [x] TA (68547) 1x   15'   [] Mech Traction (88629)  [] ES(attended) (85804)      [x] ES (un) (59716): EMG/Stim combo x15'    GOALS:  Patient stated goal: return to outdoor activities for walking and climbing, along with work  [] Progressing: [] Met: [] Not Met: [] Adjusted     Therapist goals for Patient:  Short Term Goals: To be achieved in: 2 weeks  1. Independent in HEP and progression per patient tolerance, in order to prevent re-injury. [] Progressing: [] Met: [] Not Met: [] Adjusted  2. Patient will have a decrease in pain to facilitate improvement in movement, function, and ADLs as indicated by Functional Deficits. [] Progressing: [] Met: [] Not Met: [] Adjusted     Long Term Goals: To be achieved in: 12+ weeks  1. Functional index score of 67 or more for FOTO to assist with reaching prior level of function. [] Progressing: [] Met: [] Not Met: [] Adjusted  2.  Patient will demonstrate increased AROM to 0-125 deg to allow for proper joint functioning as indicated by patients Functional Deficits. [] Progressing: [] Met: [] Not Met: [] Adjusted  3. Patient will demonstrate an increase in Strength to good proximal hip strength and control, within 5lb HHD in LE to allow for proper functional mobility as indicated by patients Functional Deficits. [] Progressing: [] Met: [] Not Met: [] Adjusted  4. Patient will return to 90% functional activities without increased symptoms or restriction. [] Progressing: [] Met: [] Not Met: [] Adjusted  5. Pt will be able to walk 45 min on uneven surfaces for hiking and climbing. (patient specific functional goal)    [] Progressing: [] Met: [] Not Met: [] Adjusted         Overall Progression Towards Functional goals/ Treatment Progress Update:  [] Patient is progressing as expected towards functional goals listed. [] Progression is slowed due to complexities/Impairments listed. [] Progression has been slowed due to co-morbidities. [x] Plan just implemented, too soon to assess goals progression <30days   [] Goals require adjustment due to lack of progress  [] Patient is not progressing as expected and requires additional follow up with physician  [] Other    ASSESSMENT:  Pt was more stiff on arrival and w/ therapy today. ROM did improve w/ stretching but did not reach full knee ext despite similar stretching routine. Quad contraction was harder in [address] positions and pt had to focus not to move hip and shift wt vs TKE. Treatment/Activity Tolerance:  [x] Patient tolerated treatment well [] Patient limited by fatique  [] Patient limited by pain  [] Patient limited by other medical complications- tightness  [] Other:     Prognosis: [x] Good [x] Fair  [] Poor    Patient Requires Follow-up: [x] Yes  [] No    PLAN: See eval.   Cont 3x/week for ROM restoration focus on gt training for function.    [x] Continue per plan of care [] Alter current plan (see comments)  [x] Plan of care initiated [] Hold pending MD visit [] Discharge    Electronically signed by: Jayesh Mack, PT, MS, OMT-C    Physical Therapist [place] license #[id number]  Physical Therapist [place] license #[id number]               Note: If patient does not return for scheduled/ recommended follow up visits, this note will serve as a discharge from care along with most recent update on progress.

## 2022-09-21 NOTE — PROGRESS NOTES
[facility]   Phone: [phone]    Fax: [phone]            Physical Therapy  Cancellation/No-show Note  Patient Name:  Karla Ppo  :  1965   Date:  2022  Cancelled visits to date: 1  No-shows to date: 0    For today's appointment patient:  [x]  Cancelled  []  Rescheduled appointment  []  No-show     Reason given by patient:  [x]  Patient ill  []  Conflicting appointment  []  No transportation    []  Conflict with work  []  No reason given  []  Other:     Comments:      Phone call information:   []  Phone call made today to patient at _ time at number provided:      []  Patient answered, conversation as follows:    []  Patient did not answer, message left as follows:  []  Phone call not made today  [x]  Phone call not needed - pt contacted us to cancel and provided reason for cancellation.      Electronically signed by:  Diane Bains PT, Tiffany Knox 85, OMT-C    Physical Therapist [place] license #[id number]  Physical Therapist [place] license #[id number]

## 2022-09-23 ENCOUNTER — TREATMENT (OUTPATIENT)
Dept: PHYSICAL THERAPY | Age: 57
End: 2022-09-23

## 2022-09-23 DIAGNOSIS — M25.562 LEFT KNEE PAIN, UNSPECIFIED CHRONICITY: ICD-10-CM

## 2022-09-23 DIAGNOSIS — M24.662 ARTHROFIBROSIS OF KNEE JOINT, LEFT: Primary | ICD-10-CM

## 2022-09-23 DIAGNOSIS — R26.2 DIFFICULTY WALKING: ICD-10-CM

## 2022-09-23 NOTE — PROGRESS NOTES
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 Pamela Gurrola  Has the plan of care been signed (Y/N):        []  Yes  [x]  No     Date of Patient follow up with Physician: 10/10/22      Is this a Progress Report:     []  Yes  [x]  No        If Yes:  Date Range for reporting period:  Beginning 22  Ending     Progress report will be due (10 Rx or 30 days whichever is less): 48       Recertification will be due (POC Duration  / 90 days whichever is less): 22        Visit # Insurance Allowable Auth Required   23 BMN []  Yes [x]  No        Functional Scale: FOTO 29; LEFS 13;    Date assessed:  22 FOTO 43; LEFS 27.2     Latex Allergy:  [x]NO      []YES  Preferred Language for Healthcare:   [x]English       []other:    Pain level:  10/10 w/ stretching; 4-5/10 w/ general activities;    SUBJECTIVE:   7 weeks post op from debridement; 10 weeks post op from posterior capsule release                Patient reports that he had MD appt yesterday. MD wants pt to try dynasplint for 2 weeks and then return to MD for assessment. Pt received a message from his job that he will have to go on long term disability if he does not have a RTW date. This will lower his pay to 60%. Pt is managing pain.  Contact has been made w/ [employer] to get device for pt ASAP and paperwork from this visit will be sent as soon as visit is complete. Pt does have significant limitation in motion when he wakes up in AM but then knee loosens up over the course of the day and stays looser. Knee pain is always ant and medial patella region.        OBJECTIVE:   incision in posterior joint healing w/o any signs or symptoms of infection; general edema around L knee region;    ROM PROM AROM Overpressure Comment    L 9/21/22 R L R L R    Flexion 97° on SOB  70° cold 125°      Extension -1° after stretch w/ op  -11° cold   -2°        Girth  7/29/2022 L R   Mid Patella 44 cm 39.3 cm   Suprapatellar     5cm above     15cm above         Strength L R Comment: majority deferred secondary to surgery   Quad      Hamstring      Abduction      Quad tone FAIR+ GOOD 9/12//2022       Special Test Results/Comment: majority deferred secondary to surgery   Homans  8/29/2022 Known DVT in calf       RESTRICTIONS/PRECAUTIONS: 7/18/22 posterior L knee capsule release; Limited ROM In L knee after injection in April that was limited prior to surg for flex and ext;  DVT left lower leg (still on blood thinners till about Oct)    Exercises/Interventions:   Exercise/Equipment Resistance/Repetitions Other comments   Cardio/Warm-up     Bike Treadmill          Stretching     Hamstring Seated propped 10\"x10 15# overpressure   Hip Flexion     ITB     Grion     Quad     Inclined Calf On floor 10\"x10    Towel Pull 30\"x5 over foam roll 15# overpressure        ROM     Passive    Active     ROM @EOB Flex & ext x10    Prone knee hang x10'   13# OP on ankle    Weight Hangs     Sheet Pulls     Ankle Pumps           Patellar Glides     Medial    Superior x2'    Inferior x2'         STRENGTH     SLR     Supine 3x10 over foam roll 1# Concentration on distal contraction w/ cuing  Prone     Abduction     Adducton     SLR+          Isometrics     Quad sets 10\" on/off x10' full foam roll  W/ biofeedback CKC     Calf raises     Wall sits     Step ups Using wall and crutch for assistance   1 leg stand Squatting    CC TKE Silver 10\"x10 x5' Balance         PRE     Extension  RANGE:   Flexion  RANGE:   Leg Press  RANGE:        Cable Column     Ed given on POC, expectations and goals    Gt training  X5'; standing upright w/o lean onto crutch Using crutches but focus on knee ext w/ cuing        Manual/Modalities     Manual stretching into ext & sitting off edge/distraction x10'    Deep tissue work to HS/scar massage in prone position x5'                Therapeutic Exercise and NMR EXR  [x] (63621) Provided verbal/tactile cueing for activities related to strengthening, flexibility, endurance, ROM for improvements in LE, proximal hip, and core control with self care, mobility, lifting, ambulation.  [] (63337) Provided verbal/tactile cueing for activities related to improving balance, coordination, kinesthetic sense, posture, motor skill, proprioception  to assist with LE, proximal hip, and core control in self care, mobility, lifting, ambulation and eccentric single leg control.      NMR and Therapeutic Activities:    [x] (34099 or 17379) Provided verbal/tactile cueing for activities related to improving balance, coordination, kinesthetic sense, posture, motor skill, proprioception and motor activation to allow for proper function of core, proximal hip and LE with self care and ADLs  [] (09648) Gait Re-education- Provided training and instruction to the patient for proper LE, core and proximal hip recruitment and positioning and eccentric body weight control with ambulation re-education including up and down stairs     Home Exercise Program:    [x] (90099) Reviewed/Progressed HEP activities related to strengthening, flexibility, endurance, ROM of core, proximal hip and LE for functional self-care, mobility, lifting and ambulation/stair navigation            Written HEP est    [] (05934)Reviewed/Progressed HEP activities related to improving balance, coordination, kinesthetic sense, posture, motor skill, proprioception of core, proximal hip and LE for self care, mobility, lifting, and ambulation/stair navigation      Manual Treatments:  PROM / STM / Oscillations-Mobs:  G-I, II, III, IV (PA's, Inf., Post.)  [x] (20976) Provided manual therapy to mobilize LE, proximal hip and/or LS spine soft tissue/joints for the purpose of modulating pain, promoting relaxation,  increasing ROM, reducing/eliminating soft tissue swelling/inflammation/restriction, improving soft tissue extensibility and allowing for proper ROM for normal function with self care, mobility, lifting and ambulation. Modalities:  CP x10 min propped and 15# over knee   [] GAME READY (VASO)- for significant edema, swelling, pain control. Charges:  Timed Code Treatment Minutes: 45   Total Treatment Minutes: 75     [] EVAL (LOW) 80600 (typically 20 minutes face-to-face)  [] EVAL (MOD) 68299 (typically 30 minutes face-to-face)  [] EVAL (HIGH) 32298 (typically 45 minutes face-to-face)  [] RE-EVAL   [x] KT(66845) 1x  15'   [] IONTO  [] NMR (70439) x     [] VASO x15'  [x] Manual (18553) x 1  x15'     [] Other: GT x12'  [x] TA (18990) 1x   15'   [] Mech Traction (78547)  [] ES(attended) (95067)      [x] ES (un) (97949): EMG/Stim combo x15'    GOALS:  Patient stated goal: return to outdoor activities for walking and climbing, along with work  [] Progressing: [] Met: [] Not Met: [] Adjusted     Therapist goals for Patient:  Short Term Goals: To be achieved in: 2 weeks  1. Independent in HEP and progression per patient tolerance, in order to prevent re-injury. [] Progressing: [] Met: [] Not Met: [] Adjusted  2. Patient will have a decrease in pain to facilitate improvement in movement, function, and ADLs as indicated by Functional Deficits. [] Progressing: [] Met: [] Not Met: [] Adjusted     Long Term Goals: To be achieved in: 12+ weeks  1.  Functional index score of 67 or more for FOTO to assist with reaching prior level of function. [] Progressing: [] Met: [] Not Met: [] Adjusted  2. Patient will demonstrate increased AROM to 0-125 deg to allow for proper joint functioning as indicated by patients Functional Deficits. [] Progressing: [] Met: [] Not Met: [] Adjusted  3. Patient will demonstrate an increase in Strength to good proximal hip strength and control, within 5lb HHD in LE to allow for proper functional mobility as indicated by patients Functional Deficits. [] Progressing: [] Met: [] Not Met: [] Adjusted  4. Patient will return to 90% functional activities without increased symptoms or restriction. [] Progressing: [] Met: [] Not Met: [] Adjusted  5. Pt will be able to walk 45 min on uneven surfaces for hiking and climbing. (patient specific functional goal)    [] Progressing: [] Met: [] Not Met: [] Adjusted         Overall Progression Towards Functional goals/ Treatment Progress Update:  [] Patient is progressing as expected towards functional goals listed. [] Progression is slowed due to complexities/Impairments listed. [] Progression has been slowed due to co-morbidities. [x] Plan just implemented, too soon to assess goals progression <30days   [] Goals require adjustment due to lack of progress  [] Patient is not progressing as expected and requires additional follow up with physician  [] Other    ASSESSMENT:              Pt was more stiff on arrival and w/ therapy today. ROM did improve w/ stretching but did not reach full knee ext despite similar stretching routine. Quad contraction was harder in [address] positions and pt had to focus not to move hip and shift wt vs TKE.     Treatment/Activity Tolerance:  [x] Patient tolerated treatment well [] Patient limited by fatique  [] Patient limited by pain  [] Patient limited by other medical complications- tightness  [] Other:     Prognosis: [x] Good [x] Fair  [] Poor    Patient Requires Follow-up: [x] Yes  []

## 2022-09-26 ENCOUNTER — TREATMENT (OUTPATIENT)
Dept: PHYSICAL THERAPY | Age: 57
End: 2022-09-26
Payer: COMMERCIAL

## 2022-09-26 DIAGNOSIS — M24.662 ARTHROFIBROSIS OF KNEE JOINT, LEFT: Primary | ICD-10-CM

## 2022-09-26 DIAGNOSIS — M24.662 FIBROSIS OF KNEE JOINT, LEFT: ICD-10-CM

## 2022-09-26 DIAGNOSIS — M25.562 LEFT KNEE PAIN, UNSPECIFIED CHRONICITY: ICD-10-CM

## 2022-09-26 DIAGNOSIS — R26.2 DIFFICULTY WALKING: ICD-10-CM

## 2022-09-26 PROCEDURE — 97140 MANUAL THERAPY 1/> REGIONS: CPT | Performed by: PHYSICAL THERAPIST

## 2022-09-26 PROCEDURE — 97014 ELECTRIC STIMULATION THERAPY: CPT | Performed by: PHYSICAL THERAPIST

## 2022-09-26 PROCEDURE — 97110 THERAPEUTIC EXERCISES: CPT | Performed by: PHYSICAL THERAPIST

## 2022-09-26 PROCEDURE — 97530 THERAPEUTIC ACTIVITIES: CPT | Performed by: PHYSICAL THERAPIST

## 2022-09-26 NOTE — PROGRESS NOTES
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 Glibert Rodriguez  Has the plan of care been signed (Y/N):        []  Yes  [x]  No     Date of Patient follow up with Physician: 10/10/22      Is this a Progress Report:     []  Yes  [x]  No        If Yes:  Date Range for reporting period:  Beginning 22  Ending     Progress report will be due (10 Rx or 30 days whichever is less):        Recertification will be due (POC Duration  / 90 days whichever is less): 10/27/22        Visit # Insurance Allowable Auth Required   23 BMN []  Yes [x]  No        Functional Scale: FOTO 29; LEFS 13;    Date assessed:  22 FOTO 43; LEFS 27.2     Latex Allergy:  [x]NO      []YES  Preferred Language for Healthcare:   [x]English       []other:    Pain level:  10/10 w/ stretching; 4-5/10 w/ general activities;    SUBJECTIVE:   5+ weeks post op from debridement; 8+ weeks post op from posterior capsule release  Patient had more pain in knee after last session that lasted for 2 days afterwards. Knee is feeling better today. Pt was approved for dynasplint and rep will come to session today and set pt up with device. Pain along distal patella region medial and lateral. Denies inc in swelling.  Pt did have some stiffness in knee after car ride [place] to see grandson play ball.        OBJECTIVE:   incision in posterior joint healing w/o any signs or symptoms of infection; general edema around L knee region;    ROM PROM AROM Overpressure Comment    L 9/21/22 R L R L R    Flexion 97° on SOB  70° cold 125°      Extension -1° after stretch w/ op  -11° cold   -2°        Girth  7/29/2022 L R   Mid Patella 44 cm 39.3 cm   Suprapatellar     5cm above     15cm above         Strength L R Comment: majority deferred secondary to surgery   Quad      Hamstring      Abduction      Quad tone FAIR+ GOOD 9/12//2022       Special Test Results/Comment: majority deferred secondary to surgery   Homans  8/29/2022 Known DVT in calf       RESTRICTIONS/PRECAUTIONS: 7/18/22 posterior L knee capsule release; Limited ROM In L knee after injection in April that was limited prior to surg for flex and ext;  DVT left lower leg (still on blood thinners till about Oct)    Exercises/Interventions:   Exercise/Equipment Resistance/Repetitions Other comments   Cardio/Warm-up     Bike Treadmill          Stretching     Hamstring Seated propped 10\"x10 15# overpressure   Hip Flexion     ITB     Grion     Quad     Inclined Calf On floor 10\"x10    Towel Pull 30\"x5 over foam roll 15# overpressure        ROM     Passive    Active     ROM @EOB    Prone knee hang x10'   13# OP on ankle    Weight Hangs     Sheet Pulls     Ankle Pumps           Patellar Glides     Medial    Superior x2'    Inferior x2'         STRENGTH     SLR     Supine 3x10 over foam roll 3# Concentration on distal contraction w/ cuing  Prone     Abduction     Adducton     SLR+          Isometrics     Quad sets 10\" on/off x5' full foam roll & 5' w/ TKE W/ biofeedback        CKC     Calf raises     Wall sits     Step ups Using wall and crutch for assistance   1 leg stand Squatting    CC TKE Silver 10\"x10 x5' Balance         PRE     Extension  RANGE:   Flexion  RANGE:   Leg Press  RANGE:        Cable Column     Ed given on POC, expectations and goals    Gt training  X5'; standing upright w/o lean onto crutch Using crutches but focus on knee ext w/ cuing        Manual/Modalities     Manual stretching into ext & sitting off edge/distraction x10'    Deep tissue work to HS/scar massage in prone position x5'                Therapeutic Exercise and NMR EXR  [x] (74835) Provided verbal/tactile cueing for activities related to strengthening, flexibility, endurance, ROM for improvements in LE, proximal hip, and core control with self care, mobility, lifting, ambulation.  [] (59805) Provided verbal/tactile cueing for activities related to improving balance, coordination, kinesthetic sense, posture, motor skill, proprioception  to assist with LE, proximal hip, and core control in self care, mobility, lifting, ambulation and eccentric single leg control.      NMR and Therapeutic Activities:    [x] (05502 or 19316) Provided verbal/tactile cueing for activities related to improving balance, coordination, kinesthetic sense, posture, motor skill, proprioception and motor activation to allow for proper function of core, proximal hip and LE with self care and ADLs  [] (05659) Gait Re-education- Provided training and instruction to the patient for proper LE, core and proximal hip recruitment and positioning and eccentric body weight control with ambulation re-education including up and down stairs     Home Exercise Program:    [x] (14107) Reviewed/Progressed HEP activities related to strengthening, flexibility, endurance, ROM of core, proximal hip and LE for functional self-care, mobility, lifting and ambulation/stair navigation            Written HEP est    [] (12428)Reviewed/Progressed HEP activities related to improving balance, coordination, kinesthetic sense, posture, motor skill, proprioception of core, proximal hip and LE for self care, mobility, lifting, and ambulation/stair navigation      Manual Treatments:  PROM / STM / Oscillations-Mobs:  G-I, II, III, IV ([place], Inf., Post.)  [x] (20041) Provided manual therapy to mobilize LE, proximal hip and/or LS spine soft tissue/joints for the purpose of modulating pain, promoting relaxation,  increasing ROM, reducing/eliminating soft tissue swelling/inflammation/restriction, improving soft tissue extensibility and allowing for proper ROM for normal function with self care, mobility, lifting and ambulation. Modalities:  CP x10 min propped and 15# over knee   [] GAME READY (VASO)- for significant edema, swelling, pain control. Charges:  Timed Code Treatment Minutes: 45   Total Treatment Minutes: 75     [] EVAL (LOW) 58899 (typically 20 minutes face-to-face)  [] EVAL (MOD) 60022 (typically 30 minutes face-to-face)  [] EVAL (HIGH) 20262 (typically 45 minutes face-to-face)  [] RE-EVAL   [x] EQ(42614) 1x  15'   [] IONTO  [] NMR (17303) x     [] VASO x15'  [x] Manual (81289) x 1  x15'     [] Other: GT x12'  [x] TA (43206) 1x   15'   [] Mech Traction (03519)  [] ES(attended) (94738)      [x] ES (un) (64802): EMG/Stim combo x15'    GOALS:  Patient stated goal: return to outdoor activities for walking and climbing, along with work  [] Progressing: [] Met: [] Not Met: [] Adjusted     Therapist goals for Patient:  Short Term Goals: To be achieved in: 2 weeks  1. Independent in HEP and progression per patient tolerance, in order to prevent re-injury. [] Progressing: [] Met: [] Not Met: [] Adjusted  2. Patient will have a decrease in pain to facilitate improvement in movement, function, and ADLs as indicated by Functional Deficits. [] Progressing: [] Met: [] Not Met: [] Adjusted     Long Term Goals: To be achieved in: 12+ weeks  1. Functional index score of 67 or more for FOTO to assist with reaching prior level of function. [] Progressing: [] Met: [] Not Met: [] Adjusted  2. Patient will demonstrate increased AROM to 0-125 deg to allow for proper joint functioning as indicated by patients Functional Deficits.   [] Discharge    Electronically signed by: Farida Trinidad PT, MS, OMT-C    Physical Therapist [place] license #[id number]  Physical Therapist [place] license #[id number]               Note: If patient does not return for scheduled/ recommended follow up visits, this note will serve as a discharge from care along with most recent update on progress.

## 2022-09-26 NOTE — PROGRESS NOTES
9/20/2022     Reason for visit:  Status post left knee posterior capsular release on 7/15/2022  Status post left knee arthroscopy with I&D and lysis of adhesions on 8/5/2022    History of Present Illness: The patient returns for postop evaluation. Overall he is doing well. He denies fever or chills. No numbness or tingling. Continues to get physical therapy. He does report improvements with his extension and flexion. Objective:  Ht 5' 9\" (1.753 m)   Wt 195 lb (88.5 kg)   BMI 28.80 kg/m²      Physical Exam:  The patient is well-appearing and in no apparent distress  Examination of the left knee   There is a small effusion, no gross deformity or skin changes  Incision sites are well-healed  Patient does still lack about 10 degrees of extension today in the office unable to flex him to about 85 to 90 degrees  5 out of 5 strength throughout distal muscle groups  Sensation is intact to light touch throughout all distributions  There is no calf swelling or tenderness  Palpable DP pulse, brisk cap refill, 2+ symmetric reflexes     Assessment:  Status post left knee posterior capsular release on 7/15/2022  Status post left knee arthroscopy with I&D and lysis of adhesions on 8/5/2022    Plan:  Overall the patient is doing very well. He is made a lot of progress with his extension as well as flexion. We did place an IVC filter prior to his surgery due to history of a DVT. He is on a anticoagulant for that. In addition the cultures from the date of the surgical intervention were positive for C acnes. He has completed antibiotics. His most recent inflammatory markers with his infectious disease doctors were normal.  At this point I would like to attempt to improve the extension prior to considering surgical intervention to improve the flexion. We will attempt approval for a dynamic brace of his lower extremity to help with extension. He will return to see me in 3 weeks.                  Jamel Aaron MD Orthopaedic Surgery Sports Medicine and [address] and [address] [facility]            Team Physician [facility] ([place]      Disclaimer: This note was dictated with voice recognition software. Though review and correction are routine, we apologize for any errors.

## 2022-09-28 ENCOUNTER — TELEPHONE (OUTPATIENT)
Dept: ORTHOPEDIC SURGERY | Age: 57
End: 2022-09-28

## 2022-09-28 ENCOUNTER — TREATMENT (OUTPATIENT)
Dept: PHYSICAL THERAPY | Age: 57
End: 2022-09-28
Payer: COMMERCIAL

## 2022-09-28 DIAGNOSIS — M24.662 FIBROSIS OF KNEE JOINT, LEFT: ICD-10-CM

## 2022-09-28 DIAGNOSIS — M24.662 ARTHROFIBROSIS OF KNEE JOINT, LEFT: Primary | ICD-10-CM

## 2022-09-28 DIAGNOSIS — R26.2 DIFFICULTY WALKING: ICD-10-CM

## 2022-09-28 DIAGNOSIS — M25.562 LEFT KNEE PAIN, UNSPECIFIED CHRONICITY: ICD-10-CM

## 2022-09-28 PROCEDURE — 97530 THERAPEUTIC ACTIVITIES: CPT | Performed by: PHYSICAL THERAPIST

## 2022-09-28 PROCEDURE — 97110 THERAPEUTIC EXERCISES: CPT | Performed by: PHYSICAL THERAPIST

## 2022-09-28 PROCEDURE — 97014 ELECTRIC STIMULATION THERAPY: CPT | Performed by: PHYSICAL THERAPIST

## 2022-09-28 PROCEDURE — 97140 MANUAL THERAPY 1/> REGIONS: CPT | Performed by: PHYSICAL THERAPIST

## 2022-09-28 NOTE — PROGRESS NOTES
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 Sofía Clay  Has the plan of care been signed (Y/N):        []  Yes  [x]  No     Date of Patient follow up with Physician: 10/10/22      Is this a Progress Report:     []  Yes  [x]  No        If Yes:  Date Range for reporting period:  Beginning 22  Ending     Progress report will be due (10 Rx or 30 days whichever is less):        Recertification will be due (POC Duration  / 90 days whichever is less): 10/27/22        Visit # Insurance Allowable Auth Required   24 BMN []  Yes [x]  No        Functional Scale: FOTO 29; LEFS 13;    Date assessed:  22 FOTO 43; LEFS 27.2     Latex Allergy:  [x]NO      []YES  Preferred Language for Healthcare:   [x]English       []other:    Pain level:  10/10 w/ stretching; 4-5/10 w/ general activities;    SUBJECTIVE:   6+ weeks post op from debridement; 9+ weeks post op from posterior capsule release  Patient has been wearing the dynasplint for knee ext for 2 hr time periods w/ steady inc in tension. Pt has been having soreness in knee but no significant stiffness or pain noted in knee. Appt w/ infection disease MD this AM and pt was released from care unless symptoms occur or if cultures at next surg present something different. OBJECTIVE:   9/28/22 pt arrived w/ L knee in flex position using 1 crutch for gt pattern;  incision in posterior joint healing w/o any signs or symptoms of infection; general edema around L knee region;    ROM PROM AROM Overpressure Comment    L 9/28/22 R L R L R    Flexion 97° on SOB  70° cold 125°      Extension 0° after stretch w/ op  -11° cold   -2°        Girth  7/29/2022 L R   Mid Patella 44 cm 39.3 cm   Suprapatellar     5cm above     15cm above         Strength L R Comment: majority deferred secondary to surgery   Quad      Hamstring      Abduction      Quad tone FAIR+ GOOD 9/12//2022       Special Test Results/Comment: majority deferred secondary to surgery   Homans  8/29/2022 Known DVT in calf       RESTRICTIONS/PRECAUTIONS: 7/18/22 posterior L knee capsule release; Limited ROM In L knee after injection in April that was limited prior to surg for flex and ext;  DVT left lower leg (still on blood thinners till about Oct)    Exercises/Interventions:   Exercise/Equipment Resistance/Repetitions Other comments   Cardio/Warm-up     Bike Treadmill          Stretching     Hamstring Seated propped 10\"x10 17.5# overpressure   Hip Flexion     ITB     Grion     Quad     Inclined Calf On floor 10\"x10    Towel Pull 30\"x5 over foam roll 17.5# overpressure        ROM     Passive    Active     ROM @EOB    Prone knee hang x10'   15# OP on ankle    Weight Hangs     Sheet Pulls     Ankle Pumps           Patellar Glides     Medial x2'    Superior x2'    Inferior x2'         STRENGTH     SLR     Supine 3x10 over foam roll 3# Concentration on distal contraction w/ cuing  Prone     Abduction     Adducton     SLR+          Isometrics     Quad sets 10\" on/off x5' full foam roll & 5' w/ TKE W/ biofeedback        CKC     Calf raises     Wall sits     Step ups Using wall and crutch for assistance   1 leg stand Squatting    CC TKE Silver 10\"x10 x5' Balance         PRE     Extension  RANGE:   Flexion  RANGE:   Leg Press  RANGE: Cable Column     Ed given on POC, expectations and goals    Gt training  X5'; over cups to focus on knee flex for clearance and heel strike w/ quad at Wolf-Gonzales w/ WBing Using crutches but focus on knee ext w/ cuing        Manual/Modalities     Manual stretching into ext x10'    Deep tissue work to HS/scar massage in prone position x5'                Therapeutic Exercise and NMR EXR  [x] (08481) Provided verbal/tactile cueing for activities related to strengthening, flexibility, endurance, ROM for improvements in LE, proximal hip, and core control with self care, mobility, lifting, ambulation.  [] (36556) Provided verbal/tactile cueing for activities related to improving balance, coordination, kinesthetic sense, posture, motor skill, proprioception  to assist with LE, proximal hip, and core control in self care, mobility, lifting, ambulation and eccentric single leg control.      NMR and Therapeutic Activities:    [x] (12389 or 10084) Provided verbal/tactile cueing for activities related to improving balance, coordination, kinesthetic sense, posture, motor skill, proprioception and motor activation to allow for proper function of core, proximal hip and LE with self care and ADLs  [] (74164) Gait Re-education- Provided training and instruction to the patient for proper LE, core and proximal hip recruitment and positioning and eccentric body weight control with ambulation re-education including up and down stairs     Home Exercise Program:    [x] (88550) Reviewed/Progressed HEP activities related to strengthening, flexibility, endurance, ROM of core, proximal hip and LE for functional self-care, mobility, lifting and ambulation/stair navigation            Written HEP est    [] (69147)Reviewed/Progressed HEP activities related to improving balance, coordination, kinesthetic sense, posture, motor skill, proprioception of core, proximal hip and LE for self care, mobility, lifting, and ambulation/stair navigation      Manual Treatments:  PROM / STM / Oscillations-Mobs:  G-I, II, III, IV (PA's, Inf., Post.)  [x] (11799) Provided manual therapy to mobilize LE, proximal hip and/or LS spine soft tissue/joints for the purpose of modulating pain, promoting relaxation,  increasing ROM, reducing/eliminating soft tissue swelling/inflammation/restriction, improving soft tissue extensibility and allowing for proper ROM for normal function with self care, mobility, lifting and ambulation. Modalities:  CP x10 min propped and 15# over knee   [] GAME READY (VASO)- for significant edema, swelling, pain control. Charges:  Timed Code Treatment Minutes: 45   Total Treatment Minutes: 75     [] EVAL (LOW) 48830 (typically 20 minutes face-to-face)  [] EVAL (MOD) 88178 (typically 30 minutes face-to-face)  [] EVAL (HIGH) 02534 (typically 45 minutes face-to-face)  [] RE-EVAL   [x] YA(57215) 1x  15'   [] IONTO  [] NMR (41330) x     [] VASO x15'  [x] Manual (42156) x 1  x15'     [] Other: GT x12'  [x] TA (54745) 1x   15'   [] Mech Traction (79791)  [] ES(attended) (23735)      [x] ES (un) (55767): EMG/Stim combo x15'    GOALS:  Patient stated goal: return to outdoor activities for walking and climbing, along with work  [] Progressing: [] Met: [] Not Met: [] Adjusted     Therapist goals for Patient:  Short Term Goals: To be achieved in: 2 weeks  1. Independent in HEP and progression per patient tolerance, in order to prevent re-injury. [] Progressing: [] Met: [] Not Met: [] Adjusted  2. Patient will have a decrease in pain to facilitate improvement in movement, function, and ADLs as indicated by Functional Deficits. [] Progressing: [] Met: [] Not Met: [] Adjusted     Long Term Goals: To be achieved in: 12+ weeks  1. Functional index score of 67 or more for FOTO to assist with reaching prior level of function. [] Progressing: [] Met: [] Not Met: [] Adjusted  2.  Patient will demonstrate increased AROM to 0-125 deg to allow for proper joint functioning as indicated by patients Functional Deficits. [] Progressing: [] Met: [] Not Met: [] Adjusted  3. Patient will demonstrate an increase in Strength to good proximal hip strength and control, within 5lb HHD in LE to allow for proper functional mobility as indicated by patients Functional Deficits. [] Progressing: [] Met: [] Not Met: [] Adjusted  4. Patient will return to 90% functional activities without increased symptoms or restriction. [] Progressing: [] Met: [] Not Met: [] Adjusted  5. Pt will be able to walk 45 min on uneven surfaces for hiking and climbing. (patient specific functional goal)    [] Progressing: [] Met: [] Not Met: [] Adjusted         Overall Progression Towards Functional goals/ Treatment Progress Update:  [] Patient is progressing as expected towards functional goals listed. [] Progression is slowed due to complexities/Impairments listed. [] Progression has been slowed due to co-morbidities. [x] Plan just implemented, too soon to assess goals progression <30days   [] Goals require adjustment due to lack of progress  [] Patient is not progressing as expected and requires additional follow up with physician  [] Other    ASSESSMENT:  Pt has more swelling in L knee on arrival and amb on flexed knee w/ 1 crutch. Pt was harder to get into full ext w/ overpressure stretching today. Pt did have more pain today w/ activities. Discussed that pt had a harder time getting into ext and pain since going down to 1 crutch. Gt pattern did improve w/ practice but concentration needed and crutch plus cane needed to support body w/ WBing onto L LE. Pt will reduce tension on dynasplint and shorten time to 1hr/time and use 2 crutches the next few days to reach knee ext easier.      Treatment/Activity Tolerance:  [x] Patient tolerated treatment well [] Patient limited by fatique  [] Patient limited by pain  [] Patient limited by other medical complications- tightness  [] Other:     Prognosis: [x] Good [x] Fair  []

## 2022-09-30 ENCOUNTER — TREATMENT (OUTPATIENT)
Dept: PHYSICAL THERAPY | Age: 57
End: 2022-09-30
Payer: COMMERCIAL

## 2022-09-30 DIAGNOSIS — M24.662 ARTHROFIBROSIS OF KNEE JOINT, LEFT: Primary | ICD-10-CM

## 2022-09-30 DIAGNOSIS — M25.562 LEFT KNEE PAIN, UNSPECIFIED CHRONICITY: ICD-10-CM

## 2022-09-30 DIAGNOSIS — R26.2 DIFFICULTY WALKING: ICD-10-CM

## 2022-09-30 PROCEDURE — 97140 MANUAL THERAPY 1/> REGIONS: CPT | Performed by: PHYSICAL THERAPIST

## 2022-09-30 PROCEDURE — 97014 ELECTRIC STIMULATION THERAPY: CPT | Performed by: PHYSICAL THERAPIST

## 2022-09-30 PROCEDURE — 97016 VASOPNEUMATIC DEVICE THERAPY: CPT | Performed by: PHYSICAL THERAPIST

## 2022-09-30 PROCEDURE — 97110 THERAPEUTIC EXERCISES: CPT | Performed by: PHYSICAL THERAPIST

## 2022-09-30 PROCEDURE — 97530 THERAPEUTIC ACTIVITIES: CPT | Performed by: PHYSICAL THERAPIST

## 2022-09-30 NOTE — PROGRESS NOTES
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 Boyer  Has the plan of care been signed (Y/N):        []  Yes  [x]  No     Date of Patient follow up with Physician: 10/10/22      Is this a Progress Report:     []  Yes  [x]  No        If Yes:  Date Range for reporting period:  Beginning 22  Ending     Progress report will be due (10 Rx or 30 days whichever is less):        Recertification will be due (POC Duration  / 90 days whichever is less): 10/27/22        Visit # Insurance Allowable Auth Required   25 BMN []  Yes [x]  No        Functional Scale: FOTO 29; LEFS 13;    Date assessed:  22 FOTO 43; LEFS 27.2     Latex Allergy:  [x]NO      []YES  Preferred Language for Healthcare:   [x]English       []other:    Pain level:  10/10 w/ stretching; 4-5/10 w/ general activities;    SUBJECTIVE:   8 weeks post op from debridement; 11 weeks post op from posterior capsule release  Patient has been using 2 crutches as directed. And he has backed off on the intensity of the splint, doing an hour at a time 3 times a day. He states after a few minutes in the splint, his knee gets used to it and he doesn't feel much discomfort at all. He is completely off anti biotics and not taking any NSAIDS either. OBJECTIVE:   9/28/22 pt arrived w/ L knee in flex position using 1 crutch for gt pattern;  incision in posterior joint healing w/o any signs or symptoms of infection; general edema around L knee region;    ROM PROM AROM Overpressure Comment    L 9/28/22 R L R L R    Flexion 97° on SOB  70° cold 125°      Extension 0° after stretch w/ op  -11° cold   -2°        Girth  7/29/2022 L R   Mid Patella 44 cm 39.3 cm   Suprapatellar     5cm above     15cm above         Strength L R Comment: majority deferred secondary to surgery   Quad      Hamstring      Abduction      Quad tone FAIR+ GOOD 9/12//2022       Special Test Results/Comment: majority deferred secondary to surgery   Homans  8/29/2022 Known DVT in calf       RESTRICTIONS/PRECAUTIONS: 7/18/22 posterior L knee capsule release; Limited ROM In L knee after injection in April that was limited prior to surg for flex and ext;  DVT left lower leg (still on blood thinners till about Oct)    Exercises/Interventions:   Exercise/Equipment Resistance/Repetitions Other comments   Cardio/Warm-up     Bike Treadmill          Stretching     Hamstring Seated propped 10\"x10 17.5# overpressure   Hip Flexion     ITB     Grion     Quad     Inclined Calf On floor 10\"x10    Towel Pull 30\"x5 over foam roll 17.5# overpressure        ROM     Passive    Active     ROM @EOB    Prone knee hang x10'   15# OP on ankle    Weight Hangs     Sheet Pulls     Ankle Pumps           Patellar Glides     Medial x2'    Superior x2'    Inferior x2'         STRENGTH     SLR     Supine 3x10 over foam roll 3# Concentration on distal contraction w/ cuing  Prone     Abduction     Adducton     SLR+          Isometrics     Quad sets 10\" on/off x5' full foam roll & 5' w/ TKE W/ biofeedback        CKC     Calf raises     Wall sits     Step ups Using wall and crutch for assistance   1 leg stand Squatting    CC TKE Silver 10\"x10 x5' Balance         PRE     Extension  RANGE:   Flexion  RANGE:   Leg Press  RANGE: Cable Column     Ed given on POC, expectations and goals    Gt training  X5'; over cups to focus on knee flex for clearance and heel strike w/ quad at Wolf-Gonzales w/ WBing Using crutches but focus on knee ext w/ cuing        Manual/Modalities     Manual stretching into ext x10'    Deep tissue work to HS/scar massage in prone position x5'                Therapeutic Exercise and NMR EXR  [x] (61701) Provided verbal/tactile cueing for activities related to strengthening, flexibility, endurance, ROM for improvements in LE, proximal hip, and core control with self care, mobility, lifting, ambulation.  [] (58524) Provided verbal/tactile cueing for activities related to improving balance, coordination, kinesthetic sense, posture, motor skill, proprioception  to assist with LE, proximal hip, and core control in self care, mobility, lifting, ambulation and eccentric single leg control.      NMR and Therapeutic Activities:    [x] (69500 or 22389) Provided verbal/tactile cueing for activities related to improving balance, coordination, kinesthetic sense, posture, motor skill, proprioception and motor activation to allow for proper function of core, proximal hip and LE with self care and ADLs  [] (65611) Gait Re-education- Provided training and instruction to the patient for proper LE, core and proximal hip recruitment and positioning and eccentric body weight control with ambulation re-education including up and down stairs     Home Exercise Program:    [x] (47337) Reviewed/Progressed HEP activities related to strengthening, flexibility, endurance, ROM of core, proximal hip and LE for functional self-care, mobility, lifting and ambulation/stair navigation            Written HEP est    [] (74187)Reviewed/Progressed HEP activities related to improving balance, coordination, kinesthetic sense, posture, motor skill, proprioception of core, proximal hip and LE for self care, mobility, lifting, and ambulation/stair navigation Manual Treatments:  PROM / STM / Oscillations-Mobs:  G-I, II, III, IV (PA's, Inf., Post.)  [x] (05759) Provided manual therapy to mobilize LE, proximal hip and/or LS spine soft tissue/joints for the purpose of modulating pain, promoting relaxation,  increasing ROM, reducing/eliminating soft tissue swelling/inflammation/restriction, improving soft tissue extensibility and allowing for proper ROM for normal function with self care, mobility, lifting and ambulation. Modalities:   [x] GAME READY (VASO)- for significant edema, swelling, pain control. Charges:  Timed Code Treatment Minutes: 45   Total Treatment Minutes: 75     [] EVAL (LOW) 51851 (typically 20 minutes face-to-face)  [] EVAL (MOD) 12060 (typically 30 minutes face-to-face)  [] EVAL (HIGH) 21365 (typically 45 minutes face-to-face)  [] RE-EVAL   [x] TK(65940) 1x  15'   [] IONTO  [] NMR (11060) x     [x] VASO x10'  [x] Manual (92245) x 1  x15'     [] Other: GT x12'  [x] TA (84270) 1x   15'   [] Mech Traction (59324)  [] ES(attended) (46581)      [x] ES (un) (56969): EMG/Stim combo x15'    GOALS:  Patient stated goal: return to outdoor activities for walking and climbing, along with work  [] Progressing: [] Met: [] Not Met: [] Adjusted     Therapist goals for Patient:  Short Term Goals: To be achieved in: 2 weeks  1. Independent in HEP and progression per patient tolerance, in order to prevent re-injury. [] Progressing: [] Met: [] Not Met: [] Adjusted  2. Patient will have a decrease in pain to facilitate improvement in movement, function, and ADLs as indicated by Functional Deficits. [] Progressing: [] Met: [] Not Met: [] Adjusted     Long Term Goals: To be achieved in: 12+ weeks  1. Functional index score of 67 or more for FOTO to assist with reaching prior level of function. [] Progressing: [] Met: [] Not Met: [] Adjusted  2.  Patient will demonstrate increased AROM to 0-125 deg to allow for proper joint functioning as indicated by patients Functional Deficits. [] Progressing: [] Met: [] Not Met: [] Adjusted  3. Patient will demonstrate an increase in Strength to good proximal hip strength and control, within 5lb HHD in LE to allow for proper functional mobility as indicated by patients Functional Deficits. [] Progressing: [] Met: [] Not Met: [] Adjusted  4. Patient will return to 90% functional activities without increased symptoms or restriction. [] Progressing: [] Met: [] Not Met: [] Adjusted  5. Pt will be able to walk 45 min on uneven surfaces for hiking and climbing. (patient specific functional goal)    [] Progressing: [] Met: [] Not Met: [] Adjusted         Overall Progression Towards Functional goals/ Treatment Progress Update:  [] Patient is progressing as expected towards functional goals listed. [] Progression is slowed due to complexities/Impairments listed. [] Progression has been slowed due to co-morbidities. [x] Plan just implemented, too soon to assess goals progression <30days   [] Goals require adjustment due to lack of progress  [] Patient is not progressing as expected and requires additional follow up with physician  [] Other    ASSESSMENT:    He continues to come in to therapy with a flexed knee gait. Takes him a bit to stretch out and get his knee straighter. He reports that he really doesn't feel much of a stretch in the posterior aspect of his knee. All discomfort is in the PF area. His quads are firing much better than a month ago, able to reach >200 on the biofeedback goal with TKEs on the table. His gait patter did improved towards the end of the session. Reviewed continued used of dynasplint over the weekend.           Treatment/Activity Tolerance:  [x] Patient tolerated treatment well [] Patient limited by fatique  [] Patient limited by pain  [] Patient limited by other medical complications- tightness  [] Other:     Prognosis: [x] Good [x] Fair  [] Poor    Patient Requires Follow-up: [x] Yes  [] No    PLAN: See eval.   Cont 3x/week for ROM restoration focus on gt training for function. [x] Continue per plan of care [] Alter current plan (see comments)  [x] Plan of care initiated [] Hold pending MD visit [] Discharge    Electronically signed by:     Alis Wilkins PT      Board Certified Orthopaedic Clinical Specialist  [facility] Certified  Physical Therapist    [facility] #[id number]  [place] [facility] #[id number]      Note: If patient does not return for scheduled/ recommended follow up visits, this note will serve as a discharge from care along with most recent update on progress.

## 2022-10-03 ENCOUNTER — TREATMENT (OUTPATIENT)
Dept: PHYSICAL THERAPY | Age: 57
End: 2022-10-03
Payer: COMMERCIAL

## 2022-10-03 DIAGNOSIS — M25.562 LEFT KNEE PAIN, UNSPECIFIED CHRONICITY: ICD-10-CM

## 2022-10-03 DIAGNOSIS — R26.2 DIFFICULTY WALKING: ICD-10-CM

## 2022-10-03 DIAGNOSIS — M24.662 ARTHROFIBROSIS OF KNEE JOINT, LEFT: Primary | ICD-10-CM

## 2022-10-03 DIAGNOSIS — M24.662 FIBROSIS OF KNEE JOINT, LEFT: ICD-10-CM

## 2022-10-03 PROCEDURE — 97530 THERAPEUTIC ACTIVITIES: CPT | Performed by: PHYSICAL THERAPIST

## 2022-10-03 PROCEDURE — 97140 MANUAL THERAPY 1/> REGIONS: CPT | Performed by: PHYSICAL THERAPIST

## 2022-10-03 PROCEDURE — 97110 THERAPEUTIC EXERCISES: CPT | Performed by: PHYSICAL THERAPIST

## 2022-10-03 PROCEDURE — 97014 ELECTRIC STIMULATION THERAPY: CPT | Performed by: PHYSICAL THERAPIST

## 2022-10-03 NOTE — PROGRESS NOTES
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 Joanna Howell  Has the plan of care been signed (Y/N):        []  Yes  [x]  No     Date of Patient follow up with Physician: 10/10/22      Is this a Progress Report:     []  Yes  [x]  No        If Yes:  Date Range for reporting period:  Beginning 22  Ending     Progress report will be due (10 Rx or 30 days whichever is less):        Recertification will be due (POC Duration  / 90 days whichever is less): 10/27/22        Visit # Insurance Allowable Auth Required   26 BMN []  Yes [x]  No        Functional Scale: FOTO 29; LEFS 13;    Date assessed:  22 FOTO 43; LEFS 27.2     Latex Allergy:  [x]NO      []YES  Preferred Language for Healthcare:   [x]English       []other:    Pain level:  10/10 w/ stretching; 4-5/10 w/ general activities;    SUBJECTIVE:   9 weeks post op from debridement; 12 weeks post op from posterior capsule release  Patient has been using 2 crutches as directed. Pt is following recommendations for wearing ext splint. Pt states that pain in knee has improved this week.        OBJECTIVE:   10/3/22 pt arrived w/ L knee in flex position using 2 crutches for gt pattern;  incision in posterior joint healing w/o any signs or symptoms of infection; general edema around L knee region;    ROM PROM AROM Overpressure Comment    L 9/28/22 R L R L R    Flexion 97° on SOB  70° cold 125°      Extension 0° after stretch w/ op  -11° cold   -2°        Girth  7/29/2022 L R   Mid Patella 44 cm 39.3 cm   Suprapatellar     5cm above     15cm above         Strength L R Comment: majority deferred secondary to surgery   Quad      Hamstring      Abduction      Quad tone FAIR+ GOOD 9/12//2022       Special Test Results/Comment: majority deferred secondary to surgery   Homans  8/29/2022 Known DVT in calf       RESTRICTIONS/PRECAUTIONS: 7/18/22 posterior L knee capsule release; Limited ROM In L knee after injection in April that was limited prior to surg for flex and ext;  DVT left lower leg (still on blood thinners till about Oct)    Exercises/Interventions:   Exercise/Equipment Resistance/Repetitions Other comments   Cardio/Warm-up     Bike Treadmill          Stretching     Hamstring Seated propped 10\"x10 17.5# overpressure   Hip Flexion     ITB     Grion     Quad     Inclined Calf On floor 10\"x10    Towel Pull 30\"x5 over foam roll 17.5# overpressure        ROM     Passive    Active     ROM @EOB    Prone knee hang x10'   15# OP on ankle    Weight Hangs     Sheet Pulls     Ankle Pumps           Patellar Glides     Medial x2'    Superior x2'    Inferior x2'         STRENGTH     SLR     Supine 3x10 over foam roll 3# Concentration on distal contraction w/ cuing  Prone     Abduction     Adducton     SLR+          Isometrics     Quad sets 10\" on/off x5' full foam roll & 5' w/ TKE W/ biofeedback        CKC     Calf raises     Wall sits     Step ups Using wall and crutch for assistance   1 leg stand Squatting    CC TKE 25# 10\"x10 Balance         PRE     Extension 0# x20 RANGE:   Flexion  RANGE:   Leg Press  RANGE:        Cable Column     Ed given on POC, expectations and goals    Gt training  X5'; over cups to focus on knee flex for clearance and heel strike w/ quad at [place] w/ WBing Using crutches but focus on knee ext w/ cuing        Manual/Modalities     Manual stretching into ext x10'    Deep tissue work to HS/scar massage in prone position x5'                Therapeutic Exercise and NMR EXR  [x] (49984) Provided verbal/tactile cueing for activities related to strengthening, flexibility, endurance, ROM for improvements in LE, proximal hip, and core control with self care, mobility, lifting, ambulation.  [] (15473) Provided verbal/tactile cueing for activities related to improving balance, coordination, kinesthetic sense, posture, motor skill, proprioception  to assist with LE, proximal hip, and core control in self care, mobility, lifting, ambulation and eccentric single leg control.      NMR and Therapeutic Activities:    [x] (70806 or 69489) Provided verbal/tactile cueing for activities related to improving balance, coordination, kinesthetic sense, posture, motor skill, proprioception and motor activation to allow for proper function of core, proximal hip and LE with self care and ADLs  [] (20571) Gait Re-education- Provided training and instruction to the patient for proper LE, core and proximal hip recruitment and positioning and eccentric body weight control with ambulation re-education including up and down stairs     Home Exercise Program:    [x] (70942) Reviewed/Progressed HEP activities related to strengthening, flexibility, endurance, ROM of core, proximal hip and LE for functional self-care, mobility, lifting and ambulation/stair navigation            Written HEP est    [] (81328)Reviewed/Progressed HEP activities related to improving balance, coordination, kinesthetic sense, posture, motor skill, proprioception of core, proximal hip and LE for self care, mobility, lifting, and ambulation/stair navigation      Manual Treatments:  PROM / STM / Oscillations-Mobs:  G-I, II, III, IV (PA's, Inf., Post.)  [x] (12646) Provided manual therapy to mobilize LE, proximal hip and/or LS spine soft tissue/joints for the purpose of modulating pain, promoting relaxation,  increasing ROM, reducing/eliminating soft tissue swelling/inflammation/restriction, improving soft tissue extensibility and allowing for proper ROM for normal function with self care, mobility, lifting and ambulation. Modalities:  CP x10 min propped and 15# over knee   [] GAME READY (VASO)- for significant edema, swelling, pain control. Charges:  Timed Code Treatment Minutes: 45   Total Treatment Minutes: 75     [] EVAL (LOW) 87176 (typically 20 minutes face-to-face)  [] EVAL (MOD) 02747 (typically 30 minutes face-to-face)  [] EVAL (HIGH) 48430 (typically 45 minutes face-to-face)  [] RE-EVAL   [x] UM(44553) 1x  15'   [] IONTO  [] NMR (82463) x     [] VASO x10'  [x] Manual (77525) x 1  x15'     [] Other: GT x12'  [x] TA (04442) 1x   15'   [] Mech Traction (00772)  [] ES(attended) (58380)      [x] ES (un) (43189): EMG/Stim combo x15'    GOALS:  Patient stated goal: return to outdoor activities for walking and climbing, along with work  [] Progressing: [] Met: [] Not Met: [] Adjusted     Therapist goals for Patient:  Short Term Goals: To be achieved in: 2 weeks  1. Independent in HEP and progression per patient tolerance, in order to prevent re-injury. [] Progressing: [] Met: [] Not Met: [] Adjusted  2. Patient will have a decrease in pain to facilitate improvement in movement, function, and ADLs as indicated by Functional Deficits. [] Progressing: [] Met: [] Not Met: [] Adjusted     Long Term Goals: To be achieved in: 12+ weeks  1. Functional index score of 67 or more for FOTO to assist with reaching prior level of function. [] Progressing: [] Met: [] Not Met: [] Adjusted  2. Patient will demonstrate increased AROM to 0-125 deg to allow for proper joint functioning as indicated by patients Functional Deficits. [] Progressing: [] Met: [] Not Met: [] Adjusted  3.  Patient will demonstrate an increase in Strength to good proximal hip strength and control, within 5lb HHD in LE to allow for proper functional mobility as indicated by patients Functional Deficits. [] Progressing: [] Met: [] Not Met: [] Adjusted  4. Patient will return to 90% functional activities without increased symptoms or restriction. [] Progressing: [] Met: [] Not Met: [] Adjusted  5. Pt will be able to walk 45 min on uneven surfaces for hiking and climbing. (patient specific functional goal)    [] Progressing: [] Met: [] Not Met: [] Adjusted         Overall Progression Towards Functional goals/ Treatment Progress Update:  [] Patient is progressing as expected towards functional goals listed. [] Progression is slowed due to complexities/Impairments listed. [] Progression has been slowed due to co-morbidities. [x] Plan just implemented, too soon to assess goals progression <30days   [] Goals require adjustment due to lack of progress  [] Patient is not progressing as expected and requires additional follow up with physician  [] Other    ASSESSMENT:  pt arrived w/ less edema in L LE but tightness in knee ext. Pt was using 2 crutches and concentrating on knee ext w/ WBIng. Patella mobility very limited and min movement noted. Pt having pain in distal patella region w/ medial > lat regions in intensity. Practiced gt pattern w/ 1 crutch and TKE when standing. Pt does have pain w/ full knee ext that may limit carry over w/ functional activities. Some thickness noted around patella from tightness and tenderness present. Treatment/Activity Tolerance:  [x] Patient tolerated treatment well [] Patient limited by fatique  [] Patient limited by pain  [] Patient limited by other medical complications- tightness  [] Other:     Prognosis: [x] Good [x] Fair  [] Poor    Patient Requires Follow-up: [x] Yes  [] No    PLAN: See eval.   Cont 3x/week for ROM restoration focus on gt training for function.    [x] Continue per plan of care [] Alter current plan (see comments)  [x] Plan of

## 2022-10-05 ENCOUNTER — TREATMENT (OUTPATIENT)
Dept: PHYSICAL THERAPY | Age: 57
End: 2022-10-05
Payer: COMMERCIAL

## 2022-10-05 DIAGNOSIS — M24.662 ARTHROFIBROSIS OF KNEE JOINT, LEFT: Primary | ICD-10-CM

## 2022-10-05 DIAGNOSIS — R26.2 DIFFICULTY WALKING: ICD-10-CM

## 2022-10-05 DIAGNOSIS — M24.662 FIBROSIS OF KNEE JOINT, LEFT: ICD-10-CM

## 2022-10-05 DIAGNOSIS — M25.562 LEFT KNEE PAIN, UNSPECIFIED CHRONICITY: ICD-10-CM

## 2022-10-05 PROCEDURE — 97014 ELECTRIC STIMULATION THERAPY: CPT | Performed by: PHYSICAL THERAPIST

## 2022-10-05 PROCEDURE — 97140 MANUAL THERAPY 1/> REGIONS: CPT | Performed by: PHYSICAL THERAPIST

## 2022-10-05 PROCEDURE — 97110 THERAPEUTIC EXERCISES: CPT | Performed by: PHYSICAL THERAPIST

## 2022-10-05 PROCEDURE — 97530 THERAPEUTIC ACTIVITIES: CPT | Performed by: PHYSICAL THERAPIST

## 2022-10-05 NOTE — PROGRESS NOTES
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 Daljit Wiggins  Has the plan of care been signed (Y/N):        []  Yes  [x]  No     Date of Patient follow up with Physician: 10/10/22      Is this a Progress Report:     []  Yes  [x]  No        If Yes:  Date Range for reporting period:  Beginning 22  Ending     Progress report will be due (10 Rx or 30 days whichever is less): 15/44/75       Recertification will be due (POC Duration  / 90 days whichever is less): 10/27/22        Visit # Insurance Allowable Auth Required   27 BMN []  Yes [x]  No        Functional Scale: FOTO 29; LEFS 13;    Date assessed:  22 FOTO 43; LEFS 27.2     Latex Allergy:  [x]NO      []YES  Preferred Language for Healthcare:   [x]English       []other:    Pain level:  10/10 w/ stretching; 3-4/10 w/ general activities;    SUBJECTIVE:   9 weeks post op from debridement; 12 weeks post op from posterior capsule release  Patient has less stiff from wearing splint at home. He does have stiffness in AM on rising but loosens up with activity. Pt does feel that he is able to get knee straight quicker w/ stretching.     OBJECTIVE:   10/3/22 pt arrived w/ L knee in flex position using 2 crutches for gt pattern;  incision in posterior joint healing w/o any signs or symptoms of infection; general edema around L knee region;    ROM PROM AROM Overpressure Comment    L 9/28/22 R L R L R    Flexion 97° on SOB  70° cold 125°      Extension 0° after stretch w/ op  -11° cold   -2°        Girth  7/29/2022 L R   Mid Patella 44 cm 39.3 cm   Suprapatellar     5cm above     15cm above         Strength L R Comment: majority deferred secondary to surgery   Quad      Hamstring      Abduction      Quad tone FAIR+ GOOD 9/12//2022       Special Test Results/Comment: majority deferred secondary to surgery   Homans  8/29/2022 Known DVT in calf       RESTRICTIONS/PRECAUTIONS: 7/18/22 posterior L knee capsule release; Limited ROM In L knee after injection in April that was limited prior to surg for flex and ext;  DVT left lower leg (still on blood thinners till about Oct)    Exercises/Interventions:   Exercise/Equipment Resistance/Repetitions Other comments   Cardio/Warm-up     Bike Treadmill          Stretching     Hamstring Seated propped 10\"x10 17.5# overpressure   Hip Flexion     ITB     Grion     Quad     Inclined Calf On floor 10\"x10    Towel Pull 30\"x5 over foam roll 17.5# overpressure        ROM     Passive    Active     ROM @EOB    Prone knee hang x10'   15# OP on ankle    Weight Hangs     Sheet Pulls     Ankle Pumps           Patellar Glides     Medial x2'    Superior x2'    Inferior x2'         STRENGTH     SLR     Supine 3x10 over foam roll 3# Concentration on distal contraction w/ cuing  Prone     Abduction     Adducton     SLR+          Isometrics     Quad sets 10\" on/off x5' full foam roll & 5' w/ TKE W/ biofeedback        CKC     Calf raises     Wall sits     Step ups Using wall and crutch for assistance   1 leg stand Squatting    CC TKE 25# 10\"x10 Balance         PRE     Extension 0# x20 RANGE: pain free   Flexion  RANGE:   Leg Press  RANGE:        Cable Column     Ed given on POC, expectations and goals    Gt training  X5'; over cups to focus on knee flex for clearance and heel strike w/ quad at [facility] w/ WBing Using crutches but focus on knee ext w/ cuing        Manual/Modalities     Manual stretching into ext x10'    Deep tissue work to HS/scar massage in prone position x5'                Therapeutic Exercise and NMR EXR  [x] (07105) Provided verbal/tactile cueing for activities related to strengthening, flexibility, endurance, ROM for improvements in LE, proximal hip, and core control with self care, mobility, lifting, ambulation.  [] (74860) Provided verbal/tactile cueing for activities related to improving balance, coordination, kinesthetic sense, posture, motor skill, proprioception  to assist with LE, proximal hip, and core control in self care, mobility, lifting, ambulation and eccentric single leg control.      NMR and Therapeutic Activities:    [x] (68699 or 30115) Provided verbal/tactile cueing for activities related to improving balance, coordination, kinesthetic sense, posture, motor skill, proprioception and motor activation to allow for proper function of core, proximal hip and LE with self care and ADLs  [] (55633) Gait Re-education- Provided training and instruction to the patient for proper LE, core and proximal hip recruitment and positioning and eccentric body weight control with ambulation re-education including up and down stairs     Home Exercise Program:    [x] (99321) Reviewed/Progressed HEP activities related to strengthening, flexibility, endurance, ROM of core, proximal hip and LE for functional self-care, mobility, lifting and ambulation/stair navigation            Written HEP est    [] (00626)Reviewed/Progressed HEP activities related to improving balance, coordination, kinesthetic sense, posture, motor skill, proprioception of core, proximal hip and LE for self care, mobility, lifting, and ambulation/stair navigation      Manual Treatments:  PROM / STM / Oscillations-Mobs:  G-I, II, III, IV (PA's, Inf., Post.)  [x] (27801) Provided manual therapy to mobilize LE, proximal hip and/or LS spine soft tissue/joints for the purpose of modulating pain, promoting relaxation,  increasing ROM, reducing/eliminating soft tissue swelling/inflammation/restriction, improving soft tissue extensibility and allowing for proper ROM for normal function with self care, mobility, lifting and ambulation. Modalities:  CP x10 min propped and 15# over knee   [] GAME READY (VASO)- for significant edema, swelling, pain control. Charges:  Timed Code Treatment Minutes: 45   Total Treatment Minutes: 75     [] EVAL (LOW) 66037 (typically 20 minutes face-to-face)  [] EVAL (MOD) 37232 (typically 30 minutes face-to-face)  [] EVAL (HIGH) 07018 (typically 45 minutes face-to-face)  [] RE-EVAL   [x] PA(53322) 1x  15'   [] IONTO  [] NMR (27004) x     [] VASO x10'  [x] Manual (02369) x 1  x15'     [] Other: GT x12'  [x] TA (05367) 1x   15'   [] Mech Traction (35933)  [] ES(attended) (21175)      [x] ES (un) (34706): EMG/Stim combo x15'    GOALS:  Patient stated goal: return to outdoor activities for walking and climbing, along with work  [] Progressing: [] Met: [] Not Met: [] Adjusted     Therapist goals for Patient:  Short Term Goals: To be achieved in: 2 weeks  1. Independent in HEP and progression per patient tolerance, in order to prevent re-injury. [] Progressing: [] Met: [] Not Met: [] Adjusted  2. Patient will have a decrease in pain to facilitate improvement in movement, function, and ADLs as indicated by Functional Deficits. [] Progressing: [] Met: [] Not Met: [] Adjusted     Long Term Goals: To be achieved in: 12+ weeks  1. Functional index score of 67 or more for FOTO to assist with reaching prior level of function. [] Progressing: [] Met: [] Not Met: [] Adjusted  2. Patient will demonstrate increased AROM to 0-125 deg to allow for proper joint functioning as indicated by patients Functional Deficits. [] Progressing: [] Met: [] Not Met: [] Adjusted  3.  Patient will demonstrate an increase in Strength to good proximal hip strength and control, within 5lb HHD in LE to allow for proper functional mobility as indicated by patients Functional Deficits. [] Progressing: [] Met: [] Not Met: [] Adjusted  4. Patient will return to 90% functional activities without increased symptoms or restriction. [] Progressing: [] Met: [] Not Met: [] Adjusted  5. Pt will be able to walk 45 min on uneven surfaces for hiking and climbing. (patient specific functional goal)    [] Progressing: [] Met: [] Not Met: [] Adjusted         Overall Progression Towards Functional goals/ Treatment Progress Update:  [] Patient is progressing as expected towards functional goals listed. [] Progression is slowed due to complexities/Impairments listed. [] Progression has been slowed due to co-morbidities. [x] Plan just implemented, too soon to assess goals progression <30days   [] Goals require adjustment due to lack of progress  [] Patient is not progressing as expected and requires additional follow up with physician  [] Other    ASSESSMENT: Less edema and pain in L knee on arrival and during session. Gt pattern takes mod concentration to perform correctly and 1 crutch. Pt arrived w/ less knee flex and improved ext when WBing. Pt does improve to full ext w/ stretching and overpressure. Treatment/Activity Tolerance:  [x] Patient tolerated treatment well [] Patient limited by fatique  [] Patient limited by pain  [] Patient limited by other medical complications- tightness  [] Other:     Prognosis: [x] Good [x] Fair  [] Poor    Patient Requires Follow-up: [x] Yes  [] No    PLAN: Cont 3x/week for ROM restoration focus on gt training for function. RTD next week. PT will reach out to MD/ MD staff to update him prior to appt.    [x] Continue per plan of care [] Alter current plan (see comments)  [x] Plan of care initiated [] Hold pending MD visit [] Discharge    Electronically signed by: Priscilla Peacock, PT, MS, OMT-C    Physical Therapist [place] license #[id number]  Physical Therapist [place] license #[id number]           Note: If patient does not return for scheduled/ recommended follow up visits, this note will serve as a discharge from care along with most recent update on progress.

## 2022-10-07 ENCOUNTER — TREATMENT (OUTPATIENT)
Dept: PHYSICAL THERAPY | Age: 57
End: 2022-10-07
Payer: COMMERCIAL

## 2022-10-07 DIAGNOSIS — M25.562 LEFT KNEE PAIN, UNSPECIFIED CHRONICITY: ICD-10-CM

## 2022-10-07 DIAGNOSIS — R26.2 DIFFICULTY WALKING: ICD-10-CM

## 2022-10-07 DIAGNOSIS — M24.662 ARTHROFIBROSIS OF KNEE JOINT, LEFT: Primary | ICD-10-CM

## 2022-10-07 PROCEDURE — 97014 ELECTRIC STIMULATION THERAPY: CPT | Performed by: PHYSICAL THERAPIST

## 2022-10-07 PROCEDURE — 97530 THERAPEUTIC ACTIVITIES: CPT | Performed by: PHYSICAL THERAPIST

## 2022-10-07 PROCEDURE — 97016 VASOPNEUMATIC DEVICE THERAPY: CPT | Performed by: PHYSICAL THERAPIST

## 2022-10-07 PROCEDURE — 97110 THERAPEUTIC EXERCISES: CPT | Performed by: PHYSICAL THERAPIST

## 2022-10-07 PROCEDURE — 97140 MANUAL THERAPY 1/> REGIONS: CPT | Performed by: PHYSICAL THERAPIST

## 2022-10-07 NOTE — PROGRESS NOTES
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 Rowdy Boone  Has the plan of care been signed (Y/N):        []  Yes  [x]  No     Date of Patient follow up with Physician: 10/10/22      Is this a Progress Report:     []  Yes  [x]  No        If Yes:  Date Range for reporting period:  Beginning 22  Ending     Progress report will be due (10 Rx or 30 days whichever is less):        Recertification will be due (POC Duration  / 90 days whichever is less): 10/27/22        Visit # Insurance Allowable Auth Required   28 BMN []  Yes [x]  No        Functional Scale: FOTO 29; LEFS 13;    Date assessed:  22 FOTO 43; LEFS 27.2     Latex Allergy:  [x]NO      []YES  Preferred Language for Healthcare:   [x]English       []other:    Pain level:  10/10 w/ stretching; 3-4/10 w/ general activities;    SUBJECTIVE:   9 weeks post op from debridement; 12 weeks post op from posterior capsule release  Patient reports he has continued to use splint at home. 3 times a day every day for 1-2 hours at a time. He has the tension set at 9-10. He states he feels like he can straighten his knee a little better. Notices that he can lay pretty straight in bed. In the morning, he still feels a bit stiffer.    Has been walking with one crutch, working on his gait pattern. In general, he states he feels better than a couple weeks ago.             OBJECTIVE:   10/3/22 pt arrived w/ L knee in flex position using 2 crutches for gt pattern;  incision in posterior joint healing w/o any signs or symptoms of infection; general edema around L knee region;    ROM PROM AROM Overpressure Comment    L 10/7/22 R L R L R    Flexion 97° on SOB  70° cold 125°      Extension 0° after stretch w/ op  -11° cold   -2°        Girth  7/29/2022 L R   Mid Patella 44 cm 39.3 cm   Suprapatellar     5cm above     15cm above         Strength L R Comment: majority deferred secondary to surgery   Quad      Hamstring      Abduction      Quad tone FAIR+ GOOD 9/12//2022       Special Test Results/Comment: majority deferred secondary to surgery   Homans  8/29/2022 Known DVT in calf       RESTRICTIONS/PRECAUTIONS: 7/18/22 posterior L knee capsule release; Limited ROM In L knee after injection in April that was limited prior to surg for flex and ext;  DVT left lower leg (still on blood thinners till about Oct)    Exercises/Interventions:   Exercise/Equipment Resistance/Repetitions Other comments   Cardio/Warm-up     Bike Treadmill          Stretching     Hamstring Seated propped 10\"x10 17.5# overpressure   Hip Flexion     ITB     Grion     Quad     Inclined Calf On floor 10\"x10    Towel Pull 30\"x5 over foam roll 17.5# overpressure        ROM     Passive    Active     ROM @EOB    Prone knee hang x10'   15# OP on ankle    Weight Hangs     Sheet Pulls     Ankle Pumps           Patellar Glides     Medial x2'    Superior x2'    Inferior x2'         STRENGTH     SLR     Supine 3x10 over foam roll 3# Concentration on distal contraction w/ cuing  Prone     Abduction     Adducton     SLR+          Isometrics     Quad sets 10\" on/off x5' full foam roll & 5' w/ TKE (standing TKE) W/ biofeedback        CKC     Calf raises     Wall sits     Step ups L1 lateral step up x10  (With stim/EMG combo) Using wall and crutch for assistance   1 leg stand Squatting    CC TKE 25# 10\"x10 Balance         PRE     Extension 0# x20 RANGE: pain free   Flexion  RANGE:   Leg Press  RANGE:        Cable Column     Ed given on POC, expectations and goals    Gt training  X5'; over cups to focus on knee flex for clearance and heel strike w/ quad at Wolf-Gonzales w/ WBing Using crutches but focus on knee ext w/ cuing        Manual/Modalities     Manual stretching into ext x10'    Deep tissue work to HS/scar massage in prone position x5'                Therapeutic Exercise and NMR EXR  [x] (72493) Provided verbal/tactile cueing for activities related to strengthening, flexibility, endurance, ROM for improvements in LE, proximal hip, and core control with self care, mobility, lifting, ambulation.  [] (21307) Provided verbal/tactile cueing for activities related to improving balance, coordination, kinesthetic sense, posture, motor skill, proprioception  to assist with LE, proximal hip, and core control in self care, mobility, lifting, ambulation and eccentric single leg control.      NMR and Therapeutic Activities:    [x] (98470 or 88215) Provided verbal/tactile cueing for activities related to improving balance, coordination, kinesthetic sense, posture, motor skill, proprioception and motor activation to allow for proper function of core, proximal hip and LE with self care and ADLs  [] (44864) Gait Re-education- Provided training and instruction to the patient for proper LE, core and proximal hip recruitment and positioning and eccentric body weight control with ambulation re-education including up and down stairs     Home Exercise Program:    [x] (16029) Reviewed/Progressed HEP activities related to strengthening, flexibility, endurance, ROM of core, proximal hip and LE for functional self-care, mobility, lifting and ambulation/stair navigation            Written HEP est    [] (69357)Reviewed/Progressed HEP activities related to improving balance, coordination, kinesthetic sense, posture, motor skill, proprioception of core, proximal hip and LE for self care, mobility, lifting, and ambulation/stair navigation      Manual Treatments:  PROM / STM / Oscillations-Mobs:  G-I, II, III, IV (PA's, Inf., Post.)  [x] (57718) Provided manual therapy to mobilize LE, proximal hip and/or LS spine soft tissue/joints for the purpose of modulating pain, promoting relaxation,  increasing ROM, reducing/eliminating soft tissue swelling/inflammation/restriction, improving soft tissue extensibility and allowing for proper ROM for normal function with self care, mobility, lifting and ambulation. Modalities:   15# over knee   [x] GAME READY (VASO)- for significant edema, swelling, pain control. Charges:  Timed Code Treatment Minutes: 45   Total Treatment Minutes: 80     [] EVAL (LOW) 95602 (typically 20 minutes face-to-face)  [] EVAL (MOD) 56840 (typically 30 minutes face-to-face)  [] EVAL (HIGH) 88342 (typically 45 minutes face-to-face)  [] RE-EVAL   [x] HA(83222) 1x  15'   [] IONTO  [] NMR (16899) x     [x] VASO x15'  [x] Manual (02780) x 1  x15'     [] Other: GT x12'  [x] TA (86294) 1x   15'   [] Mech Traction (19776)  [] ES(attended) (04012)      [x] ES (un) (89583): EMG/Stim combo x15'    GOALS:  Patient stated goal: return to outdoor activities for walking and climbing, along with work  [] Progressing: [] Met: [] Not Met: [] Adjusted     Therapist goals for Patient:  Short Term Goals: To be achieved in: 2 weeks  1. Independent in HEP and progression per patient tolerance, in order to prevent re-injury. [] Progressing: [] Met: [] Not Met: [] Adjusted  2. Patient will have a decrease in pain to facilitate improvement in movement, function, and ADLs as indicated by Functional Deficits. [] Progressing: [] Met: [] Not Met: [] Adjusted     Long Term Goals: To be achieved in: 12+ weeks  1.  Functional index score of 67 or more for FOTO to assist with reaching prior level of function. [] Progressing: [] Met: [] Not Met: [] Adjusted  2. Patient will demonstrate increased AROM to 0-125 deg to allow for proper joint functioning as indicated by patients Functional Deficits. [] Progressing: [] Met: [] Not Met: [] Adjusted  3. Patient will demonstrate an increase in Strength to good proximal hip strength and control, within 5lb HHD in LE to allow for proper functional mobility as indicated by patients Functional Deficits. [] Progressing: [] Met: [] Not Met: [] Adjusted  4. Patient will return to 90% functional activities without increased symptoms or restriction. [] Progressing: [] Met: [] Not Met: [] Adjusted  5. Pt will be able to walk 45 min on uneven surfaces for hiking and climbing. (patient specific functional goal)    [] Progressing: [] Met: [] Not Met: [] Adjusted         Overall Progression Towards Functional goals/ Treatment Progress Update:  [] Patient is progressing as expected towards functional goals listed. [] Progression is slowed due to complexities/Impairments listed. [] Progression has been slowed due to co-morbidities. [x] Plan just implemented, too soon to assess goals progression <30days   [] Goals require adjustment due to lack of progress  [] Patient is not progressing as expected and requires additional follow up with physician  [] Other    ASSESSMENT:   Main complaint of pain is around his patella with any stretching. His quads are firing better in open and closed chain positions. We did a low level step up today. Feels pain in PF joint with this. He usually goes up stairs with the non involved LE first.     Most thickened tissue is noted medial posterior aspect of knee during STM.           Treatment/Activity Tolerance:  [x] Patient tolerated treatment well [] Patient limited by fatique  [] Patient limited by pain  [] Patient limited by other medical complications- tightness  [] Other:     Prognosis: [x] Good [x] Fair  [] Poor    Patient Requires Follow-up: [x] Yes  [] No    PLAN: Cont 3x/week for ROM restoration focus on gt training for function. RTD next week. PT will reach out to MD/ MD staff to update him prior to appt. Follow up after MD visit on Monday  [x] Continue per plan of care [] Alter current plan (see comments)  [x] Plan of care initiated [] Hold pending MD visit [] Discharge    Electronically signed by:     Ronni Mendez PT      Board Certified Orthopaedic Clinical Specialist  [facility] Certified  Physical Therapist    [facility] PT #[id number]  [place] PT #[id number]        Note: If patient does not return for scheduled/ recommended follow up visits, this note will serve as a discharge from care along with most recent update on progress.

## 2022-10-10 ENCOUNTER — TREATMENT (OUTPATIENT)
Dept: PHYSICAL THERAPY | Age: 57
End: 2022-10-10

## 2022-10-10 NOTE — PROGRESS NOTES
[facility]   Phone: [phone]    Fax: [phone]      Physical Therapy  Cancellation/No-show Note  Patient Name:  Norma Domingo  :  1965   Date:  10/10/2022    Cancelled visits to date: 2  No-shows to date: 0    For today's appointment patient:  [x]  Cancelled  []  Rescheduled appointment  []  No-show     Reason given by patient:  [x]  Patient ill-  he called in and stated he couldn't make it in due to not feeling well.  []  Conflicting appointment  []  No transportation    []  Conflict with work  []  No reason given  []  Other:     Comments:  Pt has another appt scheduled    Phone call information:   []  Phone call made today to patient at _ time at number provided:      []  Patient answered, conversation as follows:    []  Patient did not answer, message left as follows:  []  Phone call not made today  [x]  Phone call not needed - pt contacted us to cancel and provided reason for cancellation.      Electronically signed by:  Christofer Cantrell PT, Tiffany Knox 85, OMT-C    Physical Therapist [address] [place] [address] license #[id number]  Physical Therapist [place] license #[id number]
no

## 2022-10-12 ENCOUNTER — TREATMENT (OUTPATIENT)
Dept: PHYSICAL THERAPY | Age: 57
End: 2022-10-12
Payer: COMMERCIAL

## 2022-10-12 DIAGNOSIS — M24.662 FIBROSIS OF KNEE JOINT, LEFT: ICD-10-CM

## 2022-10-12 DIAGNOSIS — M24.662 ARTHROFIBROSIS OF KNEE JOINT, LEFT: Primary | ICD-10-CM

## 2022-10-12 DIAGNOSIS — R26.2 DIFFICULTY WALKING: ICD-10-CM

## 2022-10-12 DIAGNOSIS — M25.562 LEFT KNEE PAIN, UNSPECIFIED CHRONICITY: ICD-10-CM

## 2022-10-12 PROCEDURE — 97530 THERAPEUTIC ACTIVITIES: CPT | Performed by: PHYSICAL THERAPIST

## 2022-10-12 PROCEDURE — 97014 ELECTRIC STIMULATION THERAPY: CPT | Performed by: PHYSICAL THERAPIST

## 2022-10-12 PROCEDURE — 97140 MANUAL THERAPY 1/> REGIONS: CPT | Performed by: PHYSICAL THERAPIST

## 2022-10-12 PROCEDURE — 97110 THERAPEUTIC EXERCISES: CPT | Performed by: PHYSICAL THERAPIST

## 2022-10-12 NOTE — PROGRESS NOTES
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 Jono Marroquin  Has the plan of care been signed (Y/N):        []  Yes  [x]  No     Date of Patient follow up with Physician: 10/10/22      Is this a Progress Report:     []  Yes  [x]  No        If Yes:  Date Range for reporting period:  Beginning 22  Ending     Progress report will be due (10 Rx or 30 days whichever is less): 87       Recertification will be due (POC Duration  / 90 days whichever is less): 10/27/22        Visit # Insurance Allowable Auth Required   29 BMN []  Yes [x]  No        Functional Scale: FOTO 29; LEFS 13;    Date assessed:  22 FOTO 43; LEFS 27.2     Latex Allergy:  [x]NO      []YES  Preferred Language for Healthcare:   [x]English       []other:    Pain level:  10/10 w/ stretching; 3-4/10 w/ general activities;    SUBJECTIVE:   10 weeks post op from debridement; 13 weeks post op from posterior capsule release  Pt feeling better from illness. He has been stretching some while sick. Pt states that knee is feeling good and not having a lot of swelling or pain. He did arrive w/ crutch today. Wearing splint daily 3x for an hour ea time.  Pt missed his appt on Monday w/ MD due to illness but appt has been rescheduled for this Monday, Oct 17th.    PT did reach out to MD and MA concerning pt through email and MD responded that he had received contact.     OBJECTIVE:   10/3/22 pt arrived w/ L knee in flex position using 2 crutches for gt pattern;  incision in posterior joint healing w/o any signs or symptoms of infection; general edema around L knee region;    ROM PROM AROM Overpressure Comment    L 10/12/22 R L R L R    Flexion 97° on SOB  70° cold 125°      Extension 0° after stretch w/ op  -11° cold   -2°        Girth  7/29/2022 L R   Mid Patella 44 cm 39.3 cm   Suprapatellar     5cm above     15cm above         Strength L R Comment: majority deferred secondary to surgery   Quad      Hamstring      Abduction      Quad tone FAIR+ GOOD 9/12//2022       Special Test Results/Comment: majority deferred secondary to surgery   Homans  8/29/2022 Known DVT in calf       RESTRICTIONS/PRECAUTIONS: 7/18/22 posterior L knee capsule release; Limited ROM In L knee after injection in April that was limited prior to surg for flex and ext;  DVT left lower leg (still on blood thinners till about Oct)    Exercises/Interventions:   Exercise/Equipment Resistance/Repetitions Other comments   Cardio/Warm-up     Bike Treadmill          Stretching     Hamstring Seated propped 10\"x10 17.5# overpressure   Hip Flexion     ITB     Grion     Quad     Inclined Calf On floor 10\"x10    Towel Pull 30\"x5 over foam roll 17.5# overpressure        ROM     Passive    Active     ROM @EOB    Prone knee hang x10'   15# OP on ankle    Weight Hangs     Sheet Pulls     Ankle Pumps           Patellar Glides     Medial x2'    Superior x2'    Inferior x2'         STRENGTH     SLR     Supine 3x10 over foam roll 3# Concentration on distal contraction w/ cuing  Prone     Abduction     Adducton     SLR+          Isometrics     Quad sets 10\" on/off x5' full foam roll & 5' w/ TKE (standing TKE) W/ biofeedback        CKC     Calf raises     Wall sits     Step ups Using wall and crutch for assistance   1 leg stand Squatting    CC TKE 35# 10\"x10 Balance         PRE     Extension 0# x20 RANGE: pain free   Flexion  RANGE:   Leg Press  RANGE:        Cable Column     Ed given on POC, expectations and goals    Gt training  X5'; over cups to focus on knee flex for clearance and heel strike w/ quad at Wolf-[place] w/ WBing Using crutches but focus on knee ext w/ cuing        Manual/Modalities     Manual stretching into ext for hamstring and calf and hip muscles x10'    Deep tissue work to HS/scar massage in prone position x5'                Therapeutic Exercise and NMR EXR  [x] (92550) Provided verbal/tactile cueing for activities related to strengthening, flexibility, endurance, ROM for improvements in LE, proximal hip, and core control with self care, mobility, lifting, ambulation.  [] (60998) Provided verbal/tactile cueing for activities related to improving balance, coordination, kinesthetic sense, posture, motor skill, proprioception  to assist with LE, proximal hip, and core control in self care, mobility, lifting, ambulation and eccentric single leg control.      NMR and Therapeutic Activities:    [x] (08970 or 55243) Provided verbal/tactile cueing for activities related to improving balance, coordination, kinesthetic sense, posture, motor skill, proprioception and motor activation to allow for proper function of core, proximal hip and LE with self care and ADLs  [] (46718) Gait Re-education- Provided training and instruction to the patient for proper LE, core and proximal hip recruitment and positioning and eccentric body weight control with ambulation re-education including up and down stairs     Home Exercise Program:    [x] (43053) Reviewed/Progressed HEP activities related to strengthening, flexibility, endurance, ROM of core, proximal hip and LE for functional self-care, mobility, lifting and ambulation/stair navigation            Written HEP est    [] (52108)Reviewed/Progressed HEP activities related to improving balance, coordination, kinesthetic sense, posture, motor skill, proprioception of core, proximal hip and LE for self care, mobility, lifting, and ambulation/stair navigation      Manual Treatments:  PROM / STM / Oscillations-Mobs:  G-I, II, III, IV (PA's, Inf., Post.)  [x] (23653) Provided manual therapy to mobilize LE, proximal hip and/or LS spine soft tissue/joints for the purpose of modulating pain, promoting relaxation,  increasing ROM, reducing/eliminating soft tissue swelling/inflammation/restriction, improving soft tissue extensibility and allowing for proper ROM for normal function with self care, mobility, lifting and ambulation. Modalities:  CP x10 min propped and 15# over knee   [] GAME READY (VASO)- for significant edema, swelling, pain control. Charges:  Timed Code Treatment Minutes: 45   Total Treatment Minutes: 80     [] EVAL (LOW) 54535 (typically 20 minutes face-to-face)  [] EVAL (MOD) 68844 (typically 30 minutes face-to-face)  [] EVAL (HIGH) 35653 (typically 45 minutes face-to-face)  [] RE-EVAL   [x] ZT(62743) 1x  15'   [] IONTO  [] NMR (71325) x     [] VASO x15'  [x] Manual (51774) x 1  x15'     [] Other: GT x12'  [x] TA (28012) 1x   15'   [] Mech Traction (21579)  [] ES(attended) (60218)      [x] ES (un) (61073): EMG/Stim combo x15'    GOALS:  Patient stated goal: return to outdoor activities for walking and climbing, along with work  [] Progressing: [] Met: [] Not Met: [] Adjusted     Therapist goals for Patient:  Short Term Goals: To be achieved in: 2 weeks  1. Independent in HEP and progression per patient tolerance, in order to prevent re-injury. [] Progressing: [] Met: [] Not Met: [] Adjusted  2. Patient will have a decrease in pain to facilitate improvement in movement, function, and ADLs as indicated by Functional Deficits. [] Progressing: [] Met: [] Not Met: [] Adjusted     Long Term Goals: To be achieved in: 12+ weeks  1.  Functional index score of 67 or more for FOTO to assist with reaching prior level of function. [] Progressing: [] Met: [] Not Met: [] Adjusted  2. Patient will demonstrate increased AROM to 0-125 deg to allow for proper joint functioning as indicated by patients Functional Deficits. [] Progressing: [] Met: [] Not Met: [] Adjusted  3. Patient will demonstrate an increase in Strength to good proximal hip strength and control, within 5lb HHD in LE to allow for proper functional mobility as indicated by patients Functional Deficits. [] Progressing: [] Met: [] Not Met: [] Adjusted  4. Patient will return to 90% functional activities without increased symptoms or restriction. [] Progressing: [] Met: [] Not Met: [] Adjusted  5. Pt will be able to walk 45 min on uneven surfaces for hiking and climbing. (patient specific functional goal)    [] Progressing: [] Met: [] Not Met: [] Adjusted         Overall Progression Towards Functional goals/ Treatment Progress Update:  [] Patient is progressing as expected towards functional goals listed. [] Progression is slowed due to complexities/Impairments listed. [] Progression has been slowed due to co-morbidities. [x] Plan just implemented, too soon to assess goals progression <30days   [] Goals require adjustment due to lack of progress  [] Patient is not progressing as expected and requires additional follow up with physician  [] Other    ASSESSMENT:   Pt was tight in knee on arrival but did loosen up with stretching and able to reach full knee ext easily w/ min overpressure. Pt has pain w/ WBing activities and believe this is part of the problem w/ pt maintaining correct gt pattern outside of clinic. Have not seen significant change in motion since using the splint at home.        Treatment/Activity Tolerance:  [x] Patient tolerated treatment well [] Patient limited by fatique  [] Patient limited by pain  [] Patient limited by other medical complications- tightness  [] Other:     Prognosis: [x] Good [x] Fair  [] Poor    Patient Requires Follow-up: [x] Yes  [] No    PLAN: Cont 3x/week for ROM restoration focus on gt training for function. RTD next week. [x] Continue per plan of care [] Alter current plan (see comments)  [x] Plan of care initiated [] Hold pending MD visit [] Discharge    Electronically signed by: Aki Romero, PT, MS, OMT-C    Physical Therapist [place] license #[id number]  Physical Therapist [place] license #[id number]           Note: If patient does not return for scheduled/ recommended follow up visits, this note will serve as a discharge from care along with most recent update on progress.

## 2022-10-14 ENCOUNTER — TREATMENT (OUTPATIENT)
Dept: PHYSICAL THERAPY | Age: 57
End: 2022-10-14
Payer: COMMERCIAL

## 2022-10-14 DIAGNOSIS — R26.2 DIFFICULTY WALKING: ICD-10-CM

## 2022-10-14 DIAGNOSIS — M25.562 LEFT KNEE PAIN, UNSPECIFIED CHRONICITY: ICD-10-CM

## 2022-10-14 DIAGNOSIS — M24.662 ARTHROFIBROSIS OF KNEE JOINT, LEFT: Primary | ICD-10-CM

## 2022-10-14 PROCEDURE — 97530 THERAPEUTIC ACTIVITIES: CPT | Performed by: PHYSICAL THERAPIST

## 2022-10-14 PROCEDURE — 97110 THERAPEUTIC EXERCISES: CPT | Performed by: PHYSICAL THERAPIST

## 2022-10-14 PROCEDURE — 97014 ELECTRIC STIMULATION THERAPY: CPT | Performed by: PHYSICAL THERAPIST

## 2022-10-14 PROCEDURE — 97140 MANUAL THERAPY 1/> REGIONS: CPT | Performed by: PHYSICAL THERAPIST

## 2022-10-14 PROCEDURE — 97016 VASOPNEUMATIC DEVICE THERAPY: CPT | Performed by: PHYSICAL THERAPIST

## 2022-10-14 NOTE — PROGRESS NOTES
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 Vahe Friedman  Has the plan of care been signed (Y/N):        []  Yes  [x]  No     Date of Patient follow up with Physician: 10/10/22      Is this a Progress Report:     []  Yes  [x]  No        If Yes:  Date Range for reporting period:  Beginning 22  Ending     Progress report will be due (10 Rx or 30 days whichever is less):        Recertification will be due (POC Duration  / 90 days whichever is less): 10/27/22        Visit # Insurance Allowable Auth Required   30 BMN []  Yes [x]  No        Functional Scale: FOTO 29; LEFS 13;    Date assessed:  22 FOTO 43; LEFS 27.2     Latex Allergy:  [x]NO      []YES  Preferred Language for Healthcare:   [x]English       []other:    Pain level:  10/10 w/ stretching; 3-4/10 w/ general activities;    SUBJECTIVE:   10 weeks post op from debridement; 13 weeks post op from posterior capsule release  No new complaints this week. He states he has continued with the splint schedule at home.    And walking with one crutch is not irritating his knee anymore than usual.           OBJECTIVE:   10/3/22 pt arrived w/ L knee in flex position using 2 crutches for gt pattern;  incision in posterior joint healing w/o any signs or symptoms of infection; general edema around L knee region;    ROM PROM AROM Overpressure Comment    L 10/12/22 R L R L R    Flexion 97° on SOB  70° cold 125°      Extension 0° after stretch w/ op  -11° cold   -2°        Girth  7/29/2022 L R   Mid Patella 44 cm 39.3 cm   Suprapatellar     5cm above     15cm above         Strength L R Comment: majority deferred secondary to surgery   Quad      Hamstring      Abduction      Quad tone FAIR+ GOOD 9/12//2022       Special Test Results/Comment: majority deferred secondary to surgery   Homans  8/29/2022 Known DVT in calf       RESTRICTIONS/PRECAUTIONS: 7/18/22 posterior L knee capsule release; Limited ROM In L knee after injection in April that was limited prior to surg for flex and ext;  DVT left lower leg (still on blood thinners till about Oct)    Exercises/Interventions:   Exercise/Equipment Resistance/Repetitions Other comments   Cardio/Warm-up     Bike Treadmill          Stretching     Hamstring Seated propped 10\"x10 17.5# overpressure   Hip Flexion     ITB     Grion     Quad     Inclined Calf On floor 10\"x10    Towel Pull 30\"x5 over foam roll 17.5# overpressure        ROM     Passive    Active     ROM @EOB    Prone knee hang x10'   15# OP on ankle    Weight Hangs     Sheet Pulls     Ankle Pumps           Patellar Glides     Medial x2'    Superior x2'    Inferior x2'         STRENGTH     SLR 3x10 1# on ankle Emphasis on full knee ext   Supine 3x10 over foam roll 3# Concentration on distal contraction w/ cuing  Prone     Abduction     Adducton     SLR+          Isometrics     Quad sets 10\" on/off x5' full foam roll & 5' w/ TKE (standing TKE) W/ biofeedback        CKC     Calf raises     Wall sits     Step ups Using wall and crutch for assistance   1 leg stand Squatting    CC TKE 35# 10\"x10 Balance         PRE     Extension 0# x20 RANGE: pain free   Flexion  RANGE:   Leg Press  RANGE:        Cable Column     Ed given on POC, expectations and goals    Gt training  X5'; over cups to focus on knee flex for clearance and heel strike w/ quad at [facility] w/ WBing Using crutches but focus on knee ext w/ cuing        Manual/Modalities     Manual stretching into ext for hamstring and calf and hip muscles x10'    Deep tissue work to HS/scar massage in prone position x5'                Therapeutic Exercise and NMR EXR  [x] (77812) Provided verbal/tactile cueing for activities related to strengthening, flexibility, endurance, ROM for improvements in LE, proximal hip, and core control with self care, mobility, lifting, ambulation.  [] (44187) Provided verbal/tactile cueing for activities related to improving balance, coordination, kinesthetic sense, posture, motor skill, proprioception  to assist with LE, proximal hip, and core control in self care, mobility, lifting, ambulation and eccentric single leg control.      NMR and Therapeutic Activities:    [x] (82174 or 40973) Provided verbal/tactile cueing for activities related to improving balance, coordination, kinesthetic sense, posture, motor skill, proprioception and motor activation to allow for proper function of core, proximal hip and LE with self care and ADLs  [] (18177) Gait Re-education- Provided training and instruction to the patient for proper LE, core and proximal hip recruitment and positioning and eccentric body weight control with ambulation re-education including up and down stairs     Home Exercise Program:    [x] (45445) Reviewed/Progressed HEP activities related to strengthening, flexibility, endurance, ROM of core, proximal hip and LE for functional self-care, mobility, lifting and ambulation/stair navigation            Written HEP est    [] (67459)Reviewed/Progressed HEP activities related to improving balance, coordination, kinesthetic sense, posture, motor skill, proprioception of core, proximal hip and LE for self care, mobility, lifting, and ambulation/stair navigation      Manual Treatments:  PROM / STM / Oscillations-Mobs:  G-I, II, III, IV (PA's, Inf., Post.)  [x] (35082) Provided manual therapy to mobilize LE, proximal hip and/or LS spine soft tissue/joints for the purpose of modulating pain, promoting relaxation,  increasing ROM, reducing/eliminating soft tissue swelling/inflammation/restriction, improving soft tissue extensibility and allowing for proper ROM for normal function with self care, mobility, lifting and ambulation. Modalities:   [x] GAME READY (VASO)- for significant edema, swelling, pain control. Charges:  Timed Code Treatment Minutes: 45   Total Treatment Minutes: 85     [] EVAL (LOW) 74741 (typically 20 minutes face-to-face)  [] EVAL (MOD) 53658 (typically 30 minutes face-to-face)  [] EVAL (HIGH) 81421 (typically 45 minutes face-to-face)  [] RE-EVAL   [x] RM(02974) 1x  15'   [] IONTO  [] NMR (76006) x     [x] VASO x15'  [x] Manual (80863) x 1  x15'     [] Other: GT x12'  [x] TA (44713) 1x   15'   [] Mech Traction (14820)  [] ES(attended) (22731)      [x] ES (un) (47645): EMG/Stim combo x15'    GOALS:  Patient stated goal: return to outdoor activities for walking and climbing, along with work  [] Progressing: [] Met: [] Not Met: [] Adjusted     Therapist goals for Patient:  Short Term Goals: To be achieved in: 2 weeks  1. Independent in HEP and progression per patient tolerance, in order to prevent re-injury. [] Progressing: [] Met: [] Not Met: [] Adjusted  2. Patient will have a decrease in pain to facilitate improvement in movement, function, and ADLs as indicated by Functional Deficits. [] Progressing: [] Met: [] Not Met: [] Adjusted     Long Term Goals: To be achieved in: 12+ weeks  1. Functional index score of 67 or more for FOTO to assist with reaching prior level of function. [] Progressing: [] Met: [] Not Met: [] Adjusted  2. Patient will demonstrate increased AROM to 0-125 deg to allow for proper joint functioning as indicated by patients Functional Deficits.   [] Progressing: [] Met: [] Not Met: [] Adjusted  3. Patient will demonstrate an increase in Strength to good proximal hip strength and control, within 5lb HHD in LE to allow for proper functional mobility as indicated by patients Functional Deficits. [] Progressing: [] Met: [] Not Met: [] Adjusted  4. Patient will return to 90% functional activities without increased symptoms or restriction. [] Progressing: [] Met: [] Not Met: [] Adjusted  5. Pt will be able to walk 45 min on uneven surfaces for hiking and climbing. (patient specific functional goal)    [] Progressing: [] Met: [] Not Met: [] Adjusted         Overall Progression Towards Functional goals/ Treatment Progress Update:  [] Patient is progressing as expected towards functional goals listed. [] Progression is slowed due to complexities/Impairments listed. [] Progression has been slowed due to co-morbidities. [x] Plan just implemented, too soon to assess goals progression <30days   [] Goals require adjustment due to lack of progress  [] Patient is not progressing as expected and requires additional follow up with physician  [] Other    ASSESSMENT:   Similar results today, he loosens up with stretching and quad activation. He does feel a stretch in posterior aspect of knee with prone weighted hangs, but still has sharper pain in the front of his knee with full extension. Much better control with his gait and WB knee extension. Treatment/Activity Tolerance:  [x] Patient tolerated treatment well [] Patient limited by fatique  [] Patient limited by pain  [] Patient limited by other medical complications- tightness  [] Other:     Prognosis: [x] Good [x] Fair  [] Poor    Patient Requires Follow-up: [x] Yes  [] No    PLAN: Cont 3x/week for ROM restoration focus on gt training for function. RTD next week.    [x] Continue per plan of care [] Alter current plan (see comments)  [x] Plan of care initiated [] Hold pending MD visit [] Discharge    Electronically signed by:     Lino Madrigal Tanya, PT      Board Certified Orthopaedic Clinical Specialist  [facility] Certified  Physical Therapist    [place] PT #[id number]  [place] PT #[id number]      Note: If patient does not return for scheduled/ recommended follow up visits, this note will serve as a discharge from care along with most recent update on progress.

## 2022-10-17 ENCOUNTER — OFFICE VISIT (OUTPATIENT)
Dept: ORTHOPEDIC SURGERY | Age: 57
End: 2022-10-17

## 2022-10-17 ENCOUNTER — TREATMENT (OUTPATIENT)
Dept: PHYSICAL THERAPY | Age: 57
End: 2022-10-17
Payer: COMMERCIAL

## 2022-10-17 VITALS — WEIGHT: 195 LBS | HEIGHT: 69 IN | BODY MASS INDEX: 28.88 KG/M2

## 2022-10-17 DIAGNOSIS — M24.662 ARTHROFIBROSIS OF KNEE JOINT, LEFT: Primary | ICD-10-CM

## 2022-10-17 DIAGNOSIS — M25.562 LEFT KNEE PAIN, UNSPECIFIED CHRONICITY: ICD-10-CM

## 2022-10-17 DIAGNOSIS — R26.2 DIFFICULTY WALKING: ICD-10-CM

## 2022-10-17 DIAGNOSIS — M24.662 FIBROSIS OF KNEE JOINT, LEFT: ICD-10-CM

## 2022-10-17 PROCEDURE — 97110 THERAPEUTIC EXERCISES: CPT | Performed by: PHYSICAL THERAPIST

## 2022-10-17 PROCEDURE — 99024 POSTOP FOLLOW-UP VISIT: CPT | Performed by: ORTHOPAEDIC SURGERY

## 2022-10-17 PROCEDURE — 97530 THERAPEUTIC ACTIVITIES: CPT | Performed by: PHYSICAL THERAPIST

## 2022-10-17 PROCEDURE — 97140 MANUAL THERAPY 1/> REGIONS: CPT | Performed by: PHYSICAL THERAPIST

## 2022-10-17 NOTE — PROGRESS NOTES
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 Lee Griffin  Has the plan of care been signed (Y/N):        []  Yes  [x]  No     Date of Patient follow up with Physician: 10/10/22      Is this a Progress Report:     []  Yes  [x]  No        If Yes:  Date Range for reporting period:  Beginning 22  Ending     Progress report will be due (10 Rx or 30 days whichever is less):        Recertification will be due (POC Duration  / 90 days whichever is less): 10/27/22        Visit # Insurance Allowable Auth Required   31 BMN []  Yes [x]  No        Functional Scale: FOTO 29; LEFS 13;    Date assessed:  22 FOTO 43; LEFS 27.2     Latex Allergy:  [x]NO      []YES  Preferred Language for Healthcare:   [x]English       []other:    Pain level:  10/10 w/ stretching; 3-4/10 w/ general activities;    SUBJECTIVE:   11 weeks post op from debridement; 14 weeks post op from posterior capsule release  Pt reports that knee is feeling good today and he feels that knee is staying straighter between session. Still wearing splint as directed. MD appt after therapy today.       OBJECTIVE:   10/17/22 pt arrived w/ L knee in flex position using 1 crutches for gt pattern;  incision in posterior joint healing w/o any signs or symptoms of infection; general edema around L knee region;    ROM PROM AROM Overpressure Comment    L 10/12/22 R L R L R    Flexion 97° on SOB  70° cold 125°      Extension 0° after stretch w/ op  -11° cold   -2°        Girth  7/29/2022 L R   Mid Patella 44 cm 39.3 cm   Suprapatellar     5cm above     15cm above         Strength L R Comment: majority deferred secondary to surgery   Quad      Hamstring      Abduction      Quad tone FAIR+ GOOD 9/12//2022       Special Test Results/Comment: majority deferred secondary to surgery   Homans  8/29/2022 Known DVT in calf       RESTRICTIONS/PRECAUTIONS: 7/18/22 posterior L knee capsule release; Limited ROM In L knee after injection in April that was limited prior to surg for flex and ext;  DVT left lower leg (still on blood thinners till about Oct)    Exercises/Interventions:   Exercise/Equipment Resistance/Repetitions Other comments   Cardio/Warm-up     Bike Treadmill          Stretching     Hamstring Seated propped 10\"x10 17.5# overpressure   Hip Flexion     ITB     Grion     Quad     Inclined Calf On floor 10\"x10    Towel Pull 30\"x5 over foam roll 17.5# overpressure        ROM     Passive    Active     ROM @EOB    Prone knee hang x10'   15# OP on ankle    Weight Hangs     Sheet Pulls     Ankle Pumps           Patellar Glides     Medial x2'    Superior x2'    Inferior x2'         STRENGTH     Supine 3x10 over foam roll 3# Concentration on distal contraction w/ cuing  Prone     Abduction 2# 3x10    Adducton     SLR+          Isometrics     Quad sets      CKC     Calf raises     Wall sits     Step ups Using wall and crutch for assistance   1 leg stand Squatting    CC TKE 35# 10\"x10 Balance         PRE     Extension 0# x20 RANGE: pain free   Flexion  RANGE:   Leg Press  RANGE:        Cable Column     Ed given on POC, expectations and goals    Gt training  X5'; over cups to focus on knee flex for clearance and heel strike w/ quad at [place] w/ WBing Using crutch but focus on knee ext w/ cuing Manual/Modalities     Manual stretching into ext for hamstring and calf and hip muscles x10'    Deep tissue work to HS/scar massage in prone position x5'                Therapeutic Exercise and NMR EXR  [x] (77165) Provided verbal/tactile cueing for activities related to strengthening, flexibility, endurance, ROM for improvements in LE, proximal hip, and core control with self care, mobility, lifting, ambulation.  [] (37465) Provided verbal/tactile cueing for activities related to improving balance, coordination, kinesthetic sense, posture, motor skill, proprioception  to assist with LE, proximal hip, and core control in self care, mobility, lifting, ambulation and eccentric single leg control.      NMR and Therapeutic Activities:    [x] (48750 or 49098) Provided verbal/tactile cueing for activities related to improving balance, coordination, kinesthetic sense, posture, motor skill, proprioception and motor activation to allow for proper function of core, proximal hip and LE with self care and ADLs  [] (18975) Gait Re-education- Provided training and instruction to the patient for proper LE, core and proximal hip recruitment and positioning and eccentric body weight control with ambulation re-education including up and down stairs     Home Exercise Program:    [x] (84929) Reviewed/Progressed HEP activities related to strengthening, flexibility, endurance, ROM of core, proximal hip and LE for functional self-care, mobility, lifting and ambulation/stair navigation            Written HEP est    [] (64537)Reviewed/Progressed HEP activities related to improving balance, coordination, kinesthetic sense, posture, motor skill, proprioception of core, proximal hip and LE for self care, mobility, lifting, and ambulation/stair navigation      Manual Treatments:  PROM / STM / Oscillations-Mobs:  G-I, II, III, IV (PA's, Inf., Post.)  [x] (46131) Provided manual therapy to mobilize LE, proximal hip and/or LS spine soft tissue/joints for the purpose of modulating pain, promoting relaxation,  increasing ROM, reducing/eliminating soft tissue swelling/inflammation/restriction, improving soft tissue extensibility and allowing for proper ROM for normal function with self care, mobility, lifting and ambulation. Modalities: CP x10 min propped and 15# over knee   [] GAME READY (VASO)- for significant edema, swelling, pain control. Charges:  Timed Code Treatment Minutes: 45   Total Treatment Minutes: 55     [] EVAL (LOW) 85432 (typically 20 minutes face-to-face)  [] EVAL (MOD) 52402 (typically 30 minutes face-to-face)  [] EVAL (HIGH) 43607 (typically 45 minutes face-to-face)  [] RE-EVAL   [x] OB(75598) 1x  15'   [] IONTO  [] NMR (59163) x     [] VASO x15'  [x] Manual (07989) x 1  x15'     [] Other: GT x12'  [x] TA (78242) 1x   15'   [] Mech Traction (57247)  [] ES(attended) (95579)      [] ES (un) (78206): EMG/Stim combo x15'    GOALS:  Patient stated goal: return to outdoor activities for walking and climbing, along with work  [] Progressing: [] Met: [] Not Met: [] Adjusted     Therapist goals for Patient:  Short Term Goals: To be achieved in: 2 weeks  1. Independent in HEP and progression per patient tolerance, in order to prevent re-injury. [] Progressing: [] Met: [] Not Met: [] Adjusted  2. Patient will have a decrease in pain to facilitate improvement in movement, function, and ADLs as indicated by Functional Deficits. [] Progressing: [] Met: [] Not Met: [] Adjusted     Long Term Goals: To be achieved in: 12+ weeks  1. Functional index score of 67 or more for FOTO to assist with reaching prior level of function. [] Progressing: [] Met: [] Not Met: [] Adjusted  2. Patient will demonstrate increased AROM to 0-125 deg to allow for proper joint functioning as indicated by patients Functional Deficits. [] Progressing: [] Met: [] Not Met: [] Adjusted  3.  Patient will demonstrate an increase in Strength to good proximal hip strength and control, within 5lb HHD in LE to allow for proper functional mobility as indicated by patients Functional Deficits. [] Progressing: [] Met: [] Not Met: [] Adjusted  4. Patient will return to 90% functional activities without increased symptoms or restriction. [] Progressing: [] Met: [] Not Met: [] Adjusted  5. Pt will be able to walk 45 min on uneven surfaces for hiking and climbing. (patient specific functional goal)    [] Progressing: [] Met: [] Not Met: [] Adjusted         Overall Progression Towards Functional goals/ Treatment Progress Update:  [] Patient is progressing as expected towards functional goals listed. [] Progression is slowed due to complexities/Impairments listed. [] Progression has been slowed due to co-morbidities. [x] Plan just implemented, too soon to assess goals progression <30days   [] Goals require adjustment due to lack of progress  [] Patient is not progressing as expected and requires additional follow up with physician  [] Other    ASSESSMENT: Less tightness in L knee on arrival. Quad contraction better so did not perform biofeedback today. Patella mobility was improved in all directions. Gt skills progressing to 1 crutch and good tech w/ concentration. Pain and swelling is better controlled. Treatment/Activity Tolerance:  [x] Patient tolerated treatment well [] Patient limited by fatique  [] Patient limited by pain  [] Patient limited by other medical complications- tightness  [] Other:     Prognosis: [x] Good [x] Fair  [] Poor    Patient Requires Follow-up: [x] Yes  [] No    PLAN: Cont 3x/week for ROM restoration focus on gt training for function. RTD today.    [x] Continue per plan of care [] Alter current plan (see comments)  [x] Plan of care initiated [] Hold pending MD visit [] Discharge    Electronically signed by: Renetta Adler, PT, MS, OMT-C    Physical Therapist [facility] license #[id number]  Physical Therapist [place] license #[id number]         Note: If patient does not return for scheduled/ recommended follow up visits, this note will serve as a discharge from care along with most recent update on progress.

## 2022-10-19 ENCOUNTER — TREATMENT (OUTPATIENT)
Dept: PHYSICAL THERAPY | Age: 57
End: 2022-10-19
Payer: COMMERCIAL

## 2022-10-19 DIAGNOSIS — R26.2 DIFFICULTY WALKING: ICD-10-CM

## 2022-10-19 DIAGNOSIS — M24.662 FIBROSIS OF KNEE JOINT, LEFT: ICD-10-CM

## 2022-10-19 DIAGNOSIS — M25.562 LEFT KNEE PAIN, UNSPECIFIED CHRONICITY: ICD-10-CM

## 2022-10-19 DIAGNOSIS — M24.662 ARTHROFIBROSIS OF KNEE JOINT, LEFT: Primary | ICD-10-CM

## 2022-10-19 PROCEDURE — 97140 MANUAL THERAPY 1/> REGIONS: CPT | Performed by: PHYSICAL THERAPIST

## 2022-10-19 PROCEDURE — 97530 THERAPEUTIC ACTIVITIES: CPT | Performed by: PHYSICAL THERAPIST

## 2022-10-19 PROCEDURE — 97110 THERAPEUTIC EXERCISES: CPT | Performed by: PHYSICAL THERAPIST

## 2022-10-19 NOTE — PROGRESS NOTES
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 César Villalobos  Has the plan of care been signed (Y/N):        []  Yes  [x]  No     Date of Patient follow up with Physician: 10/10/22      Is this a Progress Report:     []  Yes  [x]  No        If Yes:  Date Range for reporting period:  Beginning 22  Ending     Progress report will be due (10 Rx or 30 days whichever is less):        Recertification will be due (POC Duration  / 90 days whichever is less): 10/27/22        Visit # Insurance Allowable Auth Required   32 BMN []  Yes [x]  No        Functional Scale: FOTO 29; LEFS 13;    Date assessed:  22 FOTO 43; LEFS 27.2     Latex Allergy:  [x]NO      []YES  Preferred Language for Healthcare:   [x]English       []other:    Pain level:  10/10 w/ stretching; 3-4/10 w/ general activities;    SUBJECTIVE:   11 weeks post op from debridement; 14 weeks post op from posterior capsule release  Pt reports that knee is stiff on arrival today and walking w/ flex knee w/ 1 crutch. Pt had MD appt on Monday. It was discussed to move forward w/ knee surg and possibly going through ant knee region that would be Dr. César Villalobos and Dr. Rekha Pathak performing the procedure. A date has not been set but working on coordinating schedules. OBJECTIVE:   10/17/22 pt arrived w/ L knee in flex position using 1 crutch for gt pattern;  incision in posterior joint healing w/o any signs or symptoms of infection; general edema around L knee region;    ROM PROM AROM Overpressure Comment    L 10/12/22 R L R L R    Flexion 97° on SOB  70° cold 125°      Extension 0° after stretch w/ op  -11° cold   -2°        Girth  7/29/2022 L R   Mid Patella 44 cm 39.3 cm   Suprapatellar     5cm above     15cm above         Strength L R Comment: majority deferred secondary to surgery   Quad      Hamstring      Abduction      Quad tone FAIR+ GOOD 9/12//2022       Special Test Results/Comment: majority deferred secondary to surgery   Homans  8/29/2022 Known DVT in calf       RESTRICTIONS/PRECAUTIONS: 7/18/22 posterior L knee capsule release; Limited ROM In L knee after injection in April that was limited prior to surg for flex and ext;  DVT left lower leg (still on blood thinners till about Oct)    Exercises/Interventions:   Exercise/Equipment Resistance/Repetitions Other comments   Cardio/Warm-up     Bike Treadmill          Stretching     Hamstring Seated propped 10\"x10 17.5# overpressure   Hip Flexion     ITB     Grion     Quad     Inclined Calf On floor 10\"x10    Towel Pull 30\"x5 over foam roll 17.5# overpressure        ROM     Passive    Active     ROM @EOB    Prone knee hang x10'   15# OP on ankle    Weight Hangs     Sheet Pulls     Ankle Pumps           Patellar Glides     Medial x2'    Superior x2'    Inferior x2'         STRENGTH     Supine 3x10 over foam roll 3# Concentration on distal contraction w/ cuing  Prone     Abduction    clams 2x10    Adducton     SLR+          Isometrics     Quad sets      CKC     Calf raises     Wall sits     Step ups Using wall and crutch for assistance   1 leg stand Squatting    CC TKE 35# 10\"x10 Balance         PRE     Extension RANGE: pain free   Flexion  RANGE:   Leg Press  RANGE:        Cable Column     Ed given on POC, expectations and goals    Gt training  X5'; over cups to focus on knee flex for clearance and heel strike w/ quad at [facility] w/ WBing Using crutch but focus on knee ext w/ cuing        Manual/Modalities     Manual stretching to L hip into flex, fig 4, add, hip flex x15'    Deep tissue work to HS/scar massage in HS x5'                Therapeutic Exercise and NMR EXR  [x] (10627) Provided verbal/tactile cueing for activities related to strengthening, flexibility, endurance, ROM for improvements in LE, proximal hip, and core control with self care, mobility, lifting, ambulation.  [] (39620) Provided verbal/tactile cueing for activities related to improving balance, coordination, kinesthetic sense, posture, motor skill, proprioception  to assist with LE, proximal hip, and core control in self care, mobility, lifting, ambulation and eccentric single leg control.      NMR and Therapeutic Activities:    [x] (63054 or 33292) Provided verbal/tactile cueing for activities related to improving balance, coordination, kinesthetic sense, posture, motor skill, proprioception and motor activation to allow for proper function of core, proximal hip and LE with self care and ADLs  [] (64262) Gait Re-education- Provided training and instruction to the patient for proper LE, core and proximal hip recruitment and positioning and eccentric body weight control with ambulation re-education including up and down stairs     Home Exercise Program:    [x] (30532) Reviewed/Progressed HEP activities related to strengthening, flexibility, endurance, ROM of core, proximal hip and LE for functional self-care, mobility, lifting and ambulation/stair navigation            Written HEP est    [] (64910)Reviewed/Progressed HEP activities related to improving balance, coordination, kinesthetic sense, posture, motor skill, proprioception of core, proximal hip and LE for self care, mobility, lifting, and ambulation/stair navigation      Manual Treatments:  PROM / STM / Oscillations-Mobs:  G-I, II, III, IV (PA's, Inf., Post.)  [x] (01003) Provided manual therapy to mobilize LE, proximal hip and/or LS spine soft tissue/joints for the purpose of modulating pain, promoting relaxation,  increasing ROM, reducing/eliminating soft tissue swelling/inflammation/restriction, improving soft tissue extensibility and allowing for proper ROM for normal function with self care, mobility, lifting and ambulation. Modalities: CP x10 min propped and 15# over knee   [] GAME READY (VASO)- for significant edema, swelling, pain control. Charges:  Timed Code Treatment Minutes: 45   Total Treatment Minutes: 55     [] EVAL (LOW) 19794 (typically 20 minutes face-to-face)  [] EVAL (MOD) 29431 (typically 30 minutes face-to-face)  [] EVAL (HIGH) 40234 (typically 45 minutes face-to-face)  [] RE-EVAL   [x] KA(96995) 1x  15'   [] IONTO  [] NMR (52359) x     [] VASO x15'  [x] Manual (70085) x 1  x15'     [] Other: GT x12'  [x] TA (03383) 1x   15'   [] Mech Traction (57948)  [] ES(attended) (80157)      [] ES (un) (10867): EMG/Stim combo x15'    GOALS:  Patient stated goal: return to outdoor activities for walking and climbing, along with work  [] Progressing: [] Met: [] Not Met: [] Adjusted     Therapist goals for Patient:  Short Term Goals: To be achieved in: 2 weeks  1. Independent in HEP and progression per patient tolerance, in order to prevent re-injury. [] Progressing: [] Met: [] Not Met: [] Adjusted  2. Patient will have a decrease in pain to facilitate improvement in movement, function, and ADLs as indicated by Functional Deficits. [] Progressing: [] Met: [] Not Met: [] Adjusted     Long Term Goals: To be achieved in: 12+ weeks  1. Functional index score of 67 or more for FOTO to assist with reaching prior level of function. [] Progressing: [] Met: [] Not Met: [] Adjusted  2.  Patient will demonstrate increased AROM to 0-125 deg to allow for proper joint functioning as indicated by patients Functional Deficits. [] Progressing: [] Met: [] Not Met: [] Adjusted  3. Patient will demonstrate an increase in Strength to good proximal hip strength and control, within 5lb HHD in LE to allow for proper functional mobility as indicated by patients Functional Deficits. [] Progressing: [] Met: [] Not Met: [] Adjusted  4. Patient will return to 90% functional activities without increased symptoms or restriction. [] Progressing: [] Met: [] Not Met: [] Adjusted  5. Pt will be able to walk 45 min on uneven surfaces for hiking and climbing. (patient specific functional goal)    [] Progressing: [] Met: [] Not Met: [] Adjusted         Overall Progression Towards Functional goals/ Treatment Progress Update:  [] Patient is progressing as expected towards functional goals listed. [] Progression is slowed due to complexities/Impairments listed. [] Progression has been slowed due to co-morbidities. [x] Plan just implemented, too soon to assess goals progression <30days   [] Goals require adjustment due to lack of progress  [] Patient is not progressing as expected and requires additional follow up with physician  [] Other    ASSESSMENT: knee was better on arrival but still slight flexion in knee w/ walking. Performed stretching to L hip that was tight to loosen up prox end of some of thigh muscles to assist w/ knee ext. Pt did have mod tightness in L hip w/ some limitations in motion but no pain. Quad contraction still good so estim unit not used. Trial of different focus to assist w/ knee motion. Treatment/Activity Tolerance:  [x] Patient tolerated treatment well [] Patient limited by fatique  [] Patient limited by pain  [] Patient limited by other medical complications- tightness  [] Other:     Prognosis: [x] Good [x] Fair  [] Poor    Patient Requires Follow-up: [x] Yes  [] No    PLAN: Cont 3x/week for ROM restoration focus on gt training for function.    [x] Continue per plan of care [] Alter current plan (see comments)  [x] Plan of care initiated [] Hold pending MD visit [] Discharge    Electronically signed by: Shannon Shelton PT, MS, OMT-C    Physical Therapist [id number] [address] license #[id number]  Physical Therapist [place] license #[id number]         Note: If patient does not return for scheduled/ recommended follow up visits, this note will serve as a discharge from care along with most recent update on progress.

## 2022-10-21 ENCOUNTER — TREATMENT (OUTPATIENT)
Dept: PHYSICAL THERAPY | Age: 57
End: 2022-10-21
Payer: COMMERCIAL

## 2022-10-21 DIAGNOSIS — M25.562 LEFT KNEE PAIN, UNSPECIFIED CHRONICITY: ICD-10-CM

## 2022-10-21 DIAGNOSIS — R26.2 DIFFICULTY WALKING: ICD-10-CM

## 2022-10-21 DIAGNOSIS — M24.662 ARTHROFIBROSIS OF KNEE JOINT, LEFT: Primary | ICD-10-CM

## 2022-10-21 DIAGNOSIS — M24.662 FIBROSIS OF KNEE JOINT, LEFT: ICD-10-CM

## 2022-10-21 PROCEDURE — 97140 MANUAL THERAPY 1/> REGIONS: CPT | Performed by: PHYSICAL THERAPIST

## 2022-10-21 PROCEDURE — 97110 THERAPEUTIC EXERCISES: CPT | Performed by: PHYSICAL THERAPIST

## 2022-10-21 PROCEDURE — 97016 VASOPNEUMATIC DEVICE THERAPY: CPT | Performed by: PHYSICAL THERAPIST

## 2022-10-21 PROCEDURE — 97530 THERAPEUTIC ACTIVITIES: CPT | Performed by: PHYSICAL THERAPIST

## 2022-10-21 NOTE — PROGRESS NOTES
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 Lily Cevallos  Has the plan of care been signed (Y/N):        []  Yes  [x]  No     Date of Patient follow up with Physician:        Is this a Progress Report:     []  Yes  [x]  No        If Yes:  Date Range for reporting period:  Beginning 22  Ending     Progress report will be due (10 Rx or 30 days whichever is less):        Recertification will be due (POC Duration  / 90 days whichever is less): 10/27/22        Visit # Insurance Allowable Auth Required   33 BMN []  Yes [x]  No        Functional Scale: FOTO 29; LEFS 13;    Date assessed:  22 FOTO 43; LEFS 27.2     Latex Allergy:  [x]NO      []YES  Preferred Language for Healthcare:   [x]English       []other:    Pain level:  10/10 w/ stretching; 3-4/10 w/ general activities;    SUBJECTIVE:   11 weeks post op from debridement; 14 weeks post op from posterior capsule release  Patient reports that he is seeing Dr Yuan Pedersen next Thursday. He is hoping to have the next surgery scheduled in early November. He reports that he really didn't feel too different after last session with new hip stretches.              OBJECTIVE:   10/17/22 pt arrived w/ L knee in flex position using 1 crutch for gt pattern;  incision in posterior joint healing w/o any signs or symptoms of infection; general edema around L knee region;    ROM PROM AROM Overpressure Comment    L 10/12/22 R L R L R    Flexion 97° on SOB  70° cold 125°      Extension 0° after stretch w/ op  -11° cold   -2°        Girth  7/29/2022 L R   Mid Patella 44 cm 39.3 cm   Suprapatellar     5cm above     15cm above         Strength L R Comment: majority deferred secondary to surgery   Quad      Hamstring      Abduction      Quad tone FAIR+ GOOD 9/12//2022       Special Test Results/Comment: majority deferred secondary to surgery   Homans  8/29/2022 Known DVT in calf       RESTRICTIONS/PRECAUTIONS: 7/18/22 posterior L knee capsule release; Limited ROM In L knee after injection in April that was limited prior to surg for flex and ext;  DVT left lower leg (still on blood thinners till about Oct)    Exercises/Interventions:   Exercise/Equipment Resistance/Repetitions Other comments   Cardio/Warm-up     Bike Treadmill          Stretching     Hamstring Seated propped 10\"x10 17.5# overpressure   Hip Flexion     ITB     Grion     Quad     Inclined Calf On floor 10\"x10    Towel Pull 30\"x5 over foam roll 17.5# overpressure        ROM     Passive    Active     ROM @EOB    Prone knee hang x10'   15# OP on ankle    Weight Hangs     Sheet Pulls     Ankle Pumps           Patellar Glides     Medial x2'    Superior x2'    Inferior x2'         STRENGTH     Supine 3x10 over foam roll 3# Concentration on distal contraction w/ cuing  Prone     Abduction 3# 3x10    clams 3x10    Adducton     SLR+          Isometrics     Quad sets      CKC     Calf raises     Wall sits     Step ups L1 on tech x10  Using wall and crutch for assistance   1 leg stand Squatting    CC TKE 35# 10\"x10 Balance         PRE     Extension RANGE: pain free   Flexion  RANGE:   Leg Press  RANGE:        Cable Column     Ed given on POC, expectations and goals    Gt training  X5'; over cups to focus on knee flex for clearance and heel strike w/ quad at [facility] w/ WBing Using crutch but focus on knee ext w/ cuing        Manual/Modalities     Manual stretching to L hip into flex, fig 4, add, hip flex x15'    Deep tissue work to HS/scar massage in HS x5'                Therapeutic Exercise and NMR EXR  [x] (16394) Provided verbal/tactile cueing for activities related to strengthening, flexibility, endurance, ROM for improvements in LE, proximal hip, and core control with self care, mobility, lifting, ambulation.  [] (56005) Provided verbal/tactile cueing for activities related to improving balance, coordination, kinesthetic sense, posture, motor skill, proprioception  to assist with LE, proximal hip, and core control in self care, mobility, lifting, ambulation and eccentric single leg control.      NMR and Therapeutic Activities:    [x] (93031 or 71183) Provided verbal/tactile cueing for activities related to improving balance, coordination, kinesthetic sense, posture, motor skill, proprioception and motor activation to allow for proper function of core, proximal hip and LE with self care and ADLs  [] (90312) Gait Re-education- Provided training and instruction to the patient for proper LE, core and proximal hip recruitment and positioning and eccentric body weight control with ambulation re-education including up and down stairs     Home Exercise Program:    [x] (54469) Reviewed/Progressed HEP activities related to strengthening, flexibility, endurance, ROM of core, proximal hip and LE for functional self-care, mobility, lifting and ambulation/stair navigation            Written HEP est    [] (40471)Reviewed/Progressed HEP activities related to improving balance, coordination, kinesthetic sense, posture, motor skill, proprioception of core, proximal hip and LE for self care, mobility, lifting, and ambulation/stair navigation      Manual Treatments:  PROM / STM / Oscillations-Mobs:  G-I, II, III, IV (PA's, Inf., Post.)  [x] (37609) Provided manual therapy to mobilize LE, proximal hip and/or LS spine soft tissue/joints for the purpose of modulating pain, promoting relaxation,  increasing ROM, reducing/eliminating soft tissue swelling/inflammation/restriction, improving soft tissue extensibility and allowing for proper ROM for normal function with self care, mobility, lifting and ambulation. Modalities:  [x] GAME READY (VASO)- for significant edema, swelling, pain control. Charges:  Timed Code Treatment Minutes: 45   Total Treatment Minutes: 65     [] EVAL (LOW) 46893 (typically 20 minutes face-to-face)  [] EVAL (MOD) 76154 (typically 30 minutes face-to-face)  [] EVAL (HIGH) 64737 (typically 45 minutes face-to-face)  [] RE-EVAL   [x] KY(96996) 1x  15'   [] IONTO  [] NMR (47360) x     [x] VASO x15'  [x] Manual (45615) x 1  x15'     [] Other: GT x12'  [x] TA (57281) 1x   15'   [] Mech Traction (83121)  [] ES(attended) (89700)      [] ES (un) (40624): EMG/Stim combo x15'    GOALS:  Patient stated goal: return to outdoor activities for walking and climbing, along with work  [] Progressing: [] Met: [] Not Met: [] Adjusted     Therapist goals for Patient:  Short Term Goals: To be achieved in: 2 weeks  1. Independent in HEP and progression per patient tolerance, in order to prevent re-injury. [] Progressing: [] Met: [] Not Met: [] Adjusted  2. Patient will have a decrease in pain to facilitate improvement in movement, function, and ADLs as indicated by Functional Deficits. [] Progressing: [] Met: [] Not Met: [] Adjusted     Long Term Goals: To be achieved in: 12+ weeks  1. Functional index score of 67 or more for FOTO to assist with reaching prior level of function. [] Progressing: [] Met: [] Not Met: [] Adjusted  2. Patient will demonstrate increased AROM to 0-125 deg to allow for proper joint functioning as indicated by patients Functional Deficits. [] Progressing: [] Met: [] Not Met: [] Adjusted  3.  Patient will demonstrate an increase in Strength to good proximal hip strength and control, within 5lb HHD in LE to allow for proper functional mobility as indicated by patients Functional Deficits. [] Progressing: [] Met: [] Not Met: [] Adjusted  4. Patient will return to 90% functional activities without increased symptoms or restriction. [] Progressing: [] Met: [] Not Met: [] Adjusted  5. Pt will be able to walk 45 min on uneven surfaces for hiking and climbing. (patient specific functional goal)    [] Progressing: [] Met: [] Not Met: [] Adjusted         Overall Progression Towards Functional goals/ Treatment Progress Update:  [] Patient is progressing as expected towards functional goals listed. [] Progression is slowed due to complexities/Impairments listed. [] Progression has been slowed due to co-morbidities. [x] Plan just implemented, too soon to assess goals progression <30days   [] Goals require adjustment due to lack of progress  [] Patient is not progressing as expected and requires additional follow up with physician  [] Other    ASSESSMENT:   He did feel some good stretching in his hip with manual stretch today. He is still using 1 crutch for gait most of the time. Got back to some functional activities like low level step up. He complains of most discomfort in the front of his knee with step up activities. He states he tries to do stairs normally at home with the crutch. Treatment/Activity Tolerance:  [x] Patient tolerated treatment well [] Patient limited by fatique  [] Patient limited by pain  [] Patient limited by other medical complications- tightness  [] Other:     Prognosis: [x] Good [x] Fair  [] Poor    Patient Requires Follow-up: [x] Yes  [] No    PLAN: Cont 3x/week for ROM restoration focus on gt training for function.    [x] Continue per plan of care [] Alter current plan (see comments)  [x] Plan of care initiated [] Hold pending MD visit [] Discharge    Electronically signed by:     Marcelo Sanches PT      Board Certified Orthopaedic Clinical Specialist  [facility] Certified  Physical Therapist    [place] PT #[id number]  [place] PT #[id number]            Note: If patient does not return for scheduled/ recommended follow up visits, this note will serve as a discharge from care along with most recent update on progress.

## 2022-10-24 ENCOUNTER — TREATMENT (OUTPATIENT)
Dept: PHYSICAL THERAPY | Age: 57
End: 2022-10-24
Payer: COMMERCIAL

## 2022-10-24 DIAGNOSIS — M24.662 FIBROSIS OF KNEE JOINT, LEFT: ICD-10-CM

## 2022-10-24 DIAGNOSIS — M24.662 ARTHROFIBROSIS OF KNEE JOINT, LEFT: Primary | ICD-10-CM

## 2022-10-24 DIAGNOSIS — M25.562 LEFT KNEE PAIN, UNSPECIFIED CHRONICITY: ICD-10-CM

## 2022-10-24 DIAGNOSIS — R26.2 DIFFICULTY WALKING: ICD-10-CM

## 2022-10-24 PROCEDURE — 97140 MANUAL THERAPY 1/> REGIONS: CPT | Performed by: PHYSICAL THERAPIST

## 2022-10-24 PROCEDURE — 97110 THERAPEUTIC EXERCISES: CPT | Performed by: PHYSICAL THERAPIST

## 2022-10-24 PROCEDURE — 97530 THERAPEUTIC ACTIVITIES: CPT | Performed by: PHYSICAL THERAPIST

## 2022-10-24 NOTE — PROGRESS NOTES
10/17/2022     Reason for visit:  Status post left knee posterior capsular release on 7/15/2022  Status post left knee arthroscopy with I&D and lysis of adhesions on 8/5/2022    History of Present Illness: The patient returns for postop evaluation. Overall he is doing well. He denies fever or chills. No numbness or tingling. Continues to get physical therapy. He has encountered a recent plateau despite aggressive physical therapy as well as a new dynamic splint. Objective:  Ht 5' 9\" (1.753 m)   Wt 195 lb (88.5 kg)   BMI 28.80 kg/m²      Physical Exam:  The patient is well-appearing and in no apparent distress  Examination of the left knee   There is a small effusion, no gross deformity or skin changes  Incision sites are well-healed  Patient does still lack about 10 degrees of extension today in the office unable to flex him to about 85 to 90 degrees  5 out of 5 strength throughout distal muscle groups  Sensation is intact to light touch throughout all distributions  There is no calf swelling or tenderness  Palpable DP pulse, brisk cap refill, 2+ symmetric reflexes     Assessment:  Status post left knee posterior capsular release on 7/15/2022  Status post left knee arthroscopy with I&D and lysis of adhesions on 8/5/2022    Plan:  Overall the patient is doing very well. He is made a lot of progress with his extension as well as flexion. We did place an IVC filter prior to his surgery due to history of a DVT. He is on a anticoagulant for that. In addition the cultures from the date of the surgical intervention were positive for C acnes. He has completed antibiotics. His most recent inflammatory markers with his infectious disease doctors were normal.  He has reached a plateau with his motion. Options at this point were discussed with him.   They include revision posterior based surgery to attempt to get more extension back with possible lengthening of his hamstring tendons versus Botox injections posteriorly versus anterior based procedure with medial and lateral retinacular lengthening and complete lead lysis of adhesions and surgically. We thoroughly discussed the pros and cons associate with these. I do feel that he likely would improve some of his extension with the anterior based procedure if we were to find tissue within the notch that is causing some impingement with extension. I have thoroughly discussed this case with my partner Dr. Ruben Garcia. He will see the patient in this upcoming week and then following that we will make a decision of how to proceed. Maco Vogt MD            Orthopaedic Surgery Sports Medicine and [address] [facility] and [address] [facility]            Team Physician [facility]      Disclaimer: This note was dictated with voice recognition software. Though review and correction are routine, we apologize for any errors.

## 2022-10-24 NOTE — PROGRESS NOTES
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 Arzate Card  Has the plan of care been signed (Y/N):        []  Yes  [x]  No     Date of Patient follow up with Physician:  10/27/22- Dr. Campos Limb      Is this a Progress Report:     []  Yes  [x]  No        If Yes:  Date Range for reporting period:  Beginning 22  Ending     Progress report will be due (10 Rx or 30 days whichever is less):        Recertification will be due (POC Duration  / 90 days whichever is less): 10/27/22        Visit # Insurance Allowable Auth Required   34 BMN []  Yes [x]  No        Functional Scale: FOTO 29; LEFS 13;    Date assessed:  22 FOTO 43; LEFS 27.2   10/24/22 FOTO    Latex Allergy:  [x]NO      []YES  Preferred Language for Healthcare:   [x]English       []other:    Pain level:  10/10 w/ stretching; 3-4/10 w/ general activities;    SUBJECTIVE:   12 weeks post op from debridement; 15 weeks post op from posterior capsule release  Patient reports that his knee is about the same. He didn't do too much over the weekend. He has been compliant in HEP.        OBJECTIVE:   10/17/22 pt arrived w/ L knee in flex position using 1 crutch for gt pattern;  incision in posterior joint healing w/o any signs or symptoms of infection; general edema around L knee region;    ROM PROM AROM Overpressure Comment    L 10/12/22 R L R L R    Flexion 97° on SOB  70° cold 125°      Extension 0° after stretch w/ op  -11° cold   -2°        Girth  7/29/2022 L R   Mid Patella 44 cm 39.3 cm   Suprapatellar     5cm above     15cm above         Strength L R Comment: majority deferred secondary to surgery   Quad      Hamstring      Abduction      Quad tone FAIR+ GOOD 9/12//2022       Special Test Results/Comment: majority deferred secondary to surgery   Homans  8/29/2022 Known DVT in calf       RESTRICTIONS/PRECAUTIONS: 7/18/22 posterior L knee capsule release; Limited ROM In L knee after injection in April that was limited prior to surg for flex and ext;  DVT left lower leg (still on blood thinners till about Oct)    Exercises/Interventions:   Exercise/Equipment Resistance/Repetitions Other comments   Cardio/Warm-up     Bike Treadmill          Stretching     Hamstring Seated propped 10\"x10 17.5# overpressure   Hip Flexion     ITB     Grion     Quad     Inclined Calf On floor 10\"x10    Towel Pull 30\"x5 over foam roll 17.5# overpressure        ROM     Passive    Active     ROM @EOB    Prone knee hang x10'   15# OP on ankle    Weight Hangs     Sheet Pulls     Ankle Pumps           Patellar Glides     Medial x2'    Superior x2'    Inferior x2'         STRENGTH     Supine 3x10 over foam roll 3# Concentration on distal contraction w/ cuing  Prone     Abduction    clams 3# 3x10    Ext - prone 3# 3x10    Adducton     SLR+          Isometrics     Quad sets      CKC     Calf raises     Wall sits     Step ups L1 on tech x10  Using wall and crutch for assistance   1 leg stand Squatting    CC TKE 35# 10\"x10 Balance         PRE     Extension RANGE: pain free   Flexion  RANGE:   Leg Press  RANGE:        Cable Column     Ed given on POC, expectations and goals    Gt training  X5'; over cups to focus on knee flex for clearance and heel strike w/ quad at [place] w/ WBing Using crutch but focus on knee ext w/ cuing        Manual/Modalities     Manual stretching to L hip into flex, fig 4, add, hip flex x15'    Deep tissue work to HS/scar massage in HS x5'                Therapeutic Exercise and NMR EXR  [x] (26060) Provided verbal/tactile cueing for activities related to strengthening, flexibility, endurance, ROM for improvements in LE, proximal hip, and core control with self care, mobility, lifting, ambulation.  [] (33297) Provided verbal/tactile cueing for activities related to improving balance, coordination, kinesthetic sense, posture, motor skill, proprioception  to assist with LE, proximal hip, and core control in self care, mobility, lifting, ambulation and eccentric single leg control.      NMR and Therapeutic Activities:    [x] (38844 or 86713) Provided verbal/tactile cueing for activities related to improving balance, coordination, kinesthetic sense, posture, motor skill, proprioception and motor activation to allow for proper function of core, proximal hip and LE with self care and ADLs  [] (82379) Gait Re-education- Provided training and instruction to the patient for proper LE, core and proximal hip recruitment and positioning and eccentric body weight control with ambulation re-education including up and down stairs     Home Exercise Program:    [x] (40115) Reviewed/Progressed HEP activities related to strengthening, flexibility, endurance, ROM of core, proximal hip and LE for functional self-care, mobility, lifting and ambulation/stair navigation            Written HEP est    [] (11832)Reviewed/Progressed HEP activities related to improving balance, coordination, kinesthetic sense, posture, motor skill, proprioception of core, proximal hip and LE for self care, mobility, lifting, and ambulation/stair navigation      Manual Treatments:  PROM / STM / Oscillations-Mobs:  G-I, II, III, IV (PA's, Inf., Post.)  [x] (18062) Provided manual therapy to mobilize LE, proximal hip and/or LS spine soft tissue/joints for the purpose of modulating pain, promoting relaxation,  increasing ROM, reducing/eliminating soft tissue swelling/inflammation/restriction, improving soft tissue extensibility and allowing for proper ROM for normal function with self care, mobility, lifting and ambulation. Modalities: CP x10 min propped and 15# over knee   [] GAME READY (VASO)- for significant edema, swelling, pain control. Charges:  Timed Code Treatment Minutes: 45   Total Treatment Minutes: 65     [] EVAL (LOW) 35853 (typically 20 minutes face-to-face)  [] EVAL (MOD) 08866 (typically 30 minutes face-to-face)  [] EVAL (HIGH) 76431 (typically 45 minutes face-to-face)  [] RE-EVAL   [x] PU(09760) 1x  15'   [] IONTO  [] NMR (25765) x     [] VASO x15'  [x] Manual (54372) x 1  x15'     [] Other: GT x12'  [x] TA (47209) 1x   15'   [] Mech Traction (01696)  [] ES(attended) (71451)      [] ES (un) (54743): EMG/Stim combo x15'    GOALS:  Patient stated goal: return to outdoor activities for walking and climbing, along with work  [] Progressing: [] Met: [] Not Met: [] Adjusted     Therapist goals for Patient:  Short Term Goals: To be achieved in: 2 weeks  1. Independent in HEP and progression per patient tolerance, in order to prevent re-injury. [] Progressing: [] Met: [] Not Met: [] Adjusted  2. Patient will have a decrease in pain to facilitate improvement in movement, function, and ADLs as indicated by Functional Deficits. [] Progressing: [] Met: [] Not Met: [] Adjusted     Long Term Goals: To be achieved in: 12+ weeks  1. Functional index score of 67 or more for FOTO to assist with reaching prior level of function. [] Progressing: [] Met: [] Not Met: [] Adjusted  2. Patient will demonstrate increased AROM to 0-125 deg to allow for proper joint functioning as indicated by patients Functional Deficits. [] Progressing: [] Met: [] Not Met: [] Adjusted  3.  Patient will demonstrate an increase in Strength to good proximal hip strength and control, within 5lb HHD in LE to allow for proper functional mobility as indicated by patients Functional Deficits. [] Progressing: [] Met: [] Not Met: [] Adjusted  4. Patient will return to 90% functional activities without increased symptoms or restriction. [] Progressing: [] Met: [] Not Met: [] Adjusted  5. Pt will be able to walk 45 min on uneven surfaces for hiking and climbing. (patient specific functional goal)    [] Progressing: [] Met: [] Not Met: [] Adjusted         Overall Progression Towards Functional goals/ Treatment Progress Update:  [] Patient is progressing as expected towards functional goals listed. [] Progression is slowed due to complexities/Impairments listed. [] Progression has been slowed due to co-morbidities. [x] Plan just implemented, too soon to assess goals progression <30days   [] Goals require adjustment due to lack of progress  [] Patient is not progressing as expected and requires additional follow up with physician  [] Other    ASSESSMENT:  Pt arrived w/ knee in flex position and difficulty getting knee straight. Pt was able to improve motion w/ stretching. Edema was noted in medial, ant L knee joint. Not sure of reasoning for inc in stiffness today from pt's account. Tightness in medial HS today but no tenderness at origin or insertion site. Believe that lat hip tightness is putting L LE in IR rotated position w/ WBing that aggravates medial knee pain. Treatment/Activity Tolerance:  [x] Patient tolerated treatment well [] Patient limited by fatique  [] Patient limited by pain  [] Patient limited by other medical complications- tightness  [] Other:     Prognosis: [x] Good [x] Fair  [] Poor    Patient Requires Follow-up: [x] Yes  [] No    PLAN: Cont 3x/week for ROM restoration focus on gt training for function.    [x] Continue per plan of care [] Alter current plan (see comments)  [x] Plan of care initiated [] Hold pending MD visit [] Discharge    Electronically signed by: Gaudencio Browne PT, MS, OMT-C    Physical Therapist [id number] [address] license #[id number]  Physical Therapist [place] license #[id number]               Note: If patient does not return for scheduled/ recommended follow up visits, this note will serve as a discharge from care along with most recent update on progress.

## 2022-10-26 ENCOUNTER — TREATMENT (OUTPATIENT)
Dept: PHYSICAL THERAPY | Age: 57
End: 2022-10-26
Payer: COMMERCIAL

## 2022-10-26 DIAGNOSIS — M25.562 LEFT KNEE PAIN, UNSPECIFIED CHRONICITY: ICD-10-CM

## 2022-10-26 DIAGNOSIS — R26.2 DIFFICULTY WALKING: ICD-10-CM

## 2022-10-26 DIAGNOSIS — M24.662 ARTHROFIBROSIS OF KNEE JOINT, LEFT: Primary | ICD-10-CM

## 2022-10-26 DIAGNOSIS — M24.662 FIBROSIS OF KNEE JOINT, LEFT: ICD-10-CM

## 2022-10-26 PROCEDURE — 97530 THERAPEUTIC ACTIVITIES: CPT | Performed by: PHYSICAL THERAPIST

## 2022-10-26 PROCEDURE — 97140 MANUAL THERAPY 1/> REGIONS: CPT | Performed by: PHYSICAL THERAPIST

## 2022-10-26 PROCEDURE — 97110 THERAPEUTIC EXERCISES: CPT | Performed by: PHYSICAL THERAPIST

## 2022-10-26 NOTE — PROGRESS NOTES
[facility]   Phone: [phone]    Fax: [phone]    Physical Therapy Treatment Note/ Progress Report:         Date:  10/26/2022    Patient Name:  Afia Fabian    :  1965  MRN: [id number]  Restrictions/Precautions:  DVT L calf; cervical fusion; flex contracture  Medical/Treatment Diagnosis Information:  Diagnosis: M24.662 arthofibrous L knee   DOS 7/15/22 posterior capsule release    Treating Diagnosis: limited walking, limited knee ROM, pain in knee, weakness in L LE    DOS 22 IV filter placement  due to calf DVT and debridement of L knee with irrigation; Insurance/Certification information:  PT Insurance Information: ramirez  Physician Information:  Referring Practitioner: Dr. Garry Sultana  Has the plan of care been signed (Y/N):        []  Yes  [x]  No     Date of Patient follow up with Physician:  10/27/22- Dr. Beba Gaming      Is this a Progress Report:     []  Yes  [x]  No        If Yes:  Date Range for reporting period:  Beginning 22  Ending     Progress report will be due (10 Rx or 30 days whichever is less): 48       Recertification will be due (POC Duration  / 90 days whichever is less): 10/27/22        Visit # Insurance Allowable Auth Required   35 BMN []  Yes [x]  No        Functional Scale: FOTO 29; LEFS 13;    Date assessed:  22 FOTO 43; LEFS 27.2   10/24/22 FOTO    Latex Allergy:  [x]NO      []YES  Preferred Language for Healthcare:   [x]English       []other:    Pain level:  10/10 w/ stretching; 3-4/10 w/ general activities;    SUBJECTIVE:   12 weeks post op from debridement; 15 weeks post op from posterior capsule release  Patient reports that knee was feeling really good yesterday and did do a little more. Pt feels that knee motion is getting easier to maintain but still doing all the stretching at home. Pt continually reports pain in ant L knee w/ activities.        OBJECTIVE:   10/17/22 pt arrived w/ L knee in flex position using 1 crutch for gt pattern;  incision in posterior joint healing w/o any signs or symptoms of infection; general edema around L knee region;    ROM PROM AROM Overpressure Comment    L 10/26/22 R L R L R    Flexion 97° on SOB  70° cold 125°      Extension 0° after stretch w/ op  -11° cold   -2°        Girth  7/29/2022 L R   Mid Patella 44 cm 39.3 cm   Suprapatellar     5cm above     15cm above         Strength L R Comment: majority deferred secondary to surgery   Quad      Hamstring      Abduction      Quad tone FAIR+ GOOD 9/12//2022       Special Test Results/Comment: majority deferred secondary to surgery   Homans  8/29/2022 Known DVT in calf       RESTRICTIONS/PRECAUTIONS: 7/18/22 posterior L knee capsule release; Limited ROM In L knee after injection in April that was limited prior to surg for flex and ext;  DVT left lower leg (still on blood thinners till about Oct)    Exercises/Interventions:   Exercise/Equipment Resistance/Repetitions Other comments   Cardio/Warm-up     Bike Treadmill          Stretching     Hamstring Seated propped 10\"x10 17.5# overpressure   Hip Flexion     ITB     Grion     Quad     Inclined Calf On floor 10\"x10    Towel Pull 30\"x5 over foam roll 17.5# overpressure        ROM     Passive    Active     ROM @EOB    Prone knee hang x10'   15# OP on ankle    Weight Hangs     Sheet Pulls     Ankle Pumps           Patellar Glides     Medial x2'    Superior x2'    Inferior x2'         STRENGTH     Supine 3x10 over foam roll 3# Concentration on distal contraction w/ cuing  Prone     Abduction    clams 3# 3x10    Ext - prone 3# 3x10    Adducton     SLR+          Isometrics     Quad sets      CKC     Calf raises     Wall sits     Step ups Using wall and crutch for assistance   1 leg stand Squatting    CC TKE 35# 10\"x10 Balance         PRE     Extension RANGE: pain free   Flexion  RANGE:   Leg Press  RANGE:        Cable Column     Ed given on POC, expectations and goals    Gt training X5'; over cups to focus on knee flex for clearance and heel strike w/ quad at [facility] w/ WBing Using crutch but focus on knee ext w/ cuing        Manual/Modalities     Manual stretching to L hip into flex, fig 4, add, hip flex x15'    Deep tissue work to HS/scar massage in HS x5'                Therapeutic Exercise and NMR EXR  [x] (60152) Provided verbal/tactile cueing for activities related to strengthening, flexibility, endurance, ROM for improvements in LE, proximal hip, and core control with self care, mobility, lifting, ambulation.  [] (31769) Provided verbal/tactile cueing for activities related to improving balance, coordination, kinesthetic sense, posture, motor skill, proprioception  to assist with LE, proximal hip, and core control in self care, mobility, lifting, ambulation and eccentric single leg control.      NMR and Therapeutic Activities:    [x] (53969 or 73844) Provided verbal/tactile cueing for activities related to improving balance, coordination, kinesthetic sense, posture, motor skill, proprioception and motor activation to allow for proper function of core, proximal hip and LE with self care and ADLs  [] (60923) Gait Re-education- Provided training and instruction to the patient for proper LE, core and proximal hip recruitment and positioning and eccentric body weight control with ambulation re-education including up and down stairs     Home Exercise Program:    [x] (82739) Reviewed/Progressed HEP activities related to strengthening, flexibility, endurance, ROM of core, proximal hip and LE for functional self-care, mobility, lifting and ambulation/stair navigation            Written HEP est    [] (55378)Reviewed/Progressed HEP activities related to improving balance, coordination, kinesthetic sense, posture, motor skill, proprioception of core, proximal hip and LE for self care, mobility, lifting, and ambulation/stair navigation      Manual Treatments:  PROM / STM / Oscillations-Mobs:  G-I, II, III, IV (PA's, Inf., Post.)  [x] (46721) Provided manual therapy to mobilize LE, proximal hip and/or LS spine soft tissue/joints for the purpose of modulating pain, promoting relaxation,  increasing ROM, reducing/eliminating soft tissue swelling/inflammation/restriction, improving soft tissue extensibility and allowing for proper ROM for normal function with self care, mobility, lifting and ambulation. Modalities: CP x10 min propped and 15# over knee   [] GAME READY (VASO)- for significant edema, swelling, pain control. Charges:  Timed Code Treatment Minutes: 45   Total Treatment Minutes: 65     [] EVAL (LOW) 93268 (typically 20 minutes face-to-face)  [] EVAL (MOD) 26118 (typically 30 minutes face-to-face)  [] EVAL (HIGH) 17122 (typically 45 minutes face-to-face)  [] RE-EVAL   [x] YL(77635) 1x  15'   [] IONTO  [] NMR (21897) x     [] VASO x15'  [x] Manual (11733) x 1  x15'     [] Other: GT x12'  [x] TA (91678) 1x   15'   [] Mech Traction (76818)  [] ES(attended) (31665)      [] ES (un) (93578): EMG/Stim combo x15'    GOALS:  Patient stated goal: return to outdoor activities for walking and climbing, along with work  [] Progressing: [] Met: [] Not Met: [] Adjusted     Therapist goals for Patient:  Short Term Goals: To be achieved in: 2 weeks  1. Independent in HEP and progression per patient tolerance, in order to prevent re-injury. [] Progressing: [] Met: [] Not Met: [] Adjusted  2. Patient will have a decrease in pain to facilitate improvement in movement, function, and ADLs as indicated by Functional Deficits. [] Progressing: [] Met: [] Not Met: [] Adjusted     Long Term Goals: To be achieved in: 12+ weeks  1. Functional index score of 67 or more for FOTO to assist with reaching prior level of function. [] Progressing: [] Met: [] Not Met: [] Adjusted  2. Patient will demonstrate increased AROM to 0-125 deg to allow for proper joint functioning as indicated by patients Functional Deficits.   [] Progressing: [] Met: [] Not Met: [] Adjusted  3. Patient will demonstrate an increase in Strength to good proximal hip strength and control, within 5lb HHD in LE to allow for proper functional mobility as indicated by patients Functional Deficits. [] Progressing: [] Met: [] Not Met: [] Adjusted  4. Patient will return to 90% functional activities without increased symptoms or restriction. [] Progressing: [] Met: [] Not Met: [] Adjusted  5. Pt will be able to walk 45 min on uneven surfaces for hiking and climbing. (patient specific functional goal)    [] Progressing: [] Met: [] Not Met: [] Adjusted         Overall Progression Towards Functional goals/ Treatment Progress Update:  [] Patient is progressing as expected towards functional goals listed. [] Progression is slowed due to complexities/Impairments listed. [] Progression has been slowed due to co-morbidities. [x] Plan just implemented, too soon to assess goals progression <30days   [] Goals require adjustment due to lack of progress  [] Patient is not progressing as expected and requires additional follow up with physician  [] Other    ASSESSMENT:  Pt required less time and less overpressure to reach full knee ext. Quad contraction is good. Edema fluctuates from session to session but remaining min in L knee region primarily. Gt skills when practicing are good but are painful in ant knee region so difficult for pt to maintain w/ all walking. Less tightness noted in hip region w/ stretching. Treatment/Activity Tolerance:  [x] Patient tolerated treatment well [] Patient limited by fatique  [] Patient limited by pain  [] Patient limited by other medical complications- tightness  [] Other:     Prognosis: [x] Good [x] Fair  [] Poor    Patient Requires Follow-up: [x] Yes  [] No    PLAN: Cont 3x/week for ROM restoration focus on gt training for function.    [x] Continue per plan of care [] Alter current plan (see comments)  [x] Plan of care initiated [] Hold pending MD visit [] Discharge    Electronically signed by: Geo Worley PT, MS, OMMAUREEN-C    Physical Therapist [place] license #[id number]  Physical Therapist [place] license #[id number]               Note: If patient does not return for scheduled/ recommended follow up visits, this note will serve as a discharge from care along with most recent update on progress.

## 2022-10-27 ENCOUNTER — OFFICE VISIT (OUTPATIENT)
Dept: ORTHOPEDIC SURGERY | Age: 57
End: 2022-10-27
Payer: COMMERCIAL

## 2022-10-27 VITALS — WEIGHT: 195 LBS | HEIGHT: 69 IN | BODY MASS INDEX: 28.88 KG/M2

## 2022-10-27 DIAGNOSIS — Z86.718 HISTORY OF DVT (DEEP VEIN THROMBOSIS): ICD-10-CM

## 2022-10-27 DIAGNOSIS — M24.662 ARTHROFIBROSIS OF KNEE JOINT, LEFT: Primary | ICD-10-CM

## 2022-10-27 DIAGNOSIS — M00.9 INFECTION OF KNEE (HCC): ICD-10-CM

## 2022-10-27 DIAGNOSIS — R26.89 ANTALGIC GAIT: ICD-10-CM

## 2022-10-27 PROCEDURE — 99214 OFFICE O/P EST MOD 30 MIN: CPT | Performed by: ORTHOPAEDIC SURGERY

## 2022-10-27 NOTE — PROGRESS NOTES
12 [facility]  History and Physical      Date:  10/27/2022    Name:  Young Anderson  Address:  [facility] [address]    :  1965      Age:   59 y.o. Chief Complaint    Knee Pain (Left knee follow up)      History of Present Illness:  Young Anderson is a 59 y.o. male who presents for follow-up evaluation of his left knee pain. To briefly recap, the patient received a corticosteroid injection on 2022 prior to traveling to [facility]. The patient subsequently developed left knee infection and was hospitalized in [place]. He was found to have a septic knee joint was was washed out. He was placed on IV antibiotics for 6 weeks. On follow-up he was later found to have a DVT in the deep peroneal vein. He was placed on Xarelto 15 mg. He was originally seen in our office on 2022 where he was diagnosed with arthrofibrosis in the left knee. Patient had an IVC filter placed. Follow-up and further treatment was conducted by Dr. Lan Rios who performed a left knee posterior capsular release on 07/15/2022. Cultures from that procedure came back positive for Cutibacterium acnes. He completed an antibiotic regimen after left knee arthroscopy, I&D and lysis of adhesions by Dr. Irma Lara on 2022. The patient follow-up with physical therapy undergoing 3 total months of treatment. In addition, cylinder cast was placed for 48 hours before it was bivalved and converted to a dropout cast.  The patient has been utilizing this for 2 to 4 hours a day to achieve terminal extension. On his original visit his range of motion was approximately 40 to 80 degrees. Now the patient exhibits a range of motion of approximately 5 to 95 degrees. Overall he feels his pain is dramatically improved in addition to his range of motion.   He still finds it difficult to achieve endrange left knee extension and still ambulates with a single crutch due to an antalgic gait. On average he rates his discomfort a 4/10 and achy through the anterior aspect of the knee. Other conservative treatments he is currently utilizing include: rest, ice, elevation, bracing, physical therapy, home exercises, activity modification, and NSAIDs as needed. The patient denies any new injury. The patient denies any catching, giving way, joint locking, numbness, paresthesias, or weakness. HX from the patient's last visit with our team 06/07/22. Barbra Presley is a 60 y.o. male who presents for left knee pain and stiffness. 64-year-old male patient presented with left knee pain and stiffness. Patient who is known to have left knee osteoarthritis and was booked for a total knee replacement in [facility] since he on November 2022. Patient was  planning to visit [facility]. He received left knee steroid injection on April 4, 2022 before traveling. After few days from the injection he started to complain of increasing left knee pain and swelling. He went to the emergency department in [facility] where knee aspiration showed septic knee. He underwent left knee arthrotomy and washout on April 11, 2022. He was started on IV antibiotics for 5 days. Patient went back to [place] on April 17. His flight back was 10 hours total.  Once he was in [place] he was started on IV antibiotics again and finished 6 weeks of antibiotics collectively. During his follow-up, his ID physician requested an ultrasound which showed left leg DVT and deep peroneal vein. He was started on Xarelto 15 mg. Patient now complains of severe stiffness and decreased range of motion associated with pain. Patient was instructed to use crutches to walk. He states that he was not in therapy since April 4th. Patient states that cultures showed MSSA infection. Last labs were taken on May 10, 2022 showed ESR of 11 and C-reactive protein of 4.4. The patient denies any new injury.   The patient denies any clicking, popping, catching, giving way, joint locking, numbness, paresthesias, or weakness. Pain Assessment  Location of Pain: Knee  Location Modifiers: Left  Severity of Pain: 4  Quality of Pain: Aching  Duration of Pain: A few minutes  Frequency of Pain: Intermittent  Aggravating Factors: Kneeling, Walking  Limiting Behavior: Some  Result of Injury: No  Work-Related Injury: No  Are there other pain locations you wish to document?: No    Past Medical History:   Diagnosis Date    DVT of lower extremity (deep venous thrombosis) (HCC)     left lower leg, diagnosed 5/2022    GERD (gastroesophageal reflux disease)     Hyperlipidemia     Hypertension         Past Surgical History:   Procedure Laterality Date    CERVICAL FUSION      anterior fusion c6-7    COLONOSCOPY      FOOT AMPUTATION Right 1983    partial    IR IVC FILTER PLACEMENT W IMAGING  8/5/2022    IR IVC FILTER PLACEMENT W IMAGING 8/5/2022 Sonia Mccray MD [facility] SPECIAL PROCEDURES    KNEE ARTHROSCOPY      KNEE ARTHROSCOPY Right     KNEE ARTHROSCOPY Left 8/5/2022    ARTHROSCOPIC INCISION AND DRAINAGE LEFT KNEE performed by Simona Lara MD at [place] [address] ARTHROTOMY Left 7/15/2022    OPEN POSTERIOR KNEE DISSECTION, POSTERIOR CAPSULAR RELEASE performed by Simona Lara MD at [address] [place] [address]       Family History   Problem Relation Age of Onset    Cancer Father     Heart Disease Father        Social History     Socioeconomic History    Marital status:      Spouse name: None    Number of children: None    Years of education: None    Highest education level: None   Tobacco Use    Smoking status: Never    Smokeless tobacco: Never   Vaping Use    Vaping Use: Never used   Substance and Sexual Activity    Alcohol use:  Yes     Alcohol/week: 10.0 - 12.0 standard drinks     Types: 10 - 12 Shots of liquor per week    Drug use: Never       Current Outpatient Medications   Medication Sig Dispense Refill    ondansetron (ZOFRAN) 4 MG tablet Take 1 tablet by mouth every 8 hours as needed for Nausea 21 tablet 0    enoxaparin (LOVENOX) 300 MG/3ML injection Inject into the skin daily (Patient not taking: Reported on 8/4/2022)      aspirin 325 MG EC tablet Take 1 tablet by mouth daily for 14 days (Patient not taking: Reported on 8/4/2022) 14 tablet 0    ondansetron (ZOFRAN) 4 MG tablet Take 1 tablet by mouth every 8 hours as needed for Nausea 21 tablet 0    lisinopril (PRINIVIL;ZESTRIL) 40 MG tablet Take 40 mg by mouth in the morning. metoprolol succinate (TOPROL XL) 50 MG extended release tablet Take 50 mg by mouth in the morning. XARELTO 20 MG TABS tablet       meloxicam (MOBIC) 15 MG tablet Take 15 mg by mouth daily as needed (Patient not taking: No sig reported)      sildenafil (VIAGRA) 50 MG tablet Take 50 mg by mouth as needed      esomeprazole (NEXIUM) 20 MG delayed release capsule Take 20 mg by mouth every morning (before breakfast)      acetaminophen (TYLENOL) 500 MG tablet Take 500 mg by mouth every 8 hours as needed       No current facility-administered medications for this visit. No Known Allergies      Review of Systems:  A 14 point review of systems was completed by the patient and is available in the media section of the scanned medical record and was reviewed. Vital Signs:   Ht 5' 9\" (1.753 m)   Wt 195 lb (88.5 kg)   BMI 28.80 kg/m²     General Exam:    General: Guy Bolton is a healthy and well appearing [address] year old male who is sitting comfortably in our office in no acute distress. Neuro: Alert & Oriented x 3,  normal,  no focal deficits noted. Normal mood & affect. Eyes: Sclera clear  Ears: Normal external ear  Mouth: No oral lesions observed  Pulm: Respirations unlabored and regular   Skin: Warm, well perfused      Knee Examination: Left    Inspection: Mild effusion, ecchymosis, discoloration, erythema, excessive warmth. no signs of infection.       Palpation: There is patellofemoral joint crepitus, there is medial joint line tenderness. No other osseous or soft tissue tenderness around the knee. Discomfort with patellar compression. Negative calf tenderness     Range of Motion:  5-95 degrees. Approximately 50% decreased patellar mobility. Strength: Grossly intact     Special Tests:   No ligament instability,  Negative Homans sign     Gait: Walks with flexion on the left knee using crutches    Alignment: No varus deformity     Neuro: Sensation equal bilateral lower extremities     Vascular: 2+ posterior tibialis pulse        Radiology:   Previously obtain imaging reviewed. No new images obtained. Assessment: This patient is a 59 y.o. male presenting to the office for follow-up evaluation of his left knee. This patient has a diagnosis of arthrofibrosis status post a left septic knee joint. Encounter Diagnoses   Name Primary? Arthrofibrosis of knee joint, left Yes    Infection of knee ([address]     History of DVT (deep vein thrombosis)     Antalgic gait        Medical decision making:    's work-up and evaluation were reviewed in the office with the patient. All of the previous laboratory studies and as well the operative procedures were again reviewed with the patient. Given the patient's history and his physical exam I believe that the best course to follow here is at the present time to continue the conservative treatment for the next 6months. He should have a repeat of all of the systemic studies and a future radioactive WBC labeled study for proof that the infectious process is completely treated. This future study should be at least 6 months from the current time. He is not a candidate for consideration of any surgery at this point because 1 cannot exclude a long-term eradication of his infectious process.     From positive perspective the posterior releases have been very beneficial, the patient is very pleased with the fact that he can achieve within 10 to 12 degrees of full extension and therefore his gait mechanics and his daily function are certainly much improved. In summary this patient will ultimately come to a total joint replacement at which time appropriate excision of scar tissue and appropriate bony cuts will be made to achieve full extension and is well full flexion at the time of surgery. Obviously multiple cultures will be obtained at that point. Again consideration for surgery should only be entertained many months down the road when the infectious process has been totally excluded. Treatment Plan:    Repeat labs (ESR, CRP, CBC) in 4 weeks  Future WBC scan to evaluate for infection  Aspirate and Synovasure in 4 weeks  Continue physical therapy and home exercise program  Crutches with weightbearing as tolerated  Follow-up with infectious disease to evaluate if the patient should be on 3 more months of p.o. antibiotics  Activity modification/Rest   Ice 20 minutes ever 1-2 hours PRN  Take medications as prescribed/instructed  Elevation   Lightweight exercise/low impact exercise  Appropriate diet/weight management      Follow up: 6 months and as needed        Hal Wallace PA-C    Physician Assistant - Certified    80/66/89 11:46 AM      The encounter with Grace Thomson was supervised by Dr. Marian Sahu who personally examined the patient and reviewed the plan. This dictation was performed with a verbal recognition program (DRAGON) and it was checked for errors. It is possible that there are still dictated errors within this office note. If so, please bring any errors to my attention for an addendum. All efforts were made to ensure that this office note is accurate. I have completed and reviewed in detail the history, ROS, PMHx, FMHx, and Shx for this patient. Special mention is made of added complexity of this patient with an arthrofibrosis and associated infectious process as dictated above.     A comprehensive physical exam was performed which assisted in the establishment of our diagnosis and is accurate to the best of my knowledge. Special mention is made of the improvement that he has had following the posterior releases as detailed above. .     The medical decision making involves the following diagnosis of arthrofibrosis and the infectious process as detailed above. The medical decision making involves different treatment options as follows: That he should not have any operative intervention at this time. .  The review of the patient's records and data shows he has made good progress on the treatment program that we have outlined many months ago. .  The patient risks were reviewed and the patient educated on understanding the risks and benefits of the treatment options and the primary consideration is that there would be a chance of recurrence infection with the future knee replacement. This patient is in the moderate category of risk due to the still necessity of excluding an infectious process as detailed above. .   This dictation was performed with a verbal recognition program (DRAGON) and it was checked for errors. It is possible that there are still dictated errors within this office note. If so, please bring any errors to my attention for an addendum. All efforts were made to ensure that this office note is accurate. Supervising Physician Attestation:  I, Dr. Myesha Meneses, personally performed the services described in this documentation as above, and it is both accurate and complete and I agree with all pertinent clinical information. I personally interviewed the patient and performed a physical examination and medical decision making. I discussed the patient's condition and treatment options and have  reviewed and agree with the past medical, family and social history unless otherwise noted. All of the patient's questions were answered.  I personally supervised the Orthopedic and Sportsmedicine Fellow when when noted in the evaluation and treatment plan of the patient.       Board Certified Orthopaedic Surgeon  [facility] and [address] and [address] [employer]  Professor of [address]

## 2022-10-31 ENCOUNTER — TREATMENT (OUTPATIENT)
Dept: PHYSICAL THERAPY | Age: 57
End: 2022-10-31
Payer: COMMERCIAL

## 2022-10-31 DIAGNOSIS — M24.662 ARTHROFIBROSIS OF KNEE JOINT, LEFT: Primary | ICD-10-CM

## 2022-10-31 DIAGNOSIS — M24.662 FIBROSIS OF KNEE JOINT, LEFT: ICD-10-CM

## 2022-10-31 DIAGNOSIS — M25.562 LEFT KNEE PAIN, UNSPECIFIED CHRONICITY: ICD-10-CM

## 2022-10-31 DIAGNOSIS — R26.2 DIFFICULTY WALKING: ICD-10-CM

## 2022-10-31 PROCEDURE — 97140 MANUAL THERAPY 1/> REGIONS: CPT | Performed by: PHYSICAL THERAPIST

## 2022-10-31 PROCEDURE — 97530 THERAPEUTIC ACTIVITIES: CPT | Performed by: PHYSICAL THERAPIST

## 2022-10-31 PROCEDURE — 97110 THERAPEUTIC EXERCISES: CPT | Performed by: PHYSICAL THERAPIST

## 2022-10-31 NOTE — PROGRESS NOTES
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 Maico Russo  Has the plan of care been signed (Y/N):        []  Yes  [x]  No     Date of Patient follow up with Physician:  10/27/22- Dr. Ruben Garcia      Is this a Progress Report:     []  Yes  [x]  No        If Yes:  Date Range for reporting period:  Beginning 22  Ending     Progress report will be due (10 Rx or 30 days whichever is less):        Recertification will be due (POC Duration  / 90 days whichever is less): 10/27/22        Visit # Insurance Allowable Auth Required   36 BMN []  Yes [x]  No        Functional Scale: FOTO 29; LEFS 13;    Date assessed:  22 FOTO 43; LEFS 27.2   10/24/22 FOTO    Latex Allergy:  [x]NO      []YES  Preferred Language for Healthcare:   [x]English       []other:    Pain level:  10/10 w/ stretching; 3-4/10 w/ general activities;    SUBJECTIVE:   13 weeks post op from debridement; 16 weeks post op from posterior capsule release  Patient had MD appt last week w/ Dr. Ruben Garcia. Dr. Ruben Garcia was pleased w/ progress in L knee ROM. Dr. Ruben Garcia wanted pt to follow up with infectious disease MD to clarify what needed to be done to monitor for infection. Pt had appt on Friday and pt was put on anti biotics pills for 3 months.  TKA surg planned for 6 months if knee infection remain gone.         OBJECTIVE:   10/17/22 pt arrived w/ L knee in flex position using 1 crutch for gt pattern;  incision in posterior joint healing w/o any signs or symptoms of infection; general edema around L knee region;    ROM PROM AROM Overpressure Comment    L 10/26/22 R L R L R    Flexion 97° on SOB  70° cold 125°      Extension 0° after stretch w/ op  -11° cold   -2°        Girth  7/29/2022 L R   Mid Patella 44 cm 39.3 cm   Suprapatellar     5cm above     15cm above         Strength L R Comment: majority deferred secondary to surgery   Quad      Hamstring      Abduction      Quad tone FAIR+ GOOD 9/12//2022       Special Test Results/Comment: majority deferred secondary to surgery   Homans  8/29/2022 Known DVT in calf       RESTRICTIONS/PRECAUTIONS: 7/18/22 posterior L knee capsule release; Limited ROM In L knee after injection in April that was limited prior to surg for flex and ext;  DVT left lower leg (still on blood thinners till about Oct)    Exercises/Interventions:   Exercise/Equipment Resistance/Repetitions Other comments   Cardio/Warm-up     Bike Treadmill          Stretching     Hamstring Seated propped 10\"x10 17.5# overpressure   Hip Flexion     ITB     Grion     Quad     Inclined Calf On floor 10\"x10    Towel Pull 30\"x5 over foam roll 17.5# overpressure        ROM     Passive    Active     ROM @EOB    Prone knee hang x10'   15# OP on ankle    Weight Hangs     Sheet Pulls     Ankle Pumps           Patellar Glides     Medial x2'    Superior x2'    Inferior x2'         STRENGTH     Supine 3x10 over foam roll 3# Concentration on distal contraction w/ cuing  Prone     Abduction    clams 3# 3x10    Ext - prone 3# 3x10    Adducton     SLR+          Isometrics     Quad sets      CKC     Calf raises     Wall sits     Step ups L1 on tech x10  L1 lateral step up x10 Using wall 1 leg stand Squatting    CC TKE 35# 10\"x10 Balance     Biodex L9 2' POC & 1' RC    PRE     Extension RANGE: pain free   Flexion  RANGE:   Leg Press Consider NPV RANGE:        Cable Column     Ed given on POC, expectations and goals    Gt training  X5'; over cups to focus on knee flex for clearance and heel strike w/ quad at [place] w/ WBing Using crutch but focus on knee ext w/ cuing        Manual/Modalities     Manual stretching to L hip into flex, fig 4, add, hip flex x15'    Deep tissue work to HS/scar massage in HS x5'                Therapeutic Exercise and NMR EXR  [x] (18584) Provided verbal/tactile cueing for activities related to strengthening, flexibility, endurance, ROM for improvements in LE, proximal hip, and core control with self care, mobility, lifting, ambulation.  [] (85562) Provided verbal/tactile cueing for activities related to improving balance, coordination, kinesthetic sense, posture, motor skill, proprioception  to assist with LE, proximal hip, and core control in self care, mobility, lifting, ambulation and eccentric single leg control.      NMR and Therapeutic Activities:    [x] (66001 or 09825) Provided verbal/tactile cueing for activities related to improving balance, coordination, kinesthetic sense, posture, motor skill, proprioception and motor activation to allow for proper function of core, proximal hip and LE with self care and ADLs  [] (48667) Gait Re-education- Provided training and instruction to the patient for proper LE, core and proximal hip recruitment and positioning and eccentric body weight control with ambulation re-education including up and down stairs     Home Exercise Program:    [x] (01597) Reviewed/Progressed HEP activities related to strengthening, flexibility, endurance, ROM of core, proximal hip and LE for functional self-care, mobility, lifting and ambulation/stair navigation            Written HEP est    [] (68730)Reviewed/Progressed HEP activities related to improving balance, coordination, kinesthetic sense, posture, motor skill, proprioception of core, proximal hip and LE for self care, mobility, lifting, and ambulation/stair navigation      Manual Treatments:  PROM / STM / Oscillations-Mobs:  G-I, II, III, IV (PA's, Inf., Post.)  [x] (05621) Provided manual therapy to mobilize LE, proximal hip and/or LS spine soft tissue/joints for the purpose of modulating pain, promoting relaxation,  increasing ROM, reducing/eliminating soft tissue swelling/inflammation/restriction, improving soft tissue extensibility and allowing for proper ROM for normal function with self care, mobility, lifting and ambulation. Modalities: CP x10 min propped and 15# over knee   [] GAME READY (VASO)- for significant edema, swelling, pain control. Charges:  Timed Code Treatment Minutes: 45   Total Treatment Minutes: 65     [] EVAL (LOW) 03019 (typically 20 minutes face-to-face)  [] EVAL (MOD) 05263 (typically 30 minutes face-to-face)  [] EVAL (HIGH) 45546 (typically 45 minutes face-to-face)  [] RE-EVAL   [x] AK(40610) 1x  15'   [] IONTO  [] NMR (28583) x     [] VASO x15'  [x] Manual (73019) x 1  x15'     [] Other: GT x12'  [x] TA (10026) 1x   15'   [] Mech Traction (28642)  [] ES(attended) (04041)      [] ES (un) (47261): EMG/Stim combo x15'    GOALS:  Patient stated goal: return to outdoor activities for walking and climbing, along with work  [] Progressing: [] Met: [] Not Met: [] Adjusted     Therapist goals for Patient:  Short Term Goals: To be achieved in: 2 weeks  1. Independent in HEP and progression per patient tolerance, in order to prevent re-injury. [] Progressing: [] Met: [] Not Met: [] Adjusted  2. Patient will have a decrease in pain to facilitate improvement in movement, function, and ADLs as indicated by Functional Deficits. [] Progressing: [] Met: [] Not Met: [] Adjusted     Long Term Goals: To be achieved in: 12+ weeks  1. Functional index score of 67 or more for FOTO to assist with reaching prior level of function. [] Progressing: [] Met: [] Not Met: [] Adjusted  2.  Patient will demonstrate increased AROM to 0-125 deg to allow for proper joint functioning as indicated by patients Functional Deficits. [] Progressing: [] Met: [] Not Met: [] Adjusted  3. Patient will demonstrate an increase in Strength to good proximal hip strength and control, within 5lb HHD in LE to allow for proper functional mobility as indicated by patients Functional Deficits. [] Progressing: [] Met: [] Not Met: [] Adjusted  4. Patient will return to 90% functional activities without increased symptoms or restriction. [] Progressing: [] Met: [] Not Met: [] Adjusted  5. Pt will be able to walk 45 min on uneven surfaces for hiking and climbing. (patient specific functional goal)    [] Progressing: [] Met: [] Not Met: [] Adjusted         Overall Progression Towards Functional goals/ Treatment Progress Update:  [] Patient is progressing as expected towards functional goals listed. [] Progression is slowed due to complexities/Impairments listed. [] Progression has been slowed due to co-morbidities. [x] Plan just implemented, too soon to assess goals progression <30days   [] Goals require adjustment due to lack of progress  [] Patient is not progressing as expected and requires additional follow up with physician  [] Other    ASSESSMENT:  Pt did well w/ stretching into ext w/ less overpressure needed. Now that MD is pleased w/ ext and pt maintaining from one session to the next so added strengthening program. Balancing was good on biodex w/ min challenge. Pt is amb better as pain in ant knee has lessened. Gt pattern has improved w/ less dependence on crutch.      Treatment/Activity Tolerance:  [x] Patient tolerated treatment well [] Patient limited by fatique  [] Patient limited by pain  [] Patient limited by other medical complications- tightness  [] Other:     Prognosis: [x] Good [x] Fair  [] Poor    Patient Requires Follow-up: [x] Yes  [] No    PLAN: Cont 3x/week for ROM restoration focus on gt training for function. [x] Continue per plan of care [] Alter current plan (see comments)  [x] Plan of care initiated [] Hold pending MD visit [] Discharge    Electronically signed by: Gaudencio Browne PT, MS, OMT-C    Physical Therapist [place] license #[id number]  Physical Therapist [address] [place] [address] license #[id number]               Note: If patient does not return for scheduled/ recommended follow up visits, this note will serve as a discharge from care along with most recent update on progress.

## 2022-11-02 ENCOUNTER — TREATMENT (OUTPATIENT)
Dept: PHYSICAL THERAPY | Age: 57
End: 2022-11-02
Payer: COMMERCIAL

## 2022-11-02 DIAGNOSIS — M25.562 LEFT KNEE PAIN, UNSPECIFIED CHRONICITY: ICD-10-CM

## 2022-11-02 DIAGNOSIS — R26.2 DIFFICULTY WALKING: ICD-10-CM

## 2022-11-02 DIAGNOSIS — M24.662 ARTHROFIBROSIS OF KNEE JOINT, LEFT: Primary | ICD-10-CM

## 2022-11-02 PROCEDURE — 97110 THERAPEUTIC EXERCISES: CPT | Performed by: PHYSICAL THERAPIST

## 2022-11-02 PROCEDURE — 97530 THERAPEUTIC ACTIVITIES: CPT | Performed by: PHYSICAL THERAPIST

## 2022-11-02 PROCEDURE — 97016 VASOPNEUMATIC DEVICE THERAPY: CPT | Performed by: PHYSICAL THERAPIST

## 2022-11-02 PROCEDURE — 97140 MANUAL THERAPY 1/> REGIONS: CPT | Performed by: PHYSICAL THERAPIST

## 2022-11-02 NOTE — PROGRESS NOTES
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 Mcfarlane Ceasar  Has the plan of care been signed (Y/N):        []  Yes  [x]  No     Date of Patient follow up with Physician:  10/27/22- Dr. Elizabeth Ortegaf      Is this a Progress Report:     []  Yes  [x]  No        If Yes:  Date Range for reporting period:  Beginning 22  Ending     Progress report will be due (10 Rx or 30 days whichever is less):        Recertification will be due (POC Duration  / 90 days whichever is less): 10/27/22        Visit # Insurance Allowable Auth Required   36 BMN []  Yes [x]  No        Functional Scale: FOTO 29; LEFS 13;    Date assessed:  22 FOTO 43; LEFS 27.2   10/24/22 FOTO    Latex Allergy:  [x]NO      []YES  Preferred Language for Healthcare:   [x]English       []other:    Pain level:  10/10 w/ stretching; 3-4/10 w/ general activities;    SUBJECTIVE:   13 weeks post op from debridement; 16 weeks post op from posterior capsule release  Patient reports that he feels about the same. Has been walking some without his crutch. Does better after he stretches out. He has been doing some of the newer exercises at home.     Squatting and leg raises with weights        OBJECTIVE:   10/17/22 pt arrived w/ L knee in flex position using 1 crutch for gt pattern;  incision in posterior joint healing w/o any signs or symptoms of infection; general edema around L knee region;    ROM PROM AROM Overpressure Comment    L 10/26/22 R L R L R    Flexion 97° on SOB  70° cold 125°      Extension 0° after stretch w/ op  -11° cold   -2°        Girth  7/29/2022 L R   Mid Patella 44 cm 39.3 cm   Suprapatellar     5cm above     15cm above         Strength L R Comment: majority deferred secondary to surgery   Quad      Hamstring      Abduction      Quad tone FAIR+ GOOD 9/12//2022       Special Test Results/Comment: majority deferred secondary to surgery   Homans  8/29/2022 Known DVT in calf       RESTRICTIONS/PRECAUTIONS: 7/18/22 posterior L knee capsule release; Limited ROM In L knee after injection in April that was limited prior to surg for flex and ext;  DVT left lower leg (still on blood thinners till about Oct)    Exercises/Interventions:   Exercise/Equipment Resistance/Repetitions Other comments   Cardio/Warm-up     Bike Treadmill          Stretching     Hamstring Seated propped 10\"x10 17.5# overpressure   Hip Flexion     ITB     Grion     Quad     Inclined Calf On floor 10\"x10    Towel Pull 30\"x5 over foam roll 17.5# overpressure        ROM     Passive    Active     ROM @EOB    Prone knee hang x10'   15# OP on ankle    Weight Hangs     Sheet Pulls     Ankle Pumps           Patellar Glides     Medial x2'    Superior x2'    Inferior x2'         STRENGTH     Supine 3x10 over foam roll 5# Concentration on distal contraction w/ cuing  Prone     Abduction 5# 3x10    clams 5# 3x10    Ext - prone 5# 3x10    Adducton     SLR+          Isometrics     Quad sets      CKC     Calf raises     Wall sits     Step ups L1 on tech x10  L1 lateral step up x10 Using wall 1 leg stand Squatting    CC TKE 35# 10\"x10 Balance     Biodex L9 LOS x2 & 1' RC    PRE     Extension RANGE: pain free   Flexion  RANGE:   Leg Press DL 80#-100# 3x15 RANGE:        Cable Column     Ed given on POC, expectations and goals    Gt training  X5'; over cups to focus on knee flex for clearance and heel strike w/ quad at [facility] w/ WBing Using crutch but focus on knee ext w/ cuing        Manual/Modalities     Manual stretching to L hip into flex, fig 4, add, hip flex x15'    Deep tissue work to HS/scar massage in HS x5'                Therapeutic Exercise and NMR EXR  [x] (20978) Provided verbal/tactile cueing for activities related to strengthening, flexibility, endurance, ROM for improvements in LE, proximal hip, and core control with self care, mobility, lifting, ambulation.  [] (03859) Provided verbal/tactile cueing for activities related to improving balance, coordination, kinesthetic sense, posture, motor skill, proprioception  to assist with LE, proximal hip, and core control in self care, mobility, lifting, ambulation and eccentric single leg control.      NMR and Therapeutic Activities:    [x] (38063 or 52243) Provided verbal/tactile cueing for activities related to improving balance, coordination, kinesthetic sense, posture, motor skill, proprioception and motor activation to allow for proper function of core, proximal hip and LE with self care and ADLs  [] (24892) Gait Re-education- Provided training and instruction to the patient for proper LE, core and proximal hip recruitment and positioning and eccentric body weight control with ambulation re-education including up and down stairs     Home Exercise Program:    [x] (53177) Reviewed/Progressed HEP activities related to strengthening, flexibility, endurance, ROM of core, proximal hip and LE for functional self-care, mobility, lifting and ambulation/stair navigation            Written HEP est    [] (96514)Reviewed/Progressed HEP activities related to improving balance, coordination, kinesthetic sense, posture, motor skill, proprioception of core, proximal hip and LE for self care, mobility, lifting, and ambulation/stair navigation      Manual Treatments:  PROM / STM / Oscillations-Mobs:  G-I, II, III, IV (PA's, Inf., Post.)  [x] (01777) Provided manual therapy to mobilize LE, proximal hip and/or LS spine soft tissue/joints for the purpose of modulating pain, promoting relaxation,  increasing ROM, reducing/eliminating soft tissue swelling/inflammation/restriction, improving soft tissue extensibility and allowing for proper ROM for normal function with self care, mobility, lifting and ambulation. Modalities:  [] GAME READY (VASO)- for significant edema, swelling, pain control. Charges:  Timed Code Treatment Minutes: 50   Total Treatment Minutes: 75     [] EVAL (LOW) 90872 (typically 20 minutes face-to-face)  [] EVAL (MOD) 09728 (typically 30 minutes face-to-face)  [] EVAL (HIGH) 91878 (typically 45 minutes face-to-face)  [] RE-EVAL   [x] TE(14484) 2x  25'   [] IONTO  [] NMR (80874) x     [x] VASO x15'  with 10# OP  [x] Manual (02639) x 1  x15'     [] Other: GT x12'  [x] TA (01710) 1x   10'   [] Mech Traction (44787)  [] ES(attended) (90196)      [] ES (un) (53743): EMG/Stim combo x15'    GOALS:  Patient stated goal: return to outdoor activities for walking and climbing, along with work  [] Progressing: [] Met: [] Not Met: [] Adjusted     Therapist goals for Patient:  Short Term Goals: To be achieved in: 2 weeks  1. Independent in HEP and progression per patient tolerance, in order to prevent re-injury. [] Progressing: [] Met: [] Not Met: [] Adjusted  2. Patient will have a decrease in pain to facilitate improvement in movement, function, and ADLs as indicated by Functional Deficits. [] Progressing: [] Met: [] Not Met: [] Adjusted     Long Term Goals: To be achieved in: 12+ weeks  1. Functional index score of 67 or more for FOTO to assist with reaching prior level of function. [] Progressing: [] Met: [] Not Met: [] Adjusted  2. Patient will demonstrate increased AROM to 0-125 deg to allow for proper joint functioning as indicated by patients Functional Deficits.   [] Progressing: [] Met: [] Not Met: [] Adjusted  3. Patient will demonstrate an increase in Strength to good proximal hip strength and control, within 5lb HHD in LE to allow for proper functional mobility as indicated by patients Functional Deficits. [] Progressing: [] Met: [] Not Met: [] Adjusted  4. Patient will return to 90% functional activities without increased symptoms or restriction. [] Progressing: [] Met: [] Not Met: [] Adjusted  5. Pt will be able to walk 45 min on uneven surfaces for hiking and climbing. (patient specific functional goal)    [] Progressing: [] Met: [] Not Met: [] Adjusted         Overall Progression Towards Functional goals/ Treatment Progress Update:  [] Patient is progressing as expected towards functional goals listed. [] Progression is slowed due to complexities/Impairments listed. [] Progression has been slowed due to co-morbidities. [x] Plan just implemented, too soon to assess goals progression <30days   [] Goals require adjustment due to lack of progress  [] Patient is not progressing as expected and requires additional follow up with physician  [] Other    ASSESSMENT:   He doesn't seem to have as much discomfort with end range extension stretching and femoral tibio mobilizations for extension. Tolerated 5# on his ankle for all leg lifts. And we added in the leg press today, low weight to focus on form. He stated it felt fine with minimal pain. Still relies fairly heavily on his UEs when doing step ups. Treatment/Activity Tolerance:  [x] Patient tolerated treatment well [] Patient limited by fatique  [] Patient limited by pain  [] Patient limited by other medical complications- tightness  [] Other:     Prognosis: [x] Good [x] Fair  [] Poor    Patient Requires Follow-up: [x] Yes  [] No    PLAN: Cont 3x/week for ROM restoration focus on gt training for function.    [x] Continue per plan of care [] Alter current plan (see comments)  [x] Plan of care initiated [] Hold pending MD visit [] Discharge    Electronically signed by:     Mili Stringer, PT      Board Certified Orthopaedic Clinical Specialist  [facility] Certified  Physical Therapist    [place] PT #[id number]  [place] PT #[id number]                Note: If patient does not return for scheduled/ recommended follow up visits, this note will serve as a discharge from care along with most recent update on progress.

## 2022-11-04 ENCOUNTER — TREATMENT (OUTPATIENT)
Dept: PHYSICAL THERAPY | Age: 57
End: 2022-11-04

## 2022-11-04 DIAGNOSIS — R26.2 DIFFICULTY WALKING: ICD-10-CM

## 2022-11-04 DIAGNOSIS — M24.662 ARTHROFIBROSIS OF KNEE JOINT, LEFT: Primary | ICD-10-CM

## 2022-11-04 NOTE — PROGRESS NOTES
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 Murphy Jenny  Has the plan of care been signed (Y/N):        []  Yes  [x]  No     Date of Patient follow up with Physician:  10/27/22- Dr. Calvin Spivey      Is this a Progress Report:     []  Yes  [x]  No        If Yes:  Date Range for reporting period:  Beginning 22  Ending     Progress report will be due (10 Rx or 30 days whichever is less):        Recertification will be due (POC Duration  / 90 days whichever is less): 10/27/22        Visit # Insurance Allowable Auth Required   37 BMN []  Yes [x]  No        Functional Scale: FOTO 29; LEFS 13;    Date assessed:  22 FOTO 43; LEFS 27.2   10/17/22 FOTO 40    Latex Allergy:  [x]NO      []YES  Preferred Language for Healthcare:   [x]English       []other:    Pain level:  10/10 w/ stretching; 3-4/10 w/ general activities;    SUBJECTIVE:   13 weeks post op from debridement; 3+ months post op from posterior capsule release              Patient reports that he feels about the same. Has been walking some without his crutch. Does better after he stretches out. He has been doing some of the newer exercises at home.     Squatting and leg raises with weights        OBJECTIVE:   10/17/22 pt arrived w/ L knee in flex position using 1 crutch for gt pattern;  incision in posterior joint healing w/o any signs or symptoms of infection; general edema around L knee region;    ROM PROM AROM Overpressure Comment    L 10/26/22 R L R L R    Flexion 97° on SOB  70° cold 125°      Extension 0° after stretch w/ op  -11° cold   -2°        Girth  7/29/2022 L R   Mid Patella 44 cm 39.3 cm   Suprapatellar     5cm above     15cm above         Strength L R Comment: majority deferred secondary to surgery   Quad      Hamstring      Abduction      Quad tone FAIR+ GOOD 9/12//2022       Special Test Results/Comment: majority deferred secondary to surgery   Homans  8/29/2022 Known DVT in calf       RESTRICTIONS/PRECAUTIONS: 7/18/22 posterior L knee capsule release; Limited ROM In L knee after injection in April that was limited prior to surg for flex and ext;  DVT left lower leg (still on blood thinners till about Oct)    Exercises/Interventions:   Exercise/Equipment Resistance/Repetitions Other comments   Cardio/Warm-up     Bike Treadmill          Stretching     Hamstring Seated propped 10\"x10 17.5# overpressure   Hip Flexion     ITB     Grion     Quad     Inclined Calf On floor 10\"x10    Towel Pull 30\"x5 over foam roll 17.5# overpressure        ROM     Passive    Active     ROM @EOB    Prone knee hang x10'   15# OP on ankle    Weight Hangs     Sheet Pulls     Ankle Pumps           Patellar Glides     Medial x2'    Superior x2'    Inferior x2'         STRENGTH     Supine 3x10 over foam roll 5# Concentration on distal contraction w/ cuing  Prone     Abduction 5# 3x10    clams 5# 3x10    Ext - prone 5# 3x10    Adducton     SLR+          Isometrics     Quad sets      CKC     Calf raises     Wall sits     Step ups L1 on tech x10  L1 lateral step up x10 Using wall 1 leg stand Squatting    CC TKE 35# 10\"x10 Balance     Biodex L9 LOS x2 & 1' RC    PRE     Extension RANGE: pain free   Flexion  RANGE:   Leg Press DL 80#-100# 3x15 RANGE:        Cable Column Ed given on POC, expectations and goals    Gt training  X5'; over cups to focus on knee flex for clearance and heel strike w/ quad at [facility] w/ WBing Using crutch but focus on knee ext w/ cuing        Manual/Modalities     Manual stretching to L hip into flex, fig 4, add, hip flex x15'    Deep tissue work to HS/scar massage in HS x5'                Therapeutic Exercise and NMR EXR  [x] (35537) Provided verbal/tactile cueing for activities related to strengthening, flexibility, endurance, ROM for improvements in LE, proximal hip, and core control with self care, mobility, lifting, ambulation.  [] (35902) Provided verbal/tactile cueing for activities related to improving balance, coordination, kinesthetic sense, posture, motor skill, proprioception  to assist with LE, proximal hip, and core control in self care, mobility, lifting, ambulation and eccentric single leg control.      NMR and Therapeutic Activities:    [x] (11590 or 74257) Provided verbal/tactile cueing for activities related to improving balance, coordination, kinesthetic sense, posture, motor skill, proprioception and motor activation to allow for proper function of core, proximal hip and LE with self care and ADLs  [] (48326) Gait Re-education- Provided training and instruction to the patient for proper LE, core and proximal hip recruitment and positioning and eccentric body weight control with ambulation re-education including up and down stairs     Home Exercise Program:    [x] (12797) Reviewed/Progressed HEP activities related to strengthening, flexibility, endurance, ROM of core, proximal hip and LE for functional self-care, mobility, lifting and ambulation/stair navigation            Written HEP est    [] (33048)Reviewed/Progressed HEP activities related to improving balance, coordination, kinesthetic sense, posture, motor skill, proprioception of core, proximal hip and LE for self care, mobility, lifting, and ambulation/stair navigation      Manual Treatments:  PROM / STM / Oscillations-Mobs:  G-I, II, III, IV (PA's, Inf., Post.)  [x] (11274) Provided manual therapy to mobilize LE, proximal hip and/or LS spine soft tissue/joints for the purpose of modulating pain, promoting relaxation,  increasing ROM, reducing/eliminating soft tissue swelling/inflammation/restriction, improving soft tissue extensibility and allowing for proper ROM for normal function with self care, mobility, lifting and ambulation. Modalities:  [] GAME READY (VASO)- for significant edema, swelling, pain control. Charges:  Timed Code Treatment Minutes: 50   Total Treatment Minutes: 75     [] EVAL (LOW) 17308 (typically 20 minutes face-to-face)  [] EVAL (MOD) 70790 (typically 30 minutes face-to-face)  [] EVAL (HIGH) 05035 (typically 45 minutes face-to-face)  [] RE-EVAL   [x] RF(65370) 2x  25'   [] IONTO  [] NMR (95283) x     [x] VASO x15'  with 10# OP  [x] Manual (27229) x 1  x15'     [] Other: GT x12'  [x] TA (61842) 1x   10'   [] Mech Traction (95552)  [] ES(attended) (18029)      [] ES (un) (20486): EMG/Stim combo x15'    GOALS:  Patient stated goal: return to outdoor activities for walking and climbing, along with work  [] Progressing: [] Met: [] Not Met: [] Adjusted     Therapist goals for Patient:  Short Term Goals: To be achieved in: 2 weeks  1. Independent in HEP and progression per patient tolerance, in order to prevent re-injury. [] Progressing: [] Met: [] Not Met: [] Adjusted  2. Patient will have a decrease in pain to facilitate improvement in movement, function, and ADLs as indicated by Functional Deficits. [] Progressing: [] Met: [] Not Met: [] Adjusted     Long Term Goals: To be achieved in: 12+ weeks  1. Functional index score of 67 or more for FOTO to assist with reaching prior level of function. [] Progressing: [] Met: [] Not Met: [] Adjusted  2.  Patient will demonstrate increased AROM to 0-125 deg to allow for proper joint functioning as indicated by patients Functional Deficits. [] Progressing: [] Met: [] Not Met: [] Adjusted  3. Patient will demonstrate an increase in Strength to good proximal hip strength and control, within 5lb HHD in LE to allow for proper functional mobility as indicated by patients Functional Deficits. [] Progressing: [] Met: [] Not Met: [] Adjusted  4. Patient will return to 90% functional activities without increased symptoms or restriction. [] Progressing: [] Met: [] Not Met: [] Adjusted  5. Pt will be able to walk 45 min on uneven surfaces for hiking and climbing. (patient specific functional goal)    [] Progressing: [] Met: [] Not Met: [] Adjusted         Overall Progression Towards Functional goals/ Treatment Progress Update:  [] Patient is progressing as expected towards functional goals listed. [] Progression is slowed due to complexities/Impairments listed. [] Progression has been slowed due to co-morbidities. [x] Plan just implemented, too soon to assess goals progression <30days   [] Goals require adjustment due to lack of progress  [] Patient is not progressing as expected and requires additional follow up with physician  [] Other    ASSESSMENT:               He doesn't seem to have as much discomfort with end range extension stretching and femoral tibio mobilizations for extension. Tolerated 5# on his ankle for all leg lifts. And we added in the leg press today, low weight to focus on form. He stated it felt fine with minimal pain. Still relies fairly heavily on his UEs when doing step ups. Treatment/Activity Tolerance:  [x] Patient tolerated treatment well [] Patient limited by fatique  [] Patient limited by pain  [] Patient limited by other medical complications- tightness  [] Other:     Prognosis: [x] Good [x] Fair  [] Poor    Patient Requires Follow-up: [x] Yes  [] No    PLAN: Cont 3x/week for ROM restoration focus on gt training for function.    [x] Continue per plan of care [] Alter current plan (see

## 2022-11-04 NOTE — PROGRESS NOTES
[facility]   Phone: [phone]    Fax: [phone]      Physical Therapy  Cancellation/No-show Note  Patient Name:  Pat Ag  :  1965   Date:  2022    Cancelled visits to date: 3  No-shows to date: 0    For today's appointment patient:  [x]  Cancelled  []  Rescheduled appointment  []  No-show     Reason given by patient:  []  Patient ill-     []  Conflicting appointment  []  No transportation    []  Conflict with work  []  No reason given  [x]  Other:     Comments:  Pt has another appt scheduled. Meeting with his long term disability rep from work    Phone call information:   []  Phone call made today to patient at _ time at number provided:      []  Patient answered, conversation as follows:    []  Patient did not answer, message left as follows:  []  Phone call not made today  [x]  Phone call not needed - pt contacted us to cancel and provided reason for cancellation.      Electronically signed by:  Colette Waterman, PT      Board Certified Orthopaedic Clinical Specialist  [facility] Certified  Physical Therapist    [place] PT #[id number]  [id number] #[id number]

## 2022-11-07 ENCOUNTER — TREATMENT (OUTPATIENT)
Dept: PHYSICAL THERAPY | Age: 57
End: 2022-11-07
Payer: COMMERCIAL

## 2022-11-07 DIAGNOSIS — M25.562 LEFT KNEE PAIN, UNSPECIFIED CHRONICITY: ICD-10-CM

## 2022-11-07 DIAGNOSIS — M24.662 FIBROSIS OF KNEE JOINT, LEFT: ICD-10-CM

## 2022-11-07 DIAGNOSIS — M24.662 ARTHROFIBROSIS OF KNEE JOINT, LEFT: Primary | ICD-10-CM

## 2022-11-07 DIAGNOSIS — R26.2 DIFFICULTY WALKING: ICD-10-CM

## 2022-11-07 PROCEDURE — 97140 MANUAL THERAPY 1/> REGIONS: CPT | Performed by: PHYSICAL THERAPIST

## 2022-11-07 PROCEDURE — 97110 THERAPEUTIC EXERCISES: CPT | Performed by: PHYSICAL THERAPIST

## 2022-11-07 PROCEDURE — 97530 THERAPEUTIC ACTIVITIES: CPT | Performed by: PHYSICAL THERAPIST

## 2022-11-07 NOTE — PROGRESS NOTES
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 Butch Hernández  Has the plan of care been signed (Y/N):        []  Yes  [x]  No     Date of Patient follow up with Physician:  Dr. Frausto Comes appt will be in 6 months;      Is this a Progress Report:     []  Yes  [x]  No        If Yes:  Date Range for reporting period:  Beginning 22  Ending     Progress report will be due (10 Rx or 30 days whichever is less):        Recertification will be due (POC Duration  / 90 days whichever is less): 10/27/22        Visit # Insurance Allowable Auth Required   36 BMN []  Yes [x]  No        Functional Scale: FOTO 29; LEFS 13;    Date assessed:  22 FOTO 43; LEFS 27.2   10/17/22 FOTO 42;    Latex Allergy:  [x]NO      []YES  Preferred Language for Healthcare:   [x]English       []other:    Pain level:  3-4/10 w/o medication    SUBJECTIVE:   14 weeks post op from debridement; 17 weeks post op from posterior capsule release  Pt had a lot of pain in knee on Sat and swelling but yesterday was a good day. Knee feels good today. Pt has been wearing the brace as directed and had it on this AM for 1 hr.          OBJECTIVE:   10/17/22 pt arrived w/ L knee in flex position using 1 crutch for gt pattern;  incision in posterior joint healing w/o any signs or symptoms of infection; general edema around L knee region;    ROM PROM AROM Overpressure Comment    L 10/26/22 R L R L R    Flexion 97° on SOB  70° cold 125°      Extension 0° after stretch w/ op  -11° cold   -2°        Girth  7/29/2022 L R   Mid Patella 44 cm 39.3 cm   Suprapatellar     5cm above     15cm above         Strength L R Comment: majority deferred secondary to surgery   Quad      Hamstring      Abduction      Quad tone FAIR+ GOOD 9/12//2022       Special Test Results/Comment: majority deferred secondary to surgery   Homans  8/29/2022 Known DVT in calf       RESTRICTIONS/PRECAUTIONS: 7/18/22 posterior L knee capsule release; Limited ROM In L knee after injection in April that was limited prior to surg for flex and ext;  DVT left lower leg (still on blood thinners till about Oct)    Exercises/Interventions:   Exercise/Equipment Resistance/Repetitions Other comments   Cardio/Warm-up     Bike Treadmill          Stretching     Hamstring Seated propped 10\"x10 17.5# overpressure   Hip Flexion     ITB     Grion     Quad     Inclined Calf On floor 10\"x10    Towel Pull 30\"x5 over foam roll 17.5# overpressure        ROM     Passive    Active     ROM @EOB    Prone knee hang x10'   15# OP on ankle    Weight Hangs     Sheet Pulls     Ankle Pumps           Patellar Glides     Medial x2'    Superior x2'    Inferior x2'         STRENGTH     Supine 3x10 over foam roll 5# Concentration on distal contraction w/ cuing  Prone     Abduction 5# 3x10    clams 5# 3x10    Ext - prone 5# 3x10 leaning over table    Adducton     SLR+          Isometrics     Quad sets      CKC     Calf raises     Wall sits     Step ups L2 on tech x10  L2 lateral step up x10 Using wall 1 leg stand Squatting    CC TKE 35# 10\"x10 Balance     Biodex L9 LOS x2 & 1' RC    PRE     Extension RANGE: pain free   Flexion  RANGE:   Leg Press # 3x15 Bilat conc & L LE ecc        Cable Column     Ed given on POC, expectations and goals    Gt training  X5'; over cups to focus on knee flex for clearance and heel strike w/ quad at [facility] w/ WBing Using crutch but focus on knee ext w/ cuing        Manual/Modalities     Manual stretching to L hip into flex, fig 4, add, hip flex x15'    Deep tissue work to HS/scar massage in HS x5'                Therapeutic Exercise and NMR EXR  [x] (65904) Provided verbal/tactile cueing for activities related to strengthening, flexibility, endurance, ROM for improvements in LE, proximal hip, and core control with self care, mobility, lifting, ambulation.  [] (05704) Provided verbal/tactile cueing for activities related to improving balance, coordination, kinesthetic sense, posture, motor skill, proprioception  to assist with LE, proximal hip, and core control in self care, mobility, lifting, ambulation and eccentric single leg control.      NMR and Therapeutic Activities:    [x] (44688 or 77428) Provided verbal/tactile cueing for activities related to improving balance, coordination, kinesthetic sense, posture, motor skill, proprioception and motor activation to allow for proper function of core, proximal hip and LE with self care and ADLs  [] (55396) Gait Re-education- Provided training and instruction to the patient for proper LE, core and proximal hip recruitment and positioning and eccentric body weight control with ambulation re-education including up and down stairs     Home Exercise Program:    [x] (19505) Reviewed/Progressed HEP activities related to strengthening, flexibility, endurance, ROM of core, proximal hip and LE for functional self-care, mobility, lifting and ambulation/stair navigation            Written HEP est    [] (52672)Reviewed/Progressed HEP activities related to improving balance, coordination, kinesthetic sense, posture, motor skill, proprioception of core, proximal hip and LE for self care, mobility, lifting, and ambulation/stair navigation      Manual Treatments:  PROM / STM / Oscillations-Mobs: Deficits. [] Progressing: [] Met: [] Not Met: [] Adjusted  3. Patient will demonstrate an increase in Strength to good proximal hip strength and control, within 5lb HHD in LE to allow for proper functional mobility as indicated by patients Functional Deficits. [] Progressing: [] Met: [] Not Met: [] Adjusted  4. Patient will return to 90% functional activities without increased symptoms or restriction. [] Progressing: [] Met: [] Not Met: [] Adjusted  5. Pt will be able to walk 45 min on uneven surfaces for hiking and climbing. (patient specific functional goal)    [] Progressing: [] Met: [] Not Met: [] Adjusted         Overall Progression Towards Functional goals/ Treatment Progress Update:  [] Patient is progressing as expected towards functional goals listed. [] Progression is slowed due to complexities/Impairments listed. [] Progression has been slowed due to co-morbidities. [x] Plan just implemented, too soon to assess goals progression <30days   [] Goals require adjustment due to lack of progress  [] Patient is not progressing as expected and requires additional follow up with physician  [] Other    ASSESSMENT: ROM tight on arrival that did loosen up w/ stretching. Some swelling still noted in knee. Pt has better wt shift when walking but needing crutches. Quad contraction progressing but UE assistance needed for steps. Pt was able to perform correctly using UE and no push off from LE. Less tightness noted in hip today. Treatment/Activity Tolerance:  [x] Patient tolerated treatment well [] Patient limited by fatique  [] Patient limited by pain  [] Patient limited by other medical complications- tightness  [] Other:     Prognosis: [x] Good [x] Fair  [] Poor    Patient Requires Follow-up: [x] Yes  [] No    PLAN: Cont 3x/week for ROM restoration focus on gt training for function.    [x] Continue per plan of care [] Alter current plan (see comments)  [x] Plan of care initiated [] Hold pending MD visit [] Discharge    Electronically signed by: Syed Mobley PT, MS, OMT-C    Physical Therapist [place] license #[id number]  Physical Therapist [place] license #[id number]       Note: If patient does not return for scheduled/ recommended follow up visits, this note will serve as a discharge from care along with most recent update on progress.

## 2022-11-11 ENCOUNTER — TREATMENT (OUTPATIENT)
Dept: PHYSICAL THERAPY | Age: 57
End: 2022-11-11

## 2022-11-11 DIAGNOSIS — R26.2 DIFFICULTY WALKING: ICD-10-CM

## 2022-11-11 DIAGNOSIS — M24.662 ARTHROFIBROSIS OF KNEE JOINT, LEFT: Primary | ICD-10-CM

## 2022-11-11 DIAGNOSIS — M25.562 LEFT KNEE PAIN, UNSPECIFIED CHRONICITY: ICD-10-CM

## 2022-11-11 NOTE — PROGRESS NOTES
[facility]   Phone: [phone]    Fax: [phone]      Physical Therapy  Cancellation/No-show Note  Patient Name:  Ghada Marie  :  1965   Date:  2022    Cancelled visits to date: 4  No-shows to date: 0    For today's appointment patient:  [x]  Cancelled  []  Rescheduled appointment  []  No-show     Reason given by patient:  []  Patient ill-     []  Conflicting appointment  []  No transportation    []  Conflict with work  []  No reason given  [x]  Other:     Comments:  Had a family thing    Phone call information:   []  Phone call made today to patient at _ time at number provided:      []  Patient answered, conversation as follows:    []  Patient did not answer, message left as follows:  []  Phone call not made today  [x]  Phone call not needed - pt contacted us to cancel and provided reason for cancellation.      Electronically signed by:  Mili Stringer, PT      Board Certified Orthopaedic Clinical Specialist  [facility] Certified  Physical Therapist    [place] #[id number]  [id number] #[id number]

## 2022-11-14 ENCOUNTER — TREATMENT (OUTPATIENT)
Dept: PHYSICAL THERAPY | Age: 57
End: 2022-11-14
Payer: COMMERCIAL

## 2022-11-14 DIAGNOSIS — M24.662 FIBROSIS OF KNEE JOINT, LEFT: ICD-10-CM

## 2022-11-14 DIAGNOSIS — M24.662 ARTHROFIBROSIS OF KNEE JOINT, LEFT: Primary | ICD-10-CM

## 2022-11-14 DIAGNOSIS — M25.562 LEFT KNEE PAIN, UNSPECIFIED CHRONICITY: ICD-10-CM

## 2022-11-14 DIAGNOSIS — R26.2 DIFFICULTY WALKING: ICD-10-CM

## 2022-11-14 PROCEDURE — 97110 THERAPEUTIC EXERCISES: CPT | Performed by: PHYSICAL THERAPIST

## 2022-11-14 PROCEDURE — 97530 THERAPEUTIC ACTIVITIES: CPT | Performed by: PHYSICAL THERAPIST

## 2022-11-14 PROCEDURE — 97140 MANUAL THERAPY 1/> REGIONS: CPT | Performed by: PHYSICAL THERAPIST

## 2022-11-14 NOTE — PROGRESS NOTES
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 Mundo Judge  Has the plan of care been signed (Y/N):        []  Yes  [x]  No     Date of Patient follow up with Physician:  Dr. Melissa Jefferson appt will be in 6 months;      Is this a Progress Report:     []  Yes  [x]  No        If Yes:  Date Range for reporting period:  Beginning 22  Ending     Progress report will be due (10 Rx or 30 days whichever is less):        Recertification will be due (POC Duration  / 90 days whichever is less): 10/27/22        Visit # Insurance Allowable Auth Required   37 BMN []  Yes [x]  No        Functional Scale: FOTO 29; LEFS 13;    Date assessed:  22 FOTO 43; LEFS 27.2   10/17/22 FOTO 42;    Latex Allergy:  [x]NO      []YES  Preferred Language for Healthcare:   [x]English       []other:    Pain level:  3/10 w/o medication    SUBJECTIVE:   15 weeks post op from debridement; 18 weeks post op from posterior capsule release  Pt states that knee is staying straight and able to stretch it out easier. He has been using crutch overall but has gone around his home without a crutch but focusing on walking. Pt did go shopping last week and had some swelling that night that resolved. Pain is better and manageable w/o medication. OBJECTIVE:   incision in posterior joint healing w/o any signs or symptoms of infection; general edema around L knee region;    ROM PROM AROM Overpressure Comment    L 10/26/22 R L R L R    Flexion 97° on SOB  70° cold 125°      Extension 0° after stretch w/ op  -11° cold   -2°        Girth  7/29/2022 L R   Mid Patella 44 cm 39.3 cm   Suprapatellar     5cm above     15cm above         Strength L R Comment: majority deferred secondary to surgery   Quad      Hamstring      Abduction      Quad tone FAIR+ GOOD 9/12//2022       Special Test Results/Comment: majority deferred secondary to surgery   Homans  8/29/2022 Known DVT in calf       RESTRICTIONS/PRECAUTIONS: 7/18/22 posterior L knee capsule release; Limited ROM In L knee after injection in April that was limited prior to surg for flex and ext;  DVT left lower leg (still on blood thinners till about Oct)    Exercises/Interventions:   Exercise/Equipment Resistance/Repetitions Other comments   Cardio/Warm-up     Bike Treadmill          Stretching     Hamstring Seated propped 10\"x10 17.5# overpressure   Hip Flexion     ITB     Grion     Quad     Inclined Calf On floor 10\"x10    Towel Pull 30\"x5 over foam roll 17.5# overpressure        ROM     Passive    Active     ROM @EOB    Prone knee hang x10'   15# OP on ankle    Weight Hangs     Sheet Pulls     Ankle Pumps           Patellar Glides     Medial x2'    Superior x2'    Inferior x2'         STRENGTH     Supine 3x10 over foam roll 5# Concentration on distal contraction w/ cuing  Prone     Abduction 5# 3x10    clams 5# 3x10 elevated top leg    Ext - prone 5# 3x10 leaning over table    Adducton     SLR+          Isometrics     Quad sets      CKC     Calf raises     Wall sits     Step ups L2 on tech x10  L1 lateral step up x10 Using wall step down L1 x10 1 leg stand Squatting    CC TKE 35# 10\"x10 Balance     Biodex L9 LOS x2 & 1' RC         Resistive walking backwards 35# x15              PRE     Extension RANGE: pain free   Flexion  RANGE:   Leg Press # 3x15 Bilat conc & L LE ecc        Cable Column     Ed given on POC, expectations and goals    Gt training  X5'; over cups to focus on knee flex for clearance and heel strike w/ quad at [place] w/ WBing Using crutch but focus on knee ext w/ cuing        Manual/Modalities     Manual stretching to L hip into flex, fig 4, add, hip flex x15'    Deep tissue work to HS/scar massage in HS x5'                Therapeutic Exercise and NMR EXR  [x] (24746) Provided verbal/tactile cueing for activities related to strengthening, flexibility, endurance, ROM for improvements in LE, proximal hip, and core control with self care, mobility, lifting, ambulation.  [] (37740) Provided verbal/tactile cueing for activities related to improving balance, coordination, kinesthetic sense, posture, motor skill, proprioception  to assist with LE, proximal hip, and core control in self care, mobility, lifting, ambulation and eccentric single leg control.      NMR and Therapeutic Activities:    [x] (21934 or 08920) Provided verbal/tactile cueing for activities related to improving balance, coordination, kinesthetic sense, posture, motor skill, proprioception and motor activation to allow for proper function of core, proximal hip and LE with self care and ADLs  [] (70302) Gait Re-education- Provided training and instruction to the patient for proper LE, core and proximal hip recruitment and positioning and eccentric body weight control with ambulation re-education including up and down stairs     Home Exercise Program:    [x] (32211) Reviewed/Progressed HEP activities related to strengthening, flexibility, endurance, ROM of core, proximal hip and LE for functional self-care, mobility, lifting and ambulation/stair navigation            Written HEP est    [] (25758)Reviewed/Progressed HEP activities related to improving balance, coordination, kinesthetic sense, posture, motor skill, proprioception of core, proximal hip and LE for self care, mobility, lifting, and ambulation/stair navigation      Manual Treatments:  PROM / STM / Oscillations-Mobs:  G-I, II, III, IV (PA's, Inf., Post.)  [x] (62192) Provided manual therapy to mobilize LE, proximal hip and/or LS spine soft tissue/joints for the purpose of modulating pain, promoting relaxation,  increasing ROM, reducing/eliminating soft tissue swelling/inflammation/restriction, improving soft tissue extensibility and allowing for proper ROM for normal function with self care, mobility, lifting and ambulation. Modalities:  CP x10 min propped and 15# over knee   [] GAME READY (VASO)- for significant edema, swelling, pain control. Charges:  Timed Code Treatment Minutes: 60   Total Treatment Minutes: 75     [] EVAL (LOW) 19984 (typically 20 minutes face-to-face)  [] EVAL (MOD) 58932 (typically 30 minutes face-to-face)  [] EVAL (HIGH) 73647 (typically 45 minutes face-to-face)  [] RE-EVAL   [x] GI(25833) 2x  25'   [] IONTO  [] NMR (66697) x     [] VASO x15'  with 10# OP  [x] Manual (83796) x 1  x15'     [] Other: GT x12'  [x] TA (75500) 1x   10'   [] Mech Traction (41484)  [] ES(attended) (51712)      [] ES (un) (85611): EMG/Stim combo x15'    GOALS:  Patient stated goal: return to outdoor activities for walking and climbing, along with work  [] Progressing: [] Met: [] Not Met: [] Adjusted     Therapist goals for Patient:  Short Term Goals: To be achieved in: 2 weeks  1. Independent in HEP and progression per patient tolerance, in order to prevent re-injury. [] Progressing: [] Met: [] Not Met: [] Adjusted  2. Patient will have a decrease in pain to facilitate improvement in movement, function, and ADLs as indicated by Functional Deficits. [] Progressing: [] Met: [] Not Met: [] Adjusted     Long Term Goals: To be achieved in: 12+ weeks  1. Functional index score of 67 or more for FOTO to assist with reaching prior level of function.   [] Progressing: [] Met: [] Not Met: [] Adjusted  2. Patient will demonstrate increased AROM to 0-125 deg to allow for proper joint functioning as indicated by patients Functional Deficits. [] Progressing: [] Met: [] Not Met: [] Adjusted  3. Patient will demonstrate an increase in Strength to good proximal hip strength and control, within 5lb HHD in LE to allow for proper functional mobility as indicated by patients Functional Deficits. [] Progressing: [] Met: [] Not Met: [] Adjusted  4. Patient will return to 90% functional activities without increased symptoms or restriction. [] Progressing: [] Met: [] Not Met: [] Adjusted  5. Pt will be able to walk 45 min on uneven surfaces for hiking and climbing. (patient specific functional goal)    [] Progressing: [] Met: [] Not Met: [] Adjusted         Overall Progression Towards Functional goals/ Treatment Progress Update:  [] Patient is progressing as expected towards functional goals listed. [] Progression is slowed due to complexities/Impairments listed. [] Progression has been slowed due to co-morbidities. [x] Plan just implemented, too soon to assess goals progression <30days   [] Goals require adjustment due to lack of progress  [] Patient is not progressing as expected and requires additional follow up with physician  [] Other    ASSESSMENT: Pt has some tightness in reaching full knee ext on arrival but it does loosen up w/ stretching and is having better carry over between sessions. Muscle activation around knee progressing and pt able to progress w/ function. Pt had mod fatigue on step up exercises and needed UE assistance. Pt was able to focus on control for tech w/o substitution for R LE. Gt skills progressing for short distances w/o AD and mod concentration needed. Medial HS tendon (semitendinosus) was really tight today. Glute contraction limited.       Treatment/Activity Tolerance:  [x] Patient tolerated treatment well [] Patient limited by fatique  [] Patient limited by pain  [] Patient limited by other medical complications- tightness  [] Other:     Prognosis: [x] Good [x] Fair  [] Poor    Patient Requires Follow-up: [x] Yes  [] No    PLAN: Cont 3x/week for ROM restoration focus on gt training for function. [x] Continue per plan of care [] Alter current plan (see comments)  [x] Plan of care initiated [] Hold pending MD visit [] Discharge    Electronically signed by: Gaudencio Browne PT, MS, OMT-C    Physical Therapist [id number] [address] license #[id number]  Physical Therapist [place] license #[id number]       Note: If patient does not return for scheduled/ recommended follow up visits, this note will serve as a discharge from care along with most recent update on progress.

## 2022-11-16 ENCOUNTER — TELEPHONE (OUTPATIENT)
Dept: ORTHOPEDIC SURGERY | Age: 57
End: 2022-11-16

## 2022-11-17 ENCOUNTER — TREATMENT (OUTPATIENT)
Dept: PHYSICAL THERAPY | Age: 57
End: 2022-11-17
Payer: COMMERCIAL

## 2022-11-17 DIAGNOSIS — R26.2 DIFFICULTY WALKING: ICD-10-CM

## 2022-11-17 DIAGNOSIS — M24.662 ARTHROFIBROSIS OF KNEE JOINT, LEFT: Primary | ICD-10-CM

## 2022-11-17 DIAGNOSIS — M25.562 LEFT KNEE PAIN, UNSPECIFIED CHRONICITY: ICD-10-CM

## 2022-11-17 PROCEDURE — 97530 THERAPEUTIC ACTIVITIES: CPT | Performed by: PHYSICAL THERAPIST

## 2022-11-17 PROCEDURE — 97140 MANUAL THERAPY 1/> REGIONS: CPT | Performed by: PHYSICAL THERAPIST

## 2022-11-17 PROCEDURE — 97110 THERAPEUTIC EXERCISES: CPT | Performed by: PHYSICAL THERAPIST

## 2022-11-17 PROCEDURE — 97016 VASOPNEUMATIC DEVICE THERAPY: CPT | Performed by: PHYSICAL THERAPIST

## 2022-11-17 NOTE — PROGRESS NOTES
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 Sukumar Manuel  Has the plan of care been signed (Y/N):        []  Yes  [x]  No     Date of Patient follow up with Physician:  Dr. Cristino Sacks appt will be in 6 months;      Is this a Progress Report:     []  Yes  [x]  No        If Yes:  Date Range for reporting period:  Beginning 22  Ending     Progress report will be due (10 Rx or 30 days whichever is less):        Recertification will be due (POC Duration  / 90 days whichever is less): 10/27/22        Visit # Insurance Allowable Auth Required   38 BMN []  Yes [x]  No        Functional Scale: FOTO 29; LEFS 13;    Date assessed:  22 FOTO 43; LEFS 27.2   10/17/22 FOTO 42;    Latex Allergy:  [x]NO      []YES  Preferred Language for Healthcare:   [x]English       []other:    Pain level:  3/10 w/o medication    SUBJECTIVE:   15 weeks post op from debridement; 18 weeks post op from posterior capsule release  No new complaints. He continues to try and walk more indoors without the use of the crutch. He states the step up exercise remains challenging, maybe a bit easier.           OBJECTIVE:   incision in posterior joint healing w/o any signs or symptoms of infection; general edema around L knee region;    ROM PROM AROM Overpressure Comment    L 10/26/22 R L R L R    Flexion 97° on SOB  70° cold 125°      Extension 0° after stretch w/ op  -11° cold   -2°        Girth  7/29/2022 L R   Mid Patella 44 cm 39.3 cm   Suprapatellar     5cm above     15cm above         Strength L R Comment: majority deferred secondary to surgery   Quad      Hamstring      Abduction      Quad tone FAIR+ GOOD 9/12//2022       Special Test Results/Comment: majority deferred secondary to surgery   Homans  8/29/2022 Known DVT in calf       RESTRICTIONS/PRECAUTIONS: 7/18/22 posterior L knee capsule release; Limited ROM In L knee after injection in April that was limited prior to surg for flex and ext;  DVT left lower leg (still on blood thinners till about Oct)    Exercises/Interventions:   Exercise/Equipment Resistance/Repetitions Other comments   Cardio/Warm-up     Bike Rec Seat #9 x7'  Oscillations to full revs   Treadmill          Stretching     Hamstring Seated propped 10\"x10 17.5# overpressure   Hip Flexion     ITB     Grion     Quad     Inclined Calf On floor 10\"x10    Towel Pull 30\"x5 over foam roll 17.5# overpressure        ROM     Passive    Active     ROM @EOB    Prone knee hang x10'   15# OP on ankle    Weight Hangs     Sheet Pulls     Ankle Pumps           Patellar Glides     Medial x2'    Superior x2'    Inferior x2'         STRENGTH     Supine 3x10 over foam roll 5# Concentration on distal contraction w/ cuing  Prone     Abduction 5# 3x10    clams  3x10  Red band   Ext - prone 5# 3x10 leaning over table    Adducton     SLR+          Isometrics     Quad sets      CKC     Calf raises     Wall sits     Step ups L2 on tech x10  L1 lateral step up x10 Using wall step down L1-2 x10 1 leg stand Squatting    CC TKE 35# 10\"x10 Balance     Biodex L9 LOS x2 & 1' RC         Resistive walking backwards 35# x15              PRE     Extension RANGE: pain free   Flexion  RANGE:   Leg Press # 3x15 Bilat conc & L LE ecc        Cable Column     Ed given on POC, expectations and goals    Using crutch but focus on knee ext w/ cuing    Sidestepping with red band around knees x5 laps         Manual/Modalities     Manual stretching to L hip into flex, fig 4, add, hip flex x15'    Deep tissue work to HS/scar massage in HS x5'                Therapeutic Exercise and NMR EXR  [x] (14417) Provided verbal/tactile cueing for activities related to strengthening, flexibility, endurance, ROM for improvements in LE, proximal hip, and core control with self care, mobility, lifting, ambulation.  [] (95226) Provided verbal/tactile cueing for activities related to improving balance, coordination, kinesthetic sense, posture, motor skill, proprioception  to assist with LE, proximal hip, and core control in self care, mobility, lifting, ambulation and eccentric single leg control.      NMR and Therapeutic Activities:    [x] (70501 or 58914) Provided verbal/tactile cueing for activities related to improving balance, coordination, kinesthetic sense, posture, motor skill, proprioception and motor activation to allow for proper function of core, proximal hip and LE with self care and ADLs  [] (70743) Gait Re-education- Provided training and instruction to the patient for proper LE, core and proximal hip recruitment and positioning and eccentric body weight control with ambulation re-education including up and down stairs     Home Exercise Program:    [x] (70688) Reviewed/Progressed HEP activities related to strengthening, flexibility, endurance, ROM of core, proximal hip and LE for functional self-care, mobility, lifting and ambulation/stair navigation            Written HEP est    [] (96991)Reviewed/Progressed HEP activities related to improving balance, coordination, kinesthetic sense, posture, motor skill, proprioception of core, proximal hip and LE for self care, mobility, lifting, and ambulation/stair navigation      Manual Treatments:  PROM / STM / Oscillations-Mobs:  G-I, II, III, IV (PA's, Inf., Post.)  [x] (83506) Provided manual therapy to mobilize LE, proximal hip and/or LS spine soft tissue/joints for the purpose of modulating pain, promoting relaxation,  increasing ROM, reducing/eliminating soft tissue swelling/inflammation/restriction, improving soft tissue extensibility and allowing for proper ROM for normal function with self care, mobility, lifting and ambulation. Modalities:   [x] GAME READY (VASO)- for significant edema, swelling, pain control. Charges:  Timed Code Treatment Minutes: 60   Total Treatment Minutes: 75     [] EVAL (LOW) 82909 (typically 20 minutes face-to-face)  [] EVAL (MOD) 06039 (typically 30 minutes face-to-face)  [] EVAL (HIGH) 63211 (typically 45 minutes face-to-face)  [] RE-EVAL   [x] YR(46957) 2x  25'   [] IONTO  [] NMR (36111) x     [x] VASO x15'  with 10# OP  [x] Manual (44378) x 1  x15'     [] Other: GT x12'  [x] TA (07415) 1x   10'   [] Mech Traction (27357)  [] ES(attended) (13604)      [] ES (un) (50747): EMG/Stim combo x15'    GOALS:  Patient stated goal: return to outdoor activities for walking and climbing, along with work  [] Progressing: [] Met: [] Not Met: [] Adjusted     Therapist goals for Patient:  Short Term Goals: To be achieved in: 2 weeks  1. Independent in HEP and progression per patient tolerance, in order to prevent re-injury. [] Progressing: [] Met: [] Not Met: [] Adjusted  2. Patient will have a decrease in pain to facilitate improvement in movement, function, and ADLs as indicated by Functional Deficits. [] Progressing: [] Met: [] Not Met: [] Adjusted     Long Term Goals: To be achieved in: 12+ weeks  1. Functional index score of 67 or more for FOTO to assist with reaching prior level of function. [] Progressing: [] Met: [] Not Met: [] Adjusted  2. Patient will demonstrate increased AROM to 0-125 deg to allow for proper joint functioning as indicated by patients Functional Deficits.   [] Progressing: [] Met: [] Not Met: [] Adjusted  3. Patient will demonstrate an increase in Strength to good proximal hip strength and control, within 5lb HHD in LE to allow for proper functional mobility as indicated by patients Functional Deficits. [] Progressing: [] Met: [] Not Met: [] Adjusted  4. Patient will return to 90% functional activities without increased symptoms or restriction. [] Progressing: [] Met: [] Not Met: [] Adjusted  5. Pt will be able to walk 45 min on uneven surfaces for hiking and climbing. (patient specific functional goal)    [] Progressing: [] Met: [] Not Met: [] Adjusted         Overall Progression Towards Functional goals/ Treatment Progress Update:  [] Patient is progressing as expected towards functional goals listed. [] Progression is slowed due to complexities/Impairments listed. [] Progression has been slowed due to co-morbidities. [x] Plan just implemented, too soon to assess goals progression <30days   [] Goals require adjustment due to lack of progress  [] Patient is not progressing as expected and requires additional follow up with physician  [] Other    ASSESSMENT:   Did more gait work today, sidestepping with band around the knees. He did well and felt a good muscle burn in his hips. Also demonstrating good control with resisted backwards walking with the cable column. His gait is slowly improving without assistive device. Still lacking a bit into full terminal knee extension. Able to make full revolutions on the rec bike today once he loosened up. Treatment/Activity Tolerance:  [x] Patient tolerated treatment well [] Patient limited by fatique  [] Patient limited by pain  [] Patient limited by other medical complications- tightness  [] Other:     Prognosis: [x] Good [x] Fair  [] Poor    Patient Requires Follow-up: [x] Yes  [] No    PLAN: Cont 3x/week for ROM restoration focus on gt training for function.    [x] Continue per plan of care [] Alter current plan (see comments)  [x] Plan of care initiated [] Hold pending MD visit [] Discharge    Electronically signed by:     Constanza Goode, PT      Board Certified Orthopaedic Clinical Specialist  [facility] Certified  Physical Therapist    [place] PT #[id number]  [place] PT #[id number]        Note: If patient does not return for scheduled/ recommended follow up visits, this note will serve as a discharge from care along with most recent update on progress.

## 2022-11-21 ENCOUNTER — TREATMENT (OUTPATIENT)
Dept: PHYSICAL THERAPY | Age: 57
End: 2022-11-21
Payer: COMMERCIAL

## 2022-11-21 DIAGNOSIS — M24.662 ARTHROFIBROSIS OF KNEE JOINT, LEFT: Primary | ICD-10-CM

## 2022-11-21 DIAGNOSIS — M25.562 LEFT KNEE PAIN, UNSPECIFIED CHRONICITY: ICD-10-CM

## 2022-11-21 DIAGNOSIS — M24.662 FIBROSIS OF KNEE JOINT, LEFT: ICD-10-CM

## 2022-11-21 DIAGNOSIS — R26.2 DIFFICULTY WALKING: ICD-10-CM

## 2022-11-21 PROCEDURE — 97530 THERAPEUTIC ACTIVITIES: CPT | Performed by: PHYSICAL THERAPIST

## 2022-11-21 PROCEDURE — 97140 MANUAL THERAPY 1/> REGIONS: CPT | Performed by: PHYSICAL THERAPIST

## 2022-11-21 PROCEDURE — 97110 THERAPEUTIC EXERCISES: CPT | Performed by: PHYSICAL THERAPIST

## 2022-11-21 NOTE — PROGRESS NOTES
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 Berle Eisenmenger  Has the plan of care been signed (Y/N):        []  Yes  [x]  No     Date of Patient follow up with Physician:  Dr. Megan Bullard appt will be in 6 months;      Is this a Progress Report:     []  Yes  [x]  No        If Yes:  Date Range for reporting period:  Beginning 22  Ending     Progress report will be due (10 Rx or 30 days whichever is less):        Recertification will be due (POC Duration  / 90 days whichever is less): 10/27/22        Visit # Insurance Allowable Auth Required   38 BMN []  Yes [x]  No        Functional Scale: FOTO 29; LEFS 13;    Date assessed:  22 FOTO 43; LEFS 27.2   10/17/22 FOTO 42;    Latex Allergy:  [x]NO      []YES  Preferred Language for Healthcare:   [x]English       []other:    Pain level:  3/10 w/o medication    SUBJECTIVE:   15 weeks post op from debridement; 18 weeks post op from posterior capsule release  No new complaints. He continues to try and walk more indoors without the use of the crutch. He states the step up exercise remains challenging, maybe a bit easier.           OBJECTIVE:   incision in posterior joint healing w/o any signs or symptoms of infection; general edema around L knee region;    ROM PROM AROM Overpressure Comment    L 10/26/22 R L R L R    Flexion 97° on SOB  70° cold 125°      Extension 0° after stretch w/ op  -11° cold   -2°        Girth  7/29/2022 L R   Mid Patella 44 cm 39.3 cm   Suprapatellar     5cm above     15cm above         Strength L R Comment: majority deferred secondary to surgery   Quad      Hamstring      Abduction      Quad tone FAIR+ GOOD 9/12//2022       Special Test Results/Comment: majority deferred secondary to surgery   Homans  8/29/2022 Known DVT in calf       RESTRICTIONS/PRECAUTIONS: 7/18/22 posterior L knee capsule release; Limited ROM In L knee after injection in April that was limited prior to surg for flex and ext;  DVT left lower leg (still on blood thinners till about Oct)    Exercises/Interventions:   Exercise/Equipment Resistance/Repetitions Other comments   Cardio/Warm-up     Bike Rec Seat #9 x7'  Oscillations to full revs   Treadmill          Stretching     Hamstring Seated propped 10\"x10 17.5# overpressure   Hip Flexion     ITB     Grion     Quad     Inclined Calf On floor 10\"x10    Towel Pull 30\"x5 over foam roll 17.5# overpressure        ROM     Passive    Active     ROM @EOB    Prone knee hang x10'   15# OP on ankle    Weight Hangs     Sheet Pulls     Ankle Pumps           Patellar Glides     Medial x2'    Superior x2'    Inferior x2'         STRENGTH     Supine 3x10 over foam roll 5# Concentration on distal contraction w/ cuing  Prone     Abduction 5# 3x10    clams  3x10  black band   Ext - prone 5# 3x10 leaning over table    Adducton     SLR+          Isometrics     Quad sets      CKC     Calf raises     Wall sits     Step ups & over L2 on tech x10  L1 lateral step up x10 Using wall and crutch for assistance   step down 1 leg stand Squatting    CC TKE 35# 10\"x10 Balance     Biodex L9 LOS x2 & 1' RC         Resistive walking backwards 35# x15              PRE     Extension RANGE: pain free   Flexion  RANGE:   Leg Press # 3x15 Bilat conc & L LE ecc Cable Column     Ed given on POC, expectations and goals    Using crutch but focus on knee ext w/ cuing            Manual/Modalities     Manual stretching to L hip into flex, fig 4, add, hip flex x15'    Deep tissue work to HS/scar massage in HS x5'                Therapeutic Exercise and NMR EXR  [x] (16813) Provided verbal/tactile cueing for activities related to strengthening, flexibility, endurance, ROM for improvements in LE, proximal hip, and core control with self care, mobility, lifting, ambulation.  [] (09317) Provided verbal/tactile cueing for activities related to improving balance, coordination, kinesthetic sense, posture, motor skill, proprioception  to assist with LE, proximal hip, and core control in self care, mobility, lifting, ambulation and eccentric single leg control.      NMR and Therapeutic Activities:    [x] (04299 or 56036) Provided verbal/tactile cueing for activities related to improving balance, coordination, kinesthetic sense, posture, motor skill, proprioception and motor activation to allow for proper function of core, proximal hip and LE with self care and ADLs  [] (18134) Gait Re-education- Provided training and instruction to the patient for proper LE, core and proximal hip recruitment and positioning and eccentric body weight control with ambulation re-education including up and down stairs     Home Exercise Program:    [x] (60063) Reviewed/Progressed HEP activities related to strengthening, flexibility, endurance, ROM of core, proximal hip and LE for functional self-care, mobility, lifting and ambulation/stair navigation            Written HEP est    [] (36397)Reviewed/Progressed HEP activities related to improving balance, coordination, kinesthetic sense, posture, motor skill, proprioception of core, proximal hip and LE for self care, mobility, lifting, and ambulation/stair navigation      Manual Treatments:  PROM / STM / Oscillations-Mobs:  G-I, II, III, IV (PA's, Inf., Post.)  [x] (35717) Provided manual therapy to mobilize LE, proximal hip and/or LS spine soft tissue/joints for the purpose of modulating pain, promoting relaxation,  increasing ROM, reducing/eliminating soft tissue swelling/inflammation/restriction, improving soft tissue extensibility and allowing for proper ROM for normal function with self care, mobility, lifting and ambulation. Modalities:  CP x10 min propped and 15# over knee   [] GAME READY (VASO)- for significant edema, swelling, pain control. Charges:  Timed Code Treatment Minutes: 60   Total Treatment Minutes: 75     [] EVAL (LOW) 24879 (typically 20 minutes face-to-face)  [] EVAL (MOD) 72535 (typically 30 minutes face-to-face)  [] EVAL (HIGH) 32584 (typically 45 minutes face-to-face)  [] RE-EVAL   [x] UU(33804) 2x  25'   [] IONTO  [] NMR (21919) x     [] VASO x15'  with 10# OP  [x] Manual (36212) x 1  x15'     [] Other: GT x12'  [x] TA (40380) 1x   10'   [] Mech Traction (77113)  [] ES(attended) (87280)      [] ES (un) (84967): EMG/Stim combo x15'    GOALS:  Patient stated goal: return to outdoor activities for walking and climbing, along with work  [] Progressing: [] Met: [] Not Met: [] Adjusted     Therapist goals for Patient:  Short Term Goals: To be achieved in: 2 weeks  1. Independent in HEP and progression per patient tolerance, in order to prevent re-injury. [] Progressing: [] Met: [] Not Met: [] Adjusted  2. Patient will have a decrease in pain to facilitate improvement in movement, function, and ADLs as indicated by Functional Deficits. [] Progressing: [] Met: [] Not Met: [] Adjusted     Long Term Goals: To be achieved in: 12+ weeks  1. Functional index score of 67 or more for FOTO to assist with reaching prior level of function. [] Progressing: [] Met: [] Not Met: [] Adjusted  2. Patient will demonstrate increased AROM to 0-125 deg to allow for proper joint functioning as indicated by patients Functional Deficits.   [] Progressing: [] Met: [] Not Met: [] Adjusted  3. Patient will demonstrate an increase in Strength to good proximal hip strength and control, within 5lb HHD in LE to allow for proper functional mobility as indicated by patients Functional Deficits. [] Progressing: [] Met: [] Not Met: [] Adjusted  4. Patient will return to 90% functional activities without increased symptoms or restriction. [] Progressing: [] Met: [] Not Met: [] Adjusted  5. Pt will be able to walk 45 min on uneven surfaces for hiking and climbing. (patient specific functional goal)    [] Progressing: [] Met: [] Not Met: [] Adjusted         Overall Progression Towards Functional goals/ Treatment Progress Update:  [] Patient is progressing as expected towards functional goals listed. [] Progression is slowed due to complexities/Impairments listed. [] Progression has been slowed due to co-morbidities. [x] Plan just implemented, too soon to assess goals progression <30days   [] Goals require adjustment due to lack of progress  [] Patient is not progressing as expected and requires additional follow up with physician  [] Other    ASSESSMENT: Movement progressing in flex and ext. Pt tolerated the bike better w/ full revolution from the beginning and fluid motion as he progressed. Pt needed concentration on steps to avoid deviations and UE assistance. Amb skills progressed but needing SPC for mobility. Progressing w/ function. Treatment/Activity Tolerance:  [x] Patient tolerated treatment well [] Patient limited by fatique  [] Patient limited by pain  [] Patient limited by other medical complications- tightness  [] Other:     Prognosis: [x] Good [x] Fair  [] Poor    Patient Requires Follow-up: [x] Yes  [] No    PLAN: Cont 2x/week for ROM restoration & focus on gt training for function.    [x] Continue per plan of care [] Alter current plan (see comments)  [x] Plan of care initiated [] Hold pending MD visit [] Discharge    Electronically signed by: Fernando Sylvester PT,MS, OMT-C    Physical Therapist [id number] [address] license #[id number]  Physical Therapist [place] license #[id number]             Note: If patient does not return for scheduled/ recommended follow up visits, this note will serve as a discharge from care along with most recent update on progress.

## 2022-11-23 ENCOUNTER — TREATMENT (OUTPATIENT)
Dept: PHYSICAL THERAPY | Age: 57
End: 2022-11-23
Payer: COMMERCIAL

## 2022-11-23 DIAGNOSIS — M24.662 FIBROSIS OF KNEE JOINT, LEFT: ICD-10-CM

## 2022-11-23 DIAGNOSIS — M25.562 LEFT KNEE PAIN, UNSPECIFIED CHRONICITY: ICD-10-CM

## 2022-11-23 DIAGNOSIS — M24.662 ARTHROFIBROSIS OF KNEE JOINT, LEFT: ICD-10-CM

## 2022-11-23 DIAGNOSIS — R26.2 DIFFICULTY WALKING: Primary | ICD-10-CM

## 2022-11-23 PROCEDURE — 97140 MANUAL THERAPY 1/> REGIONS: CPT | Performed by: PHYSICAL THERAPIST

## 2022-11-23 PROCEDURE — 97530 THERAPEUTIC ACTIVITIES: CPT | Performed by: PHYSICAL THERAPIST

## 2022-11-23 PROCEDURE — 97110 THERAPEUTIC EXERCISES: CPT | Performed by: PHYSICAL THERAPIST

## 2022-11-23 NOTE — PROGRESS NOTES
[facility]   Phone: [phone]    Fax: [phone]     Physical Therapy Re-Certification Plan of Care    Dear  Dr. Jessica Ramsey,    We had the pleasure of treating the following patient for physical therapy services at [address] [place] [address]. A summary of our findings can be found in the updated assessment below. This includes our plan of care. If you have any questions or concerns regarding these findings, please do not hesitate to contact me at the office phone number checked above. Thank you for the referral.     Physician Signature:________________________________Date:__________________  By signing above (or electronic signature), therapists plan is approved by physician      Overall Response to Treatment:   [x]Patient is responding well to treatment and improvement is noted with regards  to goals   []Patient should continue to improve in reasonable time if they continue HEP   []Patient has plateaued and is no longer responding to skilled PT intervention    []Patient is getting worse and would benefit from return to referring MD   []Patient unable to adhere to initial POC   [x]Other: Pt is making progress on carrying over ROM improvements from one session to the next. Pt is still lacking full knee ext on arrival but improves w/ stretching. Strength in L LE has improved and he is able to amb in home w/o AD. Pt still needs concentration for correct gt pattern w/ and w/o AD. Pain and swelling continues to lessen. Pt is being monitored for resolution of infection prior to TKA which is planned for the Spring ~ 6 months from last appt w/ MD. Recommend that pt continue therapy to improve function w/ walking and steps prior to next surg but reduction of sessions from 3x/wk down to 1-2x/wk.      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 Butch Hernández  Has the plan of care been signed (Y/N):        []  Yes  [x]  No     Date of Patient follow up with Physician:  Dr. Frausto Comes appt will be in ~4 months;      Is this a Progress Report:     []  Yes  [x]  No        If Yes:  Date Range for reporting period:  Beginning 7/27/22  Ending 11/23/22    Progress report will be due (10 Rx or 30 days whichever is less): 66/07/90       Recertification will be due (POC Duration  / 90 days whichever is less): 12/27/22        Visit # Insurance Allowable Auth Required   39 BMN []  Yes [x]  No        Functional Scale: FOTO 29; LEFS 13;    Date assessed:  7/27/22 8/29/22 FOTO 43; LEFS 27.2   10/17/22 FOTO 42;  11/23/22 FOTO 52, LEFS 38.7    Latex Allergy:  [x]NO      []YES  Preferred Language for Healthcare:   [x]English       []other:    Pain level:  3/10 w/o medication    SUBJECTIVE:   15 weeks post op from debridement; 18 weeks post op from posterior capsule release  Pt had more swelling and pain in L knee yesterday w/o known cause. He did not go to the zoo as planned but stayed home and ice.  Unknown cause for flare up but managed symptoms as in past w/ icing and activity modification        OBJECTIVE:   Stiff gt pattern when walking into clinic w/ SPC but did loosen up after stretching on bike;  general edema around L knee region;    ROM PROM AROM Overpressure Comment    L 11/23/22 R L R L R    Flexion 105° on SOB  70° cold 125°      Extension 0° after stretch w/ op -11° cold   -2°        Girth  7/29/2022 L R   Mid Patella 44 cm 39.3 cm   Suprapatellar     5cm above     15cm above         Strength L R Comment: majority deferred secondary to surgery   Quad 39 lb 49 lb 11/23/22   Hamstring 34 lb 34 lb    Abduction      Quad tone          Special Test Results/Comment: majority deferred secondary to surgery   Homans  8/29/2022 Known DVT in calf       RESTRICTIONS/PRECAUTIONS: 7/18/22 posterior L knee capsule release; Limited ROM In L knee after injection in April that was limited prior to surg for flex and ext;  DVT left lower leg (still on blood thinners till about Oct)    Exercises/Interventions:   Exercise/Equipment Resistance/Repetitions Other comments   Cardio/Warm-up     Bike Rec Seat #9 x7'  Oscillations to full revs   Treadmill          Stretching     Hamstring Seated propped 10\"x10 17.5# overpressure   Hip Flexion     ITB     Grion     Quad     Inclined Calf On floor 10\"x10    Towel Pull 30\"x5 over foam roll 17.5# overpressure        ROM     Passive    Active     ROM @EOB    Prone knee hang x10'   15# OP on ankle    Weight Hangs     Sheet Pulls     Ankle Pumps           Patellar Glides     Medial x2'    Superior x2'    Inferior x2'         STRENGTH     Supine 3x10 over foam roll 5# Prone     Abduction 5# 3x10    clams  3x10  black band   Ext - prone 5# 3x10 leaning over table    Adducton     SLR+          Isometrics     Quad sets      CKC     Calf raises     Wall sits     Step ups & over L2 on tech x10  L1 lateral step up x10 Using wall and crutch for assistance   step down 1 leg stand Squatting    CC TKE Balance     Biodex L9 LOS x2 & 1' RC     SLS 20\"x2    Resistive walking backwards 35# x15              PRE     Extension RANGE: pain free   Flexion  RANGE:   Leg Press # 3x15 Bilat conc & L LE ecc        Cable Column     Ed given on POC, expectations and goals    Using crutch but focus on knee ext w/ cuing            Manual/Modalities     Manual stretching to L hip into flex, fig 4, add, hip flex x15'    Deep tissue work to HS/scar massage in HS x5'                Therapeutic Exercise and NMR EXR  [x] (16727) Provided verbal/tactile cueing for activities related to strengthening, flexibility, endurance, ROM for improvements in LE, proximal hip, and core control with self care, mobility, lifting, ambulation.  [] (32631) Provided verbal/tactile cueing for activities related to improving balance, coordination, kinesthetic sense, posture, motor skill, proprioception  to assist with LE, proximal hip, and core control in self care, mobility, lifting, ambulation and eccentric single leg control.      NMR and Therapeutic Activities:    [x] (70609 or 27306) Provided verbal/tactile cueing for activities related to improving balance, coordination, kinesthetic sense, posture, motor skill, proprioception and motor activation to allow for proper function of core, proximal hip and LE with self care and ADLs  [] (08552) Gait Re-education- Provided training and instruction to the patient for proper LE, core and proximal hip recruitment and positioning and eccentric body weight control with ambulation re-education including up and down stairs     Home Exercise Program:    [x] (55504) Reviewed/Progressed HEP activities related to strengthening, flexibility, endurance, ROM of core, proximal hip and LE for functional self-care, mobility, lifting and ambulation/stair navigation            Written HEP est    [] (66566)Reviewed/Progressed HEP activities related to improving balance, coordination, kinesthetic sense, posture, motor skill, proprioception of core, proximal hip and LE for self care, mobility, lifting, and ambulation/stair navigation      Manual Treatments:  PROM / STM / Oscillations-Mobs:  G-I, II, III, IV (PA's, Inf., Post.)  [x] (54925) Provided manual therapy to mobilize LE, proximal hip and/or LS spine soft tissue/joints for the purpose of modulating pain, promoting relaxation, increasing ROM, reducing/eliminating soft tissue swelling/inflammation/restriction, improving soft tissue extensibility and allowing for proper ROM for normal function with self care, mobility, lifting and ambulation. Modalities:  CP x10 min propped and 15# over knee   [] GAME READY (VASO)- for significant edema, swelling, pain control. Charges:  Timed Code Treatment Minutes: 60   Total Treatment Minutes: 85     [] EVAL (LOW) 14856 (typically 20 minutes face-to-face)  [] EVAL (MOD) 30298 (typically 30 minutes face-to-face)  [] EVAL (HIGH) 74787 (typically 45 minutes face-to-face)  [] RE-EVAL   [x] WL(60418) 2x  25'   [] IONTO  [] NMR (60607) x     [] VASO x15'  with 10# OP  [x] Manual (98996) x 1  x15'     [] Other: GT x12'  [x] TA (11031) 1x   10'   [] Mech Traction (89627)  [] ES(attended) (87447)      [] ES (un) (06549): EMG/Stim combo x15'    GOALS:  Patient stated goal: return to outdoor activities for walking and climbing, along with work  [] Progressing: [] Met: [] Not Met: [] Adjusted     Therapist goals for Patient:  Short Term Goals: To be achieved in: 2 weeks  1. Independent in HEP and progression per patient tolerance, in order to prevent re-injury. [] Progressing: [] Met: [] Not Met: [] Adjusted  2. Patient will have a decrease in pain to facilitate improvement in movement, function, and ADLs as indicated by Functional Deficits. [] Progressing: [] Met: [] Not Met: [] Adjusted     Long Term Goals: To be achieved in: 12+ weeks  1. Functional index score of 67 or more for FOTO to assist with reaching prior level of function. [] Progressing: [] Met: [] Not Met: [] Adjusted  2. Patient will demonstrate increased AROM to 0-125 deg to allow for proper joint functioning as indicated by patients Functional Deficits. [] Progressing: [] Met: [] Not Met: [] Adjusted  3.  Patient will demonstrate an increase in Strength to good proximal hip strength and control, within 5lb HHD in LE to allow for proper functional mobility as indicated by patients Functional Deficits. [] Progressing: [] Met: [] Not Met: [] Adjusted  4. Patient will return to 90% functional activities without increased symptoms or restriction. [] Progressing: [] Met: [] Not Met: [] Adjusted  5. Pt will be able to walk 45 min on uneven surfaces for hiking and climbing. (patient specific functional goal)    [] Progressing: [] Met: [] Not Met: [] Adjusted         Overall Progression Towards Functional goals/ Treatment Progress Update:  [] Patient is progressing as expected towards functional goals listed. [] Progression is slowed due to complexities/Impairments listed. [] Progression has been slowed due to co-morbidities. [x] Plan just implemented, too soon to assess goals progression <30days   [] Goals require adjustment due to lack of progress  [] Patient is not progressing as expected and requires additional follow up with physician  [] Other    ASSESSMENT: Pt did arrive walking stiffly but improved w/ stretching. Quad strength was better than expected w/ testing but still ~15 deg deficit between LE's. More edema noted for unknown reasons. Amb skills on steps still require assistance from AD and UE. Pt has to focus on quad contraction at end range into ext. Treatment/Activity Tolerance:  [x] Patient tolerated treatment well [] Patient limited by fatique  [] Patient limited by pain  [] Patient limited by other medical complications- tightness  [] Other:     Prognosis: [x] Good [x] Fair  [] Poor    Patient Requires Follow-up: [x] Yes  [] No    PLAN: Cont 2x/week for ROM restoration & focus on gt training for function.    [x] Continue per plan of care [] Alter current plan (see comments)  [x] Plan of care initiated [] Hold pending MD visit [] Discharge    Electronically signed by: Jennifer Ramos, PT, MS, OMT-C    Physical Therapist [place] license #[id number]  Physical Therapist [place] license #[id number]             Note: If patient does not return for scheduled/ recommended follow up visits, this note will serve as a discharge from care along with most recent update on progress.

## 2022-11-28 ENCOUNTER — TREATMENT (OUTPATIENT)
Dept: PHYSICAL THERAPY | Age: 57
End: 2022-11-28
Payer: COMMERCIAL

## 2022-11-28 DIAGNOSIS — M24.662 FIBROSIS OF KNEE JOINT, LEFT: ICD-10-CM

## 2022-11-28 DIAGNOSIS — M25.562 LEFT KNEE PAIN, UNSPECIFIED CHRONICITY: ICD-10-CM

## 2022-11-28 DIAGNOSIS — R26.2 DIFFICULTY WALKING: Primary | ICD-10-CM

## 2022-11-28 DIAGNOSIS — M24.662 ARTHROFIBROSIS OF KNEE JOINT, LEFT: ICD-10-CM

## 2022-11-28 PROCEDURE — 97530 THERAPEUTIC ACTIVITIES: CPT | Performed by: PHYSICAL THERAPIST

## 2022-11-28 PROCEDURE — 97110 THERAPEUTIC EXERCISES: CPT | Performed by: PHYSICAL THERAPIST

## 2022-11-28 PROCEDURE — 97140 MANUAL THERAPY 1/> REGIONS: CPT | Performed by: PHYSICAL THERAPIST

## 2022-11-28 NOTE — PROGRESS NOTES
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 Berle Eisenmenger  Has the plan of care been signed (Y/N):        []  Yes  [x]  No     Date of Patient follow up with Physician:  Dr. Megan Bullard appt will be in ~4 months;      Is this a Progress Report:     []  Yes  [x]  No        If Yes:  Date Range for reporting period:  Beginning 22  Ending     Progress report will be due (10 Rx or 30 days whichever is less):        Recertification will be due (POC Duration  / 90 days whichever is less): 22        Visit # Insurance Allowable Auth Required   40 BMN []  Yes [x]  No        Functional Scale: FOTO 29; LEFS 13;    Date assessed:  22 FOTO 43; LEFS 27.2   10/17/22 FOTO 42;  22 FOTO 52, LEFS 38.7    Latex Allergy:  [x]NO      []YES  Preferred Language for Healthcare:   [x]English       []other:    Pain level:  3/10 w/o medication    SUBJECTIVE:   15 weeks post op from debridement; 18 weeks post op from posterior capsule release  Pt had stiffness in L knee w/ extended standing or driving in car over holiday but it loosened up w/ movement. He is having pain laterally at L knee and into prox calf.         OBJECTIVE:   Stiff gt pattern when walking into clinic w/ [facility] but did loosen up after stretching on bike;  general min to slight edema around L knee region;     ROM PROM AROM Overpressure Comment    L 11/23/22 R L R L R    Flexion 105° on SOB  70° cold 125°      Extension 0° after stretch w/ op  -11° cold   -2°        Girth  7/29/2022 L R   Mid Patella 44 cm 39.3 cm   Suprapatellar     5cm above     15cm above         Strength L R Comment: majority deferred secondary to surgery   Quad 39 lb 49 lb 11/23/22   Hamstring 34 lb 34 lb    Abduction      Quad tone          Special Test Results/Comment: majority deferred secondary to surgery   Homans  8/29/2022 Known DVT in calf       RESTRICTIONS/PRECAUTIONS: 7/18/22 posterior L knee capsule release; Limited ROM In L knee after injection in April that was limited prior to surg for flex and ext;  DVT left lower leg (still on blood thinners till about Oct)    Exercises/Interventions:   Exercise/Equipment Resistance/Repetitions Other comments   Cardio/Warm-up     Bike Rec Seat #9 x7'  Oscillations to full revs   Treadmill          Stretching     Hamstring Seated propped 10\"x10 17.5# overpressure   Hip Flexion     ITB     Grion     Quad     Inclined Calf On floor 10\"x10    Towel Pull 30\"x5 over foam roll 17.5# overpressure        ROM     Passive    Active     ROM @EOB    Prone knee hang x10'   15# OP on ankle    Weight Hangs     Sheet Pulls     Ankle Pumps           Patellar Glides     Medial x2'    Superior x2'    Inferior x2'         STRENGTH     Supine 3x10 over foam roll 5# Prone     Abduction 5# 3x10    clams  3x10  black band   Ext - prone 5# 3x10 leaning over table    Adducton     SLR+          Isometrics     Quad sets      CKC     Calf raises     Wall sits     Step ups & over L2 on tech x10    Held lat step ups due to pain in knee   step down 1 leg stand Squatting    Resistive walking backward 35# x15 Balance         SLS 20\"x2  SL Reaching forward and backwards w/ R x10    Resistive walking backwards 35# x15              PRE     Extension RANGE: pain free   Flexion RANGE:   Leg Press SL 80# 2x10  # 3x15      SLR + 20\"x5 Cuing and monitoring for full knee ext        Cable Column     Ed given on POC, expectations and goals    Using crutch but focus on knee ext w/ cuing            Manual/Modalities     Manual stretching to L hip into flex, fig 4, add, hip flex x15'    Deep tissue work to HS/scar massage in HS x5'                Therapeutic Exercise and NMR EXR  [x] (88884) Provided verbal/tactile cueing for activities related to strengthening, flexibility, endurance, ROM for improvements in LE, proximal hip, and core control with self care, mobility, lifting, ambulation.  [] (09704) Provided verbal/tactile cueing for activities related to improving balance, coordination, kinesthetic sense, posture, motor skill, proprioception  to assist with LE, proximal hip, and core control in self care, mobility, lifting, ambulation and eccentric single leg control.      NMR and Therapeutic Activities:    [x] (40778 or 70304) Provided verbal/tactile cueing for activities related to improving balance, coordination, kinesthetic sense, posture, motor skill, proprioception and motor activation to allow for proper function of core, proximal hip and LE with self care and ADLs  [] (21334) Gait Re-education- Provided training and instruction to the patient for proper LE, core and proximal hip recruitment and positioning and eccentric body weight control with ambulation re-education including up and down stairs     Home Exercise Program:    [x] (80188) Reviewed/Progressed HEP activities related to strengthening, flexibility, endurance, ROM of core, proximal hip and LE for functional self-care, mobility, lifting and ambulation/stair navigation            Written HEP est    [] (61337)Reviewed/Progressed HEP activities related to improving balance, coordination, kinesthetic sense, posture, motor skill, proprioception of core, proximal hip and LE for self care, mobility, lifting, and ambulation/stair navigation      Manual Treatments:  PROM / STM / Oscillations-Mobs:  G-I, II, III, IV (PA's, Inf., Post.)  [x] (94713) Provided manual therapy to mobilize LE, proximal hip and/or LS spine soft tissue/joints for the purpose of modulating pain, promoting relaxation,  increasing ROM, reducing/eliminating soft tissue swelling/inflammation/restriction, improving soft tissue extensibility and allowing for proper ROM for normal function with self care, mobility, lifting and ambulation. Modalities:  CP x10 min propped and 15# over knee   [] GAME READY (VASO)- for significant edema, swelling, pain control. Charges:  Timed Code Treatment Minutes: 60   Total Treatment Minutes: 75     [] EVAL (LOW) 84847 (typically 20 minutes face-to-face)  [] EVAL (MOD) 41102 (typically 30 minutes face-to-face)  [] EVAL (HIGH) 72327 (typically 45 minutes face-to-face)  [] RE-EVAL   [x] IB(40860) 2x  25'   [] IONTO  [] NMR (99249) x     [] VASO x15'  with 10# OP  [x] Manual (77570) x 1  x15'     [] Other: GT x12'  [x] TA (14467) 1x   10'   [] Mech Traction (79782)  [] ES(attended) (14322)      [] ES (un) (92076): EMG/Stim combo x15'    GOALS:  Patient stated goal: return to outdoor activities for walking and climbing, along with work  [] Progressing: [] Met: [] Not Met: [] Adjusted     Therapist goals for Patient:  Short Term Goals: To be achieved in: 2 weeks  1. Independent in HEP and progression per patient tolerance, in order to prevent re-injury. [] Progressing: [] Met: [] Not Met: [] Adjusted  2. Patient will have a decrease in pain to facilitate improvement in movement, function, and ADLs as indicated by Functional Deficits. [] Progressing: [] Met: [] Not Met: [] Adjusted     Long Term Goals: To be achieved in: 12+ weeks  1. Functional index score of 67 or more for FOTO to assist with reaching prior level of function. [] Progressing: [] Met: [] Not Met: [] Adjusted  2.  Patient will demonstrate increased AROM to 0-125 deg to allow for proper joint functioning as indicated by patients Functional Deficits. [] Progressing: [] Met: [] Not Met: [] Adjusted  3. Patient will demonstrate an increase in Strength to good proximal hip strength and control, within 5lb HHD in LE to allow for proper functional mobility as indicated by patients Functional Deficits. [] Progressing: [] Met: [] Not Met: [] Adjusted  4. Patient will return to 90% functional activities without increased symptoms or restriction. [] Progressing: [] Met: [] Not Met: [] Adjusted  5. Pt will be able to walk 45 min on uneven surfaces for hiking and climbing. (patient specific functional goal)    [] Progressing: [] Met: [] Not Met: [] Adjusted         Overall Progression Towards Functional goals/ Treatment Progress Update:  [] Patient is progressing as expected towards functional goals listed. [] Progression is slowed due to complexities/Impairments listed. [] Progression has been slowed due to co-morbidities. [x] Plan just implemented, too soon to assess goals progression <30days   [] Goals require adjustment due to lack of progress  [] Patient is not progressing as expected and requires additional follow up with physician  [] Other    ASSESSMENT:  Pt does arrive w/ stiffness in knee. Pt did not reach full knee ext today despite stretching. Pt did better w/ quad contraction to reach knee ext. Assess gt pattern for possible circumduction as cause of lateral L knee pain but no deviations noted. Patella mobility tight but loosened up w/ manual tech. Held lat step ups due to pain. Dynamic movement on single leg went well.        Treatment/Activity Tolerance:  [x] Patient tolerated treatment well [] Patient limited by fatique  [] Patient limited by pain  [] Patient limited by other medical complications- tightness  [] Other:     Prognosis: [x] Good [x] Fair  [] Poor    Patient Requires Follow-up: [x] Yes  [] No    PLAN: Cont 2x/week for ROM restoration & focus on gt training for function. [x] Continue per plan of care [] Alter current plan (see comments)  [x] Plan of care initiated [] Hold pending MD visit [] Discharge    Electronically signed by: Anselmo Kilpatrick PT, MS, OMT-C    Physical Therapist [id number] [address] license #[id number]  Physical Therapist [place] license #[id number]             Note: If patient does not return for scheduled/ recommended follow up visits, this note will serve as a discharge from care along with most recent update on progress.

## 2022-11-30 ENCOUNTER — TELEPHONE (OUTPATIENT)
Dept: ORTHOPEDIC SURGERY | Age: 57
End: 2022-11-30

## 2022-11-30 NOTE — TELEPHONE ENCOUNTER
Faxed a charge letter for images on a cd to Cornell Gutiérrez with [facility] @ [phone]    Images are for 4/4/2022 to present.

## 2022-12-02 ENCOUNTER — TREATMENT (OUTPATIENT)
Dept: PHYSICAL THERAPY | Age: 57
End: 2022-12-02

## 2022-12-02 DIAGNOSIS — M24.662 ARTHROFIBROSIS OF KNEE JOINT, LEFT: ICD-10-CM

## 2022-12-02 DIAGNOSIS — M25.562 LEFT KNEE PAIN, UNSPECIFIED CHRONICITY: ICD-10-CM

## 2022-12-02 DIAGNOSIS — R26.2 DIFFICULTY WALKING: Primary | ICD-10-CM

## 2022-12-02 NOTE — PROGRESS NOTES
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 Giles Santamaria  Has the plan of care been signed (Y/N):        []  Yes  [x]  No     Date of Patient follow up with Physician:  Dr. Jayme Patterson appt will be in ~4 months;      Is this a Progress Report:     []  Yes  [x]  No        If Yes:  Date Range for reporting period:  Beginning 22  Ending     Progress report will be due (10 Rx or 30 days whichever is less):        Recertification will be due (POC Duration  / 90 days whichever is less): 22        Visit # Insurance Allowable Auth Required   41 BMN []  Yes [x]  No        Functional Scale: FOTO 29; LEFS 13;    Date assessed:  22 FOTO 43; LEFS 27.2   10/17/22 FOTO 42;  22 FOTO 52, LEFS 38.7    Latex Allergy:  [x]NO      []YES  Preferred Language for Healthcare:   [x]English       []other:    Pain level:  3/10 w/o medication    SUBJECTIVE:   15 weeks post op from debridement; 18 weeks post op from posterior capsule release  He states he is feeling better than earlier this week. He also states he feels better using the cane vs one crutch. At home, most of the time he goes without the cane.             OBJECTIVE:   Stiff gt pattern when walking into clinic w/ [facility] but did loosen up after stretching on bike;  general min to slight edema around L knee region;     ROM PROM AROM Overpressure Comment    L 11/23/22 R L R L R    Flexion 105° on SOB  70° cold 125°      Extension 0° after stretch w/ op  -11° cold   -2°        Girth  7/29/2022 L R   Mid Patella 44 cm 39.3 cm   Suprapatellar     5cm above     15cm above         Strength L R Comment: majority deferred secondary to surgery   Quad 39 lb 49 lb 11/23/22   Hamstring 34 lb 34 lb    Abduction      Quad tone          Special Test Results/Comment: majority deferred secondary to surgery   Homans  8/29/2022 Known DVT in calf       RESTRICTIONS/PRECAUTIONS: 7/18/22 posterior L knee capsule release; Limited ROM In L knee after injection in April that was limited prior to surg for flex and ext;  DVT left lower leg (still on blood thinners till about Oct)    Exercises/Interventions:   Exercise/Equipment Resistance/Repetitions Other comments   Cardio/Warm-up     Bike Rec Seat #9 x7'  Oscillations to full revs   Treadmill          Stretching     Hamstring Seated propped 10\"x10 17.5# overpressure   Hip Flexion     ITB     Grion     Quad Prone KF TP 20\"x5    Inclined Calf On floor 10\"x10    Towel Pull 30\"x5 over foam roll 17.5# overpressure        ROM     Passive    Active     ROM @EOB    Prone knee hang x10'   15# OP on ankle    Weight Hangs     Sheet Pulls     Ankle Pumps           Patellar Glides     Medial x2'    Superior x2'    Inferior x2'         STRENGTH     Supine 3x10 over foam roll 5# Prone     Abduction 5# 3x10    clams  3x10  black band   Ext - prone 5# 3x10 leaning over table    Adducton     SLR+          Isometrics     Quad sets      CKC     Calf raises     Wall sits     Step ups & over L2 on tech x10    Held lat step ups due to pain in knee   step down 1 leg stand Squatting       Balance         SLS 20\"x2  SL Reaching forward and backwards w/ R x10    Resistive walking backwards 35# x15    Resisted sidestepping over mini cones 35# x5 each         PRE Extension RANGE: pain free   Flexion  RANGE:   Leg Press SL 80# 2x10  # 3x15      SLR + 20\"x5 Cuing and monitoring for full knee ext        Cable Column     Ed given on POC, expectations and goals    Using crutch but focus on knee ext w/ cuing            Manual/Modalities     Manual stretching to L hip into flex, fig 4, add, hip flex x15'    Deep tissue work to HS/scar massage in HS x5'                Therapeutic Exercise and NMR EXR  [x] (40034) Provided verbal/tactile cueing for activities related to strengthening, flexibility, endurance, ROM for improvements in LE, proximal hip, and core control with self care, mobility, lifting, ambulation.  [] (31352) Provided verbal/tactile cueing for activities related to improving balance, coordination, kinesthetic sense, posture, motor skill, proprioception  to assist with LE, proximal hip, and core control in self care, mobility, lifting, ambulation and eccentric single leg control.      NMR and Therapeutic Activities:    [x] (32172 or 58595) Provided verbal/tactile cueing for activities related to improving balance, coordination, kinesthetic sense, posture, motor skill, proprioception and motor activation to allow for proper function of core, proximal hip and LE with self care and ADLs  [] (08021) Gait Re-education- Provided training and instruction to the patient for proper LE, core and proximal hip recruitment and positioning and eccentric body weight control with ambulation re-education including up and down stairs     Home Exercise Program:    [x] (05984) Reviewed/Progressed HEP activities related to strengthening, flexibility, endurance, ROM of core, proximal hip and LE for functional self-care, mobility, lifting and ambulation/stair navigation            Written HEP est    [] (92191)Reviewed/Progressed HEP activities related to improving balance, coordination, kinesthetic sense, posture, motor skill, proprioception of core, proximal hip and LE for self care, mobility, lifting, and ambulation/stair navigation      Manual Treatments:  PROM / STM / Oscillations-Mobs:  G-I, II, III, IV (PA's, Inf., Post.)  [x] (87616) Provided manual therapy to mobilize LE, proximal hip and/or LS spine soft tissue/joints for the purpose of modulating pain, promoting relaxation,  increasing ROM, reducing/eliminating soft tissue swelling/inflammation/restriction, improving soft tissue extensibility and allowing for proper ROM for normal function with self care, mobility, lifting and ambulation. Modalities:   [] GAME READY (VASO)- for significant edema, swelling, pain control. Charges:  Timed Code Treatment Minutes: 50   Total Treatment Minutes: 60     [] EVAL (LOW) 33338 (typically 20 minutes face-to-face)  [] EVAL (MOD) 82594 (typically 30 minutes face-to-face)  [] EVAL (HIGH) 66841 (typically 45 minutes face-to-face)  [] RE-EVAL   [x] EI(93518) 2x  25'   [] IONTO  [] NMR (64537) x     [] VASO x15'  with 10# OP  [x] Manual (62112) x 1  x15'     [] Other: GT x12'  [x] TA (22044) 1x   10'   [] Mech Traction (66135)  [] ES(attended) (49768)      [] ES (un) (02031): EMG/Stim combo x15'    GOALS:  Patient stated goal: return to outdoor activities for walking and climbing, along with work  [] Progressing: [] Met: [] Not Met: [] Adjusted     Therapist goals for Patient:  Short Term Goals: To be achieved in: 2 weeks  1. Independent in HEP and progression per patient tolerance, in order to prevent re-injury. [] Progressing: [] Met: [] Not Met: [] Adjusted  2. Patient will have a decrease in pain to facilitate improvement in movement, function, and ADLs as indicated by Functional Deficits. [] Progressing: [] Met: [] Not Met: [] Adjusted     Long Term Goals: To be achieved in: 12+ weeks  1. Functional index score of 67 or more for FOTO to assist with reaching prior level of function. [] Progressing: [] Met: [] Not Met: [] Adjusted  2.  Patient will demonstrate increased AROM to 0-125 deg to allow for proper joint functioning as indicated by patients Functional Deficits. [] Progressing: [] Met: [] Not Met: [] Adjusted  3. Patient will demonstrate an increase in Strength to good proximal hip strength and control, within 5lb HHD in LE to allow for proper functional mobility as indicated by patients Functional Deficits. [] Progressing: [] Met: [] Not Met: [] Adjusted  4. Patient will return to 90% functional activities without increased symptoms or restriction. [] Progressing: [] Met: [] Not Met: [] Adjusted  5. Pt will be able to walk 45 min on uneven surfaces for hiking and climbing. (patient specific functional goal)    [] Progressing: [] Met: [] Not Met: [] Adjusted         Overall Progression Towards Functional goals/ Treatment Progress Update:  [] Patient is progressing as expected towards functional goals listed. [] Progression is slowed due to complexities/Impairments listed. [] Progression has been slowed due to co-morbidities. [x] Plan just implemented, too soon to assess goals progression <30days   [] Goals require adjustment due to lack of progress  [] Patient is not progressing as expected and requires additional follow up with physician  [] Other    ASSESSMENT:    Continues to be a little stiff into end ranges. Better patellar mobility noted today. He felt a good stretch in his quads with prone knee flexion stretch today. Able to return to step ups today. We also did some sidestepping with resistance. He was challenged more due to balance issues on his right LE. He did not have time to ice today, stated he would at home.          Treatment/Activity Tolerance:  [x] Patient tolerated treatment well [] Patient limited by fatique  [] Patient limited by pain  [] Patient limited by other medical complications- tightness  [] Other:     Prognosis: [x] Good [x] Fair  [] Poor    Patient Requires Follow-up: [x] Yes  [] No    PLAN: Cont 2x/week for ROM restoration & focus on gt training for function. [x] Continue per plan of care [] Alter current plan (see comments)  [x] Plan of care initiated [] Hold pending MD visit [] Discharge    Electronically signed by:     Alyssia Jernigan, PT      Board Certified Orthopaedic Clinical Specialist  [facility] Certified  Physical Therapist    [place] PT #[id number]  [place] PT #[id number]            Note: If patient does not return for scheduled/ recommended follow up visits, this note will serve as a discharge from care along with most recent update on progress.

## 2022-12-05 ENCOUNTER — TREATMENT (OUTPATIENT)
Dept: PHYSICAL THERAPY | Age: 57
End: 2022-12-05

## 2022-12-05 DIAGNOSIS — M25.562 LEFT KNEE PAIN, UNSPECIFIED CHRONICITY: ICD-10-CM

## 2022-12-05 DIAGNOSIS — M24.662 ARTHROFIBROSIS OF KNEE JOINT, LEFT: ICD-10-CM

## 2022-12-05 DIAGNOSIS — R26.2 DIFFICULTY WALKING: Primary | ICD-10-CM

## 2022-12-05 DIAGNOSIS — M24.662 FIBROSIS OF KNEE JOINT, LEFT: ICD-10-CM

## 2022-12-05 NOTE — PROGRESS NOTES
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 Joanna Howell  Has the plan of care been signed (Y/N):        []  Yes  [x]  No     Date of Patient follow up with Physician:  Dr. Minerva Aparicio appt will be in ~4 months;      Is this a Progress Report:     []  Yes  [x]  No        If Yes:  Date Range for reporting period:  Beginning 22  Ending     Progress report will be due (10 Rx or 30 days whichever is less):        Recertification will be due (POC Duration  / 90 days whichever is less): 22        Visit # Insurance Allowable Auth Required   42 BMN []  Yes [x]  No        Functional Scale: FOTO 29; LEFS 13;    Date assessed:  22 FOTO 43; LEFS 27.2   10/17/22 FOTO 42;  22 FOTO 52, LEFS 38.7    Latex Allergy:  [x]NO      []YES  Preferred Language for Healthcare:   [x]English       []other:    Pain level:  3/10 w/o medication    SUBJECTIVE:   16 weeks post op from debridement; 19 weeks post op from posterior capsule release  He states he is using SPC int  to not at all in his home. Calf is really tight today. Pt has reached out to Dr. Joanna Howell and Dr. Minerva Aparicio to determine when surg is expected but has heard back from them yet.         OBJECTIVE:   Stiff gt pattern when walking into clinic w/ SPC but did loosen up after stretching on bike;  general min to slight edema around L knee region;     ROM PROM AROM Overpressure Comment    L 11/23/22 R L R L R    Flexion 105° on SOB  70° cold 125°      Extension 0° after stretch w/ op  -11° cold   -2°        Girth  7/29/2022 L R   Mid Patella 44 cm 39.3 cm   Suprapatellar     5cm above     15cm above         Strength L R Comment: majority deferred secondary to surgery   Quad 39 lb 49 lb 11/23/22   Hamstring 34 lb 34 lb    Abduction      Quad tone          Special Test Results/Comment: majority deferred secondary to surgery   Homans  8/29/2022 Known DVT in calf       RESTRICTIONS/PRECAUTIONS: 7/18/22 posterior L knee capsule release; Limited ROM In L knee after injection in April that was limited prior to surg for flex and ext;  DVT left lower leg (still on blood thinners till about Oct)    Exercises/Interventions:   Exercise/Equipment Resistance/Repetitions Other comments   Cardio/Warm-up     Bike Rec Seat #9 x7'  Oscillations to full revs   Treadmill          Stretching     Hamstring Seated propped 10\"x10 17.5# overpressure   Hip Flexion     ITB     Grion    Quad    Inclined Calf On floor 10\"x10    Towel Pull 30\"x5 over foam roll 17.5# overpressure        ROM     Passive    Active     ROM @EOB    Prone knee hang x10'   15# OP on ankle    Weight Hangs     Sheet Pulls     Ankle Pumps           Patellar Glides     Medial x2'    Superior x2'    Inferior x2'         STRENGTH     Supine 3x10 over foam roll 5# Prone     Abduction 5# 3x10    clams  3x10  black band   Ext - prone 5# 3x10 leaning over table    Adducton     SLR+          Isometrics     Quad sets      CKC     Calf raises     Wall sits     Step ups & over L2 on tech x10    Held lat step ups due to pain in knee   step down 1 leg stand Squatting       Balance Fitter w/ mini squat fwd/bwd'; static 1' side to side         SLS 20\"x2  SL Reaching forward for 2 cups w/ UE while SLS x5 ea hand    Resistive walking backwards Resisted sidestepping over mini cones 25# x5 each         PRE     Extension 5# 2x10 RANGE: pain free   Flexion  RANGE:   Leg Press SL 80# 2x10  # 3x15      SLR + 20\"x5 Cuing and monitoring for full knee ext        Cable Column     Ed given on POC, expectations and goals    Using crutch but focus on knee ext w/ cuing            Manual/Modalities     Manual stretching to L hip into flex, fig 4, add, hip flex x15'    Deep tissue work to HS/scar massage in HS x5'                Therapeutic Exercise and NMR EXR  [x] (16179) Provided verbal/tactile cueing for activities related to strengthening, flexibility, endurance, ROM for improvements in LE, proximal hip, and core control with self care, mobility, lifting, ambulation.  [] (38467) Provided verbal/tactile cueing for activities related to improving balance, coordination, kinesthetic sense, posture, motor skill, proprioception  to assist with LE, proximal hip, and core control in self care, mobility, lifting, ambulation and eccentric single leg control.      NMR and Therapeutic Activities:    [x] (97322 or 30463) Provided verbal/tactile cueing for activities related to improving balance, coordination, kinesthetic sense, posture, motor skill, proprioception and motor activation to allow for proper function of core, proximal hip and LE with self care and ADLs  [] (92200) Gait Re-education- Provided training and instruction to the patient for proper LE, core and proximal hip recruitment and positioning and eccentric body weight control with ambulation re-education including up and down stairs     Home Exercise Program:    [x] (89801) Reviewed/Progressed HEP activities related to strengthening, flexibility, endurance, ROM of core, proximal hip and LE for functional self-care, mobility, lifting and ambulation/stair navigation            Written HEP est    [] (30586)Reviewed/Progressed HEP activities related to improving balance, coordination, kinesthetic sense, posture, motor skill, proprioception of core, proximal hip and LE for self care, mobility, lifting, and ambulation/stair navigation      Manual Treatments:  PROM / STM / Oscillations-Mobs:  G-I, II, III, IV (PA's, Inf., Post.)  [x] (82541) Provided manual therapy to mobilize LE, proximal hip and/or LS spine soft tissue/joints for the purpose of modulating pain, promoting relaxation,  increasing ROM, reducing/eliminating soft tissue swelling/inflammation/restriction, improving soft tissue extensibility and allowing for proper ROM for normal function with self care, mobility, lifting and ambulation. Modalities:  CP x10 min propped and 15# over knee   [] GAME READY (VASO)- for significant edema, swelling, pain control. Charges:  Timed Code Treatment Minutes: 50   Total Treatment Minutes: 60     [] EVAL (LOW) 38947 (typically 20 minutes face-to-face)  [] EVAL (MOD) 00663 (typically 30 minutes face-to-face)  [] EVAL (HIGH) 83228 (typically 45 minutes face-to-face)  [] RE-EVAL   [x] DD(46441) 2x  25'   [] IONTO  [] NMR (95709) x     [] VASO x15'  with 10# OP  [x] Manual (48607) x 1  x15'     [] Other: GT x12'  [x] TA (21904) 1x   10'   [] Mech Traction (96260)  [] ES(attended) (04947)      [] ES (un) (68222): EMG/Stim combo x15'    GOALS:  Patient stated goal: return to outdoor activities for walking and climbing, along with work  [] Progressing: [] Met: [] Not Met: [] Adjusted     Therapist goals for Patient:  Short Term Goals: To be achieved in: 2 weeks  1. Independent in HEP and progression per patient tolerance, in order to prevent re-injury. [] Progressing: [] Met: [] Not Met: [] Adjusted  2. Patient will have a decrease in pain to facilitate improvement in movement, function, and ADLs as indicated by Functional Deficits. [] Progressing: [] Met: [] Not Met: [] Adjusted     Long Term Goals: To be achieved in: 12+ weeks  1.  Functional index score of 67 or more for FOTO to assist with reaching prior level of function. [] Progressing: [] Met: [] Not Met: [] Adjusted  2. Patient will demonstrate increased AROM to 0-125 deg to allow for proper joint functioning as indicated by patients Functional Deficits. [] Progressing: [] Met: [] Not Met: [] Adjusted  3. Patient will demonstrate an increase in Strength to good proximal hip strength and control, within 5lb HHD in LE to allow for proper functional mobility as indicated by patients Functional Deficits. [] Progressing: [] Met: [] Not Met: [] Adjusted  4. Patient will return to 90% functional activities without increased symptoms or restriction. [] Progressing: [] Met: [] Not Met: [] Adjusted  5. Pt will be able to walk 45 min on uneven surfaces for hiking and climbing. (patient specific functional goal)    [] Progressing: [] Met: [] Not Met: [] Adjusted         Overall Progression Towards Functional goals/ Treatment Progress Update:  [] Patient is progressing as expected towards functional goals listed. [] Progression is slowed due to complexities/Impairments listed. [] Progression has been slowed due to co-morbidities. [x] Plan just implemented, too soon to assess goals progression <30days   [] Goals require adjustment due to lack of progress  [] Patient is not progressing as expected and requires additional follow up with physician  [] Other    ASSESSMENT:    Pt had more fluid gt pattern on arrival and min usage of SPC. Pt is still tight at end range in ext and flex. Patella mobility limited but no pain w/ palpation today. Pt performed dynamic mobility activity that left knee a little tight. Attempted to inc ht of step for function and pt unable to do with using hand to completely lift him up. Side stepping w/ resistance was challenging for control so had to reduce wts and pt able to perform better.         Treatment/Activity Tolerance:  [x] Patient tolerated treatment well [] Patient limited by fatique  [] Patient limited by pain  [] Patient limited by other medical complications- tightness  [] Other:     Prognosis: [x] Good [x] Fair  [] Poor    Patient Requires Follow-up: [x] Yes  [] No    PLAN: Cont 2x/week for ROM restoration & focus on gt training for function. [x] Continue per plan of care [] Alter current plan (see comments)  [x] Plan of care initiated [] Hold pending MD visit [] Discharge    Electronically signed by: Stephanie Tompkins, PT, MS, OMT-C    Physical Therapist [facility] license #[id number]  Physical Therapist [place] license #[id number]                 Note: If patient does not return for scheduled/ recommended follow up visits, this note will serve as a discharge from care along with most recent update on progress.

## 2022-12-08 ENCOUNTER — TELEPHONE (OUTPATIENT)
Dept: ORTHOPEDIC SURGERY | Age: 57
End: 2022-12-08

## 2022-12-09 ENCOUNTER — TREATMENT (OUTPATIENT)
Dept: PHYSICAL THERAPY | Age: 57
End: 2022-12-09

## 2022-12-09 DIAGNOSIS — M24.662 ARTHROFIBROSIS OF KNEE JOINT, LEFT: ICD-10-CM

## 2022-12-09 DIAGNOSIS — R26.2 DIFFICULTY WALKING: Primary | ICD-10-CM

## 2022-12-09 NOTE — PROGRESS NOTES
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 Aman Rdz  Has the plan of care been signed (Y/N):        []  Yes  [x]  No     Date of Patient follow up with Physician:  Dr. Adriana Sullivan appt will be in ~4 months;      Is this a Progress Report:     []  Yes  [x]  No        If Yes:  Date Range for reporting period:  Beginning 22  Ending     Progress report will be due (10 Rx or 30 days whichever is less):        Recertification will be due (POC Duration  / 90 days whichever is less): 22        Visit # Insurance Allowable Auth Required   43 BMN []  Yes [x]  No        Functional Scale: FOTO 29; LEFS 13;    Date assessed:  22 FOTO 43; LEFS 27.2   10/17/22 FOTO 42;  22 FOTO 52, LEFS 38.7    Latex Allergy:  [x]NO      []YES  Preferred Language for Healthcare:   [x]English       []other:    Pain level:  3/10 w/o medication    SUBJECTIVE:   16 weeks post op from debridement; 19 weeks post op from posterior capsule release  He reports that his knee has been feeling ok this week. He has had some increased LBP lately. Feels like it is muscular. He states that he has a massaging chair that heats up and that feels good to use.            OBJECTIVE:   Stiff gt pattern when walking into clinic w/ SPC but did loosen up after stretching on bike;  general min to slight edema around L knee region;     ROM PROM AROM Overpressure Comment    L 11/23/22 R L R L R    Flexion 105° on SOB  70° cold 125°      Extension 0° after stretch w/ op  -11° cold   -2°        Girth  7/29/2022 L R   Mid Patella 44 cm 39.3 cm   Suprapatellar     5cm above     15cm above         Strength L R Comment: majority deferred secondary to surgery   Quad 39 lb 49 lb 11/23/22   Hamstring 34 lb 34 lb    Abduction      Quad tone          Special Test Results/Comment: majority deferred secondary to surgery   Homans  8/29/2022 Known DVT in calf       RESTRICTIONS/PRECAUTIONS: 7/18/22 posterior L knee capsule release; Limited ROM In L knee after injection in April that was limited prior to surg for flex and ext;  DVT left lower leg (still on blood thinners till about Oct)    Exercises/Interventions:   Exercise/Equipment Resistance/Repetitions Other comments   Cardio/Warm-up     Bike Rec Seat #9 x10'  Oscillations to full revs   Treadmill          Stretching     Hamstring Seated propped 10\"x10 17.5# overpressure   Hip Flexion     ITB     Grion    Quad Prone KF TP 20\"x5 Manual stretch   Inclined Calf On floor 10\"x10    Towel Pull 30\"x5 over foam roll 17.5# overpressure        ROM     Passive    Active     ROM @EOB    Prone knee hang x10'   15# OP on ankle    Weight Hangs     Sheet Pulls     Ankle Pumps           Patellar Glides     Medial x2'    Superior x2'    Inferior x2'         STRENGTH     Supine 3x10 over foam roll 5# Prone     Abduction 5# 3x10    clams  3x10  black band   Ext - prone 5# 3x10 leaning over table    Adducton     SLR+          Isometrics     Quad sets      CKC     Calf raises     Wall sits     Step ups & over L2 on tech x10    Held lat step ups due to pain in knee   step down 1 leg stand Squatting       Balance Fitter w/ mini squat fwd/bwd'; static 1' side to side         SLS 20\"x2  SL Reaching forward for 2 cups w/ UE while SLS x5 ea hand Resistive walking backwards    Resisted sidestepping over mini cones 25# x5 each         PRE     Extension 5# 2x10 RANGE: pain free   Flexion  RANGE:   Leg Press SL 80# 2x10  # 3x15      SLR + 20\"x5 Cuing and monitoring for full knee ext        Cable Column     Ed given on POC, expectations and goals    Using crutch but focus on knee ext w/ cuing            Manual/Modalities     Manual stretching to L hip into flex, fig 4, add, hip flex x15'    Deep tissue work to HS/scar massage in HS x5'                Therapeutic Exercise and NMR EXR  [x] (33029) Provided verbal/tactile cueing for activities related to strengthening, flexibility, endurance, ROM for improvements in LE, proximal hip, and core control with self care, mobility, lifting, ambulation.  [] (33968) Provided verbal/tactile cueing for activities related to improving balance, coordination, kinesthetic sense, posture, motor skill, proprioception  to assist with LE, proximal hip, and core control in self care, mobility, lifting, ambulation and eccentric single leg control.      NMR and Therapeutic Activities:    [x] (66543 or 86334) Provided verbal/tactile cueing for activities related to improving balance, coordination, kinesthetic sense, posture, motor skill, proprioception and motor activation to allow for proper function of core, proximal hip and LE with self care and ADLs  [] (43926) Gait Re-education- Provided training and instruction to the patient for proper LE, core and proximal hip recruitment and positioning and eccentric body weight control with ambulation re-education including up and down stairs     Home Exercise Program:    [x] (58694) Reviewed/Progressed HEP activities related to strengthening, flexibility, endurance, ROM of core, proximal hip and LE for functional self-care, mobility, lifting and ambulation/stair navigation            Written HEP est    [] (44984)Reviewed/Progressed HEP activities related to improving balance, coordination, kinesthetic sense, posture, motor skill, proprioception of core, proximal hip and LE for self care, mobility, lifting, and ambulation/stair navigation      Manual Treatments:  PROM / STM / Oscillations-Mobs:  G-I, II, III, IV (PA's, Inf., Post.)  [x] (72264) Provided manual therapy to mobilize LE, proximal hip and/or LS spine soft tissue/joints for the purpose of modulating pain, promoting relaxation,  increasing ROM, reducing/eliminating soft tissue swelling/inflammation/restriction, improving soft tissue extensibility and allowing for proper ROM for normal function with self care, mobility, lifting and ambulation. Modalities:   [] GAME READY (VASO)- for significant edema, swelling, pain control. Charges:  Timed Code Treatment Minutes: 50   Total Treatment Minutes: 55     [] EVAL (LOW) 36919 (typically 20 minutes face-to-face)  [] EVAL (MOD) 77045 (typically 30 minutes face-to-face)  [] EVAL (HIGH) 00363 (typically 45 minutes face-to-face)  [] RE-EVAL   [x] AY(78012) 2x  25'   [] IONTO  [] NMR (97187) x     [] VASO x15'  with 10# OP  [x] Manual (12137) x 1  x15'     [] Other: GT x12'  [x] TA (65564) 1x   10'   [] Mech Traction (46158)  [] ES(attended) (44473)      [] ES (un) (11096): EMG/Stim combo x15'    GOALS:  Patient stated goal: return to outdoor activities for walking and climbing, along with work  [] Progressing: [] Met: [] Not Met: [] Adjusted     Therapist goals for Patient:  Short Term Goals: To be achieved in: 2 weeks  1. Independent in HEP and progression per patient tolerance, in order to prevent re-injury. [] Progressing: [] Met: [] Not Met: [] Adjusted  2. Patient will have a decrease in pain to facilitate improvement in movement, function, and ADLs as indicated by Functional Deficits. [] Progressing: [] Met: [] Not Met: [] Adjusted     Long Term Goals: To be achieved in: 12+ weeks  1.  Functional index score of 67 or more for FOTO to assist with reaching prior level of function. [] Progressing: [] Met: [] Not Met: [] Adjusted  2. Patient will demonstrate increased AROM to 0-125 deg to allow for proper joint functioning as indicated by patients Functional Deficits. [] Progressing: [] Met: [] Not Met: [] Adjusted  3. Patient will demonstrate an increase in Strength to good proximal hip strength and control, within 5lb HHD in LE to allow for proper functional mobility as indicated by patients Functional Deficits. [] Progressing: [] Met: [] Not Met: [] Adjusted  4. Patient will return to 90% functional activities without increased symptoms or restriction. [] Progressing: [] Met: [] Not Met: [] Adjusted  5. Pt will be able to walk 45 min on uneven surfaces for hiking and climbing. (patient specific functional goal)    [] Progressing: [] Met: [] Not Met: [] Adjusted         Overall Progression Towards Functional goals/ Treatment Progress Update:  [] Patient is progressing as expected towards functional goals listed. [] Progression is slowed due to complexities/Impairments listed. [] Progression has been slowed due to co-morbidities. [x] Plan just implemented, too soon to assess goals progression <30days   [] Goals require adjustment due to lack of progress  [] Patient is not progressing as expected and requires additional follow up with physician  [] Other    ASSESSMENT:    Patient reports that bike really helps to get him moving and loosened up. With prone knee flexion, he gets a quad stretch but states he still feels it mostly in his knee. He continues to work hard in therapy and remains motivated. He did not have time for ice today, had to get to another appointment.         Treatment/Activity Tolerance:  [x] Patient tolerated treatment well [] Patient limited by fatique  [] Patient limited by pain  [] Patient limited by other medical complications- tightness  [] Other:     Prognosis: [x] Good [x] Fair  [] Poor    Patient Requires Follow-up: [x] Yes  [] No    PLAN: Cont 2x/week for ROM restoration & focus on gt training for function. [x] Continue per plan of care [] Alter current plan (see comments)  [x] Plan of care initiated [] Hold pending MD visit [] Discharge    Electronically signed by:     Van Marcos, PT,      Board Certified Orthopaedic Clinical Specialist  [facility] Certified  Physical Therapist    [place] PT #[id number]  [place] PT #[id number]              Note: If patient does not return for scheduled/ recommended follow up visits, this note will serve as a discharge from care along with most recent update on progress.

## 2022-12-12 ENCOUNTER — TREATMENT (OUTPATIENT)
Dept: PHYSICAL THERAPY | Age: 57
End: 2022-12-12

## 2022-12-12 DIAGNOSIS — M25.562 LEFT KNEE PAIN, UNSPECIFIED CHRONICITY: ICD-10-CM

## 2022-12-12 DIAGNOSIS — M24.662 ARTHROFIBROSIS OF KNEE JOINT, LEFT: ICD-10-CM

## 2022-12-12 DIAGNOSIS — M24.662 FIBROSIS OF KNEE JOINT, LEFT: ICD-10-CM

## 2022-12-12 DIAGNOSIS — R26.2 DIFFICULTY WALKING: Primary | ICD-10-CM

## 2022-12-12 NOTE — PROGRESS NOTES
[facility]   Phone: [phone]    Fax: [phone]      Physical Therapy  Cancellation/No-show Note  Patient Name:  Mack Gutierrez  :  1965   Date:  2022    Cancelled visits to date: 5  No-shows to date: 0    For today's appointment patient:  [x]  Cancelled  []  Rescheduled appointment  []  No-show     Reason given by patient:  [x]  Patient ill-  has a cold   []  Conflicting appointment  []  No transportation    []  Conflict with work  []  No reason given  []  Other:     Comments:  Had a family thing    Phone call information:   []  Phone call made today to patient at _ time at number provided:      []  Patient answered, conversation as follows:    []  Patient did not answer, message left as follows:  []  Phone call not made today  [x]  Phone call not needed - pt contacted us to cancel and provided reason for cancellation.      Electronically signed by:  Priscilla Peacock PT, Tiffany Knox 85, OMT-C    Physical Therapist [place] license #[id number]  Physical Therapist [place] license #[id number]

## 2022-12-19 ENCOUNTER — TREATMENT (OUTPATIENT)
Dept: PHYSICAL THERAPY | Age: 57
End: 2022-12-19
Payer: COMMERCIAL

## 2022-12-19 DIAGNOSIS — M24.662 ARTHROFIBROSIS OF KNEE JOINT, LEFT: ICD-10-CM

## 2022-12-19 DIAGNOSIS — R26.2 DIFFICULTY WALKING: Primary | ICD-10-CM

## 2022-12-19 DIAGNOSIS — M24.662 FIBROSIS OF KNEE JOINT, LEFT: ICD-10-CM

## 2022-12-19 DIAGNOSIS — M25.562 LEFT KNEE PAIN, UNSPECIFIED CHRONICITY: ICD-10-CM

## 2022-12-19 PROCEDURE — 97140 MANUAL THERAPY 1/> REGIONS: CPT | Performed by: PHYSICAL THERAPIST

## 2022-12-19 PROCEDURE — 97110 THERAPEUTIC EXERCISES: CPT | Performed by: PHYSICAL THERAPIST

## 2022-12-19 PROCEDURE — 97530 THERAPEUTIC ACTIVITIES: CPT | Performed by: PHYSICAL THERAPIST

## 2022-12-19 NOTE — PROGRESS NOTES
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 Angela Slater  Has the plan of care been signed (Y/N):        []  Yes  [x]  No     Date of Patient follow up with Physician:  Dr. Lakisha Murrieta appt will be in ~4 months;      Is this a Progress Report:     []  Yes  [x]  No        If Yes:  Date Range for reporting period:  Beginning 22  Ending     Progress report will be due (10 Rx or 30 days whichever is less):        Recertification will be due (POC Duration  / 90 days whichever is less): 22        Visit # Insurance Allowable Auth Required   43 BMN []  Yes [x]  No        Functional Scale: FOTO 29; LEFS 13;    Date assessed:  22 FOTO 43; LEFS 27.2   10/17/22 FOTO 42;  22 FOTO 52, LEFS 38.7    Latex Allergy:  [x]NO      []YES  Preferred Language for Healthcare:   [x]English       []other:    Pain level:  3/10 w/o medication    SUBJECTIVE:   17 weeks post op from debridement; 20 weeks post op from posterior capsule release  He reports that he is feeling better from illness. Knee is feeling better. Pt has been walking around w/o [facility] anywhere.        OBJECTIVE:   Stiff gt pattern when walking into clinic w/ [facility] but did loosen up after stretching on bike;  general min to slight edema around L knee region; ROM PROM AROM Overpressure Comment    L 11/23/22 R L R L R    Flexion 105° on SOB  70° cold 125°      Extension 0° after stretch w/ op  -11° cold   -2°        Girth  7/29/2022 L R   Mid Patella 44 cm 39.3 cm   Suprapatellar     5cm above     15cm above         Strength L R Comment: majority deferred secondary to surgery   Quad 39 lb 49 lb 11/23/22   Hamstring 34 lb 34 lb    Abduction      Quad tone          Special Test Results/Comment: majority deferred secondary to surgery   Homans  8/29/2022 Known DVT in calf       RESTRICTIONS/PRECAUTIONS: 7/18/22 posterior L knee capsule release; Limited ROM In L knee after injection in April that was limited prior to surg for flex and ext;  DVT left lower leg (still on blood thinners till about Oct)    Exercises/Interventions:   Exercise/Equipment Resistance/Repetitions Other comments   Cardio/Warm-up     Bike Rec Seat #9 x10'  Oscillations to full revs   Treadmill          Stretching     Hamstring Seated propped 10\"x10 17.5# overpressure   Hip Flexion     ITB     Grion    Quad Prone KF TP 20\"x5 Manual stretch   Inclined Calf On floor 10\"x10    Towel Pull 30\"x5 over foam roll 17.5# overpressure        ROM     Passive    Active     ROM @EOB    Prone knee hang x10'   15# OP on ankle    Weight Hangs     Sheet Pulls     Ankle Pumps           Patellar Glides     Medial x2'    Superior x2'    Inferior x2'         STRENGTH     Supine 3x10 over foam roll 5# Prone     Abduction 5# 3x10    clams  3x10  black band   Ext - prone 5# 3x10 leaning over table    Adducton     SLR+          Isometrics     Quad sets      CKC     Calf raises     Wall sits     Step ups & over L2 on tech x10  L2 lateral step up x10   Held lat step ups due to pain in knee   step down 1 leg stand Squatting       Balance Fitter w/ mini squat fwd/bwd'; static 1' side to side         SLS 20\"x2  SL Reaching forward for 4 cups w/ UE while SLS x5 ea hand     Resistive walking backwards    Resisted sidestepping over mini cones 25# x5 each         PRE     Extension 15# 2x10 B LE conc;L LE ecc RANGE:   Flexion  RANGE:   Leg Press SL 80# 2x10  # 3x15      SLR + 20\"x5 Cuing and monitoring for full knee ext        Cable Column     Ed given on POC, expectations and goals    Using crutch but focus on knee ext w/ cuing            Manual/Modalities     Manual stretching to L hip into flex, fig 4, add, hip flex x15'    Deep tissue work to HS/scar massage in HS x5'                Therapeutic Exercise and NMR EXR  [x] (79193) Provided verbal/tactile cueing for activities related to strengthening, flexibility, endurance, ROM for improvements in LE, proximal hip, and core control with self care, mobility, lifting, ambulation.  [] (21202) Provided verbal/tactile cueing for activities related to improving balance, coordination, kinesthetic sense, posture, motor skill, proprioception  to assist with LE, proximal hip, and core control in self care, mobility, lifting, ambulation and eccentric single leg control.      NMR and Therapeutic Activities:    [x] (30215 or 70634) Provided verbal/tactile cueing for activities related to improving balance, coordination, kinesthetic sense, posture, motor skill, proprioception and motor activation to allow for proper function of core, proximal hip and LE with self care and ADLs  [] (42975) Gait Re-education- Provided training and instruction to the patient for proper LE, core and proximal hip recruitment and positioning and eccentric body weight control with ambulation re-education including up and down stairs     Home Exercise Program:    [x] (49955) Reviewed/Progressed HEP activities related to strengthening, flexibility, endurance, ROM of core, proximal hip and LE for functional self-care, mobility, lifting and ambulation/stair navigation            Written HEP est    [] (92243)Reviewed/Progressed HEP activities related to improving balance, coordination, kinesthetic sense, posture, motor skill, proprioception of core, proximal hip and LE for self care, mobility, lifting, and ambulation/stair navigation      Manual Treatments:  PROM / STM / Oscillations-Mobs:  G-I, II, III, IV (PA's, Inf., Post.)  [x] (21038) Provided manual therapy to mobilize LE, proximal hip and/or LS spine soft tissue/joints for the purpose of modulating pain, promoting relaxation,  increasing ROM, reducing/eliminating soft tissue swelling/inflammation/restriction, improving soft tissue extensibility and allowing for proper ROM for normal function with self care, mobility, lifting and ambulation. Modalities:  CP x10 min propped and 15# over knee   [] GAME READY (VASO)- for significant edema, swelling, pain control. Charges:  Timed Code Treatment Minutes: 55   Total Treatment Minutes: 65     [] EVAL (LOW) 30563 (typically 20 minutes face-to-face)  [] EVAL (MOD) 33139 (typically 30 minutes face-to-face)  [] EVAL (HIGH) 08192 (typically 45 minutes face-to-face)  [] RE-EVAL   [x] EF(14247) 2x  25'   [] IONTO  [] NMR (52857) x     [] VASO x15'  with 10# OP  [x] Manual (47253) x 1  x15'     [] Other: GT x12'  [x] TA (07904) 1x   10'   [] Mech Traction (12032)  [] ES(attended) (04696)      [] ES (un) (72876): EMG/Stim combo x15'    GOALS:  Patient stated goal: return to outdoor activities for walking and climbing, along with work  [] Progressing: [] Met: [] Not Met: [] Adjusted     Therapist goals for Patient:  Short Term Goals: To be achieved in: 2 weeks  1. Independent in HEP and progression per patient tolerance, in order to prevent re-injury. [] Progressing: [] Met: [] Not Met: [] Adjusted  2. Patient will have a decrease in pain to facilitate improvement in movement, function, and ADLs as indicated by Functional Deficits. [] Progressing: [] Met: [] Not Met: [] Adjusted     Long Term Goals: To be achieved in: 12+ weeks  1. Functional index score of 67 or more for FOTO to assist with reaching prior level of function.   [] Progressing: [] Met: [] Not Met: [] Adjusted  2. Patient will demonstrate increased AROM to 0-125 deg to allow for proper joint functioning as indicated by patients Functional Deficits. [] Progressing: [] Met: [] Not Met: [] Adjusted  3. Patient will demonstrate an increase in Strength to good proximal hip strength and control, within 5lb HHD in LE to allow for proper functional mobility as indicated by patients Functional Deficits. [] Progressing: [] Met: [] Not Met: [] Adjusted  4. Patient will return to 90% functional activities without increased symptoms or restriction. [] Progressing: [] Met: [] Not Met: [] Adjusted  5. Pt will be able to walk 45 min on uneven surfaces for hiking and climbing. (patient specific functional goal)    [] Progressing: [] Met: [] Not Met: [] Adjusted         Overall Progression Towards Functional goals/ Treatment Progress Update:  [] Patient is progressing as expected towards functional goals listed. [] Progression is slowed due to complexities/Impairments listed. [] Progression has been slowed due to co-morbidities. [x] Plan just implemented, too soon to assess goals progression <30days   [] Goals require adjustment due to lack of progress  [] Patient is not progressing as expected and requires additional follow up with physician  [] Other    ASSESSMENT:    Patient reports less symptoms in knee and inc function. Pt was able to perform more of program today w/o symptoms. Pt did walk w/ knee ext when [address] after losing up L knee. Strength around L LE progressing w/ fatigue noted from wt machines. Pt did struggle to reach full knee ext despite stretching. Stretching by PT performed to L hip w/ tightness noted and limitation in range present. Pt will be gone for next week due to holiday but will return next week.        Treatment/Activity Tolerance:  [x] Patient tolerated treatment well [] Patient limited by fatique  [] Patient limited by pain  [] Patient limited by other medical complications- tightness  [] Other:     Prognosis: [x] Good [x] Fair  [] Poor    Patient Requires Follow-up: [x] Yes  [] No    PLAN: Cont 2x/week for ROM restoration & focus on gt training for function. [x] Continue per plan of care [] Alter current plan (see comments)  [x] Plan of care initiated [] Hold pending MD visit [] Discharge    Electronically signed by: Gaudencio Browne PT, MS , OMT-C    Physical Therapist [id number] [address] license #[id number]  Physical Therapist [place] license #[id number]               Note: If patient does not return for scheduled/ recommended follow up visits, this note will serve as a discharge from care along with most recent update on progress.

## 2022-12-27 ENCOUNTER — TREATMENT (OUTPATIENT)
Dept: PHYSICAL THERAPY | Age: 57
End: 2022-12-27
Payer: COMMERCIAL

## 2022-12-27 DIAGNOSIS — M24.662 ARTHROFIBROSIS OF KNEE JOINT, LEFT: ICD-10-CM

## 2022-12-27 DIAGNOSIS — R26.2 DIFFICULTY WALKING: Primary | ICD-10-CM

## 2022-12-27 PROCEDURE — 97110 THERAPEUTIC EXERCISES: CPT | Performed by: PHYSICAL THERAPIST

## 2022-12-27 PROCEDURE — 97530 THERAPEUTIC ACTIVITIES: CPT | Performed by: PHYSICAL THERAPIST

## 2022-12-27 PROCEDURE — 97140 MANUAL THERAPY 1/> REGIONS: CPT | Performed by: PHYSICAL THERAPIST

## 2022-12-27 PROCEDURE — 97016 VASOPNEUMATIC DEVICE THERAPY: CPT | Performed by: PHYSICAL THERAPIST

## 2022-12-27 NOTE — PROGRESS NOTES
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 Gab Whitaker  Has the plan of care been signed (Y/N):        []  Yes  [x]  No     Date of Patient follow up with Physician:  Dr. Celine Gao appt will be in ~4 months;      Is this a Progress Report:     []  Yes  [x]  No        If Yes:  Date Range for reporting period:  Beginning 22  Ending     Progress report will be due (10 Rx or 30 days whichever is less):        Recertification will be due (POC Duration  / 90 days whichever is less): 22        Visit # Insurance Allowable Auth Required   44 BMN []  Yes [x]  No        Functional Scale: FOTO 29; LEFS 13;    Date assessed:  22 FOTO 43; LEFS 27.2   10/17/22 FOTO 42;  22 FOTO 52, LEFS 38.7    Latex Allergy:  [x]NO      []YES  Preferred Language for Healthcare:   [x]English       []other:    Pain level:  3/10 w/o medication    SUBJECTIVE:   17 weeks post op from debridement; 20 weeks post op from posterior capsule release  Patient reports that he was out of town last week. He did bring his cane but he states he really hasn't been using it much.            OBJECTIVE:   Stiff gt pattern when walking into clinic w/ [facility] but did loosen up after stretching on bike;  general min to slight edema around L knee region;     ROM PROM AROM Overpressure Comment    L 11/23/22 R L R L R    Flexion 105° on SOB  70° cold 125°      Extension 0° after stretch w/ op  -11° cold   -2°        Girth  7/29/2022 L R   Mid Patella 44 cm 39.3 cm   Suprapatellar     5cm above     15cm above         Strength L R Comment: majority deferred secondary to surgery   Quad 39 lb 49 lb 11/23/22   Hamstring 34 lb 34 lb    Abduction      Quad tone          Special Test Results/Comment: majority deferred secondary to surgery   Homans  8/29/2022 Known DVT in calf       RESTRICTIONS/PRECAUTIONS: 7/18/22 posterior L knee capsule release; Limited ROM In L knee after injection in April that was limited prior to surg for flex and ext;  DVT left lower leg (still on blood thinners till about Oct)    Exercises/Interventions:   Exercise/Equipment Resistance/Repetitions Other comments   Cardio/Warm-up     Bike Rec Seat #9 x10'  Oscillations to full revs   Treadmill          Stretching     Hamstring Seated propped 10\"x10 17.5# overpressure   Hip Flexion     ITB     Grion    Quad Prone KF TP 20\"x5 Manual stretch   Inclined Calf On floor 10\"x10    Towel Pull 30\"x5 over foam roll 17.5# overpressure        ROM     Passive    Active     ROM @EOB    Prone knee hang x10'   15# OP on ankle    Weight Hangs     Sheet Pulls     Ankle Pumps           Patellar Glides     Medial x2'    Superior x2'    Inferior x2'         STRENGTH     Supine 3x10 over foam roll 5# Prone     Abduction 5# 3x10    clams  3x10  black band   Ext - prone 5# 3x10 leaning over table    Adducton     SLR+          Isometrics     Quad sets      CKC     Calf raises     Wall sits     Step ups & over L2 on tech x10  L2 lateral step up x10   step down 1 leg stand Squatting       Balance Fitter w/ mini squat fwd/bwd'; static 1' side to side         SLS 20\"x2  SL Reaching forward for 4 cups w/ UE while SLS x5 ea hand     Resistive walking backwards    Resisted sidestepping over mini cones X3 laps Black band around knees        PRE     Extension 15# 2x10 B LE conc;L LE ecc RANGE:   Flexion  RANGE:   Leg Press SL 80# 2x10  # 3x15      SLR + 20\"x5 Cuing and monitoring for full knee ext        Cable Column     Ed given on POC, expectations and goals    Using crutch but focus on knee ext w/ cuing            Manual/Modalities     Manual stretching to L hip into flex, fig 4, add, hip flex x15'    Deep tissue work to HS/scar massage in HS x5'                Therapeutic Exercise and NMR EXR  [x] (09917) Provided verbal/tactile cueing for activities related to strengthening, flexibility, endurance, ROM for improvements in LE, proximal hip, and core control with self care, mobility, lifting, ambulation.  [] (34336) Provided verbal/tactile cueing for activities related to improving balance, coordination, kinesthetic sense, posture, motor skill, proprioception  to assist with LE, proximal hip, and core control in self care, mobility, lifting, ambulation and eccentric single leg control.      NMR and Therapeutic Activities:    [x] (10123 or 80258) Provided verbal/tactile cueing for activities related to improving balance, coordination, kinesthetic sense, posture, motor skill, proprioception and motor activation to allow for proper function of core, proximal hip and LE with self care and ADLs  [] (48016) Gait Re-education- Provided training and instruction to the patient for proper LE, core and proximal hip recruitment and positioning and eccentric body weight control with ambulation re-education including up and down stairs     Home Exercise Program:    [x] (78997) Reviewed/Progressed HEP activities related to strengthening, flexibility, endurance, ROM of core, proximal hip and LE for functional self-care, mobility, lifting and ambulation/stair navigation            Written HEP est    [] (94400)Reviewed/Progressed HEP activities related to improving balance, coordination, kinesthetic sense, posture, motor skill, proprioception of core, proximal hip and LE for self care, mobility, lifting, and ambulation/stair navigation      Manual Treatments:  PROM / STM / Oscillations-Mobs:  G-I, II, III, IV (PA's, Inf., Post.)  [x] (60014) Provided manual therapy to mobilize LE, proximal hip and/or LS spine soft tissue/joints for the purpose of modulating pain, promoting relaxation,  increasing ROM, reducing/eliminating soft tissue swelling/inflammation/restriction, improving soft tissue extensibility and allowing for proper ROM for normal function with self care, mobility, lifting and ambulation. Modalities:   [x] GAME READY (VASO)- for significant edema, swelling, pain control. Charges:  Timed Code Treatment Minutes: 50   Total Treatment Minutes: 65     [] EVAL (LOW) 30462 (typically 20 minutes face-to-face)  [] EVAL (MOD) 92845 (typically 30 minutes face-to-face)  [] EVAL (HIGH) 81882 (typically 45 minutes face-to-face)  [] RE-EVAL   [x] KF(48671) 2x  25'   [] IONTO  [] NMR (01160) x     [x] VASO x15'  with 10# OP  [x] Manual (48557) x 1  x15'     [] Other: GT x12'  [x] TA (80399) 1x   10'   [] Mech Traction (33788)  [] ES(attended) (95264)      [] ES (un) (03142): EMG/Stim combo x15'    GOALS:  Patient stated goal: return to outdoor activities for walking and climbing, along with work  [] Progressing: [] Met: [] Not Met: [] Adjusted     Therapist goals for Patient:  Short Term Goals: To be achieved in: 2 weeks  1. Independent in HEP and progression per patient tolerance, in order to prevent re-injury. [] Progressing: [] Met: [] Not Met: [] Adjusted  2. Patient will have a decrease in pain to facilitate improvement in movement, function, and ADLs as indicated by Functional Deficits. [] Progressing: [] Met: [] Not Met: [] Adjusted     Long Term Goals: To be achieved in: 12+ weeks  1. Functional index score of 67 or more for FOTO to assist with reaching prior level of function. [] Progressing: [] Met: [] Not Met: [] Adjusted  2.  Patient will demonstrate increased AROM to 0-125 deg to allow for proper joint functioning as indicated by patients Functional Deficits. [] Progressing: [] Met: [] Not Met: [] Adjusted  3. Patient will demonstrate an increase in Strength to good proximal hip strength and control, within 5lb HHD in LE to allow for proper functional mobility as indicated by patients Functional Deficits. [] Progressing: [] Met: [] Not Met: [] Adjusted  4. Patient will return to 90% functional activities without increased symptoms or restriction. [] Progressing: [] Met: [] Not Met: [] Adjusted  5. Pt will be able to walk 45 min on uneven surfaces for hiking and climbing. (patient specific functional goal)    [] Progressing: [] Met: [] Not Met: [] Adjusted         Overall Progression Towards Functional goals/ Treatment Progress Update:  [] Patient is progressing as expected towards functional goals listed. [] Progression is slowed due to complexities/Impairments listed. [] Progression has been slowed due to co-morbidities. [x] Plan just implemented, too soon to assess goals progression <30days   [] Goals require adjustment due to lack of progress  [] Patient is not progressing as expected and requires additional follow up with physician  [] Other    ASSESSMENT:    He does still tend to walk with a slightly bent knee and mild limp over left LE when not using the cane. He did better than expected with sidestepping up and over cups with black band around his knees. He does have some discomfort across his patella with resisted open chain knee extension. Treatment/Activity Tolerance:  [x] Patient tolerated treatment well [] Patient limited by fatique  [] Patient limited by pain  [] Patient limited by other medical complications- tightness  [] Other:     Prognosis: [x] Good [x] Fair  [] Poor    Patient Requires Follow-up: [x] Yes  [] No    PLAN: Cont 2x/week for ROM restoration & focus on gt training for function.    [x] Continue per plan of care [] Alter current plan (see comments)  [x] Plan of care initiated [] Hold pending MD visit [] Discharge    Electronically signed by:     Keyanna Sanon, PT      Board Certified Orthopaedic Clinical Specialist  [facility] Certified  Physical Therapist    [place] PT #[id number]  [place] PT #[id number]          Note: If patient does not return for scheduled/ recommended follow up visits, this note will serve as a discharge from care along with most recent update on progress.

## 2022-12-30 ENCOUNTER — TREATMENT (OUTPATIENT)
Dept: PHYSICAL THERAPY | Age: 57
End: 2022-12-30
Payer: COMMERCIAL

## 2022-12-30 DIAGNOSIS — M24.662 ARTHROFIBROSIS OF KNEE JOINT, LEFT: ICD-10-CM

## 2022-12-30 DIAGNOSIS — R26.2 DIFFICULTY WALKING: Primary | ICD-10-CM

## 2022-12-30 PROCEDURE — 97110 THERAPEUTIC EXERCISES: CPT | Performed by: PHYSICAL THERAPIST

## 2022-12-30 PROCEDURE — 97140 MANUAL THERAPY 1/> REGIONS: CPT | Performed by: PHYSICAL THERAPIST

## 2022-12-30 PROCEDURE — 97530 THERAPEUTIC ACTIVITIES: CPT | Performed by: PHYSICAL THERAPIST

## 2022-12-30 NOTE — PROGRESS NOTES
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 Rowdy Boone  Has the plan of care been signed (Y/N):        []  Yes  [x]  No     Date of Patient follow up with Physician:  Dr. Carina Alan appt will be in ~4 months;      Is this a Progress Report:     []  Yes  [x]  No        If Yes:  Date Range for reporting period:  Beginning 22  Ending     Progress report will be due (10 Rx or 30 days whichever is less): 15/02/48       Recertification will be due (POC Duration  / 90 days whichever is less): 22        Visit # Insurance Allowable Auth Required   45 BMN []  Yes [x]  No        Functional Scale: FOTO 29; LEFS 13;    Date assessed:  22 FOTO 43; LEFS 27.2   10/17/22 FOTO 42;  22 FOTO 52, LEFS 38.7    Latex Allergy:  [x]NO      []YES  Preferred Language for Healthcare:   [x]English       []other:    Pain level:  3/10 w/o medication    SUBJECTIVE:   4+ months post op from debridement; 5+ months post op from posterior capsule release  Patient reports that he has been walking for the most part without his cane. States he doesn't feel too bad this am.    Hasn't stretched yet this morning, but walking fairly well.          OBJECTIVE:   Stiff gt pattern when walking into clinic w/ [facility] but did loosen up after stretching on bike;  general min to slight edema around L knee region;     ROM PROM AROM Overpressure Comment    L 11/23/22 R L R L R    Flexion 105° on SOB  70° cold 125°      Extension 0° after stretch w/ op  -11° cold   -2°        Girth  7/29/2022 L R   Mid Patella 44 cm 39.3 cm   Suprapatellar     5cm above     15cm above         Strength L R Comment: majority deferred secondary to surgery   Quad 39 lb 49 lb 11/23/22   Hamstring 34 lb 34 lb    Abduction      Quad tone          Special Test Results/Comment: majority deferred secondary to surgery   Homans  8/29/2022 Known DVT in calf       RESTRICTIONS/PRECAUTIONS: 7/18/22 posterior L knee capsule release; Limited ROM In L knee after injection in April that was limited prior to surg for flex and ext;  DVT left lower leg (still on blood thinners till about Oct)    Exercises/Interventions:   Exercise/Equipment Resistance/Repetitions Other comments   Cardio/Warm-up     Bike Rec Seat #9-#8 x10'  Oscillations to full revs   Treadmill          Stretching     Hamstring Seated propped 10\"x10 20# overpressure   Hip Flexion     ITB     Grion    Quad Prone KF TP 20\"x5 Manual stretch   Inclined Calf On floor 10\"x10    Towel Pull 30\"x5 over foam roll 20# overpressure        ROM     Passive    Active     ROM @EOB    Prone knee hang x10'   15# OP on ankle    Weight Hangs     Sheet Pulls     Ankle Pumps           Patellar Glides     Medial x2'    Superior x2'    Inferior x2'         STRENGTH     Supine 3x10 over foam roll 5# Prone     Abduction 5# 3x10    clams  3x10  black band   Ext - prone 5# 3x10 leaning over table    Adducton     SLR+          Isometrics     Quad sets      CKC     Calf raises     Wall sits     Step ups & over L2 on tech x10  L2 lateral step up x10   step down 1 leg stand Squatting       Balance Fitter w/ mini squat fwd/bwd'; static 1' side to side         SLS 20\"x2  SL Reaching forward for 4 cups w/ UE while SLS x5 ea hand     Resistive walking backwards    Resisted sidestepping over mini cones X3 laps Black band around knees        PRE     Extension 20# 2x10 B LE conc;L LE ecc RANGE:   Flexion 20# 2x10 B LE conc, L LE ecc RANGE:   Leg Press SL 90# 2x10  # 3x15      SLR + 20\"x5 Cuing and monitoring for full knee ext        Cable Column     Ed given on POC, expectations and goals    Using crutch but focus on knee ext w/ cuing            Manual/Modalities     Manual stretching to L hip into flex, fig 4, add, hip flex x15'    Deep tissue work to HS/scar massage in HS x5'                Therapeutic Exercise and NMR EXR  [x] (68993) Provided verbal/tactile cueing for activities related to strengthening, flexibility, endurance, ROM for improvements in LE, proximal hip, and core control with self care, mobility, lifting, ambulation.  [] (93459) Provided verbal/tactile cueing for activities related to improving balance, coordination, kinesthetic sense, posture, motor skill, proprioception  to assist with LE, proximal hip, and core control in self care, mobility, lifting, ambulation and eccentric single leg control.      NMR and Therapeutic Activities:    [x] (62727 or 93673) Provided verbal/tactile cueing for activities related to improving balance, coordination, kinesthetic sense, posture, motor skill, proprioception and motor activation to allow for proper function of core, proximal hip and LE with self care and ADLs  [] (37140) Gait Re-education- Provided training and instruction to the patient for proper LE, core and proximal hip recruitment and positioning and eccentric body weight control with ambulation re-education including up and down stairs     Home Exercise Program:    [x] (01920) Reviewed/Progressed HEP activities related to strengthening, flexibility, endurance, ROM of core, proximal hip and LE for functional self-care, mobility, lifting and ambulation/stair navigation            Written HEP est    [] (66475)Reviewed/Progressed HEP activities related to improving of function. [] Progressing: [] Met: [] Not Met: [] Adjusted  2. Patient will demonstrate increased AROM to 0-125 deg to allow for proper joint functioning as indicated by patients Functional Deficits. [] Progressing: [] Met: [] Not Met: [] Adjusted  3. Patient will demonstrate an increase in Strength to good proximal hip strength and control, within 5lb HHD in LE to allow for proper functional mobility as indicated by patients Functional Deficits. [] Progressing: [] Met: [] Not Met: [] Adjusted  4. Patient will return to 90% functional activities without increased symptoms or restriction. [] Progressing: [] Met: [] Not Met: [] Adjusted  5. Pt will be able to walk 45 min on uneven surfaces for hiking and climbing. (patient specific functional goal)    [] Progressing: [] Met: [] Not Met: [] Adjusted         Overall Progression Towards Functional goals/ Treatment Progress Update:  [] Patient is progressing as expected towards functional goals listed. [] Progression is slowed due to complexities/Impairments listed. [] Progression has been slowed due to co-morbidities. [x] Plan just implemented, too soon to assess goals progression <30days   [] Goals require adjustment due to lack of progress  [] Patient is not progressing as expected and requires additional follow up with physician  [] Other    ASSESSMENT:    He was a little stiff upon passive stretching today. But he did loosen up some with reps. He demonstrates good control with side stepping over cups. And he was able to do Level 2 step ups without using his UE at all and stated he had no knee pain for the first time with steps. He reported that he does feel like he is finally getting stronger.             Treatment/Activity Tolerance:  [x] Patient tolerated treatment well [] Patient limited by fatique  [] Patient limited by pain  [] Patient limited by other medical complications- tightness  [] Other:     Prognosis: [x] Good [x] Fair  [] Poor    Patient Requires Follow-up: [x] Yes  [] No    PLAN: Cont 2x/week for ROM restoration & focus on gt training for function. [x] Continue per plan of care [] Alter current plan (see comments)  [x] Plan of care initiated [] Hold pending MD visit [] Discharge    Electronically signed by:     Yamilex Ascencio, PT      Board Certified Orthopaedic Clinical Specialist  [facility] Certified  Physical Therapist    [place] PT #[id number]  [place] PT #[id number]          Note: If patient does not return for scheduled/ recommended follow up visits, this note will serve as a discharge from care along with most recent update on progress.

## 2023-01-03 ENCOUNTER — TREATMENT (OUTPATIENT)
Dept: PHYSICAL THERAPY | Age: 58
End: 2023-01-03

## 2023-01-03 DIAGNOSIS — M24.662 FIBROSIS OF KNEE JOINT, LEFT: ICD-10-CM

## 2023-01-03 DIAGNOSIS — M25.562 LEFT KNEE PAIN, UNSPECIFIED CHRONICITY: ICD-10-CM

## 2023-01-03 DIAGNOSIS — R26.2 DIFFICULTY WALKING: Primary | ICD-10-CM

## 2023-01-03 DIAGNOSIS — M24.662 ARTHROFIBROSIS OF KNEE JOINT, LEFT: ICD-10-CM

## 2023-01-03 NOTE — PROGRESS NOTES
[facility]   Phone: [phone]    Fax: [phone]      Physical Therapy  Cancellation/No-show Note  Patient Name:  Bonnie Carrillo  :  1965   Date:  1/3/2023    Cancelled visits to date: 5  No-shows to date: 1    For today's appointment patient:  []  Cancelled  []  Rescheduled appointment  [x]  No-show     Reason given by patient:  []  Patient ill-     []  Conflicting appointment  []  No transportation    []  Conflict with work  []  No reason given  [x]  Other:     Comments:  pt does have follow up appt scheduled for this week.      Phone call information:   [x]  Phone call made today to patient at _ time at number provided:      []  Patient answered, conversation as follows:    [x]  Patient did not answer, message left as follows: offered an appt tomorrow   []  Phone call not made today  []  Phone call not needed -     Electronically signed by:  Marilyn Fontaine PT, Tiffany Knox 85, OMT-C    Physical Therapist [place] license #[id number]  Physical Therapist [place] license #[id number]

## 2023-01-06 ENCOUNTER — TREATMENT (OUTPATIENT)
Dept: PHYSICAL THERAPY | Age: 58
End: 2023-01-06
Payer: COMMERCIAL

## 2023-01-06 DIAGNOSIS — M24.662 ARTHROFIBROSIS OF KNEE JOINT, LEFT: ICD-10-CM

## 2023-01-06 DIAGNOSIS — R26.2 DIFFICULTY WALKING: Primary | ICD-10-CM

## 2023-01-06 PROCEDURE — 97110 THERAPEUTIC EXERCISES: CPT | Performed by: PHYSICAL THERAPIST

## 2023-01-06 PROCEDURE — 97530 THERAPEUTIC ACTIVITIES: CPT | Performed by: PHYSICAL THERAPIST

## 2023-01-06 PROCEDURE — 97140 MANUAL THERAPY 1/> REGIONS: CPT | Performed by: PHYSICAL THERAPIST

## 2023-01-09 ENCOUNTER — TREATMENT (OUTPATIENT)
Dept: PHYSICAL THERAPY | Age: 58
End: 2023-01-09
Payer: COMMERCIAL

## 2023-01-09 DIAGNOSIS — M24.662 FIBROSIS OF KNEE JOINT, LEFT: ICD-10-CM

## 2023-01-09 DIAGNOSIS — M24.662 ARTHROFIBROSIS OF KNEE JOINT, LEFT: ICD-10-CM

## 2023-01-09 DIAGNOSIS — R26.2 DIFFICULTY WALKING: Primary | ICD-10-CM

## 2023-01-09 DIAGNOSIS — M25.562 LEFT KNEE PAIN, UNSPECIFIED CHRONICITY: ICD-10-CM

## 2023-01-09 PROCEDURE — 97530 THERAPEUTIC ACTIVITIES: CPT | Performed by: PHYSICAL THERAPIST

## 2023-01-09 PROCEDURE — 97140 MANUAL THERAPY 1/> REGIONS: CPT | Performed by: PHYSICAL THERAPIST

## 2023-01-09 PROCEDURE — 97110 THERAPEUTIC EXERCISES: CPT | Performed by: PHYSICAL THERAPIST

## 2023-01-09 NOTE — PROGRESS NOTES
[facility]   Phone: [phone]    Fax: [phone]   Physical Therapy Re-Certification Plan of Care    Dear  Dr Ortiz Faith,    We had the pleasure of treating the following patient for physical therapy services at [address] [place] [address]. A summary of our findings can be found in the updated assessment below. This includes our plan of care. If you have any questions or concerns regarding these findings, please do not hesitate to contact me at the office phone number checked above. Thank you for the referral.     Physician Signature:________________________________Date:__________________  By signing above (or electronic signature), therapists plan is approved by physician      Overall Response to Treatment:   [x]Patient is responding well to treatment and improvement is noted with regards  to goals   []Patient should continue to improve in reasonable time if they continue HEP   []Patient has plateaued and is no longer responding to skilled PT intervention    []Patient is getting worse and would benefit from return to referring MD   []Patient unable to adhere to initial POC   []Other: Pt has made significant improvement since starting therapy. He has min pain and steady reduction in edema in L knee. Quad activation has improved and pt able to wean off AD for gt. Function continues to improve as symptoms resolve. Pt is able to maintain carry over in knee ext from session to session. Recommend that pt continue therapy to progress function and advance control in L LE w/ L TKA not scheduled until March.      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 Evelio Frank  Has the plan of care been signed (Y/N):        []  Yes  [x]  No     Date of Patient follow up with Physician:  Dr. Sameer Cuenca appt will be in ~4 months;      Is this a Progress Report:     []  Yes  [x]  No        If Yes:  Date Range for reporting period:  Beginning 7/27/22  Ending 1/9/23    Progress report will be due (10 Rx or 30 days whichever is less): 0/2/07       Recertification will be due (POC Duration  / 90 days whichever is less): 2/9/23        Visit # Insurance Allowable Auth Required   2 BMN []  Yes [x]  No    He had 45 visits for same episode in 2022    Functional Scale: FOTO 29; LEFS 13;    Date assessed:  7/27/22 8/29/22 FOTO 43; LEFS 27.2   10/17/22 FOTO 42;  11/23/22 FOTO 52, LEFS 38.7  1/9/23 FOTO 63, LEFS 53.5    Latex Allergy:  [x]NO      []YES  Preferred Language for Healthcare:   [x]English       []other:    Pain level:  3/10 w/o medication    SUBJECTIVE:   4+ months post op from debridement; 5+ months post op from posterior capsule release  Patient reports that he is having min pain in knee. Stiffness still noted in knee but lessening. Pt has been able to do more at home and is no longer using an AD for gt pattern.       OBJECTIVE:   Stiff gt pattern when walking into clinic w/o AD and did loosen up after stretching on bike;  general min to slight edema around L knee region;     ROM PROM AROM Overpressure Comment    L 1/9/23 R L R L R    Flexion  70° cold 125°      Extension 0° after stretch w/ op  -1°    -2°        Girth  7/29/2022 L R   Mid Patella 44 cm 39.3 cm   Suprapatellar     5cm above     15cm above         Strength L R Comment: majority deferred secondary to surgery   Quad 39 lb 49 lb 11/23/22   Hamstring 34 lb 34 lb    Abduction      Quad tone          Special Test Results/Comment: majority deferred secondary to surgery   Homans  8/29/2022 Known DVT in calf       RESTRICTIONS/PRECAUTIONS: 7/18/22 posterior L knee capsule release; Limited ROM In L knee after injection in April that was limited prior to surg for flex and ext;  DVT left lower leg (still on blood thinners till about Oct)    Exercises/Interventions:   Exercise/Equipment Resistance/Repetitions Other comments   Cardio/Warm-up     Bike Rec Seat #8 x10'  Oscillations to full revs   Treadmill          Stretching     Hamstring Seated propped 10\"x10 20# overpressure   Hip Flexion     ITB     Grion    Quad Prone KF TP 20\"x5 Manual stretch   Inclined Calf On floor 10\"x10    Towel Pull 30\"x5 over foam roll 20# overpressure        ROM     Passive    Active     ROM @EOB    Prone knee hang x10'   15# OP on ankle    Weight Hangs     Sheet Pulls     Ankle Pumps           Patellar Glides     Medial x2'    Superior x2'    Inferior x2'         STRENGTH     Supine 3x10 over foam roll 5# Prone     Abduction 5# 3x10    clams  3x10  black band   Ext - prone    bridging x30    Adducton     SLR+          Isometrics     Quad sets      CKC     Calf raises     Wall sits     Step ups & over L3 on tech x10  L3 lateral step up x10   step down 1 leg stand Squatting       Balance     Biodex L9 LOS x2 & 1' RC     Cones on chair height    Quick stepping over line in side to side & fwd/bwd direction 30\"x2        Resistive walking backwards    Resisted sidestepping over mini cones X3 laps Black band around knees        PRE     Extension 20# 2x10 B LE conc;L LE ecc RANGE:   Flexion 40# 2x10 B LE conc, L LE ecc RANGE:   Leg Press SL  - 100# 2x10  # 3x15      SLR + 20\"x5 Cuing and monitoring for full knee ext        Cable Column     Ed given on POC, expectations and goals    Using crutch but focus on knee ext w/ cuing            Manual/Modalities     Manual stretching to L hip into flex, fig 4, add, hip flex x15'    Deep tissue work to HS/scar massage in HS x5'                Therapeutic Exercise and NMR EXR  [x] (06958) Provided verbal/tactile cueing for activities related to strengthening, flexibility, endurance, ROM for improvements in LE, proximal hip, and core control with self care, mobility, lifting, ambulation.  [] (13356) Provided verbal/tactile cueing for activities related to improving balance, coordination, kinesthetic sense, posture, motor skill, proprioception  to assist with LE, proximal hip, and core control in self care, mobility, lifting, ambulation and eccentric single leg control.      NMR and Therapeutic Activities:    [x] (87065 or 55312) Provided verbal/tactile cueing for activities related to improving balance, coordination, kinesthetic sense, posture, motor skill, proprioception and motor activation to allow for proper function of core, proximal hip and LE with self care and ADLs  [] (70713) Gait Re-education- Provided training and instruction to the patient for proper LE, core and proximal hip recruitment and positioning and eccentric body weight control with ambulation re-education including up and down stairs     Home Exercise Program:    [x] (53719) Reviewed/Progressed HEP activities related to strengthening, flexibility, endurance, ROM of core, proximal hip and LE for functional self-care, mobility, lifting and ambulation/stair navigation            Written HEP est    [] (82296)Reviewed/Progressed HEP activities related to improving balance, coordination, kinesthetic sense, posture, motor skill, proprioception of core, proximal hip and LE for self care, mobility, lifting, and ambulation/stair navigation      Manual Treatments:  PROM / STM / Oscillations-Mobs:  G-I, II, III, IV (PA's, Inf., Post.)  [x] (52677) Provided manual therapy to mobilize LE, proximal hip and/or LS spine soft tissue/joints for the purpose of modulating pain, promoting relaxation,  increasing ROM, reducing/eliminating soft tissue swelling/inflammation/restriction, improving soft tissue extensibility and allowing for proper ROM for normal function with self care, mobility, lifting and ambulation. Modalities:  CP x10 min propped and 20# over knee   [] GAME READY (VASO)- for significant edema, swelling, pain control. Charges:  Timed Code Treatment Minutes: 55   Total Treatment Minutes: 65     [] EVAL (LOW) 80951 (typically 20 minutes face-to-face)  [] EVAL (MOD) 45937 (typically 30 minutes face-to-face)  [] EVAL (HIGH) 30241 (typically 45 minutes face-to-face)  [] RE-EVAL   [x] KT(87531) 2x  25'   [] IONTO  [] NMR (17272) x     [] VASO x15'  with 10# OP  [x] Manual (72135) x 1  x15'     [] Other: GT x12'  [x] TA (07693) 1x   10'   [] Mech Traction (14313)  [] ES(attended) (79540)      [] ES (un) (55699): EMG/Stim combo x15'    GOALS:  Patient stated goal: return to outdoor activities for walking and climbing, along with work  [] Progressing: [] Met: [] Not Met: [] Adjusted     Therapist goals for Patient:  Short Term Goals: To be achieved in: 2 weeks  1. Independent in HEP and progression per patient tolerance, in order to prevent re-injury. [] Progressing: [] Met: [] Not Met: [] Adjusted  2. Patient will have a decrease in pain to facilitate improvement in movement, function, and ADLs as indicated by Functional Deficits. [] Progressing: [] Met: [] Not Met: [] Adjusted     Long Term Goals: To be achieved in: 12+ weeks  1. Functional index score of 67 or more for FOTO to assist with reaching prior level of function. [x] Progressing: [] Met: [] Not Met: [] Adjusted  2. Patient will demonstrate increased AROM to 0-125 deg to allow for proper joint functioning as indicated by patients Functional Deficits. [x] Progressing: [] Met: [] Not Met: [] Adjusted  3.  Patient will demonstrate an increase in Strength to good proximal hip strength and control, within 5lb HHD in LE to allow for proper functional mobility as indicated by patients Functional Deficits.  [x] Progressing: [] Met: [] Not Met: [] Adjusted  4. Patient will return to 90% functional activities without increased symptoms or restriction.  [x] Progressing: [] Met: [] Not Met: [] Adjusted  5. Pt will be able to walk 45 min on uneven surfaces for hiking and climbing. (patient specific functional goal)    [x] Progressing: [] Met: [] Not Met: [] Adjusted         Overall Progression Towards Functional goals/ Treatment Progress Update:  [x] Patient is progressing as expected towards functional goals listed.    [] Progression is slowed due to complexities/Impairments listed.  [] Progression has been slowed due to co-morbidities.  [] Plan just implemented, too soon to assess goals progression <30days   [] Goals require adjustment due to lack of progress  [] Patient is not progressing as expected and requires additional follow up with physician  [] Other    ASSESSMENT:    Pt is progressing w/ function in LE and less pain. Pt amb skills have significantly improved w/o AD. Tolerated advancements well. Pt did quick response activity w/ good tech and inc speed w/ repetition as he got the hang of it. ROM in L knee progressing.       Treatment/Activity Tolerance:  [x] Patient tolerated treatment well [] Patient limited by fatique  [] Patient limited by pain  [] Patient limited by other medical complications- tightness  [] Other:     Prognosis: [x] Good [x] Fair  [] Poor    Patient Requires Follow-up: [x] Yes  [] No    PLAN: Cont 2x/week for ROM restoration & focus on gt training for function.   [x] Continue per plan of care [] Alter current plan (see comments)  [x] Plan of care initiated [] Hold pending MD visit [] Discharge    Electronically signed by: Silvina Enamorado, PT, MS, OMT-C    Physical Therapist [place] license #[id number]  Physical Therapist [place] license #[id number]             Note: If patient does not return for scheduled/ recommended follow up  visits, this note will serve as a discharge from care along with most recent update on progress.

## 2023-01-12 ENCOUNTER — TREATMENT (OUTPATIENT)
Dept: PHYSICAL THERAPY | Age: 58
End: 2023-01-12
Payer: COMMERCIAL

## 2023-01-12 DIAGNOSIS — M24.662 FIBROSIS OF KNEE JOINT, LEFT: ICD-10-CM

## 2023-01-12 DIAGNOSIS — M24.662 ARTHROFIBROSIS OF KNEE JOINT, LEFT: ICD-10-CM

## 2023-01-12 DIAGNOSIS — M25.562 LEFT KNEE PAIN, UNSPECIFIED CHRONICITY: ICD-10-CM

## 2023-01-12 DIAGNOSIS — R26.2 DIFFICULTY WALKING: Primary | ICD-10-CM

## 2023-01-12 PROCEDURE — 97530 THERAPEUTIC ACTIVITIES: CPT | Performed by: PHYSICAL THERAPIST

## 2023-01-12 PROCEDURE — 97110 THERAPEUTIC EXERCISES: CPT | Performed by: PHYSICAL THERAPIST

## 2023-01-12 PROCEDURE — 97140 MANUAL THERAPY 1/> REGIONS: CPT | Performed by: PHYSICAL THERAPIST

## 2023-01-12 NOTE — PROGRESS NOTES
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 Jono Marroquin  Has the plan of care been signed (Y/N):        []  Yes  [x]  No     Date of Patient follow up with Physician:  Dr. Chema Mccoy appt will be in ~4 months;      Is this a Progress Report:     []  Yes  [x]  No        If Yes:  Date Range for reporting period:  Beginning 22  Ending     Progress report will be due (10 Rx or 30 days whichever is less): 99       Recertification will be due (POC Duration  / 90 days whichever is less): 23        Visit # Insurance Allowable Auth Required   3 BMN []  Yes [x]  No    He had 45 visits for same episode in     Functional Scale: FOTO 29; LEFS 13;    Date assessed:  22 FOTO 43; LEFS 27.2   10/17/22 FOTO 42;  22 FOTO 52, LEFS 38.7  23 FOTO 63, LEFS 53.5    Latex Allergy:  [x]NO      []YES  Preferred Language for Healthcare:   [x]English       []other:    Pain level:  3/10 w/o medication    SUBJECTIVE:   4+ months post op from debridement; 5+ months post op from posterior capsule release  Patient reports marino he had some back pain after last session that he relates to bridges. He has more tightness today for unknown cause.  Pt is traveling to [place] this weekend so will be in vehicle a lot due to drive and out of routine.       OBJECTIVE:   Stiff gt pattern when walking into clinic w/o AD and did loosen up after stretching on bike;  general min to slight edema around L knee region;     ROM PROM AROM Overpressure Comment    L 1/12/23 R L R L R    Flexion 106° on ERMI  70° cold 125°      Extension 0° after stretch w/ op  -1°    -2°        Girth  7/29/2022 L R   Mid Patella 44 cm 39.3 cm   Suprapatellar     5cm above     15cm above         Strength L R Comment: majority deferred secondary to surgery   Quad 39 lb 49 lb 11/23/22   Hamstring 34 lb 34 lb    Abduction      Quad tone          Special Test Results/Comment: majority deferred secondary to surgery   Homans  8/29/2022 Known DVT in calf       RESTRICTIONS/PRECAUTIONS: 7/18/22 posterior L knee capsule release; Limited ROM In L knee after injection in April that was limited prior to surg for flex and ext;  DVT left lower leg (still on blood thinners till about Oct)    Exercises/Interventions:   Exercise/Equipment Resistance/Repetitions Other comments   Cardio/Warm-up     Bike Rec Seat #8 x10'  Oscillations to full revs   Treadmill          Stretching     Hamstring Seated propped 10\"x10 20# overpressure   Hip Flexion     ITB     Grion    Quad Prone KF TP 20\"x5 Manual stretch   Inclined Calf On floor 10\"x10    Towel Pull 30\"x5 over foam roll 20# overpressure        ROM     Passive    Active     ROM @EOB    Prone knee hang x10'   15# OP on ankle    Weight Hangs     Sheet Pulls     Ankle Pumps           Patellar Glides     Medial x2'    Superior x2'    Inferior x2'         STRENGTH     Supine 3x10 over foam roll 5# Prone     Abduction 5# 3x10    clams  3x10  black band   Ext - prone 5# 3x10 leaning over table    bridging 1/12/23 hold due to back pain   Adducton     SLR+          Isometrics     Quad sets      CKC     Calf raises     Wall sits     Step ups & over L3 on tech x10  L3 lateral step up x10   step down 1 leg stand Squatting       Balance         SLS 20\"x2  SL Reaching across body for 4 cups w/ UE while SLS x5 ea hand - 2 directions Cones on chair height    Quick stepping over line in side to side & fwd/bwd direction 30\"x2        Resistive walking backwards    Resisted sidestepping over mini cones X3 laps Black loop around knees        PRE     Extension 20# 2x10 B LE conc;L LE ecc RANGE:   Flexion 40# 2x10 B LE conc, L LE ecc RANGE:   Leg Press SL -100# 2x10  # 3x15      SLR + 20\"x5 Cuing and monitoring for full knee ext        Cable Column     Ed given on POC, expectations and goals             Manual/Modalities     Manual stretching to L hip into flex, fig 4, add, hip flex x15'    Deep tissue work to HS/scar massage in HS x5'                Therapeutic Exercise and NMR EXR  [x] (90078) Provided verbal/tactile cueing for activities related to strengthening, flexibility, endurance, ROM for improvements in LE, proximal hip, and core control with self care, mobility, lifting, ambulation.  [] (62995) Provided verbal/tactile cueing for activities related to improving balance, coordination, kinesthetic sense, posture, motor skill, proprioception  to assist with LE, proximal hip, and core control in self care, mobility, lifting, ambulation and eccentric single leg control.      NMR and Therapeutic Activities:    [x] (53567 or 41462) Provided verbal/tactile cueing for activities related to improving balance, coordination, kinesthetic sense, posture, motor skill, proprioception and motor activation to allow for proper function of core, proximal hip and LE with self care and ADLs  [] (16995) Gait Re-education- Provided training and instruction to the patient for proper LE, core and proximal hip recruitment and positioning and eccentric body weight control with ambulation re-education including up and down stairs     Home Exercise Program:    [x] (48099) Reviewed/Progressed HEP activities related to strengthening, flexibility, endurance, ROM of core, proximal hip and LE for functional self-care, mobility, lifting and ambulation/stair navigation            Written HEP est    [] (58132)Reviewed/Progressed HEP activities related to improving balance, coordination, kinesthetic sense, posture, motor skill, proprioception of core, proximal hip and LE for self care, mobility, lifting, and ambulation/stair navigation      Manual Treatments:  PROM / STM / Oscillations-Mobs:  G-I, II, III, IV (PA's, Inf., Post.)  [x] (56186) Provided manual therapy to mobilize LE, proximal hip and/or LS spine soft tissue/joints for the purpose of modulating pain, promoting relaxation,  increasing ROM, reducing/eliminating soft tissue swelling/inflammation/restriction, improving soft tissue extensibility and allowing for proper ROM for normal function with self care, mobility, lifting and ambulation. Modalities:  CP x10 min propped and 20# over knee   [] GAME READY (VASO)- for significant edema, swelling, pain control. Charges:  Timed Code Treatment Minutes: 55   Total Treatment Minutes: 65     [] EVAL (LOW) 69444 (typically 20 minutes face-to-face)  [] EVAL (MOD) 59592 (typically 30 minutes face-to-face)  [] EVAL (HIGH) 59255 (typically 45 minutes face-to-face)  [] RE-EVAL   [x] RL(89598) 2x  25'   [] IONTO  [] NMR (75313) x     [] VASO x15'  with 10# OP  [x] Manual (20453) x 1  x15'     [] Other: GT x12'  [x] TA (05818) 1x   10'   [] Mech Traction (44779)  [] ES(attended) (42756)      [] ES (un) (06075): EMG/Stim combo x15'    GOALS:  Patient stated goal: return to outdoor activities for walking and climbing, along with work  [] Progressing: [] Met: [] Not Met: [] Adjusted     Therapist goals for Patient:  Short Term Goals: To be achieved in: 2 weeks  1. Independent in HEP and progression per patient tolerance, in order to prevent re-injury. [] Progressing: [] Met: [] Not Met: [] Adjusted  2.  Patient will have a decrease in pain to facilitate improvement in movement, function, and ADLs as indicated by Functional Deficits. [] Progressing: [] Met: [] Not Met: [] Adjusted     Long Term Goals: To be achieved in: 12+ weeks  1. Functional index score of 67 or more for FOTO to assist with reaching prior level of function. [x] Progressing: [] Met: [] Not Met: [] Adjusted  2. Patient will demonstrate increased AROM to 0-125 deg to allow for proper joint functioning as indicated by patients Functional Deficits. [x] Progressing: [] Met: [] Not Met: [] Adjusted  3. Patient will demonstrate an increase in Strength to good proximal hip strength and control, within 5lb HHD in LE to allow for proper functional mobility as indicated by patients Functional Deficits. [x] Progressing: [] Met: [] Not Met: [] Adjusted  4. Patient will return to 90% functional activities without increased symptoms or restriction. [x] Progressing: [] Met: [] Not Met: [] Adjusted  5. Pt will be able to walk 45 min on uneven surfaces for hiking and climbing. (patient specific functional goal)    [x] Progressing: [] Met: [] Not Met: [] Adjusted         Overall Progression Towards Functional goals/ Treatment Progress Update:  [x] Patient is progressing as expected towards functional goals listed. [] Progression is slowed due to complexities/Impairments listed. [] Progression has been slowed due to co-morbidities. [] Plan just implemented, too soon to assess goals progression <30days   [] Goals require adjustment due to lack of progress  [] Patient is not progressing as expected and requires additional follow up with physician  [] Other    ASSESSMENT:    Pt is stiff at end range motion for both ext and flex. Pain in knee reduced despite inc in function. Pt was very tired from program today despite no change in challenge. Some tightness noted in hip motion.         Treatment/Activity Tolerance:  [x] Patient tolerated treatment well [] Patient limited by fatique  [] Patient limited by pain  [] Patient limited by other medical complications- tightness  [] Other:     Prognosis: [x] Good [x] Fair  [] Poor    Patient Requires Follow-up: [x] Yes  [] No    PLAN: Cont 2x/week for ROM restoration & focus on gt training for function. [x] Continue per plan of care [] Alter current plan (see comments)  [x] Plan of care initiated [] Hold pending MD visit [] Discharge    Electronically signed by: Nelia Vogel, PT, MS, OMT-C    Physical Therapist [place] license #[id number]  Physical Therapist [place] license #[id number]             Note: If patient does not return for scheduled/ recommended follow up visits, this note will serve as a discharge from care along with most recent update on progress.

## 2023-01-17 ENCOUNTER — TREATMENT (OUTPATIENT)
Dept: PHYSICAL THERAPY | Age: 58
End: 2023-01-17

## 2023-01-17 DIAGNOSIS — R26.2 DIFFICULTY WALKING: Primary | ICD-10-CM

## 2023-01-17 DIAGNOSIS — M24.662 ARTHROFIBROSIS OF KNEE JOINT, LEFT: ICD-10-CM

## 2023-01-17 DIAGNOSIS — M25.562 LEFT KNEE PAIN, UNSPECIFIED CHRONICITY: ICD-10-CM

## 2023-01-17 NOTE — PROGRESS NOTES
Evan Porter [facility]   Phone: [phone]    Fax: [phone]      Physical Therapy  Cancellation/No-show Note  Patient Name:  Hailey Coburn  :  1965   Date:  2023    Cancelled visits to date: 6  No-shows to date: 0    For today's appointment patient:  [x]  Cancelled  []  Rescheduled appointment  []  No-show     Reason given by patient:  [x]  Patient ill-     []  Conflicting appointment  []  No transportation    []  Conflict with work  []  No reason given  []  Other:     Comments:  Had a family thing    Phone call information:   []  Phone call made today to patient at _ time at number provided:      []  Patient answered, conversation as follows:    []  Patient did not answer, message left as follows:  []  Phone call not made today  [x]  Phone call not needed - pt contacted us to cancel and provided reason for cancellation.      Electronically signed by:      Ten Perdomo PT      Board Certified Orthopaedic Clinical Specialist  [facility] Certified  Physical Therapist    [place] PT #[id number]  [id number]

## 2023-01-23 ENCOUNTER — TREATMENT (OUTPATIENT)
Dept: PHYSICAL THERAPY | Age: 58
End: 2023-01-23

## 2023-01-23 DIAGNOSIS — M25.562 LEFT KNEE PAIN, UNSPECIFIED CHRONICITY: ICD-10-CM

## 2023-01-23 DIAGNOSIS — R26.2 DIFFICULTY WALKING: Primary | ICD-10-CM

## 2023-01-23 DIAGNOSIS — M24.662 ARTHROFIBROSIS OF KNEE JOINT, LEFT: ICD-10-CM

## 2023-01-23 DIAGNOSIS — M24.662 FIBROSIS OF KNEE JOINT, LEFT: ICD-10-CM

## 2023-01-23 NOTE — PROGRESS NOTES
Evan Porter [facility]   Phone: [phone]    Fax: [phone]      Physical Therapy  Cancellation/No-show Note  Patient Name:  Stephani Medina  :  1965   Date:  2023    Cancelled visits to date: 7  No-shows to date: 0    For today's appointment patient:  [x]  Cancelled  []  Rescheduled appointment  []  No-show     Reason given by patient:  [x]  Patient ill-     []  Conflicting appointment  []  No transportation    []  Conflict with work  []  No reason given  []  Other:     Comments:  Had a family thing    Phone call information:   []  Phone call made today to patient at _ time at number provided:      []  Patient answered, conversation as follows:    []  Patient did not answer, message left as follows:  []  Phone call not made today  [x]  Phone call not needed - pt contacted us to cancel and provided reason for cancellation.      Electronically signed by:  Shalini Asher PT, Tiffany Knox 85, OMT-C    Physical Therapist [place] license #[id number]  Physical Therapist [place] license #[id number]

## 2023-01-26 ENCOUNTER — TREATMENT (OUTPATIENT)
Dept: PHYSICAL THERAPY | Age: 58
End: 2023-01-26
Payer: COMMERCIAL

## 2023-01-26 DIAGNOSIS — R26.2 DIFFICULTY WALKING: Primary | ICD-10-CM

## 2023-01-26 DIAGNOSIS — M24.662 ARTHROFIBROSIS OF KNEE JOINT, LEFT: ICD-10-CM

## 2023-01-26 PROCEDURE — 97140 MANUAL THERAPY 1/> REGIONS: CPT | Performed by: PHYSICAL THERAPIST

## 2023-01-26 PROCEDURE — 97530 THERAPEUTIC ACTIVITIES: CPT | Performed by: PHYSICAL THERAPIST

## 2023-01-26 PROCEDURE — 97110 THERAPEUTIC EXERCISES: CPT | Performed by: PHYSICAL THERAPIST

## 2023-01-26 PROCEDURE — 97016 VASOPNEUMATIC DEVICE THERAPY: CPT | Performed by: PHYSICAL THERAPIST

## 2023-01-26 NOTE — PROGRESS NOTES
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 Abrahan Winkler  Has the plan of care been signed (Y/N):        []  Yes  [x]  No     Date of Patient follow up with Physician:  Dr. Daniel Sapp appt will be in ~4 months;      Is this a Progress Report:     []  Yes  [x]  No        If Yes:  Date Range for reporting period:  Beginning 22  Ending     Progress report will be due (10 Rx or 30 days whichever is less): 47       Recertification will be due (POC Duration  / 90 days whichever is less): 23        Visit # Insurance Allowable Auth Required   4 BMN []  Yes [x]  No    He had 45 visits for same episode in     Functional Scale: FOTO 29; LEFS 13;    Date assessed:  22 FOTO 43; LEFS 27.2   10/17/22 FOTO 42;  22 FOTO 52, LEFS 38.7  23 FOTO 63, LEFS 53.5    Latex Allergy:  [x]NO      []YES  Preferred Language for Healthcare:   [x]English       []other:    Pain level:  3/10 w/o medication    SUBJECTIVE:   4+ months post op from debridement; 5+ months post op from posterior capsule release  Patient returned from [place] and he ended up testing positive for Covid. So he has been down for a week or two. Starting to feel better now. For not doing much, his leg/knee hasn't been feeling too bad.    He worked out Tuesday and rode the bike for 20 minutes, was a little sore yesterday.           OBJECTIVE:   Stiff gt pattern when walking into clinic w/o AD and did loosen up after stretching on bike;  general min to slight edema around L knee region;     ROM PROM AROM Overpressure Comment    L 1/12/23 R L R L R    Flexion 106° on ERMI  70° cold 125°      Extension 0° after stretch w/ op  -1°    -2°        Girth  7/29/2022 L R   Mid Patella 44 cm 39.3 cm   Suprapatellar     5cm above     15cm above         Strength L R Comment: majority deferred secondary to surgery   Quad 39 lb 49 lb 11/23/22   Hamstring 34 lb 34 lb    Abduction      Quad tone          Special Test Results/Comment: majority deferred secondary to surgery   Homans  8/29/2022 Known DVT in calf       RESTRICTIONS/PRECAUTIONS: 7/18/22 posterior L knee capsule release; Limited ROM In L knee after injection in April that was limited prior to surg for flex and ext;  DVT left lower leg (still on blood thinners till about Oct)    Exercises/Interventions:   Exercise/Equipment Resistance/Repetitions Other comments   Cardio/Warm-up     Bike Rec Seat #8 x10'  Oscillations to full revs   Treadmill          Stretching     Hamstring Seated propped 10\"x10 20# overpressure   Hip Flexion     ITB     Grion    Quad Prone KF TP 20\"x5 Manual stretch   Inclined Calf On floor 10\"x10    Towel Pull 30\"x5 over foam roll 20# overpressure        ROM     Passive    Active     ROM @EOB    Prone knee hang x10'   15# OP on ankle    Weight Hangs     Sheet Pulls     Ankle Pumps           Patellar Glides     Medial x2'    Superior x2'    Inferior x2'         STRENGTH     Supine 3x10 over foam roll 5# Prone     Abduction 5# 3x10    clams  3x10  black band   Ext - prone 5# 3x10 leaning over table    bridging 1/12/23 hold due to back pain   Adducton     SLR+          Isometrics     Quad sets      CKC     Calf raises     Wall sits     Step ups & over L3 on tech x10  L3 lateral step up x10   step down 1 leg stand Squatting       Balance         SLS 20\"x2  SL Reaching across body for 4 cups w/ UE while SLS x5 ea hand - 2 directions Cones on chair height    Quick stepping over line in side to side & fwd/bwd direction 30\"x2        Resistive walking backwards    Resisted sidestepping over mini cones X3 laps Black loop around knees        PRE     Extension 20# 2x10 B LE conc;L LE ecc RANGE:   Flexion 40# 2x10 B LE conc, L LE ecc RANGE:   Leg Press SL -100# 2x10  # 3x15      SLR + 20\"x5 Cuing and monitoring for full knee ext        Cable Column     Ed given on POC, expectations and goals             Manual/Modalities     Manual stretching to L hip into flex, fig 4, add, hip flex x15'    Deep tissue work to HS/scar massage in HS x5'                Therapeutic Exercise and NMR EXR  [x] (58077) Provided verbal/tactile cueing for activities related to strengthening, flexibility, endurance, ROM for improvements in LE, proximal hip, and core control with self care, mobility, lifting, ambulation.  [] (72317) Provided verbal/tactile cueing for activities related to improving balance, coordination, kinesthetic sense, posture, motor skill, proprioception  to assist with LE, proximal hip, and core control in self care, mobility, lifting, ambulation and eccentric single leg control.      NMR and Therapeutic Activities:    [x] (28478 or 98486) Provided verbal/tactile cueing for activities related to improving balance, coordination, kinesthetic sense, posture, motor skill, proprioception and motor activation to allow for proper function of core, proximal hip and LE with self care and ADLs  [] (26680) Gait Re-education- Provided training and instruction to the patient for proper LE, core and proximal hip recruitment and positioning and eccentric body weight control with ambulation re-education including up and down stairs     Home Exercise Program:    [x] (04326) Reviewed/Progressed HEP activities related to strengthening, flexibility, endurance, ROM of core, proximal hip and LE for functional self-care, mobility, lifting and ambulation/stair navigation            Written HEP est    [] (38865)Reviewed/Progressed HEP activities related to improving balance, coordination, kinesthetic sense, posture, motor skill, proprioception of core, proximal hip and LE for self care, mobility, lifting, and ambulation/stair navigation      Manual Treatments:  PROM / STM / Oscillations-Mobs:  G-I, II, III, IV (PA's, Inf., Post.)  [x] (31093) Provided manual therapy to mobilize LE, proximal hip and/or LS spine soft tissue/joints for the purpose of modulating pain, promoting relaxation,  increasing ROM, reducing/eliminating soft tissue swelling/inflammation/restriction, improving soft tissue extensibility and allowing for proper ROM for normal function with self care, mobility, lifting and ambulation. Modalities:   [x] GAME READY (VASO)- for significant edema, swelling, pain control. Charges:  Timed Code Treatment Minutes: 55   Total Treatment Minutes: 75     [] EVAL (LOW) 30052 (typically 20 minutes face-to-face)  [] EVAL (MOD) 87000 (typically 30 minutes face-to-face)  [] EVAL (HIGH) 11497 (typically 45 minutes face-to-face)  [] RE-EVAL   [x] KG(47277) 2x  25'   [] IONTO  [] NMR (00196) x     [x] VASO x10'  with 10# OP  [x] Manual (91523) x 1  x15'     [] Other: GT x12'  [x] TA (11837) 1x   10'   [] Mech Traction (40995)  [] ES(attended) (34915)      [] ES (un) (27915): EMG/Stim combo x15'    GOALS:  Patient stated goal: return to outdoor activities for walking and climbing, along with work  [] Progressing: [] Met: [] Not Met: [] Adjusted     Therapist goals for Patient:  Short Term Goals: To be achieved in: 2 weeks  1. Independent in HEP and progression per patient tolerance, in order to prevent re-injury. [] Progressing: [] Met: [] Not Met: [] Adjusted  2.  Patient will have a decrease in pain to facilitate improvement in movement, function, and ADLs as indicated by Functional Deficits. [] Progressing: [] Met: [] Not Met: [] Adjusted     Long Term Goals: To be achieved in: 12+ weeks  1. Functional index score of 67 or more for FOTO to assist with reaching prior level of function. [x] Progressing: [] Met: [] Not Met: [] Adjusted  2. Patient will demonstrate increased AROM to 0-125 deg to allow for proper joint functioning as indicated by patients Functional Deficits. [x] Progressing: [] Met: [] Not Met: [] Adjusted  3. Patient will demonstrate an increase in Strength to good proximal hip strength and control, within 5lb HHD in LE to allow for proper functional mobility as indicated by patients Functional Deficits. [x] Progressing: [] Met: [] Not Met: [] Adjusted  4. Patient will return to 90% functional activities without increased symptoms or restriction. [x] Progressing: [] Met: [] Not Met: [] Adjusted  5. Pt will be able to walk 45 min on uneven surfaces for hiking and climbing. (patient specific functional goal)    [x] Progressing: [] Met: [] Not Met: [] Adjusted         Overall Progression Towards Functional goals/ Treatment Progress Update:  [x] Patient is progressing as expected towards functional goals listed. [] Progression is slowed due to complexities/Impairments listed. [] Progression has been slowed due to co-morbidities. [] Plan just implemented, too soon to assess goals progression <30days   [] Goals require adjustment due to lack of progress  [] Patient is not progressing as expected and requires additional follow up with physician  [] Other    ASSESSMENT:    Worked on stretching into flexion right after he rode the bike today. He got a good stretch in his quads. He worked through his PREs with good control on the machines today. He did fatigue with quick side to side and front to back steps.           Treatment/Activity Tolerance:  [x] Patient tolerated treatment well [] Patient limited by alondra  [] Patient limited by pain  [] Patient limited by other medical complications- tightness  [] Other:     Prognosis: [x] Good [x] Fair  [] Poor    Patient Requires Follow-up: [x] Yes  [] No    PLAN: Cont 2x/week for ROM restoration & focus on gt training for function. [x] Continue per plan of care [] Alter current plan (see comments)  [x] Plan of care initiated [] Hold pending MD visit [] Discharge    Electronically signed by:     Tay Durand PT      Board Certified Orthopaedic Clinical Specialist  [facility] Certified  Physical Therapist    [address] [place] [address] PT #[id number]  [place] PT #[id number]]          Note: If patient does not return for scheduled/ recommended follow up visits, this note will serve as a discharge from care along with most recent update on progress.

## 2023-01-30 ENCOUNTER — TREATMENT (OUTPATIENT)
Dept: PHYSICAL THERAPY | Age: 58
End: 2023-01-30
Payer: COMMERCIAL

## 2023-01-30 DIAGNOSIS — M25.562 LEFT KNEE PAIN, UNSPECIFIED CHRONICITY: ICD-10-CM

## 2023-01-30 DIAGNOSIS — M24.662 ARTHROFIBROSIS OF KNEE JOINT, LEFT: ICD-10-CM

## 2023-01-30 DIAGNOSIS — M24.662 FIBROSIS OF KNEE JOINT, LEFT: ICD-10-CM

## 2023-01-30 DIAGNOSIS — R26.2 DIFFICULTY WALKING: Primary | ICD-10-CM

## 2023-01-30 PROCEDURE — 97530 THERAPEUTIC ACTIVITIES: CPT | Performed by: PHYSICAL THERAPIST

## 2023-01-30 PROCEDURE — 97140 MANUAL THERAPY 1/> REGIONS: CPT | Performed by: PHYSICAL THERAPIST

## 2023-01-30 PROCEDURE — 97110 THERAPEUTIC EXERCISES: CPT | Performed by: PHYSICAL THERAPIST

## 2023-01-30 NOTE — PROGRESS NOTES
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 Butch Hernández  Has the plan of care been signed (Y/N):        []  Yes  [x]  No     Date of Patient follow up with Physician:  Dr. Frausto Comes appt will be in ~4 months;      Is this a Progress Report:     []  Yes  [x]  No        If Yes:  Date Range for reporting period:  Beginning 22  Ending     Progress report will be due (10 Rx or 30 days whichever is less):        Recertification will be due (POC Duration  / 90 days whichever is less): 23        Visit # Insurance Allowable Auth Required   5 BMN []  Yes [x]  No    He had 45 visits for same episode in     Functional Scale: FOTO 29; LEFS 13;    Date assessed:  22 FOTO 43; LEFS 27.2   10/17/22 FOTO 42;  22 FOTO 52, LEFS 38.7  23 FOTO 63, LEFS 53.5    Latex Allergy:  [x]NO      []YES  Preferred Language for Healthcare:   [x]English       []other:    Pain level:  3/10 w/o medication    SUBJECTIVE:   4+ months post op from debridement; 5+ months post op from posterior capsule release  Patient is feeling better from his illness. He does relate that he was both sore and painful after last session. Edema in knee min.  Pt did feel better after a day or so and that he was walking and moving better.       OBJECTIVE:   Stiff gt pattern when walking into clinic w/o AD and did loosen up after stretching on bike;  general min to slight edema around L knee region;     ROM PROM AROM Overpressure Comment    L 1/30/23 R L R L R    Flexion 106° on ERMI  70° cold 125°      Extension 0° after stretch w/ op  -1°    -2°        Girth  7/29/2022 L R   Mid Patella 44 cm 39.3 cm   Suprapatellar     5cm above     15cm above         Strength L R Comment: majority deferred secondary to surgery   Quad 39 lb 49 lb 11/23/22   Hamstring 34 lb 34 lb    Abduction      Quad tone          Special Test Results/Comment: majority deferred secondary to surgery   Homans  8/29/2022 Known DVT in calf       RESTRICTIONS/PRECAUTIONS: 7/18/22 posterior L knee capsule release; Limited ROM In L knee after injection in April that was limited prior to surg for flex and ext;  DVT left lower leg (still on blood thinners till about Oct)    Exercises/Interventions:   Exercise/Equipment Resistance/Repetitions Other comments   Cardio/Warm-up     Bike Rec Seat #8 x10'  Oscillations to full revs   Treadmill          Stretching     Hamstring Seated propped 10\"x10 20# overpressure   Hip Flexion     ITB     Grion    Quad Prone KF TP 20\"x5 Manual stretch   Inclined Calf On floor 10\"x10    Towel Pull 30\"x5 over foam roll 20# overpressure        ROM     Passive    Active     ROM @EOB    Prone knee hang x10'   15# OP on ankle    Weight Hangs     Sheet Pulls     Ankle Pumps           Patellar Glides     Medial x2'    Superior x2'    Inferior x2'         STRENGTH     Supine 3x10 over foam roll 5# Prone     Abduction 5# 3x10    clams  3x10  black band   Ext - prone 5# 3x10 leaning over table    bridging 1/12/23 hold due to back pain   Adducton     SLR+          Isometrics     Quad sets 10\"x10 Over foam roll        CKC     Calf raises     Wall sits     Step ups & over L3 on tech x10  L3 lateral step up x10   step down 1 leg stand Squatting       Balance SLS 20\"x2  SL Reaching across body for 4 cups w/ UE while SLS x5 ea hand - 2 directions Cones on chair height       TKE 35# 10\"x10    Resistive walking backwards    Resisted sidestepping over mini cones X3 laps Black loop around knees        PRE     Extension 20# 2x10 B LE conc;L LE ecc RANGE:   Flexion 40# 2x10 B LE conc, L LE ecc RANGE:   Leg Press SL -100# 2x10  # 3x15      SLR + 20\"x5 Cuing and monitoring for full knee ext        Cable Column     Ed given on POC, expectations and goals             Manual/Modalities     Manual stretching to L hip into flex, fig 4, add, hip flex x15'    Deep tissue work to HS/scar massage in HS x5'                Therapeutic Exercise and NMR EXR  [x] (21428) Provided verbal/tactile cueing for activities related to strengthening, flexibility, endurance, ROM for improvements in LE, proximal hip, and core control with self care, mobility, lifting, ambulation.  [] (30622) Provided verbal/tactile cueing for activities related to improving balance, coordination, kinesthetic sense, posture, motor skill, proprioception  to assist with LE, proximal hip, and core control in self care, mobility, lifting, ambulation and eccentric single leg control.      NMR and Therapeutic Activities:    [x] (46062 or 02942) Provided verbal/tactile cueing for activities related to improving balance, coordination, kinesthetic sense, posture, motor skill, proprioception and motor activation to allow for proper function of core, proximal hip and LE with self care and ADLs  [] (77691) Gait Re-education- Provided training and instruction to the patient for proper LE, core and proximal hip recruitment and positioning and eccentric body weight control with ambulation re-education including up and down stairs     Home Exercise Program:    [x] (31245) Reviewed/Progressed HEP activities related to strengthening, flexibility, endurance, ROM of core, proximal hip and LE for functional self-care, mobility, lifting and ambulation/stair navigation            Written HEP est    [] (53667)Reviewed/Progressed HEP activities related to improving balance, coordination, kinesthetic sense, posture, motor skill, proprioception of core, proximal hip and LE for self care, mobility, lifting, and ambulation/stair navigation      Manual Treatments:  PROM / STM / Oscillations-Mobs:  G-I, II, III, IV (PA's, Inf., Post.)  [x] (58137) Provided manual therapy to mobilize LE, proximal hip and/or LS spine soft tissue/joints for the purpose of modulating pain, promoting relaxation,  increasing ROM, reducing/eliminating soft tissue swelling/inflammation/restriction, improving soft tissue extensibility and allowing for proper ROM for normal function with self care, mobility, lifting and ambulation. Modalities:  CP x10 min propped and 20# over knee   [] GAME READY (VASO)- for significant edema, swelling, pain control. Charges:  Timed Code Treatment Minutes: 55   Total Treatment Minutes: 75     [] EVAL (LOW) 49105 (typically 20 minutes face-to-face)  [] EVAL (MOD) 62533 (typically 30 minutes face-to-face)  [] EVAL (HIGH) 28300 (typically 45 minutes face-to-face)  [] RE-EVAL   [x] OD(71869) 2x  25'   [] IONTO  [] NMR (36466) x     [] VASO x10'  with 10# OP  [x] Manual (92610) x 1  x15'     [] Other: GT x12'  [x] TA (31433) 1x   10'   [] Mech Traction (92479)  [] ES(attended) (38889)      [] ES (un) (68615): EMG/Stim combo x15'    GOALS:  Patient stated goal: return to outdoor activities for walking and climbing, along with work  [] Progressing: [] Met: [] Not Met: [] Adjusted     Therapist goals for Patient:  Short Term Goals: To be achieved in: 2 weeks  1. Independent in HEP and progression per patient tolerance, in order to prevent re-injury. [] Progressing: [] Met: [] Not Met: [] Adjusted  2. Patient will have a decrease in pain to facilitate improvement in movement, function, and ADLs as indicated by Functional Deficits.   [] Progressing: [] Met: [] Not Met: [] Adjusted     Long Term Goals: To be achieved in: 12+ weeks  1. Functional index score of 67 or more for FOTO to assist with reaching prior level of function. [x] Progressing: [] Met: [] Not Met: [] Adjusted  2. Patient will demonstrate increased AROM to 0-125 deg to allow for proper joint functioning as indicated by patients Functional Deficits. [x] Progressing: [] Met: [] Not Met: [] Adjusted  3. Patient will demonstrate an increase in Strength to good proximal hip strength and control, within 5lb HHD in LE to allow for proper functional mobility as indicated by patients Functional Deficits. [x] Progressing: [] Met: [] Not Met: [] Adjusted  4. Patient will return to 90% functional activities without increased symptoms or restriction. [x] Progressing: [] Met: [] Not Met: [] Adjusted  5. Pt will be able to walk 45 min on uneven surfaces for hiking and climbing. (patient specific functional goal)    [x] Progressing: [] Met: [] Not Met: [] Adjusted         Overall Progression Towards Functional goals/ Treatment Progress Update:  [x] Patient is progressing as expected towards functional goals listed. [] Progression is slowed due to complexities/Impairments listed. [] Progression has been slowed due to co-morbidities. [] Plan just implemented, too soon to assess goals progression <30days   [] Goals require adjustment due to lack of progress  [] Patient is not progressing as expected and requires additional follow up with physician  [] Other    ASSESSMENT:  Pt was very limited in knee ext on arrival so time was spend on stretching into knee ext vs flexion. Strength in L LE still good w/ general fatigue from machines vs pain. Pt did need overpressure on L knee to reach full knee ext but min pressure needed. Stretching L hip had min tightness but some noted in all ranges at end range.        Treatment/Activity Tolerance:  [x] Patient tolerated treatment well [] Patient limited by alondra  [] Patient limited by pain  [] Patient limited by other medical complications- tightness  [] Other:     Prognosis: [x] Good [x] Fair  [] Poor    Patient Requires Follow-up: [x] Yes  [] No    PLAN: Cont 2x/week for ROM restoration & focus on gt training for function. [x] Continue per plan of care [] Alter current plan (see comments)  [x] Plan of care initiated [] Hold pending MD visit [] Discharge    Electronically signed by: Geo Worley, PT, MS, OMT-C    Physical Therapist [id number] [facility] [employer] license #[id number]  Physical Therapist [place] license #[id number]             Note: If patient does not return for scheduled/ recommended follow up visits, this note will serve as a discharge from care along with most recent update on progress.

## 2023-02-02 ENCOUNTER — TREATMENT (OUTPATIENT)
Dept: PHYSICAL THERAPY | Age: 58
End: 2023-02-02
Payer: COMMERCIAL

## 2023-02-02 DIAGNOSIS — M25.562 LEFT KNEE PAIN, UNSPECIFIED CHRONICITY: ICD-10-CM

## 2023-02-02 DIAGNOSIS — R26.2 DIFFICULTY WALKING: Primary | ICD-10-CM

## 2023-02-02 DIAGNOSIS — M24.662 ARTHROFIBROSIS OF KNEE JOINT, LEFT: ICD-10-CM

## 2023-02-02 PROCEDURE — 97110 THERAPEUTIC EXERCISES: CPT | Performed by: PHYSICAL THERAPIST

## 2023-02-02 PROCEDURE — 97530 THERAPEUTIC ACTIVITIES: CPT | Performed by: PHYSICAL THERAPIST

## 2023-02-02 PROCEDURE — 97140 MANUAL THERAPY 1/> REGIONS: CPT | Performed by: PHYSICAL THERAPIST

## 2023-02-06 ENCOUNTER — TREATMENT (OUTPATIENT)
Dept: PHYSICAL THERAPY | Age: 58
End: 2023-02-06
Payer: COMMERCIAL

## 2023-02-06 DIAGNOSIS — M24.662 ARTHROFIBROSIS OF KNEE JOINT, LEFT: ICD-10-CM

## 2023-02-06 DIAGNOSIS — M24.662 FIBROSIS OF KNEE JOINT, LEFT: ICD-10-CM

## 2023-02-06 DIAGNOSIS — M25.562 LEFT KNEE PAIN, UNSPECIFIED CHRONICITY: ICD-10-CM

## 2023-02-06 DIAGNOSIS — R26.2 DIFFICULTY WALKING: Primary | ICD-10-CM

## 2023-02-06 PROCEDURE — 97110 THERAPEUTIC EXERCISES: CPT | Performed by: PHYSICAL THERAPIST

## 2023-02-06 PROCEDURE — 97140 MANUAL THERAPY 1/> REGIONS: CPT | Performed by: PHYSICAL THERAPIST

## 2023-02-06 PROCEDURE — 97530 THERAPEUTIC ACTIVITIES: CPT | Performed by: PHYSICAL THERAPIST

## 2023-02-06 NOTE — PROGRESS NOTES
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 Naila Hernandez  Has the plan of care been signed (Y/N):        []  Yes  [x]  No     Date of Patient follow up with Physician:  Dr. Ronni Okeeef appt 3/16/2023      Is this a Progress Report:     []  Yes  [x]  No        If Yes:  Date Range for reporting period:  Beginning 22  Ending     Progress report will be due (10 Rx or 30 days whichever is less):        Recertification will be due (POC Duration  / 90 days whichever is less): 23        Visit # Insurance Allowable Auth Required   6 BMN []  Yes [x]  No    He had 45 visits for same episode in     Functional Scale: FOTO 29; LEFS 13;    Date assessed:  22 FOTO 43; LEFS 27.2   10/17/22 FOTO 42;  22 FOTO 52, LEFS 38.7  23 FOTO 63, LEFS 53.5    Latex Allergy:  [x]NO      []YES  Preferred Language for Healthcare:   [x]English       []other:    Pain level:  3/10 w/o medication    SUBJECTIVE:   5+ months post op from debridement; 6+ months post op from posterior capsule release  Patient reports that he is sore today after [place]. He denies any inc in edema noted.       OBJECTIVE:     REASSESS NPV  Stiff gt pattern when walking into clinic w/o AD and did loosen up after stretching on bike;  general min to slight edema around L knee region;     ROM PROM AROM Overpressure Comment    L 1/30/23 R L R L R    Flexion 106° on ERMI  70° cold 125°      Extension 0° after stretch w/ op  -1°    -2°        Girth  7/29/2022 L R   Mid Patella 44 cm 39.3 cm   Suprapatellar     5cm above     15cm above         Strength L R Comment: majority deferred secondary to surgery   Quad 39 lb 49 lb 11/23/22   Hamstring 34 lb 34 lb    Abduction      Quad tone          Special Test Results/Comment: majority deferred secondary to surgery   Homans  8/29/2022 Known DVT in calf       RESTRICTIONS/PRECAUTIONS: 7/18/22 posterior L knee capsule release; Limited ROM In L knee after injection in April that was limited prior to surg for flex and ext;  DVT left lower leg (still on blood thinners till about Oct)    Exercises/Interventions:   Exercise/Equipment Resistance/Repetitions Other comments   Cardio/Warm-up     Bike Rec Seat #8 x10'  Oscillations to full revs   Treadmill          Stretching     Hamstring Seated propped 10\"x10 20# overpressure   Hip Flexion     ITB     Grion    Quad Prone KF TP 20\"x5 Manual stretch   Inclined Calf On floor 10\"x10    Towel Pull 30\"x5 over foam roll 20# overpressure        ROM     Passive    Active     ROM @EOB    Prone knee hang x10'   15# OP on ankle    Weight Hangs     Sheet Pulls     Ankle Pumps           Patellar Glides     Medial x2'    Superior x2'    Inferior x2'         STRENGTH     Supine 3x10 over foam roll 5# Prone     Abduction 5# 3x10    clams  3x10  black band   Ext - prone 5# 3x10 leaning over table    bridging 2x15 Over green SB 1 set with legs straight and 1 set with knees bent   Adducton     SLR+          Isometrics     Quad sets 10\"x10 Over foam roll        CKC     Calf raises     Wall sits     Step ups & over L3 on tech 2x10  L3 lateral step up 2x10   step down 1 leg stand Squatting Mini squat at counter x30 on fitter       Balance         4 cups reaching in arch x5 while SLS on table        TKE 45# 10\"x10    Resistive walking backwards    Resisted sidestepping over mini cones X3 laps Black loop around knees        PRE     Extension 20# 3x10 B LE conc;L LE ecc RANGE:   Flexion 40# 3x10 B LE conc, L LE ecc RANGE:   Leg Press SL # 3x10  # 3x10      SLR + 20\"x5 Cuing and monitoring for full knee ext   Amb in lunge position w/ trunk rotation 5 laps              Cable Column     Ed given on POC, expectations and goals             Manual/Modalities     Manual stretching to L hip into flex, fig 4, add, hip flex x15'    Deep tissue work to HS/scar massage in HS x5'                Therapeutic Exercise and NMR EXR  [x] (01999) Provided verbal/tactile cueing for activities related to strengthening, flexibility, endurance, ROM for improvements in LE, proximal hip, and core control with self care, mobility, lifting, ambulation.  [] (75025) Provided verbal/tactile cueing for activities related to improving balance, coordination, kinesthetic sense, posture, motor skill, proprioception  to assist with LE, proximal hip, and core control in self care, mobility, lifting, ambulation and eccentric single leg control.      NMR and Therapeutic Activities:    [x] (74714 or 53703) Provided verbal/tactile cueing for activities related to improving balance, coordination, kinesthetic sense, posture, motor skill, proprioception and motor activation to allow for proper function of core, proximal hip and LE with self care and ADLs  [] (09736) Gait Re-education- Provided training and instruction to the patient for proper LE, core and proximal hip recruitment and positioning and eccentric body weight control with ambulation re-education including up and down stairs     Home Exercise Program:    [x] (35754) Reviewed/Progressed HEP activities related to strengthening, flexibility, endurance, ROM of core, proximal hip and LE for functional self-care, mobility, lifting and ambulation/stair navigation Written HEP est    [] (66530)Reviewed/Progressed HEP activities related to improving balance, coordination, kinesthetic sense, posture, motor skill, proprioception of core, proximal hip and LE for self care, mobility, lifting, and ambulation/stair navigation      Manual Treatments:  PROM / STM / Oscillations-Mobs:  G-I, II, III, IV (PA's, Inf., Post.)  [x] (39828) Provided manual therapy to mobilize LE, proximal hip and/or LS spine soft tissue/joints for the purpose of modulating pain, promoting relaxation,  increasing ROM, reducing/eliminating soft tissue swelling/inflammation/restriction, improving soft tissue extensibility and allowing for proper ROM for normal function with self care, mobility, lifting and ambulation. Modalities:   [] GAME READY (VASO)- for significant edema, swelling, pain control. Charges:  Timed Code Treatment Minutes: 55   Total Treatment Minutes: 70     [] EVAL (LOW) 39415 (typically 20 minutes face-to-face)  [] EVAL (MOD) 26987 (typically 30 minutes face-to-face)  [] EVAL (HIGH) 47571 (typically 45 minutes face-to-face)  [] RE-EVAL   [x] FW(62907) 2x  25'   [] IONTO  [] NMR (06145) x     [] VASO x10'  with 10# OP  [x] Manual (14206) x 1  x15'     [] Other: GT x12'  [x] TA (76143) 1x   10'   [] Mech Traction (25916)  [] ES(attended) (18213)      [] ES (un) (99823): EMG/Stim combo x15'    GOALS:  Patient stated goal: return to outdoor activities for walking and climbing, along with work  [] Progressing: [] Met: [] Not Met: [] Adjusted     Therapist goals for Patient:  Short Term Goals: To be achieved in: 2 weeks  1. Independent in HEP and progression per patient tolerance, in order to prevent re-injury. [] Progressing: [] Met: [] Not Met: [] Adjusted  2. Patient will have a decrease in pain to facilitate improvement in movement, function, and ADLs as indicated by Functional Deficits. [] Progressing: [] Met: [] Not Met: [] Adjusted     Long Term Goals:  To be achieved in: 12+ weeks  1. Functional index score of 67 or more for FOTO to assist with reaching prior level of function. [x] Progressing: [] Met: [] Not Met: [] Adjusted  2. Patient will demonstrate increased AROM to 0-125 deg to allow for proper joint functioning as indicated by patients Functional Deficits. [x] Progressing: [] Met: [] Not Met: [] Adjusted  3. Patient will demonstrate an increase in Strength to good proximal hip strength and control, within 5lb HHD in LE to allow for proper functional mobility as indicated by patients Functional Deficits. [x] Progressing: [] Met: [] Not Met: [] Adjusted  4. Patient will return to 90% functional activities without increased symptoms or restriction. [x] Progressing: [] Met: [] Not Met: [] Adjusted  5. Pt will be able to walk 45 min on uneven surfaces for hiking and climbing. (patient specific functional goal)    [x] Progressing: [] Met: [] Not Met: [] Adjusted         Overall Progression Towards Functional goals/ Treatment Progress Update:  [x] Patient is progressing as expected towards functional goals listed. [] Progression is slowed due to complexities/Impairments listed. [] Progression has been slowed due to co-morbidities. [] Plan just implemented, too soon to assess goals progression <30days   [] Goals require adjustment due to lack of progress  [] Patient is not progressing as expected and requires additional follow up with physician  [] Other    ASSESSMENT:  pt was tight into ext on arrival and stiff gt pattern. Pt did loosen up w/ stretching. Eccentric control in L LE limited w/ step downs. Pt did well w/ machines in full motion resulting in fatigue. L hip tightness noted w/ stretching.        Treatment/Activity Tolerance:  [x] Patient tolerated treatment well [] Patient limited by fatique  [] Patient limited by pain  [] Patient limited by other medical complications- tightness  [] Other:     Prognosis: [x] Good [x] Fair  [] Poor    Patient Requires Follow-up: [x] Yes  [] No    PLAN: Cont 2x/week for ROM restoration & focus on gt training for function. [x] Continue per plan of care [] Alter current plan (see comments)  [x] Plan of care initiated [] Hold pending MD visit [] Discharge    Electronically signed by: Adam Sethi, PT, MS, OMT-C    Physical Therapist [id number] [address] license #[id number]  Physical Therapist [place] license #[id number]           Note: If patient does not return for scheduled/ recommended follow up visits, this note will serve as a discharge from care along with most recent update on progress.

## 2023-02-09 ENCOUNTER — TREATMENT (OUTPATIENT)
Dept: PHYSICAL THERAPY | Age: 58
End: 2023-02-09

## 2023-02-09 DIAGNOSIS — M25.562 LEFT KNEE PAIN, UNSPECIFIED CHRONICITY: ICD-10-CM

## 2023-02-09 DIAGNOSIS — M24.662 ARTHROFIBROSIS OF KNEE JOINT, LEFT: ICD-10-CM

## 2023-02-09 DIAGNOSIS — R26.2 DIFFICULTY WALKING: Primary | ICD-10-CM

## 2023-02-09 NOTE — PROGRESS NOTES
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 Rich Dugan  Has the plan of care been signed (Y/N):        []  Yes  [x]  No     Date of Patient follow up with Physician:  Dr. Debi Calhoun appt 3/16/2023      Is this a Progress Report:     []  Yes  [x]  No        If Yes:  Date Range for reporting period:  Beginning 22  Ending     Progress report will be due (10 Rx or 30 days whichever is less): 3/5/96       Recertification will be due (POC Duration  / 90 days whichever is less): 23        Visit # Insurance Allowable Auth Required   7 BMN []  Yes [x]  No    He had 45 visits for same episode in     Functional Scale: FOTO 29; LEFS 13;    Date assessed:  22 FOTO 43; LEFS 27.2   10/17/22 FOTO 42;  22 FOTO 52, LEFS 38.7  23 FOTO 63, LEFS 53.5    Latex Allergy:  [x]NO      []YES  Preferred Language for Healthcare:   [x]English       []other:    Pain level:  3/10 w/o medication    SUBJECTIVE:   5+ months post op from debridement; 6+ months post op from posterior capsule release  He feels like he has recovered from his [place] the aqu[place] earlier this week. States his knee is not feeling too bad currently. Feels more of the \"bone on bone\" discomfort.    He is anxious to get back in to see Dr Debi Calhoun to discuss the next steps.               OBJECTIVE:     REASSESS NPV  Stiff gt pattern when walking into clinic w/o AD and did loosen up after stretching on bike;  general min to slight edema around L knee region;     ROM PROM AROM Overpressure Comment    L 1/30/23 R L R L R    Flexion 106° on ERMI  70° cold 125°      Extension 0° after stretch w/ op  -1°    -2°        Girth  7/29/2022 L R   Mid Patella 44 cm 39.3 cm   Suprapatellar     5cm above     15cm above         Strength L R Comment: majority deferred secondary to surgery   Quad 39 lb 49 lb 11/23/22   Hamstring 34 lb 34 lb    Abduction      Quad tone          Special Test Results/Comment: majority deferred secondary to surgery   Homans  8/29/2022 Known DVT in calf       RESTRICTIONS/PRECAUTIONS: 7/18/22 posterior L knee capsule release; Limited ROM In L knee after injection in April that was limited prior to surg for flex and ext;  DVT left lower leg (still on blood thinners till about Oct)    Exercises/Interventions:   Exercise/Equipment Resistance/Repetitions Other comments   Cardio/Warm-up     Bike Rec Seat #8 x10'  Oscillations to full revs   Treadmill          Stretching     Hamstring Seated propped 10\"x10 20# overpressure   Hip Flexion     ITB     Grion    Quad Prone KF TP 20\"x5 Manual stretch   Inclined Calf On floor 10\"x10    Towel Pull 30\"x5 over foam roll 20# overpressure        ROM     Passive    Active     ROM @EOB    Prone knee hang x10'   15# OP on ankle    Weight Hangs     Sheet Pulls     Ankle Pumps           Patellar Glides     Medial x2'    Superior x2'    Inferior x2'         STRENGTH     Supine 3x10 over foam roll 5# Prone     Abduction 5# 3x10    clams  3x10  black band   Ext - prone 5# 3x10 leaning over table    bridging 2x15 Over green SB 1 set with legs straight and 1 set with knees bent   Adducton     SLR+          Isometrics     Quad sets 10\"x10 Over foam roll        CKC     Calf raises     Wall sits     Step ups & over L3 on tech 2x10  L3 lateral step up 2x10   step down 1 leg stand Squatting Mini squat at counter x30 on fitter       Balance         4 cups reaching in arch x5 while SLS on table        TKE 45# 10\"x10    Resistive walking backwards    Resisted sidestepping over mini cones X5 laps Black loop around knees        PRE     Extension 20# 3x10 B LE conc;L LE ecc RANGE:   Flexion 40# 3x10 B LE conc, L LE ecc RANGE:   Leg Press SL # 3x10  #-180# 3x10      SLR + 20\"x5 Cuing and monitoring for full knee ext   Amb in lunge position w/ trunk rotation 5 laps              Cable Column     Ed given on POC, expectations and goals             Manual/Modalities     Manual stretching to L hip into flex, fig 4, add, hip flex x15'    Deep tissue work to HS/scar massage in HS x5'                Therapeutic Exercise and NMR EXR  [x] (46700) Provided verbal/tactile cueing for activities related to strengthening, flexibility, endurance, ROM for improvements in LE, proximal hip, and core control with self care, mobility, lifting, ambulation.  [] (55139) Provided verbal/tactile cueing for activities related to improving balance, coordination, kinesthetic sense, posture, motor skill, proprioception  to assist with LE, proximal hip, and core control in self care, mobility, lifting, ambulation and eccentric single leg control.      NMR and Therapeutic Activities:    [x] (60906 or 25353) Provided verbal/tactile cueing for activities related to improving balance, coordination, kinesthetic sense, posture, motor skill, proprioception and motor activation to allow for proper function of core, proximal hip and LE with self care and ADLs  [] (07427) Gait Re-education- Provided training and instruction to the patient for proper LE, core and proximal hip recruitment and positioning and eccentric body weight control with ambulation re-education including up and down stairs     Home Exercise Program:    [x] (73002) Reviewed/Progressed HEP activities related to strengthening, flexibility, endurance, ROM of core, proximal hip and LE for functional self-care, mobility, lifting and ambulation/stair navigation            Written HEP est    [] (61711)Reviewed/Progressed HEP activities related to improving balance, coordination, kinesthetic sense, posture, motor skill, proprioception of core, proximal hip and LE for self care, mobility, lifting, and ambulation/stair navigation      Manual Treatments:  PROM / STM / Oscillations-Mobs:  G-I, II, III, IV (PA's, Inf., Post.)  [x] (32705) Provided manual therapy to mobilize LE, proximal hip and/or LS spine soft tissue/joints for the purpose of modulating pain, promoting relaxation,  increasing ROM, reducing/eliminating soft tissue swelling/inflammation/restriction, improving soft tissue extensibility and allowing for proper ROM for normal function with self care, mobility, lifting and ambulation. Modalities:   [] GAME READY (VASO)- for significant edema, swelling, pain control. Charges:  Timed Code Treatment Minutes: 55   Total Treatment Minutes: 70     [] EVAL (LOW) 23152 (typically 20 minutes face-to-face)  [] EVAL (MOD) 50712 (typically 30 minutes face-to-face)  [] EVAL (HIGH) 54684 (typically 45 minutes face-to-face)  [] RE-EVAL   [x] FL(25936) 2x  25'   [] IONTO  [] NMR (91551) x     [] VASO x10'  with 10# OP  [x] Manual (79640) x 1  x15'     [] Other: GT x12'  [x] TA (37477) 1x   10'   [] [facility] (19937[facility]  [] ES(attended) (01346)      [] ES (un) (53915): EMG/Stim combo x15'    GOALS:  Patient stated goal: return to outdoor activities for walking and climbing, along with work  [] Progressing: [] Met: [] Not Met: [] Adjusted     Therapist goals for Patient:  Short Term Goals: To be achieved in: 2 weeks  1. Independent in HEP and progression per patient tolerance, in order to prevent re-injury. [] Progressing: [] Met: [] Not Met: [] Adjusted  2.  Patient will have a decrease in pain to facilitate improvement in movement, function, and ADLs as indicated by Functional Deficits. [] Progressing: [] Met: [] Not Met: [] Adjusted     Long Term Goals: To be achieved in: 12+ weeks  1. Functional index score of 67 or more for FOTO to assist with reaching prior level of function. [x] Progressing: [] Met: [] Not Met: [] Adjusted  2. Patient will demonstrate increased AROM to 0-125 deg to allow for proper joint functioning as indicated by patients Functional Deficits. [x] Progressing: [] Met: [] Not Met: [] Adjusted  3. Patient will demonstrate an increase in Strength to good proximal hip strength and control, within 5lb HHD in LE to allow for proper functional mobility as indicated by patients Functional Deficits. [x] Progressing: [] Met: [] Not Met: [] Adjusted  4. Patient will return to 90% functional activities without increased symptoms or restriction. [x] Progressing: [] Met: [] Not Met: [] Adjusted  5. Pt will be able to walk 45 min on uneven surfaces for hiking and climbing. (patient specific functional goal)    [x] Progressing: [] Met: [] Not Met: [] Adjusted         Overall Progression Towards Functional goals/ Treatment Progress Update:  [x] Patient is progressing as expected towards functional goals listed. [] Progression is slowed due to complexities/Impairments listed. [] Progression has been slowed due to co-morbidities. [] Plan just implemented, too soon to assess goals progression <30days   [] Goals require adjustment due to lack of progress  [] Patient is not progressing as expected and requires additional follow up with physician  [] Other    ASSESSMENT:  He is demonstrating good control with functional activities. Even his SLB is improved with dynamic upper body reaching. Still working on maintaining freedom of motion into end ranges. And continues to require some further LE strength and endurance.      He was able to do about 1 set of double leg press at 180, then had to go back to 160 due to fatigue. Treatment/Activity Tolerance:  [x] Patient tolerated treatment well [] Patient limited by fatique  [] Patient limited by pain  [] Patient limited by other medical complications- tightness  [] Other:     Prognosis: [x] Good [x] Fair  [] Poor    Patient Requires Follow-up: [x] Yes  [] No    PLAN: Cont 2x/week for ROM restoration & focus on gt training for function. [x] Continue per plan of care [] Alter current plan (see comments)  [x] Plan of care initiated [] Hold pending MD visit [] Discharge    Electronically signed by:     Jennifer Hicks, PT      Board Certified Orthopaedic Clinical Specialist  [facility] Certified  Physical Therapist    [facility] #[id number]  [place] PT #[id number]        Note: If patient does not return for scheduled/ recommended follow up visits, this note will serve as a discharge from care along with most recent update on progress.

## 2023-02-13 ENCOUNTER — TREATMENT (OUTPATIENT)
Dept: PHYSICAL THERAPY | Age: 58
End: 2023-02-13
Payer: COMMERCIAL

## 2023-02-13 DIAGNOSIS — M24.662 FIBROSIS OF KNEE JOINT, LEFT: ICD-10-CM

## 2023-02-13 DIAGNOSIS — M24.662 ARTHROFIBROSIS OF KNEE JOINT, LEFT: ICD-10-CM

## 2023-02-13 DIAGNOSIS — M25.562 LEFT KNEE PAIN, UNSPECIFIED CHRONICITY: ICD-10-CM

## 2023-02-13 DIAGNOSIS — R26.2 DIFFICULTY WALKING: Primary | ICD-10-CM

## 2023-02-13 PROCEDURE — 97110 THERAPEUTIC EXERCISES: CPT | Performed by: PHYSICAL THERAPIST

## 2023-02-13 PROCEDURE — 97140 MANUAL THERAPY 1/> REGIONS: CPT | Performed by: PHYSICAL THERAPIST

## 2023-02-13 PROCEDURE — 97530 THERAPEUTIC ACTIVITIES: CPT | Performed by: PHYSICAL THERAPIST

## 2023-02-13 NOTE — PROGRESS NOTES
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 Sunday Degroot  Has the plan of care been signed (Y/N):        []  Yes  [x]  No     Date of Patient follow up with Physician:  Dr. Jonny Root appt 3/16/2023      Is this a Progress Report:     []  Yes  [x]  No        If Yes:  Date Range for reporting period:  Beginning 22  Ending     Progress report will be due (10 Rx or 30 days whichever is less): 67       Recertification will be due (POC Duration  / 90 days whichever is less): 23        Visit # Insurance Allowable Auth Required   7 BMN []  Yes [x]  No    He had 45 visits for same episode in     Functional Scale: FOTO 29; LEFS 13;    Date assessed:  22 FOTO 43; LEFS 27.2   10/17/22 FOTO 42;  22 FOTO 52, LEFS 38.7  23 FOTO 63, LEFS 53.5    Latex Allergy:  [x]NO      []YES  Preferred Language for Healthcare:   [x]English       []other:    Pain level:  3/10 w/o medication    SUBJECTIVE:   5+ months post op from debridement; 6+ months post op from posterior capsule release  He feels like he has recovered from his trip to the aquarium earlier this week. States his knee is not feeling too bad currently. Feels more of the \"bone on bone\" discomfort.    He is anxious to get back in to see Dr Jonny Root to discuss the next steps.               OBJECTIVE:     REASSESS NPV  Stiff gt pattern when walking into clinic w/o AD and did loosen up after stretching on bike;  general min to slight edema around L knee region;     ROM PROM AROM Overpressure Comment    L 1/30/23 R L R L R    Flexion 106° on ERMI  70° cold 125°      Extension 0° after stretch w/ op  -1°    -2°        Girth  7/29/2022 L R   Mid Patella 44 cm 39.3 cm   Suprapatellar     5cm above     15cm above         Strength L R Comment: majority deferred secondary to surgery   Quad 39 lb 49 lb 11/23/22   Hamstring 34 lb 34 lb    Abduction      Quad tone          Special Test Results/Comment: majority deferred secondary to surgery   Homans  8/29/2022 Known DVT in calf       RESTRICTIONS/PRECAUTIONS: 7/18/22 posterior L knee capsule release; Limited ROM In L knee after injection in April that was limited prior to surg for flex and ext;  DVT left lower leg (still on blood thinners till about Oct)    Exercises/Interventions:   Exercise/Equipment Resistance/Repetitions Other comments   Cardio/Warm-up     Bike Rec Seat #8 x10'  Oscillations to full revs   Treadmill          Stretching     Hamstring Seated propped 10\"x10 20# overpressure   Hip Flexion     ITB     Grion    Quad Prone KF TP 20\"x5 Manual stretch   Inclined Calf On floor 10\"x10    Towel Pull 30\"x5 over foam roll 20# overpressure        ROM     Passive    Active     ROM @EOB    Prone knee hang x10'   15# OP on ankle    Weight Hangs     Sheet Pulls     Ankle Pumps           Patellar Glides     Medial x2'    Superior x2'    Inferior x2'         STRENGTH     Supine 3x10 over foam roll 5# Prone     Abduction 5# 3x10    clams  3x10  black band   Ext - prone 5# 3x10 leaning over table    bridging 2x15 Over green SB 1 set with legs straight and 1 set with knees bent   Adducton     SLR+          Isometrics     Quad sets 10\"x10 Over foam roll        CKC     Calf raises     Wall sits     Step ups & over L3 on tech 2x10  L3 lateral step up 2x10   step down 1 leg stand Squatting Mini squat at counter x30 on fitter Each direction      Balance         4 cups reaching in arch x5 while SLS on table        TKE 45# 10\"x10    Resistive walking backwards    Resisted sidestepping over mini cones X5 laps Black loop around knees        PRE     Extension 25# 3x10 B LE conc;L LE ecc RANGE:   Flexion 45# 3x10 B LE conc, L LE ecc RANGE:   Leg Press SL # 3x10  # 3x10      SLR + 20\"x5 Cuing and monitoring for full knee ext   Amb in lunge position w/ trunk rotation 5 laps              Cable Column     Ed given on POC, expectations and goals             Manual/Modalities     Manual stretching to L hip into flex, fig 4, add, hip flex x15'    Deep tissue work to HS/scar massage in HS x5'                Therapeutic Exercise and NMR EXR  [x] (97225) Provided verbal/tactile cueing for activities related to strengthening, flexibility, endurance, ROM for improvements in LE, proximal hip, and core control with self care, mobility, lifting, ambulation.  [] (54426) Provided verbal/tactile cueing for activities related to improving balance, coordination, kinesthetic sense, posture, motor skill, proprioception  to assist with LE, proximal hip, and core control in self care, mobility, lifting, ambulation and eccentric single leg control.      NMR and Therapeutic Activities:    [x] (08505 or 35174) Provided verbal/tactile cueing for activities related to improving balance, coordination, kinesthetic sense, posture, motor skill, proprioception and motor activation to allow for proper function of core, proximal hip and LE with self care and ADLs  [] (49718) Gait Re-education- Provided training and instruction to the patient for proper LE, core and proximal hip recruitment and positioning and eccentric body weight control with ambulation re-education including up and down stairs     Home Exercise Program:    [x] (47718) Reviewed/Progressed HEP activities related to strengthening, flexibility, endurance, ROM of core, proximal hip and LE for functional self-care, mobility, lifting and ambulation/stair navigation            Written HEP est    [] (13905)Reviewed/Progressed HEP activities related to improving balance, coordination, kinesthetic sense, posture, motor skill, proprioception of core, proximal hip and LE for self care, mobility, lifting, and ambulation/stair navigation      Manual Treatments:  PROM / STM / Oscillations-Mobs:  G-I, II, III, IV (PA's, Inf., Post.)  [x] (24874) Provided manual therapy to mobilize LE, proximal hip and/or LS spine soft tissue/joints for the purpose of modulating pain, promoting relaxation,  increasing ROM, reducing/eliminating soft tissue swelling/inflammation/restriction, improving soft tissue extensibility and allowing for proper ROM for normal function with self care, mobility, lifting and ambulation. Modalities:   [] GAME READY (VASO)- for significant edema, swelling, pain control. Charges:  Timed Code Treatment Minutes: 55   Total Treatment Minutes: 70     [] EVAL (LOW) 48113 (typically 20 minutes face-to-face)  [] EVAL (MOD) 92669 (typically 30 minutes face-to-face)  [] EVAL (HIGH) 13004 (typically 45 minutes face-to-face)  [] RE-EVAL   [x] ON(53997) 2x  25'   [] IONTO  [] NMR (23968) x     [] VASO x10'  with 10# OP  [x] Manual (74403) x 1  x15'     [] Other: GT x12'  [x] TA (93066) 1x   10'   [] Mech Traction (78004)  [] ES(attended) (68624)      [] ES (un) (78463): EMG/Stim combo x15'    GOALS:  Patient stated goal: return to outdoor activities for walking and climbing, along with work  [] Progressing: [] Met: [] Not Met: [] Adjusted     Therapist goals for Patient:  Short Term Goals: To be achieved in: 2 weeks  1. Independent in HEP and progression per patient tolerance, in order to prevent re-injury. [] Progressing: [] Met: [] Not Met: [] Adjusted  2.  Patient will have a decrease in pain to facilitate improvement in movement, function, and ADLs as indicated by Functional Deficits. [] Progressing: [] Met: [] Not Met: [] Adjusted     Long Term Goals: To be achieved in: 12+ weeks  1. Functional index score of 67 or more for FOTO to assist with reaching prior level of function. [x] Progressing: [] Met: [] Not Met: [] Adjusted  2. Patient will demonstrate increased AROM to 0-125 deg to allow for proper joint functioning as indicated by patients Functional Deficits. [x] Progressing: [] Met: [] Not Met: [] Adjusted  3. Patient will demonstrate an increase in Strength to good proximal hip strength and control, within 5lb HHD in LE to allow for proper functional mobility as indicated by patients Functional Deficits. [x] Progressing: [] Met: [] Not Met: [] Adjusted  4. Patient will return to 90% functional activities without increased symptoms or restriction. [x] Progressing: [] Met: [] Not Met: [] Adjusted  5. Pt will be able to walk 45 min on uneven surfaces for hiking and climbing. (patient specific functional goal)    [x] Progressing: [] Met: [] Not Met: [] Adjusted         Overall Progression Towards Functional goals/ Treatment Progress Update:  [x] Patient is progressing as expected towards functional goals listed. [] Progression is slowed due to complexities/Impairments listed. [] Progression has been slowed due to co-morbidities. [] Plan just implemented, too soon to assess goals progression <30days   [] Goals require adjustment due to lack of progress  [] Patient is not progressing as expected and requires additional follow up with physician  [] Other    ASSESSMENT: Pt arrived w/ some tightness in knee ext but did improve w/ stretching. Strengthening in L LE improving. Pt amb backward and did better w/ stability. Pt had fatigue in hip muscles from dynamic movement. Pt does well w/ balance despite missing the front of his R foot. Pain is well managed and min in L LE.  Pt has good carry over in function from one session to the next. Treatment/Activity Tolerance:  [x] Patient tolerated treatment well [] Patient limited by fatique  [] Patient limited by pain  [] Patient limited by other medical complications- tightness  [] Other:     Prognosis: [x] Good [x] Fair  [] Poor    Patient Requires Follow-up: [x] Yes  [] No    PLAN: Cont 2x/week for ROM restoration & focus on gt training for function. [x] Continue per plan of care [] Alter current plan (see comments)  [x] Plan of care initiated [] Hold pending MD visit [] Discharge    Electronically signed by: Yovanny Vicente, PT, MS, OMT-C    Physical Therapist [id number] [address] license #[id number]  Physical Therapist [place] license #[id number]               Note: If patient does not return for scheduled/ recommended follow up visits, this note will serve as a discharge from care along with most recent update on progress.

## 2023-02-16 ENCOUNTER — TREATMENT (OUTPATIENT)
Dept: PHYSICAL THERAPY | Age: 58
End: 2023-02-16

## 2023-02-16 DIAGNOSIS — M25.562 LEFT KNEE PAIN, UNSPECIFIED CHRONICITY: ICD-10-CM

## 2023-02-16 DIAGNOSIS — R26.2 DIFFICULTY WALKING: Primary | ICD-10-CM

## 2023-02-16 DIAGNOSIS — M24.662 ARTHROFIBROSIS OF KNEE JOINT, LEFT: ICD-10-CM

## 2023-02-16 NOTE — PROGRESS NOTES
[facility]   Phone: [phone]    Fax: [phone]      Physical Therapy  Cancellation/No-show Note  Patient Name:  Tunde Trejo  :  1965   Date:  2023    Cancelled visits to date: 6  No-shows to date: 0    For today's appointment patient:  [x]  Cancelled  []  Rescheduled appointment  []  No-show     Reason given by patient:  []  Patient ill-     []  Conflicting appointment  []  No transportation    []  Conflict with work  []  No reason given  [x]  Other:     Comments:  power out at home and dealing with sump pump, he rescheduled for tomorrow  Phone call information:   []  Phone call made today to patient at _ time at number provided:      []  Patient answered, conversation as follows:    []  Patient did not answer, message left as follows:  []  Phone call not made today  [x]  Phone call not needed - pt contacted us to cancel and provided reason for cancellation.      Electronically signed by:  Annetta Wallace, PT      Board Certified Orthopaedic Clinical Specialist  [facility] Certified  Physical Therapist    [facility] PT #[id number]  [id number] #[id number]

## 2023-02-16 NOTE — PROGRESS NOTES
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 Yovani Meade  Has the plan of care been signed (Y/N):        []  Yes  [x]  No     Date of Patient follow up with Physician:  Dr. Huyen Carter appt 3/16/2023      Is this a Progress Report:     []  Yes  [x]  No        If Yes:  Date Range for reporting period:  Beginning 22  Ending     Progress report will be due (10 Rx or 30 days whichever is less): 55       Recertification will be due (POC Duration  / 90 days whichever is less): 23        Visit # Insurance Allowable Auth Required   8 BMN []  Yes [x]  No    He had 45 visits for same episode in     Functional Scale: FOTO 29; LEFS 13;    Date assessed:  22 FOTO 43; LEFS 27.2   10/17/22 FOTO 42;  22 FOTO 52, LEFS 38.7  23 FOTO 63, LEFS 53.5    Latex Allergy:  [x]NO      []YES  Preferred Language for Healthcare:   [x]English       []other:    Pain level:  3/10 w/o medication    SUBJECTIVE:   5+ months post op from debridement; 6+ months post op from posterior capsule release          He feels like he has recovered from his [place] aqu[place] earlier this [place]. States his knee is not feeling too bad currently. Feels more of the \"bone on bone\" discomfort.    He is anxious to get back in to see Dr Huyen Carter to discuss the next steps.               OBJECTIVE:       Stiff gt pattern when walking into clinic w/o AD and did loosen up after stretching on bike;  general min to slight edema around L knee region;     ROM PROM AROM Overpressure Comment    L 1/30/23 R L R L R    Flexion 106° on ERMI  70° cold 125°      Extension 0° after stretch w/ op  -1°    -2°        Girth  7/29/2022 L R   Mid Patella 44 cm 39.3 cm   Suprapatellar     5cm above     15cm above         Strength L R Comment: majority deferred secondary to surgery   Quad 39 lb 49 lb 11/23/22   Hamstring 34 lb 34 lb    Abduction      Quad tone          Special Test Results/Comment: majority deferred secondary to surgery   Homans  8/29/2022 Known DVT in calf       RESTRICTIONS/PRECAUTIONS: 7/18/22 posterior L knee capsule release; Limited ROM In L knee after injection in April that was limited prior to surg for flex and ext;  DVT left lower leg (still on blood thinners till about Oct)    Exercises/Interventions:   Exercise/Equipment Resistance/Repetitions Other comments   Cardio/Warm-up     Bike Rec Seat #8 x10'  Oscillations to full revs   Treadmill          Stretching     Hamstring Seated propped 10\"x10 20# overpressure   Hip Flexion     ITB     Grion    Quad Prone KF TP 20\"x5 Manual stretch   Inclined Calf On floor 10\"x10    Towel Pull 30\"x5 over foam roll 20# overpressure        ROM     Passive    Active     ROM @EOB    Prone knee hang x10'   15# OP on ankle    Weight Hangs     Sheet Pulls     Ankle Pumps           Patellar Glides     Medial x2'    Superior x2'    Inferior x2'         STRENGTH     Supine 3x10 over foam roll 5# Prone     Abduction 5# 3x10    clams  3x10  black band   Ext - prone 5# 3x10 leaning over table    bridging 2x15 Over green SB 1 set with legs straight and 1 set with knees bent   Adducton     SLR+          Isometrics     Quad sets 10\"x10 Over foam roll        CKC     Calf raises     Wall sits     Step ups & over L3 on tech 2x10  L3 lateral step up 2x10   step down 1 leg stand Squatting Mini squat at counter x30 on fitter Each direction      Balance         4 cups reaching in arch x5 while SLS on table        TKE 45# 10\"x10    Resistive walking backwards    Resisted sidestepping over mini cones X5 laps Black loop around knees        PRE     Extension 25# 3x10 B LE conc;L LE ecc RANGE:   Flexion 45# 3x10 B LE conc, L LE ecc RANGE:   Leg Press SL # 3x10  # 3x10      SLR + 20\"x5 Cuing and monitoring for full knee ext   Amb in lunge position w/ trunk rotation 5 laps              Cable Column     Ed given on POC, expectations and goals             Manual/Modalities     Manual stretching to L hip into flex, fig 4, add, hip flex x15'    Deep tissue work to HS/scar massage in HS x5'                Therapeutic Exercise and NMR EXR  [x] (61145) Provided verbal/tactile cueing for activities related to strengthening, flexibility, endurance, ROM for improvements in LE, proximal hip, and core control with self care, mobility, lifting, ambulation.  [] (19431) Provided verbal/tactile cueing for activities related to improving balance, coordination, kinesthetic sense, posture, motor skill, proprioception  to assist with LE, proximal hip, and core control in self care, mobility, lifting, ambulation and eccentric single leg control.      NMR and Therapeutic Activities:    [x] (99295 or 11178) Provided verbal/tactile cueing for activities related to improving balance, coordination, kinesthetic sense, posture, motor skill, proprioception and motor activation to allow for proper function of core, proximal hip and LE with self care and ADLs  [] (18974) Gait Re-education- Provided training and instruction to the patient for proper LE, core and proximal hip recruitment and positioning and eccentric body weight control with ambulation re-education including up and down stairs     Home Exercise Program:    [x] (37561) Reviewed/Progressed HEP activities related to strengthening, flexibility, endurance, ROM of core, proximal hip and LE for functional self-care, mobility, lifting and ambulation/stair navigation            Written HEP est    [] (87569)Reviewed/Progressed HEP activities related to improving balance, coordination, kinesthetic sense, posture, motor skill, proprioception of core, proximal hip and LE for self care, mobility, lifting, and ambulation/stair navigation      Manual Treatments:  PROM / STM / Oscillations-Mobs:  G-I, II, III, IV (PA's, Inf., Post.)  [x] (36235) Provided manual therapy to mobilize LE, proximal hip and/or LS spine soft tissue/joints for the purpose of modulating pain, promoting relaxation,  increasing ROM, reducing/eliminating soft tissue swelling/inflammation/restriction, improving soft tissue extensibility and allowing for proper ROM for normal function with self care, mobility, lifting and ambulation.     Modalities:   [] GAME READY (VASO)- for significant edema, swelling, pain control.     Charges:  Timed Code Treatment Minutes: 55   Total Treatment Minutes: 70     [] EVAL (LOW) 52764 (typically 20 minutes face-to-face)  [] EVAL (MOD) 86372 (typically 30 minutes face-to-face)  [] EVAL (HIGH) 44672 (typically 45 minutes face-to-face)  [] RE-EVAL   [x] TE(91017) 2x  25'   [] IONTO  [] NMR (86483) x     [] VASO x10'  with 10# OP  [x] Manual (41796) x 1  x15'     [] Other: GT x12'  [x] TA (71747) 1x   10'   [] Mech Traction (89946)  [] ES(attended) (21740)      [] ES (un) (98018): EMG/Stim combo x15'    GOALS:  Patient stated goal: return to outdoor activities for walking and climbing, along with work  [] Progressing: [] Met: [] Not Met: [] Adjusted     Therapist goals for Patient:  Short Term Goals: To be achieved in: 2 weeks  1. Independent in HEP and progression per patient tolerance, in order to prevent re-injury.  [] Progressing: [] Met: [] Not Met: [] Adjusted  2. Patient will have a decrease in pain to facilitate improvement  in movement, function, and ADLs as indicated by Functional Deficits. [] Progressing: [] Met: [] Not Met: [] Adjusted     Long Term Goals: To be achieved in: 12+ weeks  1. Functional index score of 67 or more for FOTO to assist with reaching prior level of function. [x] Progressing: [] Met: [] Not Met: [] Adjusted  2. Patient will demonstrate increased AROM to 0-125 deg to allow for proper joint functioning as indicated by patients Functional Deficits. [x] Progressing: [] Met: [] Not Met: [] Adjusted  3. Patient will demonstrate an increase in Strength to good proximal hip strength and control, within 5lb HHD in LE to allow for proper functional mobility as indicated by patients Functional Deficits. [x] Progressing: [] Met: [] Not Met: [] Adjusted  4. Patient will return to 90% functional activities without increased symptoms or restriction. [x] Progressing: [] Met: [] Not Met: [] Adjusted  5. Pt will be able to walk 45 min on uneven surfaces for hiking and climbing. (patient specific functional goal)    [x] Progressing: [] Met: [] Not Met: [] Adjusted         Overall Progression Towards Functional goals/ Treatment Progress Update:  [x] Patient is progressing as expected towards functional goals listed. [] Progression is slowed due to complexities/Impairments listed. [] Progression has been slowed due to co-morbidities. [] Plan just implemented, too soon to assess goals progression <30days   [] Goals require adjustment due to lack of progress  [] Patient is not progressing as expected and requires additional follow up with physician  [] Other    ASSESSMENT:             Pt arrived w/ some tightness in knee ext but did improve w/ stretching. Strengthening in L LE improving. Pt amb backward and did better w/ stability. Pt had fatigue in hip muscles from dynamic movement. Pt does well w/ balance despite missing the front of his R foot. Pain is well managed and min in L LE.  Pt has good carry over in function from one session to the next. Treatment/Activity Tolerance:  [x] Patient tolerated treatment well [] Patient limited by fatique  [] Patient limited by pain  [] Patient limited by other medical complications- tightness  [] Other:     Prognosis: [x] Good [x] Fair  [] Poor    Patient Requires Follow-up: [x] Yes  [] No    PLAN: Cont 2x/week for ROM restoration & focus on gt training for function. [x] Continue per plan of care [] Alter current plan (see comments)  [x] Plan of care initiated [] Hold pending MD visit [] Discharge    Electronically signed by:     Bhaskar Lamb, PT      Board Certified Orthopaedic Clinical Specialist  [facility] Certified  Physical Therapist    [facility] #[id number]  [place] PT #[id number]          Note: If patient does not return for scheduled/ recommended follow up visits, this note will serve as a discharge from care along with most recent update on progress.

## 2023-02-17 ENCOUNTER — TREATMENT (OUTPATIENT)
Dept: PHYSICAL THERAPY | Age: 58
End: 2023-02-17

## 2023-02-17 DIAGNOSIS — R26.2 DIFFICULTY WALKING: Primary | ICD-10-CM

## 2023-02-17 DIAGNOSIS — M24.662 ARTHROFIBROSIS OF KNEE JOINT, LEFT: ICD-10-CM

## 2023-02-17 NOTE — PROGRESS NOTES
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 Rylee Castanon  Has the plan of care been signed (Y/N):        []  Yes  [x]  No     Date of Patient follow up with Physician:  Dr. Kristen Hartmann appt 3/16/2023      Is this a Progress Report:     []  Yes  [x]  No        If Yes:  Date Range for reporting period:  Beginning 22  Ending     Progress report will be due (10 Rx or 30 days whichever is less): 28       Recertification will be due (POC Duration  / 90 days whichever is less): 23        Visit # Insurance Allowable Auth Required   8 BMN []  Yes [x]  No    He had 45 visits for same episode in     Functional Scale: FOTO 29; LEFS 13;    Date assessed:  22 FOTO 43; LEFS 27.2   10/17/22 FOTO 42;  22 FOTO 52, LEFS 38.7  23 FOTO 63, LEFS 53.5    Latex Allergy:  [x]NO      []YES  Preferred Language for Healthcare:   [x]English       []other:    Pain level:  3/10 w/o medication    SUBJECTIVE:   5+ months post op from debridement; 6+ months post op from posterior capsule release                He feels like he has recovered from his [place] the aqu[place] earlier this [place]. States his knee is not feeling too bad currently. Feels more of the \"bone on bone\" discomfort.    He is anxious to get back in to see  Yesi to discuss the next steps.               OBJECTIVE:     REASSESS NPV  Stiff gt pattern when walking into clinic w/o AD and did loosen up after stretching on bike;  general min to slight edema around L knee region;     ROM PROM AROM Overpressure Comment    L 1/30/23 R L R L R    Flexion 106° on ERMI  70° cold 125°      Extension 0° after stretch w/ op  -1°    -2°        Girth  7/29/2022 L R   Mid Patella 44 cm 39.3 cm   Suprapatellar     5cm above     15cm above         Strength L R Comment: majority deferred secondary to surgery   Quad 39 lb 49 lb 11/23/22   Hamstring 34 lb 34 lb    Abduction      Quad tone          Special Test Results/Comment: majority deferred secondary to surgery   Homans  8/29/2022 Known DVT in calf       RESTRICTIONS/PRECAUTIONS: 7/18/22 posterior L knee capsule release; Limited ROM In L knee after injection in April that was limited prior to surg for flex and ext;  DVT left lower leg (still on blood thinners till about Oct)    Exercises/Interventions:   Exercise/Equipment Resistance/Repetitions Other comments   Cardio/Warm-up     Bike Rec Seat #8 x10'  Oscillations to full revs   Treadmill          Stretching     Hamstring Seated propped 10\"x10 20# overpressure   Hip Flexion     ITB     Grion    Quad Prone KF TP 20\"x5 Manual stretch   Inclined Calf On floor 10\"x10    Towel Pull 30\"x5 over foam roll 20# overpressure        ROM     Passive    Active     ROM @EOB    Prone knee hang x10'   15# OP on ankle    Weight Hangs     Sheet Pulls     Ankle Pumps           Patellar Glides     Medial x2'    Superior x2'    Inferior x2'         STRENGTH     Supine 3x10 over foam roll 5# Prone     Abduction 5# 3x10    clams  3x10  black band   Ext - prone 5# 3x10 leaning over table    bridging 2x15 Over green SB 1 set with legs straight and 1 set with knees bent   Adducton     SLR+          Isometrics     Quad sets 10\"x10 Over foam roll        CKC     Calf raises     Wall sits     Step ups & over L3 on tech 2x10  L3 lateral step up 2x10   step down 1 leg stand Squatting Mini squat at counter x30 on fitter Each direction      Balance         4 cups reaching in arch x5 while SLS on table        TKE 45# 10\"x10    Resistive walking backwards    Resisted sidestepping over mini cones X5 laps Black loop around knees        PRE     Extension 25# 3x10 B LE conc;L LE ecc RANGE:   Flexion 45# 3x10 B LE conc, L LE ecc RANGE:   Leg Press SL # 3x10  # 3x10      SLR + 20\"x5 Cuing and monitoring for full knee ext   Amb in lunge position w/ trunk rotation 5 laps              Cable Column     Ed given on POC, expectations and goals             Manual/Modalities     Manual stretching to L hip into flex, fig 4, add, hip flex x15'    Deep tissue work to HS/scar massage in HS x5'                Therapeutic Exercise and NMR EXR  [x] (66342) Provided verbal/tactile cueing for activities related to strengthening, flexibility, endurance, ROM for improvements in LE, proximal hip, and core control with self care, mobility, lifting, ambulation.  [] (85783) Provided verbal/tactile cueing for activities related to improving balance, coordination, kinesthetic sense, posture, motor skill, proprioception  to assist with LE, proximal hip, and core control in self care, mobility, lifting, ambulation and eccentric single leg control.      NMR and Therapeutic Activities:    [x] (64717 or 32702) Provided verbal/tactile cueing for activities related to improving balance, coordination, kinesthetic sense, posture, motor skill, proprioception and motor activation to allow for proper function of core, proximal hip and LE with self care and ADLs  [] (00057) Gait Re-education- Provided training and instruction to the patient for proper LE, core and proximal hip recruitment and positioning and eccentric body weight control with ambulation re-education including up and down stairs     Home Exercise Program:    [x] (51086) Reviewed/Progressed HEP activities related to strengthening, flexibility, endurance, ROM of core, proximal hip and LE for functional self-care, mobility, lifting and ambulation/stair navigation            Written HEP est    [] (60724)Reviewed/Progressed HEP activities related to improving balance, coordination, kinesthetic sense, posture, motor skill, proprioception of core, proximal hip and LE for self care, mobility, lifting, and ambulation/stair navigation      Manual Treatments:  PROM / STM / Oscillations-Mobs:  G-I, II, III, IV (PA's, Inf., Post.)  [x] (51885) Provided manual therapy to mobilize LE, proximal hip and/or LS spine soft tissue/joints for the purpose of modulating pain, promoting relaxation,  increasing ROM, reducing/eliminating soft tissue swelling/inflammation/restriction, improving soft tissue extensibility and allowing for proper ROM for normal function with self care, mobility, lifting and ambulation. Modalities:   [] GAME READY (VASO)- for significant edema, swelling, pain control. Charges:  Timed Code Treatment Minutes: 55   Total Treatment Minutes: 70     [] EVAL (LOW) 67107 (typically 20 minutes face-to-face)  [] EVAL (MOD) 68994 (typically 30 minutes face-to-face)  [] EVAL (HIGH) 11059 (typically 45 minutes face-to-face)  [] RE-EVAL   [x] GILDA(08467) 2x  25'   [] IONTO  [] NMR (21634) x     [] VASO x10'  with 10# OP  [x] Manual (99237) x 1  x15'     [] Other: GT x12'  [x] TA (67564) 1x   10'   [] [facility] (25175)  [] ES(attended) (65160)      [] ES (un) (62188): EMG/Stim combo x15'    GOALS:  Patient stated goal: return to outdoor activities for walking and climbing, along with work  [] Progressing: [] Met: [] Not Met: [] Adjusted     Therapist goals for Patient:  Short Term Goals: To be achieved in: 2 weeks  1. Independent in HEP and progression per patient tolerance, in order to prevent re-injury. [] Progressing: [] Met: [] Not Met: [] Adjusted  2.  Patient will have a decrease in pain to facilitate improvement in movement, function, and ADLs as indicated by Functional Deficits. [] Progressing: [] Met: [] Not Met: [] Adjusted     Long Term Goals: To be achieved in: 12+ weeks  1. Functional index score of 67 or more for FOTO to assist with reaching prior level of function. [x] Progressing: [] Met: [] Not Met: [] Adjusted  2. Patient will demonstrate increased AROM to 0-125 deg to allow for proper joint functioning as indicated by patients Functional Deficits. [x] Progressing: [] Met: [] Not Met: [] Adjusted  3. Patient will demonstrate an increase in Strength to good proximal hip strength and control, within 5lb HHD in LE to allow for proper functional mobility as indicated by patients Functional Deficits. [x] Progressing: [] Met: [] Not Met: [] Adjusted  4. Patient will return to 90% functional activities without increased symptoms or restriction. [x] Progressing: [] Met: [] Not Met: [] Adjusted  5. Pt will be able to walk 45 min on uneven surfaces for hiking and climbing. (patient specific functional goal)    [x] Progressing: [] Met: [] Not Met: [] Adjusted         Overall Progression Towards Functional goals/ Treatment Progress Update:  [x] Patient is progressing as expected towards functional goals listed. [] Progression is slowed due to complexities/Impairments listed. [] Progression has been slowed due to co-morbidities. [] Plan just implemented, too soon to assess goals progression <30days   [] Goals require adjustment due to lack of progress  [] Patient is not progressing as expected and requires additional follow up with physician  [] Other    ASSESSMENT:                     Pt arrived w/ some tightness in knee ext but did improve w/ stretching. Strengthening in L LE improving. Pt amb backward and did better w/ stability. Pt had fatigue in hip muscles from dynamic movement. Pt does well w/ balance despite missing the front of his R foot. Pain is well managed and min in L LE.  Pt has good carry over in function from one session to the next. Treatment/Activity Tolerance:  [x] Patient tolerated treatment well [] Patient limited by fatique  [] Patient limited by pain  [] Patient limited by other medical complications- tightness  [] Other:     Prognosis: [x] Good [x] Fair  [] Poor    Patient Requires Follow-up: [x] Yes  [] No    PLAN: Cont 2x/week for ROM restoration & focus on gt training for function. [x] Continue per plan of care [] Alter current plan (see comments)  [x] Plan of care initiated [] Hold pending MD visit [] Discharge    Electronically signed by:     Tay Durand PT        Board Certified Orthopaedic Clinical Specialist  [facility] Certified  Physical Therapist    [facility] #[id number]  [place] [facility] #[id number]      Note: If patient does not return for scheduled/ recommended follow up visits, this note will serve as a discharge from care along with most recent update on progress.

## 2023-02-17 NOTE — PROGRESS NOTES
[facility]   Phone: [phone]    Fax: [phone]      Physical Therapy  Cancellation/No-show Note  Patient Name:  Tanika Prado  :  1965   Date:  2023    Cancelled visits to date: 5  No-shows to date: 0    For today's appointment patient:  [x]  Cancelled  []  Rescheduled appointment  []  No-show     Reason given by patient:  []  Patient ill-     []  Conflicting appointment  []  No transportation    []  Conflict with work  []  No reason given  [x]  Other:     Comments:  still dealing with people working at his house  Phone call information:   []  Phone call made today to patient at _ time at number provided:      []  Patient answered, conversation as follows:    []  Patient did not answer, message left as follows:  []  Phone call not made today  [x]  Phone call not needed - pt contacted us to cancel and provided reason for cancellation.      Electronically signed by:  Kal Mendoza PT      Board Certified Orthopaedic Clinical Specialist  [facility] Certified  Physical Therapist    [facility] #[id number]  [id number]

## 2023-02-20 ENCOUNTER — TREATMENT (OUTPATIENT)
Dept: PHYSICAL THERAPY | Age: 58
End: 2023-02-20
Payer: COMMERCIAL

## 2023-02-20 DIAGNOSIS — R26.2 DIFFICULTY WALKING: Primary | ICD-10-CM

## 2023-02-20 DIAGNOSIS — M25.562 LEFT KNEE PAIN, UNSPECIFIED CHRONICITY: ICD-10-CM

## 2023-02-20 DIAGNOSIS — M24.662 FIBROSIS OF KNEE JOINT, LEFT: ICD-10-CM

## 2023-02-20 DIAGNOSIS — M24.662 ARTHROFIBROSIS OF KNEE JOINT, LEFT: ICD-10-CM

## 2023-02-20 PROCEDURE — 97110 THERAPEUTIC EXERCISES: CPT | Performed by: PHYSICAL THERAPIST

## 2023-02-20 PROCEDURE — 97140 MANUAL THERAPY 1/> REGIONS: CPT | Performed by: PHYSICAL THERAPIST

## 2023-02-20 PROCEDURE — 97530 THERAPEUTIC ACTIVITIES: CPT | Performed by: PHYSICAL THERAPIST

## 2023-02-20 NOTE — PROGRESS NOTES
[facility]   Phone: [phone]    Fax: [phone]   Physical Therapy Re-Certification Plan of Care    Dear  Dr. Demetria Moya,    We had the pleasure of treating the following patient for physical therapy services at [address] [place] [address]. A summary of our findings can be found in the updated assessment below. This includes our plan of care. If you have any questions or concerns regarding these findings, please do not hesitate to contact me at the office phone number checked above. Thank you for the referral.     Physician Signature:________________________________Date:__________________  By signing above (or electronic signature), therapists plan is approved by physician      Overall Response to Treatment:   [x]Patient is responding well to treatment and improvement is noted with regards  to goals   []Patient should continue to improve in reasonable time if they continue HEP   []Patient has plateaued and is no longer responding to skilled PT intervention    []Patient is getting worse and would benefit from return to referring MD   []Patient unable to adhere to initial POC   []Other: Pt has made good and steady progress despite being slow w/ function of L LE. ROM in knee has progressed and knee remains relatively straight based on his physical activities and ability to stretch. Motion into ext always loosens up w/ stretching but int arrival with ext lag. Muscle activation continues to improve. Pt has difficulty w/ eccentric control on steps but working on full step height now for function in home. Pain and swelling are controlled. Recommend that pt continue therapy to address deficits to be able to amb stairs in normal sequence.       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 Murphy Jenny  Has the plan of care been signed (Y/N):        []  Yes  [x]  No     Date of Patient follow up with Physician:  Dr. Calvin Spivey appt 3/16/2023      Is this a Progress Report:     []  Yes  [x]  No        If Yes:  Date Range for reporting period:  Beginning 7/27/22  Ending 2/20/23    Progress report will be due (10 Rx or 30 days whichever is less): 9/7/63       Recertification will be due (POC Duration  / 90 days whichever is less): 2/9/23        Visit # Insurance Allowable Auth Required   8 BMN []  Yes [x]  No    He had 45 visits for same episode in 2022    Functional Scale: FOTO 29; LEFS 13;    Date assessed:  7/27/22 8/29/22 FOTO 43; LEFS 27.2   10/17/22 FOTO 42;  11/23/22 FOTO 52, LEFS 38.7  1/9/23 FOTO 63, LEFS 53.5  2/20/23 LEFS    Latex Allergy:  [x]NO      []YES  Preferred Language for Healthcare:   [x]English       []other:    Pain level:  3/10 w/o medication    SUBJECTIVE:     He states that his knee is feeling better and having min pain during the day. He has been able to do more. Some stiffness noted more today.              OBJECTIVE:       Stiff gt pattern when walking into clinic w/o AD and did loosen up after stretching on bike;  general min to slight edema around L knee region;     ROM PROM AROM Overpressure Comment    L 1/30/23 R L R L R    Flexion 106° on ERMI  70° cold 125°      Extension 0° after stretch w/ op  -1°    -2°        Girth  7/29/2022 L R   Mid Patella 44 cm 39.3 cm   Suprapatellar     5cm above     15cm above         Strength L R Comment: majority deferred secondary to surgery   Quad 39 lb 49 lb 11/23/22   Hamstring 34 lb 34 lb    Abduction      Quad tone          Special Test Results/Comment: majority deferred secondary to surgery   Homans  8/29/2022 Known DVT in calf       RESTRICTIONS/PRECAUTIONS: 7/18/22 posterior L knee capsule release; Limited ROM In L knee after injection in April that was limited prior to surg for flex and ext;  DVT left lower leg (still on blood thinners till about Oct)    Exercises/Interventions:   Exercise/Equipment Resistance/Repetitions Other comments   Cardio/Warm-up     Bike Rec Seat #8 x10'  Oscillations to full revs   Treadmill          Stretching     Hamstring Seated propped 10\"x10 20# overpressure   Hip Flexion     ITB     Grion    Quad Prone KF TP 20\"x5 Manual stretch   Inclined Calf On floor 10\"x10    Towel Pull 30\"x5 over foam roll 20# overpressure        ROM     Passive    Active     ROM @EOB    Prone knee hang x10'   15# OP on ankle    Weight Hangs     Sheet Pulls     Ankle Pumps           Patellar Glides     Medial x2'    Superior x2'    Inferior x2'         STRENGTH     Supine 3x10 over foam roll 5# Prone     Abduction 5# 3x10    clams  3x10  black band   Ext - prone 5# 3x10 leaning over table    bridging 2x15 Over green SB 1 set with legs straight and 1 set with knees bent   Adducton     SLR+          Isometrics     Quad sets 10\"x10 Over foam roll        CKC     Calf raises     Wall sits     Step ups & over L3 on tech 2x10  L3 lateral step up 2x10   step down 1 leg stand Squatting Mini squat at counter x30 on fitter Each direction      Balance         4 cups reaching in arch x5 while SLS on table        TKE 45# 10\"x10    Resistive walking backwards    Resisted sidestepping over mini cones X5 laps Black loop around knees        PRE     Extension 25# 3x10 B LE conc;L LE ecc RANGE:   Flexion 45# 3x10 B LE conc, L LE ecc RANGE:   Leg Press SL # 3x10  # 3x10      SLR + 20\"x5 Cuing and monitoring for full knee ext   Amb in lunge position w/ trunk rotation 5 laps              Cable Column     Ed given on POC, expectations and goals             Manual/Modalities     Manual stretching to L hip into flex, fig 4, add, hip flex x15'    Deep tissue work to HS/scar massage in HS x5'                Therapeutic Exercise and NMR EXR  [x] (82816) Provided verbal/tactile cueing for activities related to strengthening, flexibility, endurance, ROM for improvements in LE, proximal hip, and core control with self care, mobility, lifting, ambulation.  [] (95482) Provided verbal/tactile cueing for activities related to improving balance, coordination, kinesthetic sense, posture, motor skill, proprioception  to assist with LE, proximal hip, and core control in self care, mobility, lifting, ambulation and eccentric single leg control.      NMR and Therapeutic Activities:    [x] (13068 or 30791) Provided verbal/tactile cueing for activities related to improving balance, coordination, kinesthetic sense, posture, motor skill, proprioception and motor activation to allow for proper function of core, proximal hip and LE with self care and ADLs  [] (67198) Gait Re-education- Provided training and instruction to the patient for proper LE, core and proximal hip recruitment and positioning and eccentric body weight control with ambulation re-education including up and down stairs     Home Exercise Program:    [x] (81489) Reviewed/Progressed HEP activities related to strengthening, flexibility, endurance, ROM of core, proximal hip and LE for functional self-care, mobility, lifting and ambulation/stair navigation            Written HEP est    [] (03900)Reviewed/Progressed HEP activities related to improving balance, coordination, kinesthetic sense, posture, motor skill, proprioception of core, proximal hip and LE for self care, mobility, lifting, and ambulation/stair navigation      Manual Treatments:  PROM / STM / Oscillations-Mobs:  G-I, II, III, IV ([place], Inf., Post.)  [x] (77771) Provided manual therapy to mobilize LE, proximal hip and/or LS spine soft tissue/joints for the purpose of modulating pain, promoting relaxation,  increasing ROM, reducing/eliminating soft tissue swelling/inflammation/restriction, improving soft tissue extensibility and allowing for proper ROM for normal function with self care, mobility, lifting and ambulation. Modalities:   [] GAME READY (VASO)- for significant edema, swelling, pain control. Charges:  Timed Code Treatment Minutes: 55   Total Treatment Minutes: 70     [] EVAL (LOW) 03187 (typically 20 minutes face-to-face)  [] EVAL (MOD) 05445 (typically 30 minutes face-to-face)  [] EVAL (HIGH) 18390 (typically 45 minutes face-to-face)  [] RE-EVAL   [x] NG(12171) 2x  25'   [] IONTO  [] NMR (11520) x     [] VASO x10'  with 10# OP  [x] Manual (27616) x 1  x15'     [] Other: GT x12'  [x] TA (03641) 1x   10'   [] Mech Traction (95887)  [] ES(attended) (61703)      [] ES (un) (22128): EMG/Stim combo x15'    GOALS:  Patient stated goal: return to outdoor activities for walking and climbing, along with work  [] Progressing: [] Met: [] Not Met: [] Adjusted     Therapist goals for Patient:  Short Term Goals: To be achieved in: 2 weeks  1. Independent in HEP and progression per patient tolerance, in order to prevent re-injury. [] Progressing: [] Met: [] Not Met: [] Adjusted  2. Patient will have a decrease in pain to facilitate improvement in movement, function, and ADLs as indicated by Functional Deficits. [] Progressing: [] Met: [] Not Met: [] Adjusted     Long Term Goals: To be achieved in: 12+ weeks  1. Functional index score of 67 or more for FOTO to assist with reaching prior level of function. [x] Progressing: [] Met: [] Not Met: [] Adjusted  2. Patient will demonstrate increased AROM to 0-125 deg to allow for proper joint functioning as indicated by patients Functional Deficits.   [x] Progressing: [] Met: [] Not Met: [] Adjusted  3. Patient will demonstrate an increase in Strength to good proximal hip strength and control, within 5lb HHD in LE to allow for proper functional mobility as indicated by patients Functional Deficits. [x] Progressing: [] Met: [] Not Met: [] Adjusted  4. Patient will return to 90% functional activities without increased symptoms or restriction. [x] Progressing: [] Met: [] Not Met: [] Adjusted  5. Pt will be able to walk 45 min on uneven surfaces for hiking and climbing. (patient specific functional goal)    [x] Progressing: [] Met: [] Not Met: [] Adjusted         Overall Progression Towards Functional goals/ Treatment Progress Update:  [x] Patient is progressing as expected towards functional goals listed. [] Progression is slowed due to complexities/Impairments listed. [] Progression has been slowed due to co-morbidities. [] Plan just implemented, too soon to assess goals progression <30days   [] Goals require adjustment due to lack of progress  [] Patient is not progressing as expected and requires additional follow up with physician  [] Other    ASSESSMENT: Pt arrived w/ some tightness in knee ext but did improve w/ stretching. Strengthening in L LE improving. Pt amb backward and did better w/ stability. Pt had fatigue in hip muscles from dynamic movement. Pt does well w/ balance despite missing the front of his R foot. Pain is well managed and min in L LE. Pt has good carry over in function from one session to the next. Treatment/Activity Tolerance:  [x] Patient tolerated treatment well [] Patient limited by fatique  [] Patient limited by pain  [] Patient limited by other medical complications- tightness  [] Other:     Prognosis: [x] Good [x] Fair  [] Poor    Patient Requires Follow-up: [x] Yes  [] No    PLAN: Cont 2x/week for ROM restoration & focus on gt training for function.    [x] Continue per plan of care [] Alter current plan (see comments)  [x] Plan of care initiated [] Hold pending MD visit [] Discharge    Electronically signed by: Felicia Charles, PT, MS, OMT-C    Physical Therapist [id number] [address] license #[id number]  Physical Therapist [place] license #[id number]               Note: If patient does not return for scheduled/ recommended follow up visits, this note will serve as a discharge from care along with most recent update on progress.

## 2023-02-21 NOTE — PROGRESS NOTES
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 Angela Slater  Has the plan of care been signed (Y/N):        []  Yes  [x]  No     Date of Patient follow up with Physician:  Dr. Lakisha Murrieta appt 3/16/2023      Is this a Progress Report:     []  Yes  [x]  No        If Yes:  Date Range for reporting period:  Beginning 22  Ending     Progress report will be due (10 Rx or 30 days whichever is less): 38       Recertification will be due (POC Duration  / 90 days whichever is less): 23        Visit # Insurance Allowable Auth Required   6 BMN []  Yes [x]  No    He had 45 visits for same episode in     Functional Scale: FOTO 29; LEFS 13;    Date assessed:  22 FOTO 43; LEFS 27.2   10/17/22 FOTO 42;  22 FOTO 52, LEFS 38.7  23 FOTO 63, LEFS 53.5    Latex Allergy:  [x]NO      []YES  Preferred Language for Healthcare:   [x]English       []other:    Pain level:  3/10 w/o medication    SUBJECTIVE:   5+ months post op from debridement; 6+ months post op from posterior capsule release  Patient reports he doesn't notice the swelling in his calf and ankle like he used to have. His knee was a little sore after last visit but not too bad.             OBJECTIVE:   Stiff gt pattern when walking into clinic w/o AD and did none loosen up after stretching on bike;  general min to slight edema around L knee region;     ROM PROM AROM Overpressure Comment    L 1/30/23 R L R L R    Flexion 106° on ERMI  70° cold 125°      Extension 0° after stretch w/ op  -1°    -2°        Girth  7/29/2022 L R   Mid Patella 44 cm 39.3 cm   Suprapatellar     5cm above     15cm above         Strength L R Comment: majority deferred secondary to surgery   Quad 39 lb 49 lb 11/23/22   Hamstring 34 lb 34 lb    Abduction      Quad tone          Special Test Results/Comment: majority deferred secondary to surgery   Homans  8/29/2022 Known DVT in calf       RESTRICTIONS/PRECAUTIONS: 7/18/22 posterior L knee capsule release; Limited ROM In L knee after injection in April that was limited prior to surg for flex and ext;  DVT left lower leg (still on blood thinners till about Oct)    Exercises/Interventions:   Exercise/Equipment Resistance/Repetitions Other comments   Cardio/Warm-up     Bike Rec Seat #8 x10'  Oscillations to full revs   Treadmill          Stretching     Hamstring Seated propped 10\"x10 20# overpressure   Hip Flexion     ITB     Grion    Quad Prone KF TP 20\"x5 Manual stretch   Inclined Calf On floor 10\"x10    Towel Pull 30\"x5 over foam roll 20# overpressure        ROM     Passive    Active     ROM @EOB    Prone knee hang x10'   15# OP on ankle    Weight Hangs     Sheet Pulls     Ankle Pumps           Patellar Glides     Medial x2'    Superior x2'    Inferior x2'         STRENGTH     Supine 3x10 over foam roll 5# Prone     Abduction 5# 3x10    clams  3x10  black band   Ext - prone 5# 3x10 leaning over table    bridging 2x15 Over green SB 1 set with legs straight and 1 set with knees bent   Adducton     SLR+          Isometrics     Quad sets 10\"x10 Over foam roll        CKC     Calf raises     Wall sits     Step ups & over L3 on tech x10  L3 lateral step up x10   step down 1 leg stand Squatting       Balance         SLS 20\"x2  SL Reaching across body for 4 cups w/ UE while SLS x5 ea hand - 2 directions Cones on chair height       TKE 45# 10\"x10    Resistive walking backwards    Resisted sidestepping over mini cones X3 laps Black loop around knees        PRE     Extension 20# 3x10 B LE conc;L LE ecc RANGE:   Flexion 40# 3x10 B LE conc, L LE ecc RANGE:   Leg Press SL # 3x10  # 3x10      SLR + 20\"x5 Cuing and monitoring for full knee ext        Cable Column     Ed given on POC, expectations and goals             Manual/Modalities     Manual stretching to L hip into flex, fig 4, add, hip flex x15'    Deep tissue work to HS/scar massage in HS x5'                Therapeutic Exercise and NMR EXR  [x] (00418) Provided verbal/tactile cueing for activities related to strengthening, flexibility, endurance, ROM for improvements in LE, proximal hip, and core control with self care, mobility, lifting, ambulation.  [] (27454) Provided verbal/tactile cueing for activities related to improving balance, coordination, kinesthetic sense, posture, motor skill, proprioception  to assist with LE, proximal hip, and core control in self care, mobility, lifting, ambulation and eccentric single leg control.      NMR and Therapeutic Activities:    [x] (72205 or 66213) Provided verbal/tactile cueing for activities related to improving balance, coordination, kinesthetic sense, posture, motor skill, proprioception and motor activation to allow for proper function of core, proximal hip and LE with self care and ADLs  [] (84092) Gait Re-education- Provided training and instruction to the patient for proper LE, core and proximal hip recruitment and positioning and eccentric body weight control with ambulation re-education including up and down stairs     Home Exercise Program:    [x] (77769) Reviewed/Progressed HEP activities related to strengthening, flexibility, endurance, ROM of core, proximal hip and LE for functional self-care, mobility, lifting and ambulation/stair navigation Written HEP est    [] (81463)Reviewed/Progressed HEP activities related to improving balance, coordination, kinesthetic sense, posture, motor skill, proprioception of core, proximal hip and LE for self care, mobility, lifting, and ambulation/stair navigation      Manual Treatments:  PROM / STM / Oscillations-Mobs:  G-I, II, III, IV (PA's, Inf., Post.)  [x] (79319) Provided manual therapy to mobilize LE, proximal hip and/or LS spine soft tissue/joints for the purpose of modulating pain, promoting relaxation,  increasing ROM, reducing/eliminating soft tissue swelling/inflammation/restriction, improving soft tissue extensibility and allowing for proper ROM for normal function with self care, mobility, lifting and ambulation. Modalities:   [] GAME READY (VASO)- for significant edema, swelling, pain control. Charges:  Timed Code Treatment Minutes: 55   Total Treatment Minutes: 70     [] EVAL (LOW) 45576 (typically 20 minutes face-to-face)  [] EVAL (MOD) 05320 (typically 30 minutes face-to-face)  [] EVAL (HIGH) 35343 (typically 45 minutes face-to-face)  [] RE-EVAL   [x] IM(01369) 2x  25'   [] IONTO  [] NMR (96934) x     [] VASO x10'  with 10# OP  [x] Manual (88265) x 1  x15'     [] Other: GT x12'  [x] TA (66589) 1x   10'   [] Mech Traction (48763)  [] ES(attended) (05787)      [] ES (un) (29092): EMG/Stim combo x15'    GOALS:  Patient stated goal: return to outdoor activities for walking and climbing, along with work  [] Progressing: [] Met: [] Not Met: [] Adjusted     Therapist goals for Patient:  Short Term Goals: To be achieved in: 2 weeks  1. Independent in HEP and progression per patient tolerance, in order to prevent re-injury. [] Progressing: [] Met: [] Not Met: [] Adjusted  2. Patient will have a decrease in pain to facilitate improvement in movement, function, and ADLs as indicated by Functional Deficits. [] Progressing: [] Met: [] Not Met: [] Adjusted     Long Term Goals:  To be achieved in: 12+ weeks  1. Functional index score of 67 or more for FOTO to assist with reaching prior level of function. [x] Progressing: [] Met: [] Not Met: [] Adjusted  2. Patient will demonstrate increased AROM to 0-125 deg to allow for proper joint functioning as indicated by patients Functional Deficits. [x] Progressing: [] Met: [] Not Met: [] Adjusted  3. Patient will demonstrate an increase in Strength to good proximal hip strength and control, within 5lb HHD in LE to allow for proper functional mobility as indicated by patients Functional Deficits. [x] Progressing: [] Met: [] Not Met: [] Adjusted  4. Patient will return to 90% functional activities without increased symptoms or restriction. [x] Progressing: [] Met: [] Not Met: [] Adjusted  5. Pt will be able to walk 45 min on uneven surfaces for hiking and climbing. (patient specific functional goal)    [x] Progressing: [] Met: [] Not Met: [] Adjusted         Overall Progression Towards Functional goals/ Treatment Progress Update:  [x] Patient is progressing as expected towards functional goals listed. [] Progression is slowed due to complexities/Impairments listed. [] Progression has been slowed due to co-morbidities. [] Plan just implemented, too soon to assess goals progression <30days   [] Goals require adjustment due to lack of progress  [] Patient is not progressing as expected and requires additional follow up with physician  [] Other    ASSESSMENT:    Softer end feel noted into end range knee extension with stretching. He continues to work hard in therapy and remains motivated to continue to improve. He is looking forward to seeing Dr Yuan Pedersen in March and hopes to get the TKA scheduled for April. He is making improvements in his functional strength and overall single limb control. He said he felt pretty good after therapy and didn't want to use ice. Would ice at home if needed.           Treatment/Activity Tolerance:  [x] Patient tolerated treatment well [] Patient limited by fatique  [] Patient limited by pain  [] Patient limited by other medical complications- tightness  [] Other:     Prognosis: [x] Good [x] Fair  [] Poor    Patient Requires Follow-up: [x] Yes  [] No    PLAN: Cont 2x/week for ROM restoration & focus on gt training for function. [x] Continue per plan of care [] Alter current plan (see comments)  [x] Plan of care initiated [] Hold pending MD visit [] Discharge    Electronically signed by:     Adeola Greene, PT      Board Certified Orthopaedic Clinical Specialist  [facility] Certified  Physical Therapist    [place] PT #[id number]  [place] PT #[id number]          Note: If patient does not return for scheduled/ recommended follow up visits, this note will serve as a discharge from care along with most recent update on progress.

## 2023-02-23 ENCOUNTER — TREATMENT (OUTPATIENT)
Dept: PHYSICAL THERAPY | Age: 58
End: 2023-02-23

## 2023-02-23 DIAGNOSIS — R26.2 DIFFICULTY WALKING: Primary | ICD-10-CM

## 2023-02-23 DIAGNOSIS — M24.662 ARTHROFIBROSIS OF KNEE JOINT, LEFT: ICD-10-CM

## 2023-02-23 DIAGNOSIS — M25.562 LEFT KNEE PAIN, UNSPECIFIED CHRONICITY: ICD-10-CM

## 2023-02-23 DIAGNOSIS — M24.662 FIBROSIS OF KNEE JOINT, LEFT: ICD-10-CM

## 2023-02-23 NOTE — PROGRESS NOTES
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 Katherine Alvarado  Has the plan of care been signed (Y/N):        [x]  Yes  []  No     Date of Patient follow up with Physician:  Dr. Calvin Spivey appt 3/16/2023      Is this a Progress Report:     []  Yes  [x]  No        If Yes:  Date Range for reporting period:  Beginning 22  Ending 23    Progress report will be due (10 Rx or 30 days whichever is less):        Recertification will be due (POC Duration  / 90 days whichever is less): 23        Visit # Insurance Allowable Auth Required   9 BMN []  Yes [x]  No    He had 45 visits for same episode in     Functional Scale: FOTO 29; LEFS 13;    Date assessed:  22 FOTO 43; LEFS 27.2   10/17/22 FOTO 42;  22 FOTO 52, LEFS 38.7  23 FOTO 63, LEFS 53.5  23 LEFS    Latex Allergy:  [x]NO      []YES  Preferred Language for Healthcare:   [x]English       []other:    Pain level:  3/10 w/o medication    SUBJECTIVE:     Patient states he is feeling stiff in the L knee today. He was a little sore after last visit but this subsided in a day and is feeling well coming in to therapy. OBJECTIVE:       Reduced knee extension with terminal knee extension in gait upon arrival, improved after stretching.     ROM PROM AROM Overpressure Comment    L 2/23/23 R L R L R    Flexion 108° on ERMI  100° 125°      Extension 4° after stretch w/ op  -1°    -2°        Girth  7/29/2022 L R   Mid Patella 44 cm 39.3 cm   Suprapatellar     5cm above     15cm above         Strength L R Comment: majority deferred secondary to surgery   Quad 39 lb 49 lb 11/23/22   Hamstring 34 lb 34 lb    Abduction      Quad tone          Special Test Results/Comment: majority deferred secondary to surgery   Homans  8/29/2022 Known DVT in calf       RESTRICTIONS/PRECAUTIONS: 7/18/22 posterior L knee capsule release; Limited ROM In L knee after injection in April that was limited prior to surg for flex and ext;  DVT left lower leg (still on blood thinners till about Oct)    Exercises/Interventions:   Exercise/Equipment Resistance/Repetitions Other comments   Cardio/Warm-up     Bike Rec Seat #8 x5'  Oscillations to full revs   Treadmill          Stretching     Hamstring Seated propped 10\"x10 20# overpressure   Quadruped Rocks 10\"x10    ITB     Grion    Quad Prone KF TP 20\"x5 Manual stretch   Inclined Calf On floor 10\"x10    Towel Pull 30\"x5 over foam roll 20# overpressure             ROM     Passive    Active     ROM @EOB    Prone knee hang NPV   Weight Hangs     Sheet Pulls 10\"x10    Ankle Pumps           Patellar Glides     Medial x2'    Superior x2'    Inferior x2'    Lateral x2'    STRENGTH     Supine 3x10 5# Prone     Abduction 5# 3x10    clams Ext - prone 5# 3x10 leaning over table    bridging 2x15 Over green SB 1 set with legs straight and 1 set with knees bent   Adducton     SLR+          Isometrics     Quad sets 10\"x10 Over foam roll        CKC     Calf raises     Wall sits     Step ups & over   step down 1 leg stand Squatting    Balance         4 cups reaching in arch x5 while SLS on table        TKE 45# 10\"x10    Resistive walking backwards    Resisted sidestepping over mini cones      PRE     Extension 25# 3x10 B LE conc;L LE ecc RANGE:   Flexion 45# 3x10 B LE conc, L LE ecc RANGE:   Leg Press SL # 3x10  # 3x10      SLR + Amb in lunge position w/ trunk rotation              Cable Column     Ed given on POC, expectations and goals             Manual/Modalities     Manual stretching to L hip into flex, fig 4, add, hip flex    Deep tissue work to [employer] and calf/scar massage in HS x10'    Joint mobs x5' Femoral AP glides to promote extension (half foam roll under proximal tibia)          Therapeutic Exercise and NMR EXR  [x] (15768) Provided verbal/tactile cueing for activities related to strengthening, flexibility, endurance, ROM for improvements in LE, proximal hip, and core control with self care, mobility, lifting, ambulation.  [] (97003) Provided verbal/tactile cueing for activities related to improving balance, coordination, kinesthetic sense, posture, motor skill, proprioception  to assist with LE, proximal hip, and core control in self care, mobility, lifting, ambulation and eccentric single leg control.      NMR and Therapeutic Activities:    [x] (69159 or 38765) Provided verbal/tactile cueing for activities related to improving balance, coordination, kinesthetic sense, posture, motor skill, proprioception and motor activation to allow for proper function of core, proximal hip and LE with self care and ADLs  [] (44668) Gait Re-education- Provided training and instruction to the patient for proper LE, core and proximal hip recruitment and positioning and eccentric body weight control with ambulation re-education including up and down stairs     Home Exercise Program:    [x] (28502) Reviewed/Progressed HEP activities related to strengthening, flexibility, endurance, ROM of core, proximal hip and LE for functional self-care, mobility, lifting and ambulation/stair navigation            Written HEP est    [] (62715)Reviewed/Progressed HEP activities related to improving balance, coordination, kinesthetic sense, posture, motor skill, proprioception of core, proximal hip and LE for self care, mobility, lifting, and ambulation/stair navigation      Manual Treatments:  PROM / STM / Oscillations-Mobs:  G-I, II, III, IV (PA's, Inf., Post.)  [x] (86060) Provided manual therapy to mobilize LE, proximal hip and/or LS spine soft tissue/joints for the purpose of modulating pain, promoting relaxation,  increasing ROM, reducing/eliminating soft tissue swelling/inflammation/restriction, improving soft tissue extensibility and allowing for proper ROM for normal function with self care, mobility, lifting and ambulation. Modalities:   [] GAME READY (VASO)- for significant edema, swelling, pain control. Charges:  Timed Code Treatment Minutes: 67   Total Treatment Minutes: 75     [] EVAL (LOW) 55561 (typically 20 minutes face-to-face)  [] EVAL (MOD) 22493 (typically 30 minutes face-to-face)  [] EVAL (HIGH) 73445 (typically 45 minutes face-to-face)  [] RE-EVAL     [x] PT(22208) 2x  40'    [] IONTO  [] NMR (15441) x      [] VASO x10'  with 10# OP  [x] Manual (17940) x 1  x15'    [] Other: GT x12'  [x] TA (61663) 1x   15'   [] Mech Traction (71410)  [] ES(attended) (28823)      [] ES (un) (92702): EMG/Stim combo x15'    GOALS:  Patient stated goal: return to outdoor activities for walking and climbing, along with work  [] Progressing: [] Met: [] Not Met: [] Adjusted     Therapist goals for Patient:  Short Term Goals: To be achieved in: 2 weeks  1. Independent in HEP and progression per patient tolerance, in order to prevent re-injury. [] Progressing: [] Met: [] Not Met: [] Adjusted  2. Patient will have a decrease in pain to facilitate improvement in movement, function, and ADLs as indicated by Functional Deficits. [] Progressing: [] Met: [] Not Met: [] Adjusted     Long Term Goals: To be achieved in: 12+ weeks  1. Functional index score of 67 or more for FOTO to assist with reaching prior level of function. [x] Progressing: [] Met: [] Not Met: [] Adjusted  2.  Patient will demonstrate increased AROM to 0-125 deg to allow for proper joint functioning as indicated by patients Functional Deficits. [x] Progressing: [] Met: [] Not Met: [] Adjusted  3. Patient will demonstrate an increase in Strength to good proximal hip strength and control, within 5lb HHD in LE to allow for proper functional mobility as indicated by patients Functional Deficits. [x] Progressing: [] Met: [] Not Met: [] Adjusted  4. Patient will return to 90% functional activities without increased symptoms or restriction. [x] Progressing: [] Met: [] Not Met: [] Adjusted  5. Pt will be able to walk 45 min on uneven surfaces for hiking and climbing. (patient specific functional goal)    [x] Progressing: [] Met: [] Not Met: [] Adjusted         Overall Progression Towards Functional goals/ Treatment Progress Update:  [x] Patient is progressing as expected towards functional goals listed. [] Progression is slowed due to complexities/Impairments listed. [] Progression has been slowed due to co-morbidities. [] Plan just implemented, too soon to assess goals progression <30days   [] Goals require adjustment due to lack of progress  [] Patient is not progressing as expected and requires additional follow up with physician  [] Other    ASSESSMENT:   Patient presents with stiffness in the L knee today, however this began to loosen with stretching, soft tissue mobilization, and joint mobilization. Focus today on range of motion as limitations remain with L knee flexion and extension. Slight improvements were seen with both ranges this date, see measurements above. Palpable calf tightness present during STM by PT, minimal tightness noted in hamstrings. With quad stretching, slight stretch noted to quad with significant restriction in flexion, no irritation to the joint per patient. Hypomobility noted with joint mobs as well and can continue to address in future visits.  Increased weight on leg press today, which was tolerated well. Fatigue noted in the lower extremities. Introduced knee flexion range of motion exercises of heel slides and quadruped rockers to improve flexion. Encouraged patient to continue with L knee flexion range of motion at home with heel slides and quadruped rocks. Plan to continue to address limitations to promote return to PLOF. Treatment/Activity Tolerance:  [x] Patient tolerated treatment well [] Patient limited by fatique  [] Patient limited by pain  [] Patient limited by other medical complications- tightness  [] Other:     Prognosis: [x] Good [x] Fair  [] Poor    Patient Requires Follow-up: [x] Yes  [] No    PLAN: Cont 2x/week for ROM restoration & focus on gt training for function. [x] Continue per plan of care [] Alter current plan (see comments)  [x] Plan of care initiated [] Hold pending MD visit [] Discharge    Electronically signed by:   Joni Brand, Student Physical Therapist      Therapist was present, directed the patient's care, made skilled judgement, and was responsible for assessment and treatment of the patient. Shikha Bunn, PT, DPT, OCS, OMT-C    Board-Certified Orthopaedic Clinical Specialist    [address] [address] PT license: [id number]  [place] PT license: [id number]          Note: If patient does not return for scheduled/ recommended follow up visits, this note will serve as a discharge from care along with most recent update on progress.

## 2023-02-27 ENCOUNTER — TREATMENT (OUTPATIENT)
Dept: PHYSICAL THERAPY | Age: 58
End: 2023-02-27
Payer: COMMERCIAL

## 2023-02-27 DIAGNOSIS — M24.662 FIBROSIS OF KNEE JOINT, LEFT: ICD-10-CM

## 2023-02-27 DIAGNOSIS — M24.662 ARTHROFIBROSIS OF KNEE JOINT, LEFT: ICD-10-CM

## 2023-02-27 DIAGNOSIS — R26.2 DIFFICULTY WALKING: Primary | ICD-10-CM

## 2023-02-27 DIAGNOSIS — M25.562 LEFT KNEE PAIN, UNSPECIFIED CHRONICITY: ICD-10-CM

## 2023-02-27 PROCEDURE — 97110 THERAPEUTIC EXERCISES: CPT | Performed by: PHYSICAL THERAPIST

## 2023-02-27 PROCEDURE — 97530 THERAPEUTIC ACTIVITIES: CPT | Performed by: PHYSICAL THERAPIST

## 2023-02-27 PROCEDURE — 97140 MANUAL THERAPY 1/> REGIONS: CPT | Performed by: PHYSICAL THERAPIST

## 2023-02-27 NOTE — PROGRESS NOTES
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 David Thomson  Has the plan of care been signed (Y/N):        [x]  Yes  []  No     Date of Patient follow up with Physician:  Dr. Tammie Calvert appt 3/16/2023      Is this a Progress Report:     []  Yes  [x]  No        If Yes:  Date Range for reporting period:  Beginning 22  Ending 23    Progress report will be due (10 Rx or 30 days whichever is less): 52       Recertification will be due (POC Duration  / 90 days whichever is less): 3/20/23        Visit # Insurance Allowable Auth Required   9 BMN []  Yes [x]  No    He had 45 visits for same episode in     Functional Scale: FOTO 29; LEFS 13;    Date assessed:  22 FOTO 43; LEFS 27.2   10/17/22 FOTO 42;  22 FOTO 52, LEFS 38.7  23 FOTO 63, LEFS 53.5  23 LEFS    Latex Allergy:  [x]NO      []YES  Preferred Language for Healthcare:   [x]English       []other:    Pain level:  3/10 w/o medication    SUBJECTIVE:     Patient states he had pain yesterday. He worked out on the rower machine at home for 20 min on Sat w/o problems but then had pain in both knees yesterday. He feels alittle better today.  Min edema in L LE.        OBJECTIVE:       Reduced knee extension with terminal knee extension in gait upon arrival, improved after stretching.     ROM PROM AROM Overpressure Comment    L 2/23/23 R L R L R    Flexion 108° on ERMI  100° 125°      Extension 4° after stretch w/ op  -1°    -2°        Girth  7/29/2022 L R   Mid Patella 44 cm 39.3 cm   Suprapatellar     5cm above     15cm above         Strength L R Comment: majority deferred secondary to surgery   Quad 39 lb 49 lb 11/23/22   Hamstring 34 lb 34 lb    Abduction      Quad tone          Special Test Results/Comment: majority deferred secondary to surgery   Homans  8/29/2022 Known DVT in calf       RESTRICTIONS/PRECAUTIONS: 7/18/22 posterior L knee capsule release; Limited ROM In L knee after injection in April that was limited prior to surg for flex and ext;  DVT left lower leg (still on blood thinners till about Oct)    Exercises/Interventions:   Exercise/Equipment Resistance/Repetitions Other comments   Cardio/Warm-up     Bike Rec Seat #8 x5'  Oscillations to full revs   Treadmill          Stretching     Hamstring Seated propped 10\"x10 20# overpressure   Quadruped Rocks 10\"x10    ITB     Grion    Quad Prone KF TP 20\"x5 Manual stretch   Inclined Calf On floor 10\"x10    Towel Pull 30\"x5 over foam roll 20# overpressure             ROM     Passive    Active     ROM @EOB    Prone knee hang x10'   15# OP on ankle    Weight Hangs     Sheet Pulls 10\"x10    Ankle Pumps           Patellar Glides     Medial x2'    Superior x2'    Inferior x2'    Lateral x2'         STRENGTH     Supine 3x10 5# Prone     Abduction 5# 3x10    clams Ext - prone 5# 3x10 leaning over table    bridging 2x15 Over green SB 1 set with legs straight and 1 set with knees bent   Adducton     SLR+          Isometrics     Quad sets 10\"x10 Over foam roll        CKC     Calf raises     Wall sits     Step ups & over   step down 1 leg stand Squatting    Balance         4 cups reaching in arch x5 while SLS on table        TKE 45# 10\"x10    Resistive walking backwards    Resisted sidestepping over mini cones PRE     Extension 25# 3x10 B LE conc;L LE ecc RANGE:   Flexion 45# 3x10 B LE conc, L LE ecc RANGE:   Leg Press SL # 3x10  # 3x10      SLR + Amb in lunge position w/ trunk rotation              Cable Column     Ed given on POC, expectations and goals             Manual/Modalities     Manual stretching to L hip into flex, fig 4, add, hip flex x10'    Deep tissue work to [facility] and calf/scar massage in HS x5'    Joint mobs x5' Femoral AP glides to promote extension (half foam roll under proximal tibia)          Therapeutic Exercise and NMR EXR  [x] (34603) Provided verbal/tactile cueing for activities related to strengthening, flexibility, endurance, ROM for improvements in LE, proximal hip, and core control with self care, mobility, lifting, ambulation.  [] (86331) Provided verbal/tactile cueing for activities related to improving balance, coordination, kinesthetic sense, posture, motor skill, proprioception  to assist with LE, proximal hip, and core control in self care, mobility, lifting, ambulation and eccentric single leg control.      NMR and Therapeutic Activities:    [x] (56747 or 05719) Provided verbal/tactile cueing for activities related to improving balance, coordination, kinesthetic sense, posture, motor skill, proprioception and motor activation to allow for proper function of core, proximal hip and LE with self care and ADLs  [] (12199) Gait Re-education- Provided training and instruction to the patient for proper LE, core and proximal hip recruitment and positioning and eccentric body weight control with ambulation re-education including up and down stairs     Home Exercise Program:    [x] (20389) Reviewed/Progressed HEP activities related to strengthening, flexibility, endurance, ROM of core, proximal hip and LE for functional self-care, mobility, lifting and ambulation/stair navigation            Written HEP est    [] (10822)Reviewed/Progressed HEP activities related to improving balance, coordination, kinesthetic sense, posture, motor skill, proprioception of core, proximal hip and LE for self care, mobility, lifting, and ambulation/stair navigation      Manual Treatments:  PROM / STM / Oscillations-Mobs:  G-I, II, III, IV (PA's, Inf., Post.)  [x] (82060) Provided manual therapy to mobilize LE, proximal hip and/or LS spine soft tissue/joints for the purpose of modulating pain, promoting relaxation,  increasing ROM, reducing/eliminating soft tissue swelling/inflammation/restriction, improving soft tissue extensibility and allowing for proper ROM for normal function with self care, mobility, lifting and ambulation. Modalities:   [] GAME READY (VASO)- for significant edema, swelling, pain control. Charges:  Timed Code Treatment Minutes: 67   Total Treatment Minutes: 75     [] EVAL (LOW) 63873 (typically 20 minutes face-to-face)  [] EVAL (MOD) 98249 (typically 30 minutes face-to-face)  [] EVAL (HIGH) 95726 (typically 45 minutes face-to-face)  [] RE-EVAL     [x] GF(48204) 2x  40'    [] IONTO  [] NMR (31459) x      [] VASO x10'  with 10# OP  [x] Manual (40542) x 1  x20'    [] Other: GT x12'  [x] TA (13887) 1x   15'   [] Mech Traction (90688)  [] ES(attended) (00327)      [] ES (un) (88765): EMG/Stim combo x15'    GOALS:  Patient stated goal: return to outdoor activities for walking and climbing, along with work  [] Progressing: [] Met: [] Not Met: [] Adjusted     Therapist goals for Patient:  Short Term Goals: To be achieved in: 2 weeks  1. Independent in HEP and progression per patient tolerance, in order to prevent re-injury. [] Progressing: [] Met: [] Not Met: [] Adjusted  2. Patient will have a decrease in pain to facilitate improvement in movement, function, and ADLs as indicated by Functional Deficits. [] Progressing: [] Met: [] Not Met: [] Adjusted     Long Term Goals: To be achieved in: 12+ weeks  1.  Functional index score of 67 or more for FOTO to assist with reaching prior level of function. [x] Progressing: [] Met: [] Not Met: [] Adjusted  2. Patient will demonstrate increased AROM to 0-125 deg to allow for proper joint functioning as indicated by patients Functional Deficits. [x] Progressing: [] Met: [] Not Met: [] Adjusted  3. Patient will demonstrate an increase in Strength to good proximal hip strength and control, within 5lb HHD in LE to allow for proper functional mobility as indicated by patients Functional Deficits. [x] Progressing: [] Met: [] Not Met: [] Adjusted  4. Patient will return to 90% functional activities without increased symptoms or restriction. [x] Progressing: [] Met: [] Not Met: [] Adjusted  5. Pt will be able to walk 45 min on uneven surfaces for hiking and climbing. (patient specific functional goal)    [x] Progressing: [] Met: [] Not Met: [] Adjusted         Overall Progression Towards Functional goals/ Treatment Progress Update:  [x] Patient is progressing as expected towards functional goals listed. [] Progression is slowed due to complexities/Impairments listed. [] Progression has been slowed due to co-morbidities. [] Plan just implemented, too soon to assess goals progression <30days   [] Goals require adjustment due to lack of progress  [] Patient is not progressing as expected and requires additional follow up with physician  [] Other    ASSESSMENT:   Patient has improved ROM into ext from previous sessions. Pt is tight in muscles and joint for L knee. More pain noted in B knees on arrival due to arthritis. Strength progressing in LE. Cuing needed on gt pattern. Treatment/Activity Tolerance:  [x] Patient tolerated treatment well [] Patient limited by fatique  [] Patient limited by pain  [] Patient limited by other medical complications- tightness  [] Other:     Prognosis: [x] Good [x] Fair  [] Poor    Patient Requires Follow-up: [x] Yes  [] No    PLAN: Cont 2x/week for ROM restoration & focus on gt training for function.    [x] Continue per plan of care [] Alter current plan (see comments)  [x] Plan of care initiated [] Hold pending MD visit [] Discharge    Electronically signed by:  Denise Contreras PT, MS, OMT-C    Physical Therapist [id number] [address] license #[id number]  Physical Therapist [place] license #[id number]             Note: If patient does not return for scheduled/ recommended follow up visits, this note will serve as a discharge from care along with most recent update on progress.

## 2023-03-02 ENCOUNTER — TREATMENT (OUTPATIENT)
Dept: PHYSICAL THERAPY | Age: 58
End: 2023-03-02

## 2023-03-02 DIAGNOSIS — M24.662 ARTHROFIBROSIS OF KNEE JOINT, LEFT: ICD-10-CM

## 2023-03-02 DIAGNOSIS — R26.2 DIFFICULTY WALKING: Primary | ICD-10-CM

## 2023-03-02 DIAGNOSIS — M25.562 LEFT KNEE PAIN, UNSPECIFIED CHRONICITY: ICD-10-CM

## 2023-03-02 NOTE — PROGRESS NOTES
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 Diana Lieberman  Has the plan of care been signed (Y/N):        [x]  Yes  []  No     Date of Patient follow up with Physician:  Dr. Amy Arshad appt 3/16/2023      Is this a Progress Report:     []  Yes  [x]  No        If Yes:  Date Range for reporting period:  Beginning 22  Ending 23    Progress report will be due (10 Rx or 30 days whichever is less):        Recertification will be due (POC Duration  / 90 days whichever is less): 3/20/23        Visit # Insurance Allowable Auth Required   10 BMN []  Yes [x]  No    He had 45 visits for same episode in     Functional Scale: FOTO 29; LEFS 13;    Date assessed:  22 FOTO 43; LEFS 27.2   10/17/22 FOTO 42;  22 FOTO 52, LEFS 38.7  23 FOTO 63, LEFS 53.5  23 LEFS    Latex Allergy:  [x]NO      []YES  Preferred Language for Healthcare:   [x]English       []other:    Pain level:  3/10 w/o medication    SUBJECTIVE:     Patient reports that his knee is a little stiff and sore today. He admits he was up on his feet quite a bit yesterday. Was doing a little cleaning up in his yard since the weather was so nice.         OBJECTIVE:       Reduced knee extension with terminal knee extension in gait upon arrival, improved after stretching.     ROM PROM AROM Overpressure Comment    L 2/23/23 R L R L R    Flexion 108° on ERMI  100° 125°      Extension 4° after stretch w/ op  -1°    -2°        Girth  7/29/2022 L R   Mid Patella 44 cm 39.3 cm   Suprapatellar     5cm above     15cm above         Strength L R Comment: majority deferred secondary to surgery   Quad 39 lb 49 lb 11/23/22   Hamstring 34 lb 34 lb    Abduction      Quad tone          Special Test Results/Comment: majority deferred secondary to surgery   Homans  8/29/2022 Known DVT in calf       RESTRICTIONS/PRECAUTIONS: 7/18/22 posterior L knee capsule release; Limited ROM In L knee after injection in April that was limited prior to surg for flex and ext;  DVT left lower leg (still on blood thinners till about Oct)    Exercises/Interventions:   Exercise/Equipment Resistance/Repetitions Other comments   Cardio/Warm-up     Bike Rec Seat #8 x5'  Oscillations to full revs   Treadmill          Stretching     Hamstring Seated propped 10\"x10 20# overpressure      ITB     Grion    Quad Prone KF TP 20\"x5 Manual stretch   Inclined Calf On floor 10\"x10    Towel Pull 30\"x5 over foam roll 20# overpressure             ROM     Passive    Active     ROM @EOB    Prone knee hang x10'   15# OP on ankle    Weight Hangs     Sheet Pulls 10\"x10    Ankle Pumps           Patellar Glides     Medial x2'    Superior x2'    Inferior x2'    Lateral x2'         STRENGTH     Supine 3x10 5# Prone     Abduction 5# 3x10    clams Ext - prone 5# 3x10 leaning over table    bridging 2x15 Over green SB 1 set with legs straight and 1 set with knees bent   Adducton     SLR+          Isometrics     Quad sets 10\"x10 Over foam roll        CKC     Calf raises     Wall sits 3x30\" ~60°   Step ups & over   step down 1 leg stand Squatting    Balance         4 cups reaching in arch x5 while SLS on table        TKE 45# 10\"x10    Resistive walking backwards    Resisted sidestepping over mini cones      PRE     Extension 25# 3x10 B LE conc;L LE ecc RANGE:   Flexion 45# 3x10 B LE conc, L LE ecc RANGE:   Leg Press SL # 3x10  # 3x10      SLR + Amb in lunge position w/ trunk rotation              Cable Column     Ed given on POC, expectations and goals             Manual/Modalities     Manual stretching to L hip into flex, fig 4, add, hip flex x10'    Deep tissue work to [facility] and calf/scar massage in HS x5'    Joint mobs x5' Femoral AP glides to promote extension (half foam roll under proximal tibia)          Therapeutic Exercise and NMR EXR  [x] (74472) Provided verbal/tactile cueing for activities related to strengthening, flexibility, endurance, ROM for improvements in LE, proximal hip, and core control with self care, mobility, lifting, ambulation.  [] (99827) Provided verbal/tactile cueing for activities related to improving balance, coordination, kinesthetic sense, posture, motor skill, proprioception  to assist with LE, proximal hip, and core control in self care, mobility, lifting, ambulation and eccentric single leg control.      NMR and Therapeutic Activities:    [x] (63463 or 25166) Provided verbal/tactile cueing for activities related to improving balance, coordination, kinesthetic sense, posture, motor skill, proprioception and motor activation to allow for proper function of core, proximal hip and LE with self care and ADLs  [] (87820) Gait Re-education- Provided training and instruction to the patient for proper LE, core and proximal hip recruitment and positioning and eccentric body weight control with ambulation re-education including up and down stairs     Home Exercise Program:    [x] (86198) Reviewed/Progressed HEP activities related to strengthening, flexibility, endurance, ROM of core, proximal hip and LE for functional self-care, mobility, lifting and ambulation/stair navigation            Written HEP est    [] (88374)Reviewed/Progressed HEP activities related to improving balance, coordination, kinesthetic sense, posture, motor skill, proprioception of core, proximal hip and LE for self care, mobility, lifting, and ambulation/stair navigation      Manual Treatments:  PROM / STM / Oscillations-Mobs:  G-I, II, III, IV (PA's, Inf., Post.)  [x] (93360) Provided manual therapy to mobilize LE, proximal hip and/or LS spine soft tissue/joints for the purpose of modulating pain, promoting relaxation,  increasing ROM, reducing/eliminating soft tissue swelling/inflammation/restriction, improving soft tissue extensibility and allowing for proper ROM for normal function with self care, mobility, lifting and ambulation. Modalities:   [x] GAME READY (VASO)- for significant edema, swelling, pain control. Charges:  Timed Code Treatment Minutes: 65   Total Treatment Minutes: 80     [] EVAL (LOW) 16346 (typically 20 minutes face-to-face)  [] EVAL (MOD) 16198 (typically 30 minutes face-to-face)  [] EVAL (HIGH) 14686 (typically 45 minutes face-to-face)  [] RE-EVAL     [x] VS(35155) 2x  30'    [] IONTO  [] NMR (60009) x      [x] VASO x10'  with 10# OP  [x] Manual (11370) x 1  x20'    [] Other: GT x12'  [x] TA (25068) 1x   15'   [] Mech Traction (98076)  [] ES(attended) (42620)      [] ES (un) (91199): EMG/Stim combo x15'    GOALS:  Patient stated goal: return to outdoor activities for walking and climbing, along with work  [] Progressing: [] Met: [] Not Met: [] Adjusted     Therapist goals for Patient:  Short Term Goals: To be achieved in: 2 weeks  1. Independent in HEP and progression per patient tolerance, in order to prevent re-injury. [] Progressing: [] Met: [] Not Met: [] Adjusted  2. Patient will have a decrease in pain to facilitate improvement in movement, function, and ADLs as indicated by Functional Deficits. [] Progressing: [] Met: [] Not Met: [] Adjusted     Long Term Goals: To be achieved in: 12+ weeks  1. Functional index score of 67 or more for FOTO to assist with reaching prior level of function.   [x] Progressing: [] Met: [] Not Met: [] Adjusted  2. Patient will demonstrate increased AROM to 0-125 deg to allow for proper joint functioning as indicated by patients Functional Deficits.  [x] Progressing: [] Met: [] Not Met: [] Adjusted  3. Patient will demonstrate an increase in Strength to good proximal hip strength and control, within 5lb HHD in LE to allow for proper functional mobility as indicated by patients Functional Deficits.  [x] Progressing: [] Met: [] Not Met: [] Adjusted  4. Patient will return to 90% functional activities without increased symptoms or restriction.  [x] Progressing: [] Met: [] Not Met: [] Adjusted  5. Pt will be able to walk 45 min on uneven surfaces for hiking and climbing. (patient specific functional goal)    [x] Progressing: [] Met: [] Not Met: [] Adjusted         Overall Progression Towards Functional goals/ Treatment Progress Update:  [x] Patient is progressing as expected towards functional goals listed.    [] Progression is slowed due to complexities/Impairments listed.  [] Progression has been slowed due to co-morbidities.  [] Plan just implemented, too soon to assess goals progression <30days   [] Goals require adjustment due to lack of progress  [] Patient is not progressing as expected and requires additional follow up with physician  [] Other    ASSESSMENT:   Spent a good amount of time with manual stretching and mobilizations today.   Focusing on Grade 3 to 4 mobs with end range stretching holds.    He continues to tolerate all well and works hard in therapy.  He needed a couple breaks here and there primarily with SLB drills.    Stated his back was a little stiff and sore as well.     Added in wall sits today and he felt a good muscle burn in both thighs.      Treatment/Activity Tolerance:  [x] Patient tolerated treatment well [] Patient limited by fatique  [] Patient limited by pain  [] Patient limited by other medical complications- tightness  [] Other:     Prognosis: [x] Good [x]  Fair  [] Poor    Patient Requires Follow-up: [x] Yes  [] No    PLAN: Cont 2x/week for ROM restoration & focus on gt training for function.   [x] Continue per plan of care [] Alter current plan (see comments)  [x] Plan of care initiated [] Hold pending MD visit [] Discharge    Electronically signed by:      Rene Martínez, PT      Board Certified Orthopaedic Clinical Specialist  TPI Certified  Physical Therapist    [place] PT #[id number]  [place] PT #[id number]          Note: If patient does not return for scheduled/ recommended follow up visits, this note will serve as a discharge from care along with most recent update on progress.

## 2023-03-06 ENCOUNTER — TREATMENT (OUTPATIENT)
Dept: PHYSICAL THERAPY | Age: 58
End: 2023-03-06
Payer: COMMERCIAL

## 2023-03-06 DIAGNOSIS — M24.662 ARTHROFIBROSIS OF KNEE JOINT, LEFT: ICD-10-CM

## 2023-03-06 DIAGNOSIS — M24.662 FIBROSIS OF KNEE JOINT, LEFT: ICD-10-CM

## 2023-03-06 DIAGNOSIS — R26.2 DIFFICULTY WALKING: Primary | ICD-10-CM

## 2023-03-06 DIAGNOSIS — M25.562 LEFT KNEE PAIN, UNSPECIFIED CHRONICITY: ICD-10-CM

## 2023-03-06 PROCEDURE — 97110 THERAPEUTIC EXERCISES: CPT | Performed by: PHYSICAL THERAPIST

## 2023-03-06 PROCEDURE — 97530 THERAPEUTIC ACTIVITIES: CPT | Performed by: PHYSICAL THERAPIST

## 2023-03-06 PROCEDURE — 97140 MANUAL THERAPY 1/> REGIONS: CPT | Performed by: PHYSICAL THERAPIST

## 2023-03-06 NOTE — PROGRESS NOTES
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 Dewitte Epley  Has the plan of care been signed (Y/N):        [x]  Yes  []  No     Date of Patient follow up with Physician:  Dr. Gabbi Mckeon appt 3/16/2023      Is this a Progress Report:     []  Yes  [x]  No        If Yes:  Date Range for reporting period:  Beginning 22  Ending     Progress report will be due (10 Rx or 30 days whichever is less):        Recertification will be due (POC Duration  / 90 days whichever is less): 3/20/23        Visit # Insurance Allowable Auth Required   11 BMN []  Yes [x]  No    He had 45 visits for same episode in     Functional Scale: FOTO 29; LEFS 13;    Date assessed:  22 FOTO 43; LEFS 27.2   10/17/22 FOTO 42;  22 FOTO 52, LEFS 38.7  23 FOTO 63, LEFS 53.5  23 LEFS    Latex Allergy:  [x]NO      []YES  Preferred Language for Healthcare:   [x]English       []other:    Pain level:  3/10 w/o medication    SUBJECTIVE:     Patient reports that knee is feeling good. He has been focusing on stretching knee and making sure he can get knee as straight as possible prior to MD appt next week. OBJECTIVE:       Reduced knee extension with terminal knee extension in gait upon arrival, improved after stretching.     ROM PROM AROM Overpressure Comment    L 2/23/23 R L R L R    Flexion 108° on ERMI  100° 125°      Extension 4° after stretch w/ op  -1°    -2°        Girth  7/29/2022 L R   Mid Patella 44 cm 39.3 cm   Suprapatellar     5cm above     15cm above         Strength L R Comment: majority deferred secondary to surgery   Quad 39 lb 49 lb 11/23/22   Hamstring 34 lb 34 lb    Abduction      Quad tone          Special Test Results/Comment: majority deferred secondary to surgery   Homans  8/29/2022 Known DVT in calf       RESTRICTIONS/PRECAUTIONS: 7/18/22 posterior L knee capsule release; Limited ROM In L knee after injection in April that was limited prior to surg for flex and ext;  DVT left lower leg (still on blood thinners till about Oct)    Exercises/Interventions:   Exercise/Equipment Resistance/Repetitions Other comments   Cardio/Warm-up     Bike Rec Seat #8 x5'  Oscillations to full revs   Treadmill          Stretching     Hamstring Seated propped 10\"x10 20# overpressure      ITB     Grion    Quad Prone KF TP 20\"x5 Manual stretch   Inclined Calf On floor 10\"x10    Towel Pull 30\"x5 over foam roll 20# overpressure             ROM     Passive    Active     ROM @EOB    Prone knee hang x10'   15# OP on ankle    Weight Hangs     Sheet Pulls 10\"x10    Ankle Pumps           Patellar Glides     Medial x2'    Superior x2'    Inferior x2'    Lateral x2'         STRENGTH     Supine 3x10 5# Prone     Abduction 5# 3x10    clams Ext - prone 5# 3x10 leaning over table    bridging 2x15 Over green SB 1 set with legs straight and 1 set with knees bent   Adducton     SLR+          Isometrics     Quad sets 10\"x10 Over foam roll        CKC     Calf raises     Wall sits 3x30\" ~60°   Step ups & over L3 on tech 2x10  L3 lateral step up 2x10   step down 1 leg stand Squatting    Balance         4 cups reaching on table x5 while SLS         TKE 45# 10\"x10    Resistive walking backwards    Resisted sidestepping over mini cones      PRE     Extension 25# 3x10 B LE conc;L LE ecc RANGE:   Flexion 45# 3x10 B LE conc, L LE ecc RANGE:   Leg Press SL # 3x10  # 3x10      SLR + Amb in lunge position w/ trunk rotation              Cable Column     Ed given on POC, expectations and goals             Manual/Modalities     Manual stretching to L hip into flex, fig 4, add, hip flex x10'    Deep tissue work to [facility] and calf/scar massage in HS x5'    Joint mobs x5' Femoral AP glides to promote extension (half foam roll under proximal tibia)          Therapeutic Exercise and NMR EXR  [x] (63554) Provided verbal/tactile cueing for activities related to strengthening, flexibility, endurance, ROM for improvements in LE, proximal hip, and core control with self care, mobility, lifting, ambulation.  [] (76669) Provided verbal/tactile cueing for activities related to improving balance, coordination, kinesthetic sense, posture, motor skill, proprioception  to assist with LE, proximal hip, and core control in self care, mobility, lifting, ambulation and eccentric single leg control.      NMR and Therapeutic Activities:    [x] (18880 or 92668) Provided verbal/tactile cueing for activities related to improving balance, coordination, kinesthetic sense, posture, motor skill, proprioception and motor activation to allow for proper function of core, proximal hip and LE with self care and ADLs  [] (28526) Gait Re-education- Provided training and instruction to the patient for proper LE, core and proximal hip recruitment and positioning and eccentric body weight control with ambulation re-education including up and down stairs     Home Exercise Program:    [x] (85008) Reviewed/Progressed HEP activities related to strengthening, flexibility, endurance, ROM of core, proximal hip and LE for functional self-care, mobility, lifting and ambulation/stair navigation            Written HEP est    [] (51917)Reviewed/Progressed HEP activities related to improving balance, coordination, kinesthetic sense, posture, motor skill, proprioception of core, proximal hip and LE for self care, mobility, lifting, and ambulation/stair navigation      Manual Treatments:  PROM / STM / Oscillations-Mobs:  G-I, II, III, IV (PA's, Inf., Post.)  [x] (17266) Provided manual therapy to mobilize LE, proximal hip and/or LS spine soft tissue/joints for the purpose of modulating pain, promoting relaxation,  increasing ROM, reducing/eliminating soft tissue swelling/inflammation/restriction, improving soft tissue extensibility and allowing for proper ROM for normal function with self care, mobility, lifting and ambulation. Modalities:   [x] GAME READY (VASO)- for significant edema, swelling, pain control. Charges:  Timed Code Treatment Minutes: 65   Total Treatment Minutes: 80     [] EVAL (LOW) 38158 (typically 20 minutes face-to-face)  [] EVAL (MOD) 59437 (typically 30 minutes face-to-face)  [] EVAL (HIGH) 44761 (typically 45 minutes face-to-face)  [] RE-EVAL     [x] DN(11103) 2x  30'    [] IONTO  [] NMR (56317) x      [x] VASO x10'  with 10# OP  [x] Manual (79321) x 1  x20'    [] Other: GT x12'  [x] TA (70756) 1x   15'   [] Mech Traction (58973)  [] ES(attended) (55013)      [] ES (un) (58727): EMG/Stim combo x15'    GOALS:  Patient stated goal: return to outdoor activities for walking and climbing, along with work  [] Progressing: [] Met: [] Not Met: [] Adjusted     Therapist goals for Patient:  Short Term Goals: To be achieved in: 2 weeks  1. Independent in HEP and progression per patient tolerance, in order to prevent re-injury. [] Progressing: [] Met: [] Not Met: [] Adjusted  2. Patient will have a decrease in pain to facilitate improvement in movement, function, and ADLs as indicated by Functional Deficits. [] Progressing: [] Met: [] Not Met: [] Adjusted     Long Term Goals: To be achieved in: 12+ weeks  1. Functional index score of 67 or more for FOTO to assist with reaching prior level of function.   [x] Progressing: [] Met: [] Not Met: [] Adjusted  2. Patient will demonstrate increased AROM to 0-125 deg to allow for proper joint functioning as indicated by patients Functional Deficits. [x] Progressing: [] Met: [] Not Met: [] Adjusted  3. Patient will demonstrate an increase in Strength to good proximal hip strength and control, within 5lb HHD in LE to allow for proper functional mobility as indicated by patients Functional Deficits. [x] Progressing: [] Met: [] Not Met: [] Adjusted  4. Patient will return to 90% functional activities without increased symptoms or restriction. [x] Progressing: [] Met: [] Not Met: [] Adjusted  5. Pt will be able to walk 45 min on uneven surfaces for hiking and climbing. (patient specific functional goal)    [x] Progressing: [] Met: [] Not Met: [] Adjusted         Overall Progression Towards Functional goals/ Treatment Progress Update:  [x] Patient is progressing as expected towards functional goals listed. [] Progression is slowed due to complexities/Impairments listed. [] Progression has been slowed due to co-morbidities. [] Plan just implemented, too soon to assess goals progression <30days   [] Goals require adjustment due to lack of progress  [] Patient is not progressing as expected and requires additional follow up with physician  [] Other    ASSESSMENT: Pt arrived w/ knee straighter than on previous session w/ this PT. Pt had min edema in knee. Tightness noted in hip ER and end range knee ext. Reviewed focus on knee motion in preparation for MD appt and hopefully, surg. Gt pattern noted to have normal trunk rotation and arm swing. Good tolerance to program and carryover between sessions.     Treatment/Activity Tolerance:  [x] Patient tolerated treatment well [] Patient limited by fatique  [] Patient limited by pain  [] Patient limited by other medical complications- tightness  [] Other:     Prognosis: [x] Good [x] Fair  [] Poor    Patient Requires Follow-up: [x] Yes  [] No    PLAN: Cont 2x/week for ROM restoration & focus on gt training for function. [x] Continue per plan of care [] Alter current plan (see comments)  [x] Plan of care initiated [] Hold pending MD visit [] Discharge    Electronically signed by:  Daniel Tyler, PT, MS, OMT-C    Physical Therapist [place] license #[id number]  Physical Therapist [place] license #[id number]             Note: If patient does not return for scheduled/ recommended follow up visits, this note will serve as a discharge from care along with most recent update on progress.

## 2023-03-08 ENCOUNTER — TREATMENT (OUTPATIENT)
Dept: PHYSICAL THERAPY | Age: 58
End: 2023-03-08
Payer: COMMERCIAL

## 2023-03-08 DIAGNOSIS — R26.2 DIFFICULTY WALKING: Primary | ICD-10-CM

## 2023-03-08 DIAGNOSIS — M24.662 FIBROSIS OF KNEE JOINT, LEFT: ICD-10-CM

## 2023-03-08 DIAGNOSIS — M25.562 LEFT KNEE PAIN, UNSPECIFIED CHRONICITY: ICD-10-CM

## 2023-03-08 DIAGNOSIS — M24.662 ARTHROFIBROSIS OF KNEE JOINT, LEFT: ICD-10-CM

## 2023-03-08 PROCEDURE — 97140 MANUAL THERAPY 1/> REGIONS: CPT | Performed by: PHYSICAL THERAPIST

## 2023-03-08 PROCEDURE — 97110 THERAPEUTIC EXERCISES: CPT | Performed by: PHYSICAL THERAPIST

## 2023-03-08 PROCEDURE — 97530 THERAPEUTIC ACTIVITIES: CPT | Performed by: PHYSICAL THERAPIST

## 2023-03-08 NOTE — PROGRESS NOTES
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 Joaquim Guzman  Has the plan of care been signed (Y/N):        [x]  Yes  []  No     Date of Patient follow up with Physician:  Dr. Dandy Mcwilliams appt 3/16/2023      Is this a Progress Report:     []  Yes  [x]  No        If Yes:  Date Range for reporting period:  Beginning 22  Ending     Progress report will be due (10 Rx or 30 days whichever is less):        Recertification will be due (POC Duration  / 90 days whichever is less): 3/20/23        Visit # Insurance Allowable Auth Required   11 BMN []  Yes [x]  No    He had 45 visits for same episode in     Functional Scale: FOTO 29; LEFS 13;    Date assessed:  22 FOTO 43; LEFS 27.2   10/17/22 FOTO 42;  22 FOTO 52, LEFS 38.7  23 FOTO 63, LEFS 53.5  23 LEFS    Latex Allergy:  [x]NO      []YES  Preferred Language for Healthcare:   [x]English       []other:    Pain level:  3/10 w/o medication    SUBJECTIVE:     Patient reports that he was able to ride a stationary bike at home yesterday for 20 min. He notices that knee is stiffer today on the bike here. OBJECTIVE:       Reduced knee extension with terminal knee extension in gait upon arrival, improved after stretching.     ROM PROM AROM Overpressure Comment L 2/23/23 R L R L R    Flexion 108° on ERMI  100° 125°      Extension 4° after stretch w/ op  -1°    -2°        Girth  7/29/2022 L R   Mid Patella 44 cm 39.3 cm   Suprapatellar     5cm above     15cm above         Strength L R Comment: majority deferred secondary to surgery   Quad 39 lb 49 lb 11/23/22   Hamstring 34 lb 34 lb    Abduction      Quad tone          Special Test Results/Comment: majority deferred secondary to surgery   Homans  8/29/2022 Known DVT in calf       RESTRICTIONS/PRECAUTIONS: 7/18/22 posterior L knee capsule release; Limited ROM In L knee after injection in April that was limited prior to surg for flex and ext;  DVT left lower leg (still on blood thinners till about Oct)    Exercises/Interventions:   Exercise/Equipment Resistance/Repetitions Other comments   Cardio/Warm-up     Bike Rec Seat #8 x5'  Oscillations to full revs   Treadmill          Stretching     Hamstring Seated propped 10\"x10 20# overpressure      ITB     Grion    Quad Prone KF TP 20\"x5 Manual stretch   Inclined Calf On floor 10\"x10    Towel Pull 30\"x5 over foam roll 20# overpressure             ROM     Passive    Active     ROM @EOB    Prone knee hang x10'   15# OP on ankle    Weight Hangs     Sheet Pulls 10\"x10    Ankle Pumps           Patellar Glides     Medial x2'    Superior x2'    Inferior x2'    Lateral x2'         STRENGTH     Supine 3x10 5# Prone     Abduction 5# 3x10    clams Ext - prone 5# 3x10 leaning over table    bridging 2x15 Over green SB 1 set with legs straight and 1 set with knees bent   Adducton     SLR+          Isometrics     Quad sets 10\"x10 Over foam roll        CKC     Calf raises     Wall sits 3x30\" ~60°   Step ups & over L3 on tech 2x10  L3 lateral step up 2x10   step down 1 leg stand Squatting    Balance         4 cups reaching on table x5 while SLS         TKE 45# 10\"x10    Resistive walking backwards    Resisted sidestepping over mini cones      PRE     Extension 25# 3x10 B LE conc;L LE ecc RANGE: Flexion 45# 3x10 B LE conc, L LE ecc RANGE:   Leg Press SL # 3x10  # 3x10      SLR + Amb in lunge position w/ trunk rotation              Cable Column     Ed given on POC, expectations and goals             Manual/Modalities     Manual stretching to L hip into flex, fig 4, add, hip flex x10'    Deep tissue work to [employer] and calf/scar massage in HS x5'    Joint mobs x5' Femoral AP glides to promote extension (half foam roll under proximal tibia)          Therapeutic Exercise and NMR EXR  [x] (69555) Provided verbal/tactile cueing for activities related to strengthening, flexibility, endurance, ROM for improvements in LE, proximal hip, and core control with self care, mobility, lifting, ambulation.  [] (77036) Provided verbal/tactile cueing for activities related to improving balance, coordination, kinesthetic sense, posture, motor skill, proprioception  to assist with LE, proximal hip, and core control in self care, mobility, lifting, ambulation and eccentric single leg control.      NMR and Therapeutic Activities:    [x] (87286 or 15193) Provided verbal/tactile cueing for activities related to improving balance, coordination, kinesthetic sense, posture, motor skill, proprioception and motor activation to allow for proper function of core, proximal hip and LE with self care and ADLs  [] (16406) Gait Re-education- Provided training and instruction to the patient for proper LE, core and proximal hip recruitment and positioning and eccentric body weight control with ambulation re-education including up and down stairs     Home Exercise Program:    [x] (06844) Reviewed/Progressed HEP activities related to strengthening, flexibility, endurance, ROM of core, proximal hip and LE for functional self-care, mobility, lifting and ambulation/stair navigation            Written HEP est    [] (81552)Reviewed/Progressed HEP activities related to improving balance, coordination, kinesthetic sense, posture, motor skill, proprioception of core, proximal hip and LE for self care, mobility, lifting, and ambulation/stair navigation      Manual Treatments:  PROM / STM / Oscillations-Mobs:  G-I, II, III, IV (PA's, Inf., Post.)  [x] (36178) Provided manual therapy to mobilize LE, proximal hip and/or LS spine soft tissue/joints for the purpose of modulating pain, promoting relaxation,  increasing ROM, reducing/eliminating soft tissue swelling/inflammation/restriction, improving soft tissue extensibility and allowing for proper ROM for normal function with self care, mobility, lifting and ambulation. Modalities:   [] GAME READY (VASO)- for significant edema, swelling, pain control. Charges:  Timed Code Treatment Minutes: 65   Total Treatment Minutes: 80     [] EVAL (LOW) 99611 (typically 20 minutes face-to-face)  [] EVAL (MOD) 46953 (typically 30 minutes face-to-face)  [] EVAL (HIGH) 37657 (typically 45 minutes face-to-face)  [] RE-EVAL     [x] US(64300) 2x  30'    [] IONTO  [] NMR (58665) x      [] VASO x10'  with 10# OP  [x] Manual (60140) x 1  x20'    [] Other: GT x12'  [x] TA (06284) 1x   15'   [] Mech Traction (70601)  [] ES(attended) (60493)      [] ES (un) (36204): EMG/Stim combo x15'    GOALS:  Patient stated goal: return to outdoor activities for walking and climbing, along with work  [] Progressing: [] Met: [] Not Met: [] Adjusted     Therapist goals for Patient:  Short Term Goals: To be achieved in: 2 weeks  1. Independent in HEP and progression per patient tolerance, in order to prevent re-injury. [] Progressing: [] Met: [] Not Met: [] Adjusted  2. Patient will have a decrease in pain to facilitate improvement in movement, function, and ADLs as indicated by Functional Deficits. [] Progressing: [] Met: [] Not Met: [] Adjusted     Long Term Goals: To be achieved in: 12+ weeks  1. Functional index score of 67 or more for FOTO to assist with reaching prior level of function.   [x] Progressing: [] Met: [] Not Met: [] Adjusted  2. Patient will demonstrate increased AROM to 0-125 deg to allow for proper joint functioning as indicated by patients Functional Deficits. [x] Progressing: [] Met: [] Not Met: [] Adjusted  3. Patient will demonstrate an increase in Strength to good proximal hip strength and control, within 5lb HHD in LE to allow for proper functional mobility as indicated by patients Functional Deficits. [x] Progressing: [] Met: [] Not Met: [] Adjusted  4. Patient will return to 90% functional activities without increased symptoms or restriction. [x] Progressing: [] Met: [] Not Met: [] Adjusted  5. Pt will be able to walk 45 min on uneven surfaces for hiking and climbing. (patient specific functional goal)    [x] Progressing: [] Met: [] Not Met: [] Adjusted         Overall Progression Towards Functional goals/ Treatment Progress Update:  [x] Patient is progressing as expected towards functional goals listed. [] Progression is slowed due to complexities/Impairments listed. [] Progression has been slowed due to co-morbidities. [] Plan just implemented, too soon to assess goals progression <30days   [] Goals require adjustment due to lack of progress  [] Patient is not progressing as expected and requires additional follow up with physician  [] Other    ASSESSMENT: Tightness noted in medial and lateral thigh muscles w/ slight tenderness. Amb skills improved today. Strength in L LE progressing and quad activation good. Pt does have limited control when stepping down on step but improved w/ UE assistance    Treatment/Activity Tolerance:  [x] Patient tolerated treatment well [] Patient limited by fatique  [] Patient limited by pain  [] Patient limited by other medical complications- tightness  [] Other:     Prognosis: [x] Good [x] Fair  [] Poor    Patient Requires Follow-up: [x] Yes  [] No    PLAN: Cont 2x/week for ROM restoration & focus on gt training for function.    [x] Continue per plan of care [] Alter current plan (see comments)  [x] Plan of care initiated [] Hold pending MD visit [] Discharge    Electronically signed by:  Austin Man PT, MS, OMT-C    Physical Therapist [place] license #[id number]  Physical Therapist [place] license #[id number]             Note: If patient does not return for scheduled/ recommended follow up visits, this note will serve as a discharge from care along with most recent update on progress.

## 2023-03-13 ENCOUNTER — TREATMENT (OUTPATIENT)
Dept: PHYSICAL THERAPY | Age: 58
End: 2023-03-13
Payer: COMMERCIAL

## 2023-03-13 DIAGNOSIS — R26.2 DIFFICULTY WALKING: Primary | ICD-10-CM

## 2023-03-13 DIAGNOSIS — M24.662 ARTHROFIBROSIS OF KNEE JOINT, LEFT: ICD-10-CM

## 2023-03-13 DIAGNOSIS — M24.662 FIBROSIS OF KNEE JOINT, LEFT: ICD-10-CM

## 2023-03-13 DIAGNOSIS — M25.562 LEFT KNEE PAIN, UNSPECIFIED CHRONICITY: ICD-10-CM

## 2023-03-13 PROCEDURE — 97140 MANUAL THERAPY 1/> REGIONS: CPT | Performed by: PHYSICAL THERAPIST

## 2023-03-13 PROCEDURE — 97110 THERAPEUTIC EXERCISES: CPT | Performed by: PHYSICAL THERAPIST

## 2023-03-13 NOTE — PROGRESS NOTES
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 Poncho Chris  Has the plan of care been signed (Y/N):        [x]  Yes  []  No     Date of Patient follow up with Physician:  Dr. Gabriela Montes appt 3/16/2023      Is this a Progress Report:     []  Yes  [x]  No        If Yes:  Date Range for reporting period:  Beginning 22  Ending     Progress report will be due (10 Rx or 30 days whichever is less):        Recertification will be due (POC Duration  / 90 days whichever is less): 3/20/23        Visit # Insurance Allowable Auth Required   12 BMN []  Yes [x]  No    He had 45 visits for same episode in     Functional Scale: FOTO 29; LEFS 13;    Date assessed:  22 FOTO 43; LEFS 27.2   10/17/22 FOTO 42;  22 FOTO 52, LEFS 38.7  23 FOTO 63, LEFS 53.5  23 LEFS    Latex Allergy:  [x]NO      []YES  Preferred Language for Healthcare:   [x]English       []other:    Pain level:  3/10 w/o medication    SUBJECTIVE:     Patient reports that knee is stiff today w/ walking a lot  over weekend and plane ride yesterday. He denied swelling in knee. Pt had his appt w/ Dr Gabriela Montes moved up to tomorrow. OBJECTIVE:       Reduced knee extension with terminal knee extension in gait upon arrival, improved after stretching. Muscle tightness noted in calf w/ min tenderness noted;    ROM PROM AROM Overpressure Comment    L 2/23/23 R L R L R    Flexion 108° on ERMI  100° 125°      Extension 4° after stretch w/ op  -1°    -2°        Girth  7/29/2022 L R   Mid Patella 44 cm 39.3 cm   Suprapatellar     5cm above     15cm above         Strength L R Comment: majority deferred secondary to surgery   Quad 39 lb 49 lb 11/23/22   Hamstring 34 lb 34 lb    Abduction      Quad tone          Special Test Results/Comment: majority deferred secondary to surgery   Homans  8/29/2022 Known DVT in calf       RESTRICTIONS/PRECAUTIONS: 7/18/22 posterior L knee capsule release; Limited ROM In L knee after injection in April that was limited prior to surg for flex and ext;  DVT left lower leg (still on blood thinners till about Oct)    Exercises/Interventions:   Exercise/Equipment Resistance/Repetitions Other comments   Cardio/Warm-up     Bike Rec Seat #8 x5'  Oscillations to full revs   Treadmill          Stretching     Hamstring Seated propped 10\"x10 20# overpressure      ITB     Grion    Quad Prone KF TP 20\"x5 Manual stretch   Inclined Calf On floor 10\"x10    Towel Pull 30\"x5 over foam roll 20# overpressure             ROM     Passive    Active     ROM @EOB    Prone knee hang x10'   15# OP on ankle    Weight Hangs     Sheet Pulls 10\"x10    Ankle Pumps           Patellar Glides     Medial x2'    Superior x2'    Inferior x2'    Lateral x2'         STRENGTH     Supine 3x10 5# Prone     Abduction 5# 3x10    clams Ext - prone 5# 3x10 leaning over table    bridging 2x15 Over green SB 1 set with legs straight and 1 set with knees bent   Adducton     SLR+          Isometrics     Quad sets 10\"x10 Over foam roll        CKC     Calf raises     Wall sits Step ups & over step down 1 leg stand Squatting    Balance       TKE Resistive walking backwards Resisted sidestepping over mini cones  PRE Extension Flexion Leg Press      SLR + Amb in lunge position w/ trunk rotation Cable Column     Ed given on POC, expectations and goals             Manual/Modalities     Manual stretching to L hip into flex, fig 4, add, hip flex x10'    Deep tissue work to [employer] and calf/scar massage in HS x5'    Joint mobs x5' Femoral AP glides to promote extension (half foam roll under proximal tibia)          Therapeutic Exercise and NMR EXR  [x] (87143) Provided verbal/tactile cueing for activities related to strengthening, flexibility, endurance, ROM for improvements in LE, proximal hip, and core control with self care, mobility, lifting, ambulation.  [] (74929) Provided verbal/tactile cueing for activities related to improving balance, coordination, kinesthetic sense, posture, motor skill, proprioception  to assist with LE, proximal hip, and core control in self care, mobility, lifting, ambulation and eccentric single leg control.      NMR and Therapeutic Activities:    [x] (53772 or 16556) Provided verbal/tactile cueing for activities related to improving balance, coordination, kinesthetic sense, posture, motor skill, proprioception and motor activation to allow for proper function of core, proximal hip and LE with self care and ADLs  [] (74780) Gait Re-education- Provided training and instruction to the patient for proper LE, core and proximal hip recruitment and positioning and eccentric body weight control with ambulation re-education including up and down stairs     Home Exercise Program:    [x] (74067) Reviewed/Progressed HEP activities related to strengthening, flexibility, endurance, ROM of core, proximal hip and LE for functional self-care, mobility, lifting and ambulation/stair navigation            Written HEP est    [] (99382)Reviewed/Progressed HEP activities related to improving balance, coordination, kinesthetic sense, posture, motor skill, proprioception of core, proximal hip and LE for self care, mobility, lifting, and ambulation/stair navigation      Manual Treatments:  PROM / STM / Oscillations-Mobs:  G-I, II, III, IV (PA's, Inf., Post.)  [x] (67990) Provided manual therapy to mobilize LE, proximal hip and/or LS spine soft tissue/joints for the purpose of modulating pain, promoting relaxation,  increasing ROM, reducing/eliminating soft tissue swelling/inflammation/restriction, improving soft tissue extensibility and allowing for proper ROM for normal function with self care, mobility, lifting and ambulation. Modalities:   [] GAME READY (VASO)- for significant edema, swelling, pain control. Charges:  Timed Code Treatment Minutes: 40   Total Treatment Minutes: 40     [] EVAL (LOW) 04886 (typically 20 minutes face-to-face)  [] EVAL (MOD) 41659 (typically 30 minutes face-to-face)  [] EVAL (HIGH) 12160 (typically 45 minutes face-to-face)  [] RE-EVAL     [x] OB(77719) 2x  30'    [] IONTO  [] NMR (88759) x      [] VASO x10'  with 10# OP  [x] Manual (45273) x 1  x10'    [] Other: GT x12'  [] TA (14461) 1x   15'   [] Mech Traction (38644)  [] ES(attended) (20073)      [] ES (un) (86076): EMG/Stim combo x15'    GOALS:  Patient stated goal: return to outdoor activities for walking and climbing, along with work  [] Progressing: [] Met: [] Not Met: [] Adjusted     Therapist goals for Patient:  Short Term Goals: To be achieved in: 2 weeks  1. Independent in HEP and progression per patient tolerance, in order to prevent re-injury. [] Progressing: [] Met: [] Not Met: [] Adjusted  2. Patient will have a decrease in pain to facilitate improvement in movement, function, and ADLs as indicated by Functional Deficits. [] Progressing: [] Met: [] Not Met: [] Adjusted     Long Term Goals: To be achieved in: 12+ weeks  1. Functional index score of 67 or more for FOTO to assist with reaching prior level of function. [x] Progressing: [] Met: [] Not Met: [] Adjusted  2.  Patient will demonstrate increased AROM to 0-125 deg to allow for proper joint functioning as indicated by patients Functional Deficits. [x] Progressing: [] Met: [] Not Met: [] Adjusted  3. Patient will demonstrate an increase in Strength to good proximal hip strength and control, within 5lb HHD in LE to allow for proper functional mobility as indicated by patients Functional Deficits. [x] Progressing: [] Met: [] Not Met: [] Adjusted  4. Patient will return to 90% functional activities without increased symptoms or restriction. [x] Progressing: [] Met: [] Not Met: [] Adjusted  5. Pt will be able to walk 45 min on uneven surfaces for hiking and climbing. (patient specific functional goal)    [x] Progressing: [] Met: [] Not Met: [] Adjusted         Overall Progression Towards Functional goals/ Treatment Progress Update:  [x] Patient is progressing as expected towards functional goals listed. [] Progression is slowed due to complexities/Impairments listed. [] Progression has been slowed due to co-morbidities. [] Plan just implemented, too soon to assess goals progression <30days   [] Goals require adjustment due to lack of progress  [] Patient is not progressing as expected and requires additional follow up with physician  [] Other    ASSESSMENT: Pt was tight from travel and weekend activities. His knee did stretch well after warm up. Tightness noted in calf >thigh. Due to general soreness and MD appt did not perform wts today. Pt will continue to stretch and avoid aggravating knee to min adverse effect and prep for MD appt in hopes that surg will be scheduled. Treatment/Activity Tolerance:  [x] Patient tolerated treatment well [] Patient limited by fatique  [] Patient limited by pain  [] Patient limited by other medical complications- tightness  [] Other:     Prognosis: [x] Good [x] Fair  [] Poor    Patient Requires Follow-up: [x] Yes  [] No    PLAN: Cont 2x/week for ROM restoration & focus on gt training for function. Await results of MD appt tomorrow.    [x] Continue per plan of care [] Alter current plan (see comments)  [x] Plan of care initiated [] Hold pending MD visit [] Discharge    Electronically signed by:  Geo Worley, PT, MS, OMT-C    Physical Therapist [id number] [address] license #[id number]  Physical Therapist [place] license #[id number]             Note: If patient does not return for scheduled/ recommended follow up visits, this note will serve as a discharge from care along with most recent update on progress.

## 2023-03-14 ENCOUNTER — OFFICE VISIT (OUTPATIENT)
Dept: ORTHOPEDIC SURGERY | Age: 58
End: 2023-03-14
Payer: COMMERCIAL

## 2023-03-14 VITALS — WEIGHT: 195 LBS | HEIGHT: 69 IN | BODY MASS INDEX: 28.88 KG/M2

## 2023-03-14 DIAGNOSIS — M24.662 ARTHROFIBROSIS OF KNEE JOINT, LEFT: Primary | ICD-10-CM

## 2023-03-14 DIAGNOSIS — Z86.718 HISTORY OF DVT (DEEP VEIN THROMBOSIS): ICD-10-CM

## 2023-03-14 PROCEDURE — 99213 OFFICE O/P EST LOW 20 MIN: CPT | Performed by: PHYSICIAN ASSISTANT

## 2023-03-14 RX ORDER — LOSARTAN POTASSIUM AND HYDROCHLOROTHIAZIDE 25; 100 MG/1; MG/1
1 TABLET ORAL DAILY
COMMUNITY
Start: 2023-02-27

## 2023-03-14 NOTE — PROGRESS NOTES
Date:  3/14/2023    Name:  Ellender Barthel  Address:  [facility] [address]    :  1965      Age:   59 y.o. Chief Complaint    Knee Pain (Left knee)      History of Present Illness:  Ellender Barthel is a 59 y.o. male who presents for a follow-up evaluation of his left knee. To recap, the patient had a corticosteroid injection on 2022 by an outside orthopedic facility. He traveled to [facility] where he subsequently developed a septic joint. He had a washout while in [facility] followed by IV antibiotics for 5 weeks. He subsequently developed a DVT and was placed on Xarelto 15 mg. He was diagnosed with arthrofibrosis and underwent a left knee capsular release on 7- by Dr. Brandie Weiner. Since then the patient has been conducting conservative treatment for his condition including: Rest, ice, elevation, intermittent bracing, physical therapy, home exercises, stretches, activity modification, crutches/cane, and over-the-counter pain medication. The patient is no longer on IV or p.o. antibiotics. He had a brief follow-up with his infectious disease specialist in January who does not believe the patient requires any further antibiotic treatment or prophylaxis. His infectious disease specialist feels that his infection has resolved. He is no longer on Xarelto or any other anticoagulants. His range of motion has improved to -1 - 100 degrees. He still walks with a bit of a limp but is no longer using an assistive device. He presents today to discuss a possible total joint replacement. 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 This requires him to ambulate for prolonged periods of time, ambulate up and down stairs and ladders.   He notes that because he is favoring the left knee somewhat she is also starting to exhibit some right knee discomfort from nontraumatic osteoarthritis. The patient denies any new injury. The patient denies any giving way or joint locking. History from the patient's visit on 10-. Sarina Condon is a 59 y.o. male who presents for follow-up evaluation of his left knee pain. To briefly recap, the patient received a corticosteroid injection on 04/04/2022 prior to traveling to [facility]. The patient subsequently developed left knee infection and was hospitalized in [place]. He was found to have a septic knee joint was was washed out. He was placed on IV antibiotics for 6 weeks. On follow-up he was later found to have a DVT in the deep peroneal vein. He was placed on Xarelto 15 mg. He was originally seen in our office on 06/07/2022 where he was diagnosed with arthrofibrosis in the left knee. Patient had an IVC filter placed. Follow-up and further treatment was conducted by Dr. Noemi Tyson who performed a left knee posterior capsular release on 07/15/2022. Cultures from that procedure came back positive for Cutibacterium acnes. He completed an antibiotic regimen after left knee arthroscopy, I&D and lysis of adhesions by Dr. Cristian Sainz on 08/05/2022. The patient follow-up with physical therapy undergoing 3 total months of treatment. In addition, cylinder cast was placed for 48 hours before it was bivalved and converted to a dropout cast.  The patient has been utilizing this for 2 to 4 hours a day to achieve terminal extension. On his original visit his range of motion was approximately 40 to 80 degrees. Now the patient exhibits a range of motion of approximately 5 to 95 degrees. Overall he feels his pain is dramatically improved in addition to his range of motion. He still finds it difficult to achieve endrange left knee extension and still ambulates with a single crutch due to an antalgic gait. On average he rates his discomfort a 4/10 and achy through the anterior aspect of the knee.   Other conservative treatments he is currently utilizing include: rest, ice, elevation, bracing, physical therapy, home exercises, activity modification, and NSAIDs as needed. Past Medical History:   Diagnosis Date    DVT of lower extremity (deep venous thrombosis) (HCC)     left lower leg, diagnosed 5/2022    GERD (gastroesophageal reflux disease)     Hyperlipidemia     Hypertension         Past Surgical History:   Procedure Laterality Date    CERVICAL FUSION      anterior fusion c6-7    COLONOSCOPY      FOOT AMPUTATION Right 1983    partial    IR IVC FILTER PLACEMENT W IMAGING  8/5/2022    IR IVC FILTER PLACEMENT W IMAGING 8/5/2022 Héctor Banks MD [facility] SPECIAL PROCEDURES    KNEE ARTHROSCOPY      KNEE ARTHROSCOPY Right     KNEE ARTHROSCOPY Left 8/5/2022    ARTHROSCOPIC INCISION AND DRAINAGE LEFT KNEE performed by Tessy Anderson MD at [place] [address] ARTHROTOMY Left 7/15/2022    OPEN POSTERIOR KNEE DISSECTION, POSTERIOR CAPSULAR RELEASE performed by [facility]y Anderson MD [facility]       Family History   Problem Relation Age of Onset    Cancer Father     Heart Disease Father        Social History     Socioeconomic History    Marital status:      Spouse name: None    Number of children: None    Years of education: None    Highest education level: None   Tobacco Use    Smoking status: Never    Smokeless tobacco: Never   Vaping Use    Vaping Use: Never used   Substance and Sexual Activity    Alcohol use:  Yes     Alcohol/week: 10.0 - 12.0 standard drinks     Types: 10 - 12 Shots of liquor per week    Drug use: Never       Current Outpatient Medications   Medication Sig Dispense Refill    metoprolol succinate (TOPROL XL) 50 MG extended release tablet Take 100 mg by mouth daily      losartan-hydroCHLOROthiazide (HYZAAR) 100-25 MG per tablet Take 1 tablet by mouth daily      sildenafil (VIAGRA) 50 MG tablet Take 50 mg by mouth as needed      esomeprazole (NEXIUM) 20 MG delayed release capsule Take 20 mg by mouth every morning (before breakfast)      acetaminophen (TYLENOL) 500 MG tablet Take 500 mg by mouth every 8 hours as needed       No current facility-administered medications for this visit. No Known Allergies      Review of Systems:  A 14 point review of systems was completed by the patient and is available in the media section of the scanned medical record and was reviewed. Vital Signs:   Ht 5' 9\" (1.753 m)   Wt 195 lb (88.5 kg)   BMI 28.80 kg/m²     General Exam:    General: Guy Bolton is a healthy and well appearing 59y.o. year old male who is sitting comfortably in our office in no acute distress. Neuro: Alert & Oriented x 3,  normal,  no focal deficits noted. Normal mood & affect. Eyes: Sclera clear  Ears: Normal external ear  Pulm: Respirations unlabored and regular   Skin: Warm, well perfused      Knee Examination: Left     Inspection: No effusion, ecchymosis, discoloration, erythema, excessive warmth. no signs of infection. Palpation: There is patellofemoral joint crepitus, there is mild medial joint line tenderness. No other osseous or soft tissue tenderness around the knee. Discomfort with patellar compression. Negative calf tenderness     Range of Motion:  -2 - 100 degrees. Decreased patellar mobility. Strength: Grossly intact     Special Tests:   No ligament instability,  Negative Homans sign     Gait: Walks with mild limp     Alignment: No varus deformity     Neuro: Sensation equal bilateral lower extremities     Vascular: 2+ posterior tibialis pulse        Radiology:   Previously obtain imaging reviewed. No new images obtained. Assessment: This patient is a 59 y.o. male presenting to the office for follow-up evaluation on his left knee. This patient has a diagnosis of left knee arthritis, arthrofibrosis in the left knee status post a left septic joint infection. Encounter Diagnoses   Name Primary?     Arthrofibrosis of knee joint, left Yes    History of DVT (deep vein thrombosis)            Medical decision making:  Patient's workup and evaluation were reviewed with the patient in the office today. Imaging was reviewed with the patient. This patient has done remarkably well with regards to conservative treatment for his arthrofibrosis. He has increased his range of motion and is able to walk with a fairly normal gait. He still exhibits intermittent catching and arthritic symptoms that inhibit him from conducting some ADLs and work-related activities. He has been on long-term disability and desires to return to work. Because of his pain and limitations he desires to have a left total knee arthroplasty. Further conservative versus surgical treatment was discussed. We would like to refrain from any further injections given his history and the fact that any injections in the future would provide the patient with approximately 1 and 5000 chance of a subsequent septic joint infection. Our conservative arthritis protocol was discussed. He was encouraged to continue his physical therapy exercises including his home exercise program and stretches. We will discuss this patient's case with our knee board includes Dr. Hipolito Owens. Literature suggest that further knee surgeries should be delayed until 1 year after the infection. The patient will be reaching that point in approximately 4 weeks. After thorough discussion with Dr. Sergey Banks we will determine what preoperative testing needs to be conducted and if the patient is a good candidate for surgery. All the patient's questions were answered while in the clinic. The patient is understanding of all instructions and agrees with the plan.       Treatment Plan:    Continue physical therapy/home exercise program  Weightbearing as tolerated  Medical optimization  Activity modification/Rest   Ice 20 minutes ever 1-2 hours PRN  Take medications as prescribed/instructed  Elevation   Lightweight exercise/low impact exercise  Appropriate diet/weight management      Follow up: After consultation with Dr. Johnna Forbes and as needed      This patient exhibits high complexity for obtaining an independent history, reviewing past medical records, independent interpretation of diagnostic imaging, and further coordinating care. The patient exhibits high risk given the patient's prior septic joint infection, diagnosis of arthrofibrosis which could return/worsen, and the need for a total knee arthroplasty. Dr. Brock Better participated in obtaining/reviewing the patient's history, conducting a physical exam, patient education, and coordinating care with the patient and his wife. Holly Araiza PA-C    Physician Assistant - Certified  [address] [place] [address]    03/14/23 1:16 PM      This dictation was performed with a verbal recognition program (DRAGON) and it was checked for errors. It is possible that there are still dictated errors within this office note. If so, please bring any errors to my attention for an addendum. All efforts were made to ensure that this office note is accurate.

## 2023-03-15 ENCOUNTER — TREATMENT (OUTPATIENT)
Dept: PHYSICAL THERAPY | Age: 58
End: 2023-03-15
Payer: COMMERCIAL

## 2023-03-15 DIAGNOSIS — M24.662 ARTHROFIBROSIS OF KNEE JOINT, LEFT: ICD-10-CM

## 2023-03-15 DIAGNOSIS — M24.662 FIBROSIS OF KNEE JOINT, LEFT: ICD-10-CM

## 2023-03-15 DIAGNOSIS — R26.2 DIFFICULTY WALKING: Primary | ICD-10-CM

## 2023-03-15 DIAGNOSIS — M25.562 LEFT KNEE PAIN, UNSPECIFIED CHRONICITY: ICD-10-CM

## 2023-03-15 PROCEDURE — 97140 MANUAL THERAPY 1/> REGIONS: CPT | Performed by: PHYSICAL THERAPIST

## 2023-03-15 PROCEDURE — 97112 NEUROMUSCULAR REEDUCATION: CPT | Performed by: PHYSICAL THERAPIST

## 2023-03-15 PROCEDURE — 97110 THERAPEUTIC EXERCISES: CPT | Performed by: PHYSICAL THERAPIST

## 2023-03-15 NOTE — PROGRESS NOTES
[facility]   Phone: [phone]    Fax: [phone]  Physical Therapy Treatment Note/ Progress Report:         Date:  3/15/2023    Patient Name:  Norma Domingo    :  1965  MRN: [id number]  Restrictions/Precautions:  DVT L calf; cervical fusion; flex contracture  Medical/Treatment Diagnosis Information:  Diagnosis: M24.662 arthofibrous L knee   DOS 7/15/22 posterior capsule release    Treating Diagnosis: limited walking, limited knee ROM, pain in knee, weakness in L LE    DOS 22 IV filter placement  due to calf DVT and debridement of L knee with irrigation; Insurance/Certification information:  PT Insurance Information: ramirez  Physician Information:  Referring Practitioner: Dr. Kasey Ward  Has the plan of care been signed (Y/N):        [x]  Yes  []  No     Date of Patient follow up with Physician:  Dr. Robb Odom appt 3/16/2023      Is this a Progress Report:     []  Yes  [x]  No        If Yes:  Date Range for reporting period:  Beginning 22  Ending     Progress report will be due (10 Rx or 30 days whichever is less): 3/64/55       Recertification will be due (POC Duration  / 90 days whichever is less): 3/20/23        Visit # Insurance Allowable Auth Required   13 BMN []  Yes [x]  No    He had 45 visits for same episode in     Functional Scale: FOTO 29; LEFS 13;    Date assessed:  22 FOTO 43; LEFS 27.2   10/17/22 FOTO 42;  22 FOTO 52, LEFS 38.7  23 FOTO 63, LEFS 53.5  23 LEFS    Latex Allergy:  [x]NO      []YES  Preferred Language for Healthcare:   [x]English       []other:    Pain level:  3/10 w/o medication    SUBJECTIVE:     MD appt yesterday w/ Dr Robb Odom. Pt was referred to another MD as a consult to review that injection concerns are minimal and that appt is scheduled for Monday, . Pt is frustrated w/ continued delay for TKA surg. Knee is doing well .        OBJECTIVE:       Reduced knee extension with terminal knee extension in gait upon arrival, improved after stretching.  Muscle tightness noted in calf w/ min tenderness noted;    3/15/23 30 sec sit to stand test: 15 reps    ROM PROM AROM Overpressure Comment    L 2/23/23 R L R L R    Flexion 108° on ERMI  100° 125°      Extension 4° after stretch w/ op  -1°    -2°        Girth  7/29/2022 L R   Mid Patella 44 cm 39.3 cm   Suprapatellar     5cm above     15cm above         Strength L R Comment: majority deferred secondary to surgery   Quad 39 lb 49 lb 11/23/22   Hamstring 34 lb 34 lb    Abduction      Quad tone          Special Test Results/Comment: majority deferred secondary to surgery   Homans  8/29/2022 Known DVT in calf       RESTRICTIONS/PRECAUTIONS: 7/18/22 posterior L knee capsule release; Limited ROM In L knee after injection in April that was limited prior to surg for flex and ext;  DVT left lower leg (still on blood thinners till about Oct)    Exercises/Interventions:   Exercise/Equipment Resistance/Repetitions Other comments   Cardio/Warm-up     Bike Rec Seat #8 x5'  Oscillations to full revs   Treadmill          Stretching     Hamstring Seated propped 10\"x10 20# overpressure      ITB     Grion    Quad Prone KF TP 20\"x5 Manual stretch   Inclined Calf On floor 10\"x10    Towel Pull 30\"x5 over foam roll 20# overpressure             ROM     Passive    Active     ROM @EOB    Prone knee hang x10'   15# OP on ankle    Weight Hangs     Sheet Pulls 10\"x10    Ankle Pumps           Patellar Glides     Medial x2'    Superior x2'    Inferior x2'    Lateral x2'         STRENGTH     Supine 3x10 5# Prone     Abduction 5# 3x10    clams Ext - prone 5# 3x10 leaning over table    bridging 2x15 Over green SB 1 set with legs straight and 1 set with knees bent   Adducton     SLR+          Isometrics     Quad sets 10\"x10 Over foam roll        CKC     Calf raises     Wall sits Step ups & over L3 on tech 2x10  L3 lateral step up 2x10 step down 1 leg stand Squatting    Balance       TKE Resistive walking backwards Resisted sidestepping over mini cones Monster walks Blue TB x5   PRE Extension 25# 3x10 B LE conc;L LE ecc Flexion 45# 3x10 B LE conc, L LE ecc Leg Press SL # 3x10  # 3x10      SLR + Amb in lunge position w/ trunk rotation              Cable Column     Ed given on POC, expectations and goals             Manual/Modalities     Manual stretching to L hip into flex, fig 4, add, hip flex x10'    Deep tissue work to [employer] and calf/scar massage in HS x5'    Joint mobs x5' Femoral AP glides to promote extension (half foam roll under proximal tibia)          Therapeutic Exercise and NMR EXR  [x] (03384) Provided verbal/tactile cueing for activities related to strengthening, flexibility, endurance, ROM for improvements in LE, proximal hip, and core control with self care, mobility, lifting, ambulation.  [] (87970) Provided verbal/tactile cueing for activities related to improving balance, coordination, kinesthetic sense, posture, motor skill, proprioception  to assist with LE, proximal hip, and core control in self care, mobility, lifting, ambulation and eccentric single leg control.      NMR and Therapeutic Activities:    [x] (37550 or 48185) Provided verbal/tactile cueing for activities related to improving balance, coordination, kinesthetic sense, posture, motor skill, proprioception and motor activation to allow for proper function of core, proximal hip and LE with self care and ADLs  [] (69947) Gait Re-education- Provided training and instruction to the patient for proper LE, core and proximal hip recruitment and positioning and eccentric body weight control with ambulation re-education including up and down stairs     Home Exercise Program:    [x] (74585) Reviewed/Progressed HEP activities related to strengthening, flexibility, endurance, ROM of core, proximal hip and LE for functional self-care, mobility, lifting and ambulation/stair navigation            Written HEP est    [] (65842)Reviewed/Progressed HEP activities related to improving balance, coordination, kinesthetic sense, posture, motor skill, proprioception of core, proximal hip and LE for self care, mobility, lifting, and ambulation/stair navigation      Manual Treatments:  PROM / STM / Oscillations-Mobs:  G-I, II, III, IV (PA's, Inf., Post.)  [x] (74816) Provided manual therapy to mobilize LE, proximal hip and/or LS spine soft tissue/joints for the purpose of modulating pain, promoting relaxation,  increasing ROM, reducing/eliminating soft tissue swelling/inflammation/restriction, improving soft tissue extensibility and allowing for proper ROM for normal function with self care, mobility, lifting and ambulation. Modalities:   [] GAME READY (VASO)- for significant edema, swelling, pain control. Charges:  Timed Code Treatment Minutes: 55   Total Treatment Minutes: 60     [] EVAL (LOW) 56877 (typically 20 minutes face-to-face)  [] EVAL (MOD) 39615 (typically 30 minutes face-to-face)  [] EVAL (HIGH) 06928 (typically 45 minutes face-to-face)  [] RE-EVAL     [x] QS(69839) 2x  35'    [] IONTO  [x] NMR (94547) x   10'   [] VASO x10'  with 10# OP  [x] Manual (41733) x 1  x10'    [] Other: GT x12'  [] TA (63170) 1x   15'   [] Mech Traction (25418)  [] ES(attended) (41053)      [] ES (un) (82608): EMG/Stim combo x15'    GOALS:  Patient stated goal: return to outdoor activities for walking and climbing, along with work  [] Progressing: [] Met: [] Not Met: [] Adjusted     Therapist goals for Patient:  Short Term Goals: To be achieved in: 2 weeks  1. Independent in HEP and progression per patient tolerance, in order to prevent re-injury. [] Progressing: [] Met: [] Not Met: [] Adjusted  2. Patient will have a decrease in pain to facilitate improvement in movement, function, and ADLs as indicated by Functional Deficits. [] Progressing: [] Met: [] Not Met: [] Adjusted     Long Term Goals: To be achieved in: 12+ weeks  1.  Functional index score of 67 or more for FOTO to assist with reaching prior level of function. [x] Progressing: [] Met: [] Not Met: [] Adjusted  2. Patient will demonstrate increased AROM to 0-125 deg to allow for proper joint functioning as indicated by patients Functional Deficits. [x] Progressing: [] Met: [] Not Met: [] Adjusted  3. Patient will demonstrate an increase in Strength to good proximal hip strength and control, within 5lb HHD in LE to allow for proper functional mobility as indicated by patients Functional Deficits. [x] Progressing: [] Met: [] Not Met: [] Adjusted  4. Patient will return to 90% functional activities without increased symptoms or restriction. [x] Progressing: [] Met: [] Not Met: [] Adjusted  5. Pt will be able to walk 45 min on uneven surfaces for hiking and climbing. (patient specific functional goal)    [x] Progressing: [] Met: [] Not Met: [] Adjusted         Overall Progression Towards Functional goals/ Treatment Progress Update:  [x] Patient is progressing as expected towards functional goals listed. [] Progression is slowed due to complexities/Impairments listed. [] Progression has been slowed due to co-morbidities. [] Plan just implemented, too soon to assess goals progression <30days   [] Goals require adjustment due to lack of progress  [] Patient is not progressing as expected and requires additional follow up with physician  [] Other    ASSESSMENT: Less stiffness noted today in knee. Muscle activation good. Added Wbing exercises back into program w/ good tolerance. 30\" sit to stand at controlled pace was good tech and min pain in both knees after activity. Symptoms in knee resolved by the time he finished session.      Treatment/Activity Tolerance:  [x] Patient tolerated treatment well [] Patient limited by fatique  [] Patient limited by pain  [] Patient limited by other medical complications- tightness  [] Other:     Prognosis: [x] Good [x] Fair  [] Poor    Patient Requires Follow-up: [x] Yes  [] No    PLAN: Cont 2x/week for ROM restoration & focus on gt training for function. Await results of MD referral appt on Monday. [x] Continue per plan of care [] Alter current plan (see comments)  [x] Plan of care initiated [] Hold pending MD visit [] Discharge    Electronically signed by:  Stephanie Tompkins, PT, MS, OMT-C    Physical Therapist [id number] [address] license #[id number]  Physical Therapist [place] license #[id number]             Note: If patient does not return for scheduled/ recommended follow up visits, this note will serve as a discharge from care along with most recent update on progress.

## 2023-03-20 ENCOUNTER — OFFICE VISIT (OUTPATIENT)
Dept: ORTHOPEDIC SURGERY | Age: 58
End: 2023-03-20
Payer: COMMERCIAL

## 2023-03-20 VITALS — WEIGHT: 195 LBS | HEIGHT: 69 IN | BODY MASS INDEX: 28.88 KG/M2

## 2023-03-20 DIAGNOSIS — M17.0 BILATERAL PRIMARY OSTEOARTHRITIS OF KNEE: Primary | ICD-10-CM

## 2023-03-20 DIAGNOSIS — M17.5 OTHER SECONDARY OSTEOARTHRITIS OF LEFT KNEE: ICD-10-CM

## 2023-03-20 PROCEDURE — 99214 OFFICE O/P EST MOD 30 MIN: CPT | Performed by: STUDENT IN AN ORGANIZED HEALTH CARE EDUCATION/TRAINING PROGRAM

## 2023-03-20 RX ORDER — OMEPRAZOLE 20 MG/1
20 CAPSULE, DELAYED RELEASE ORAL DAILY
COMMUNITY
Start: 2010-06-20

## 2023-03-20 SDOH — HEALTH STABILITY: PHYSICAL HEALTH: ON AVERAGE, HOW MANY DAYS PER WEEK DO YOU ENGAGE IN MODERATE TO STRENUOUS EXERCISE (LIKE A BRISK WALK)?: 4 DAYS

## 2023-03-20 NOTE — LETTER
[address] [employer]   DR. Moreno Keen     TODAY'S DATE: 3/20/23    PATIENT'S NAME: Alondra Galavn    PATIENT'S NUMBER: [id number]    : 1965    PREFERRED PHONE NUMBER: [phone]      WORK PHONE NUMBER: There is no work phone number on file. DIAGNOSIS:   No diagnosis found.     DATE OF SURGERY:     PROCEDURE: L TKA    SURGEON: [address]: [facility] or Ronen     ADMISSION STATUS: 23 Hour Observation    ANESTHESIA: General   Pre-Op Block requested  Adductor canal & IPAC    LENGTH OF SURGERY: 1.5 hours    EQUIPMENT REQUESTED: [employer] - Cement with 1g Vanc powder      X-RAYS REQUIRED: None      PCP: Billie Gomez MD    H&P TO BE DONE BY THE PCP      CARDIAC CLEARANCE NEEDED: No     ALLERGIES: No Known Allergies    POST-OP VISIT: 2-3 weeks    OTHER INSTRUCTIONS/REMARKS: none    INSURANCE INFORMATION: _________________________ CARD IN MEDIA IN EPIC___  CARD FAXED___    PRE-CERTIFICATION REQUIRED: YES   NO   PER _______________________    SURGERY SCHEDULED BY: ____________________________________    PATIENT CALLED AND CONFIRMED DATE & TIME: ______________________

## 2023-03-20 NOTE — PROGRESS NOTES
Duricef postoperatively as extra measures to prevent infection in this high risk scenario. I do think benefits are greater than the risks in this situation and the patient understands and agrees. They will schedule a preop evaluation with their PCP and get routine pre op lab work and we will schedule surgery at our earliest mutual convenience. Electronically signed by Terry Lomax MD on 0/11/1491 at 1:02 PM  This dictation was generated by voice recognition computer software. Although all attempts are made to edit the dictation for accuracy, there may be errors in the transcription that are not intended. Total time spent reviewing past notes, imaging, labs, clinical exam, and treatment plan was > 45 min.

## 2023-03-21 ENCOUNTER — TREATMENT (OUTPATIENT)
Dept: PHYSICAL THERAPY | Age: 58
End: 2023-03-21
Payer: COMMERCIAL

## 2023-03-21 DIAGNOSIS — R26.2 DIFFICULTY WALKING: Primary | ICD-10-CM

## 2023-03-21 DIAGNOSIS — M24.662 ARTHROFIBROSIS OF KNEE JOINT, LEFT: ICD-10-CM

## 2023-03-21 DIAGNOSIS — M25.562 LEFT KNEE PAIN, UNSPECIFIED CHRONICITY: ICD-10-CM

## 2023-03-21 PROCEDURE — 97110 THERAPEUTIC EXERCISES: CPT | Performed by: PHYSICAL THERAPIST

## 2023-03-21 PROCEDURE — 97140 MANUAL THERAPY 1/> REGIONS: CPT | Performed by: PHYSICAL THERAPIST

## 2023-03-21 PROCEDURE — 97112 NEUROMUSCULAR REEDUCATION: CPT | Performed by: PHYSICAL THERAPIST

## 2023-03-21 NOTE — PROGRESS NOTES
upon arrival, improved after stretching.  Muscle tightness noted in calf w/ min tenderness noted;    3/15/23 30 sec sit to stand test: 15 reps    ROM PROM AROM Overpressure Comment    L 2/23/23 R L R L R    Flexion 108° on ERMI  100° 125°      Extension 4° after stretch w/ op  -1°    -2°        Girth  7/29/2022 L R   Mid Patella 44 cm 39.3 cm   Suprapatellar     5cm above     15cm above         Strength L R Comment: majority deferred secondary to surgery   Quad 39 lb 49 lb 11/23/22   Hamstring 34 lb 34 lb    Abduction      Quad tone          Special Test Results/Comment: majority deferred secondary to surgery   Homans  8/29/2022 Known DVT in calf       RESTRICTIONS/PRECAUTIONS: 7/18/22 posterior L knee capsule release; Limited ROM In L knee after injection in April that was limited prior to surg for flex and ext;  DVT left lower leg (still on blood thinners till about Oct)    Exercises/Interventions:   Exercise/Equipment Resistance/Repetitions Other comments   Cardio/Warm-up     Bike Rec Seat #8 x5'  Oscillations to full revs   Treadmill          Stretching     Hamstring Seated propped 10\"x10 20# overpressure      ITB     Grion    Quad Prone KF TP 20\"x5 Manual stretch   Inclined Calf On floor 10\"x10    Towel Pull 30\"x5 over foam roll 20# overpressure             ROM     Passive    Active     ROM @EOB    Prone knee hang x10'   15# OP on ankle    Weight Hangs     Sheet Pulls 10\"x10    Ankle Pumps           Patellar Glides     Medial x2'    Superior x2'    Inferior x2'    Lateral x2'         STRENGTH     Supine 3x10 5# Prone     Abduction 5# 3x10    clams Ext - prone 5# 3x10 leaning over table    bridging 2x15 Over green SB 1 set with legs straight and 1 set with knees bent   Adducton     SLR+          Isometrics     Quad sets 10\"x10 Over foam roll        CKC     Calf raises     Wall sits Step ups & over L3 on tech 2x10  L3 lateral step up 2x10 step down 1 leg stand Squatting    Balance       TKE 45# 10\"x10 Resistive

## 2023-03-23 ENCOUNTER — TELEPHONE (OUTPATIENT)
Dept: ORTHOPEDIC SURGERY | Age: 58
End: 2023-03-23

## 2023-03-23 ENCOUNTER — TREATMENT (OUTPATIENT)
Dept: PHYSICAL THERAPY | Age: 58
End: 2023-03-23
Payer: COMMERCIAL

## 2023-03-23 DIAGNOSIS — R26.2 DIFFICULTY WALKING: Primary | ICD-10-CM

## 2023-03-23 DIAGNOSIS — M24.662 ARTHROFIBROSIS OF KNEE JOINT, LEFT: ICD-10-CM

## 2023-03-23 DIAGNOSIS — M25.562 LEFT KNEE PAIN, UNSPECIFIED CHRONICITY: ICD-10-CM

## 2023-03-23 PROCEDURE — 97110 THERAPEUTIC EXERCISES: CPT | Performed by: PHYSICAL THERAPIST

## 2023-03-23 PROCEDURE — 97140 MANUAL THERAPY 1/> REGIONS: CPT | Performed by: PHYSICAL THERAPIST

## 2023-03-23 PROCEDURE — 97112 NEUROMUSCULAR REEDUCATION: CPT | Performed by: PHYSICAL THERAPIST

## 2023-03-23 NOTE — PROGRESS NOTES
(35949)Reviewed/Progressed HEP activities related to improving balance, coordination, kinesthetic sense, posture, motor skill, proprioception of core, proximal hip and LE for self care, mobility, lifting, and ambulation/stair navigation      Manual Treatments:  PROM / STM / Oscillations-Mobs:  G-I, II, III, IV (PA's, Inf., Post.)  [x] (00571) Provided manual therapy to mobilize LE, proximal hip and/or LS spine soft tissue/joints for the purpose of modulating pain, promoting relaxation,  increasing ROM, reducing/eliminating soft tissue swelling/inflammation/restriction, improving soft tissue extensibility and allowing for proper ROM for normal function with self care, mobility, lifting and ambulation. Modalities:   [] GAME READY (VASO)- for significant edema, swelling, pain control. Charges:  Timed Code Treatment Minutes: 55   Total Treatment Minutes: 60     [] EVAL (LOW) 87109 (typically 20 minutes face-to-face)  [] EVAL (MOD) 64949 (typically 30 minutes face-to-face)  [] EVAL (HIGH) 38463 (typically 45 minutes face-to-face)  [] RE-EVAL     [x] YS(81372) 2x  35'    [] IONTO  [x] NMR (33120) x 1  10'   [] VASO x10'  with 10# OP  [x] Manual (20040) x 1  x10'    [] Other: GT x12'  [] TA (71341) 1x   15'   [] Mech Traction (69813)  [] ES(attended) (64287)      [] ES (un) (07470): EMG/Stim combo x15'    GOALS:  Patient stated goal: return to outdoor activities for walking and climbing, along with work  [] Progressing: [] Met: [] Not Met: [] Adjusted     Therapist goals for Patient:  Short Term Goals: To be achieved in: 2 weeks  1. Independent in HEP and progression per patient tolerance, in order to prevent re-injury. [] Progressing: [] Met: [] Not Met: [] Adjusted  2. Patient will have a decrease in pain to facilitate improvement in movement, function, and ADLs as indicated by Functional Deficits. [] Progressing: [] Met: [] Not Met: [] Adjusted     Long Term Goals: To be achieved in: 12+ weeks  1.  Functional

## 2023-03-23 NOTE — TELEPHONE ENCOUNTER
Auth: NPR  Date: 04/06/23 thru 07/07/23  Reference # [id number]  Spoke with: Online  Type of SX: Outpatient/Observation  Location: [facility]  CPT: 06981   DX: M17.12  SX area: LT knee  Insurance: [employer]

## 2023-03-27 ENCOUNTER — TREATMENT (OUTPATIENT)
Dept: PHYSICAL THERAPY | Age: 58
End: 2023-03-27
Payer: COMMERCIAL

## 2023-03-27 DIAGNOSIS — M25.562 LEFT KNEE PAIN, UNSPECIFIED CHRONICITY: ICD-10-CM

## 2023-03-27 DIAGNOSIS — M24.662 FIBROSIS OF KNEE JOINT, LEFT: ICD-10-CM

## 2023-03-27 DIAGNOSIS — M24.662 ARTHROFIBROSIS OF KNEE JOINT, LEFT: ICD-10-CM

## 2023-03-27 DIAGNOSIS — R26.2 DIFFICULTY WALKING: Primary | ICD-10-CM

## 2023-03-27 PROCEDURE — 97112 NEUROMUSCULAR REEDUCATION: CPT | Performed by: PHYSICAL THERAPIST

## 2023-03-27 PROCEDURE — 97140 MANUAL THERAPY 1/> REGIONS: CPT | Performed by: PHYSICAL THERAPIST

## 2023-03-27 PROCEDURE — 97110 THERAPEUTIC EXERCISES: CPT | Performed by: PHYSICAL THERAPIST

## 2023-03-27 NOTE — PROGRESS NOTES
PRE Extension 30# 3x10 B LE conc;L LE ecc Flexion 50# 3x10 B LE conc, L LE ecc Leg Press SL # 3x10  # 3x10      SLR + Amb in lunge position w/ trunk rotation              Cable Column     Ed given on POC, expectations and goals             Manual/Modalities     Manual stretching to L hip into flex, fig 4, add, hip flex x10'    Deep tissue work to [employer] and calf/scar massage in HS x5'    Joint mobs x5' Femoral AP glides to promote extension (half foam roll under proximal tibia)          Therapeutic Exercise and NMR EXR  [x] (55910) Provided verbal/tactile cueing for activities related to strengthening, flexibility, endurance, ROM for improvements in LE, proximal hip, and core control with self care, mobility, lifting, ambulation.  [] (93975) Provided verbal/tactile cueing for activities related to improving balance, coordination, kinesthetic sense, posture, motor skill, proprioception  to assist with LE, proximal hip, and core control in self care, mobility, lifting, ambulation and eccentric single leg control.      NMR and Therapeutic Activities:    [x] (35875 or 81940) Provided verbal/tactile cueing for activities related to improving balance, coordination, kinesthetic sense, posture, motor skill, proprioception and motor activation to allow for proper function of core, proximal hip and LE with self care and ADLs  [] (39114) Gait Re-education- Provided training and instruction to the patient for proper LE, core and proximal hip recruitment and positioning and eccentric body weight control with ambulation re-education including up and down stairs     Home Exercise Program:    [x] (20577) Reviewed/Progressed HEP activities related to strengthening, flexibility, endurance, ROM of core, proximal hip and LE for functional self-care, mobility, lifting and ambulation/stair navigation            Written HEP est    [] (55348)Reviewed/Progressed HEP activities related to improving balance, coordination, kinesthetic

## 2023-03-29 ENCOUNTER — TELEPHONE (OUTPATIENT)
Dept: ORTHOPEDIC SURGERY | Age: 58
End: 2023-03-29

## 2023-03-29 NOTE — TELEPHONE ENCOUNTER
ORTHOPAEDIC NURSE NAVIGATOR SUMMARY NOTE      Anticipated Date of Surgery: 4/6/23    Recieved Pre-Op Education: yes   In person class:No  Pt used educational link:Yes   Pt completed pre and post test to measure learning:Yes    If pt did not complete either, why not? N/A      PCP: Radha Means MD   Phone #: [phone]    Date of PCP Visit for H&P: 3/23/23    Any Noted Concerns from PCP prior to surgery:  No   If Yes, what concerns?:  ERAS protocol explained to pt. Pt does not have the following medical conditions:  -Diabetes  -Gastroparesis  -CHF  -Fluid restricted diet  -Known difficult airway  Pt instructed to drink up to 40 oz of Gatorade type drink the evening prior to surgery. Pt informed they can have up to 40 oz of water and that it must be completed 2 hours prior to scheduled surgery. Pt verbalized understanding. IS THE PATIENT IN A PAIN MANAGEMENT PROGRAM?:   Not Applicable     Review of Past Medical History Reveals History of:      Critical Lab Values:   Hgb/Hct:   Hemoglobin (g/dL)   Date Value   08/05/2022 13.6   /  Hematocrit (%)   Date Value   08/05/2022 40.1 (L)      HgbA1C:  No results found for: LABA1C   Albumin:  No results found for: LABALBU   BUN/Cr:   BUN (mg/dL)   Date Value   08/05/2022 15   /  Creatinine (mg/dL)   Date Value   08/05/2022 0.9      BMI:    BMI Readings from Last 1 Encounters:   03/20/23 28.80 kg/m²        Coronary Artery Disease/HTN/CHF History: Yes- HTN      Cardiologist:     Cardiac Clearance Necessary: No    Date of Cardiac Clearance Appt: On Plavix? No,  If YES, when will they stop taking?     Final Cardiac Recommendations:N/A   -On any anticoagulation-none       Diabetes History: No    Most Recent HgbA1C: N/A    PCP or Endocrine Recommendations: N/A    Nutritionist/Dietician Consult Scheduled: N/A    Final Plan For Diabetic Control: N/A   Pulmonary: COPD/Emphysema/ Use of home oxygen: none     Alcohol use:        DVT Risk Stratification:  High- Pt had

## 2023-03-30 ENCOUNTER — TREATMENT (OUTPATIENT)
Dept: PHYSICAL THERAPY | Age: 58
End: 2023-03-30
Payer: COMMERCIAL

## 2023-03-30 DIAGNOSIS — R26.2 DIFFICULTY WALKING: Primary | ICD-10-CM

## 2023-03-30 DIAGNOSIS — M24.662 ARTHROFIBROSIS OF KNEE JOINT, LEFT: ICD-10-CM

## 2023-03-30 PROCEDURE — 97110 THERAPEUTIC EXERCISES: CPT | Performed by: PHYSICAL THERAPIST

## 2023-03-30 PROCEDURE — 97140 MANUAL THERAPY 1/> REGIONS: CPT | Performed by: PHYSICAL THERAPIST

## 2023-03-30 PROCEDURE — 97112 NEUROMUSCULAR REEDUCATION: CPT | Performed by: PHYSICAL THERAPIST

## 2023-03-30 NOTE — PROGRESS NOTES
then back next week and ready for surgery. Surg on 4/6/23 for L TKA    OBJECTIVE:       Reduced knee extension with terminal knee extension in gait upon arrival, improved after stretching.  Muscle tightness noted in calf w/ min tenderness noted;    3/15/23 30 sec sit to stand test: 15 reps    ROM PROM AROM Overpressure Comment    L 3/30/23 R L R L R    Flexion 109°  105° 125°      Extension 0°  -6°    -2°        Girth  7/29/2022 L R   Mid Patella 44 cm 39.3 cm   Suprapatellar     5cm above     15cm above         Strength L R Comment: majority deferred secondary to surgery   Quad 39 lb 49 lb 11/23/22   Hamstring 34 lb 34 lb    Abduction      Quad tone          Special Test Results/Comment: majority deferred secondary to surgery   Homans  8/29/2022 Known DVT in calf       RESTRICTIONS/PRECAUTIONS: 7/18/22 posterior L knee capsule release; Limited ROM In L knee after injection in April that was limited prior to surg for flex and ext;  DVT left lower leg (still on blood thinners till about Oct)    Exercises/Interventions:   Exercise/Equipment Resistance/Repetitions Other comments   Cardio/Warm-up     Bike Rec Seat #8 x5'  Oscillations to full revs   Treadmill          Stretching     Hamstring Seated propped 10\"x10 20# overpressure      ITB     Grion    Quad Prone KF TP 20\"x5 Manual stretch   Inclined Calf On floor 10\"x10    Towel Pull 30\"x5 over foam roll 20# overpressure             ROM     Passive    Active     ROM @EOB    Prone knee hang x10'   15# OP on ankle    Weight Hangs     Sheet Pulls 10\"x10    Ankle Pumps           Patellar Glides     Medial x2'    Superior x2'    Inferior x2'    Lateral x2'         STRENGTH     Supine 3x10 5# Prone     Abduction 5# 3x10    clams Ext - prone 5# 3x10 leaning over table    bridging 2x15 Over green SB 1 set with legs straight and 1 set with knees bent   Adducton     SLR+          Isometrics     Quad sets 10\"x10 Over foam roll        CKC     Calf raises     Wall sits Step ups

## 2023-03-31 ENCOUNTER — ANESTHESIA EVENT (OUTPATIENT)
Dept: OPERATING ROOM | Age: 58
End: 2023-03-31
Payer: COMMERCIAL

## 2023-04-06 ENCOUNTER — APPOINTMENT (OUTPATIENT)
Dept: GENERAL RADIOLOGY | Age: 58
End: 2023-04-06
Attending: STUDENT IN AN ORGANIZED HEALTH CARE EDUCATION/TRAINING PROGRAM
Payer: COMMERCIAL

## 2023-04-06 ENCOUNTER — ANESTHESIA (OUTPATIENT)
Dept: OPERATING ROOM | Age: 58
End: 2023-04-06
Payer: COMMERCIAL

## 2023-04-06 ENCOUNTER — HOSPITAL ENCOUNTER (OUTPATIENT)
Age: 58
Setting detail: OUTPATIENT SURGERY
Discharge: HOME | End: 2023-04-06
Attending: STUDENT IN AN ORGANIZED HEALTH CARE EDUCATION/TRAINING PROGRAM | Admitting: STUDENT IN AN ORGANIZED HEALTH CARE EDUCATION/TRAINING PROGRAM
Payer: COMMERCIAL

## 2023-04-06 VITALS
RESPIRATION RATE: 23 BRPM | HEIGHT: 69 IN | TEMPERATURE: 97.6 F | HEART RATE: 74 BPM | BODY MASS INDEX: 28.94 KG/M2 | OXYGEN SATURATION: 100 % | SYSTOLIC BLOOD PRESSURE: 127 MMHG | DIASTOLIC BLOOD PRESSURE: 73 MMHG | WEIGHT: 195.38 LBS

## 2023-04-06 DIAGNOSIS — M24.662 ARTHROFIBROSIS OF KNEE JOINT, LEFT: Primary | ICD-10-CM

## 2023-04-06 LAB
ABO + RH BLD: NORMAL
BLD GP AB SCN SERPL QL: NORMAL
GLUCOSE BLD-MCNC: 105 MG/DL (ref 70–99)
PERFORMED ON: ABNORMAL

## 2023-04-06 PROCEDURE — 2500000003 HC RX 250 WO HCPCS: Performed by: NURSE ANESTHETIST, CERTIFIED REGISTERED

## 2023-04-06 PROCEDURE — 6360000002 HC RX W HCPCS: Performed by: STUDENT IN AN ORGANIZED HEALTH CARE EDUCATION/TRAINING PROGRAM

## 2023-04-06 PROCEDURE — 2580000003 HC RX 258: Performed by: STUDENT IN AN ORGANIZED HEALTH CARE EDUCATION/TRAINING PROGRAM

## 2023-04-06 PROCEDURE — 6360000002 HC RX W HCPCS: Performed by: ANESTHESIOLOGY

## 2023-04-06 PROCEDURE — 7100000001 HC PACU RECOVERY - ADDTL 15 MIN: Performed by: STUDENT IN AN ORGANIZED HEALTH CARE EDUCATION/TRAINING PROGRAM

## 2023-04-06 PROCEDURE — 6360000002 HC RX W HCPCS: Performed by: NURSE ANESTHETIST, CERTIFIED REGISTERED

## 2023-04-06 PROCEDURE — 7100000000 HC PACU RECOVERY - FIRST 15 MIN: Performed by: STUDENT IN AN ORGANIZED HEALTH CARE EDUCATION/TRAINING PROGRAM

## 2023-04-06 PROCEDURE — 86850 RBC ANTIBODY SCREEN: CPT

## 2023-04-06 PROCEDURE — 6370000000 HC RX 637 (ALT 250 FOR IP): Performed by: ANESTHESIOLOGY

## 2023-04-06 PROCEDURE — A4217 STERILE WATER/SALINE, 500 ML: HCPCS | Performed by: STUDENT IN AN ORGANIZED HEALTH CARE EDUCATION/TRAINING PROGRAM

## 2023-04-06 PROCEDURE — 64447 NJX AA&/STRD FEMORAL NRV IMG: CPT | Performed by: ANESTHESIOLOGY

## 2023-04-06 PROCEDURE — 2500000003 HC RX 250 WO HCPCS: Performed by: STUDENT IN AN ORGANIZED HEALTH CARE EDUCATION/TRAINING PROGRAM

## 2023-04-06 PROCEDURE — C1776 JOINT DEVICE (IMPLANTABLE): HCPCS | Performed by: STUDENT IN AN ORGANIZED HEALTH CARE EDUCATION/TRAINING PROGRAM

## 2023-04-06 PROCEDURE — 3700000000 HC ANESTHESIA ATTENDED CARE: Performed by: STUDENT IN AN ORGANIZED HEALTH CARE EDUCATION/TRAINING PROGRAM

## 2023-04-06 PROCEDURE — 86901 BLOOD TYPING SEROLOGIC RH(D): CPT

## 2023-04-06 PROCEDURE — 97116 GAIT TRAINING THERAPY: CPT

## 2023-04-06 PROCEDURE — 86900 BLOOD TYPING SEROLOGIC ABO: CPT

## 2023-04-06 PROCEDURE — 2709999900 HC NON-CHARGEABLE SUPPLY: Performed by: STUDENT IN AN ORGANIZED HEALTH CARE EDUCATION/TRAINING PROGRAM

## 2023-04-06 PROCEDURE — 3600000014 HC SURGERY LEVEL 4 ADDTL 15MIN: Performed by: STUDENT IN AN ORGANIZED HEALTH CARE EDUCATION/TRAINING PROGRAM

## 2023-04-06 PROCEDURE — 7100000010 HC PHASE II RECOVERY - FIRST 15 MIN: Performed by: STUDENT IN AN ORGANIZED HEALTH CARE EDUCATION/TRAINING PROGRAM

## 2023-04-06 PROCEDURE — 97110 THERAPEUTIC EXERCISES: CPT

## 2023-04-06 PROCEDURE — 3700000001 HC ADD 15 MINUTES (ANESTHESIA): Performed by: STUDENT IN AN ORGANIZED HEALTH CARE EDUCATION/TRAINING PROGRAM

## 2023-04-06 PROCEDURE — 2720000010 HC SURG SUPPLY STERILE: Performed by: STUDENT IN AN ORGANIZED HEALTH CARE EDUCATION/TRAINING PROGRAM

## 2023-04-06 PROCEDURE — 73560 X-RAY EXAM OF KNEE 1 OR 2: CPT

## 2023-04-06 PROCEDURE — 3600000004 HC SURGERY LEVEL 4 BASE: Performed by: STUDENT IN AN ORGANIZED HEALTH CARE EDUCATION/TRAINING PROGRAM

## 2023-04-06 PROCEDURE — 7100000011 HC PHASE II RECOVERY - ADDTL 15 MIN: Performed by: STUDENT IN AN ORGANIZED HEALTH CARE EDUCATION/TRAINING PROGRAM

## 2023-04-06 PROCEDURE — 2500000003 HC RX 250 WO HCPCS: Performed by: ANESTHESIOLOGY

## 2023-04-06 PROCEDURE — 6360000002 HC RX W HCPCS

## 2023-04-06 PROCEDURE — 2580000003 HC RX 258: Performed by: NURSE ANESTHETIST, CERTIFIED REGISTERED

## 2023-04-06 PROCEDURE — 97161 PT EVAL LOW COMPLEX 20 MIN: CPT

## 2023-04-06 PROCEDURE — C1713 ANCHOR/SCREW BN/BN,TIS/BN: HCPCS | Performed by: STUDENT IN AN ORGANIZED HEALTH CARE EDUCATION/TRAINING PROGRAM

## 2023-04-06 DEVICE — IMPLANTABLE DEVICE
Type: IMPLANTABLE DEVICE | Site: KNEE | Status: FUNCTIONAL
Brand: PERSONA®

## 2023-04-06 DEVICE — PALACOS® R IS A FAST-CURING, RADIOPAQUE, POLY(METHYL METHACRYLATE)-BASED BONE CEMENT.PALACOS ® R CONTAINS THE X-RAY CONTRAST MEDIUM ZIRCONIUM DIOXIDE. TO IMPROVE VISIBILITY IN THE SURGICAL FIELD PALACOS ® R HAS BEEN COLOURED WITH CHLOROPHYLL (E141). THE BONE CEMENT IS PREPARED DIRECTLY BEFORE USE BY MIXING A POLYMER POWDER COMPONENT WITH A LIQUID MONOMER COMPONENT. A DUCTILE DOUGH FORMS WHICH CURES WITHIN A FEW MINUTES.
Type: IMPLANTABLE DEVICE | Site: KNEE | Status: FUNCTIONAL
Brand: PALACOS®

## 2023-04-06 DEVICE — PSN TIB STM 5 DEG SZ F L: Type: IMPLANTABLE DEVICE | Site: KNEE | Status: FUNCTIONAL

## 2023-04-06 DEVICE — IMPLANTABLE DEVICE
Type: IMPLANTABLE DEVICE | Site: KNEE | Status: FUNCTIONAL
Brand: PERSONA® VIVACIT-E®

## 2023-04-06 RX ORDER — CEFADROXIL 500 MG/1
500 CAPSULE ORAL 2 TIMES DAILY
Qty: 20 CAPSULE | Refills: 0 | Status: SHIPPED | OUTPATIENT
Start: 2023-04-06 | End: 2023-04-16

## 2023-04-06 RX ORDER — LABETALOL HYDROCHLORIDE 5 MG/ML
10 INJECTION, SOLUTION INTRAVENOUS
Status: DISCONTINUED | OUTPATIENT
Start: 2023-04-06 | End: 2023-04-06 | Stop reason: HOSPADM

## 2023-04-06 RX ORDER — OXYCODONE HYDROCHLORIDE 5 MG/1
5 TABLET ORAL EVERY 6 HOURS PRN
Qty: 28 TABLET | Refills: 0 | Status: SHIPPED | OUTPATIENT
Start: 2023-04-06 | End: 2023-04-13

## 2023-04-06 RX ORDER — DEXAMETHASONE SODIUM PHOSPHATE 4 MG/ML
INJECTION, SOLUTION INTRA-ARTICULAR; INTRALESIONAL; INTRAMUSCULAR; INTRAVENOUS; SOFT TISSUE PRN
Status: DISCONTINUED | OUTPATIENT
Start: 2023-04-06 | End: 2023-04-06 | Stop reason: SDUPTHER

## 2023-04-06 RX ORDER — TRANEXAMIC ACID 100 MG/ML
1000 INJECTION, SOLUTION INTRAVENOUS ONCE
Status: COMPLETED | OUTPATIENT
Start: 2023-04-06 | End: 2023-04-06

## 2023-04-06 RX ORDER — ACETAMINOPHEN 500 MG
1000 TABLET ORAL ONCE
Status: COMPLETED | OUTPATIENT
Start: 2023-04-06 | End: 2023-04-06

## 2023-04-06 RX ORDER — FENTANYL CITRATE 50 UG/ML
INJECTION, SOLUTION INTRAMUSCULAR; INTRAVENOUS PRN
Status: DISCONTINUED | OUTPATIENT
Start: 2023-04-06 | End: 2023-04-06 | Stop reason: SDUPTHER

## 2023-04-06 RX ORDER — SODIUM CHLORIDE 9 MG/ML
INJECTION, SOLUTION INTRAVENOUS PRN
Status: DISCONTINUED | OUTPATIENT
Start: 2023-04-06 | End: 2023-04-06 | Stop reason: HOSPADM

## 2023-04-06 RX ORDER — ONDANSETRON 2 MG/ML
INJECTION INTRAMUSCULAR; INTRAVENOUS PRN
Status: DISCONTINUED | OUTPATIENT
Start: 2023-04-06 | End: 2023-04-06 | Stop reason: SDUPTHER

## 2023-04-06 RX ORDER — LIDOCAINE HYDROCHLORIDE 20 MG/ML
INJECTION, SOLUTION INFILTRATION; PERINEURAL PRN
Status: DISCONTINUED | OUTPATIENT
Start: 2023-04-06 | End: 2023-04-06 | Stop reason: SDUPTHER

## 2023-04-06 RX ORDER — MAGNESIUM SULFATE IN WATER 40 MG/ML
2000 INJECTION, SOLUTION INTRAVENOUS ONCE
Status: COMPLETED | OUTPATIENT
Start: 2023-04-06 | End: 2023-04-06

## 2023-04-06 RX ORDER — OXYCODONE HYDROCHLORIDE 5 MG/1
5 TABLET ORAL PRN
Status: DISCONTINUED | OUTPATIENT
Start: 2023-04-06 | End: 2023-04-06 | Stop reason: HOSPADM

## 2023-04-06 RX ORDER — METHYLPREDNISOLONE 4 MG/1
TABLET ORAL
Qty: 1 KIT | Refills: 0 | Status: SHIPPED | OUTPATIENT
Start: 2023-04-06 | End: 2023-04-12

## 2023-04-06 RX ORDER — CELECOXIB 100 MG/1
200 CAPSULE ORAL 2 TIMES DAILY
Qty: 60 CAPSULE | Refills: 0 | Status: SHIPPED | OUTPATIENT
Start: 2023-04-06

## 2023-04-06 RX ORDER — LIDOCAINE HYDROCHLORIDE 10 MG/ML
1 INJECTION, SOLUTION EPIDURAL; INFILTRATION; INTRACAUDAL; PERINEURAL
Status: DISCONTINUED | OUTPATIENT
Start: 2023-04-06 | End: 2023-04-06 | Stop reason: HOSPADM

## 2023-04-06 RX ORDER — SODIUM CHLORIDE, SODIUM LACTATE, POTASSIUM CHLORIDE, CALCIUM CHLORIDE 600; 310; 30; 20 MG/100ML; MG/100ML; MG/100ML; MG/100ML
INJECTION, SOLUTION INTRAVENOUS CONTINUOUS PRN
Status: DISCONTINUED | OUTPATIENT
Start: 2023-04-06 | End: 2023-04-06 | Stop reason: SDUPTHER

## 2023-04-06 RX ORDER — CELECOXIB 100 MG/1
400 CAPSULE ORAL ONCE
Status: COMPLETED | OUTPATIENT
Start: 2023-04-06 | End: 2023-04-06

## 2023-04-06 RX ORDER — CEFAZOLIN SODIUM IN 0.9 % NACL 2 G/100 ML
2000 PLASTIC BAG, INJECTION (ML) INTRAVENOUS ONCE
Status: DISCONTINUED | OUTPATIENT
Start: 2023-04-06 | End: 2023-04-06 | Stop reason: HOSPADM

## 2023-04-06 RX ORDER — ACETAMINOPHEN 500 MG
1000 TABLET ORAL 3 TIMES DAILY PRN
Qty: 180 TABLET | Refills: 0 | Status: SHIPPED | OUTPATIENT
Start: 2023-04-06

## 2023-04-06 RX ORDER — SODIUM CHLORIDE 0.9 % (FLUSH) 0.9 %
5-40 SYRINGE (ML) INJECTION PRN
Status: DISCONTINUED | OUTPATIENT
Start: 2023-04-06 | End: 2023-04-06 | Stop reason: HOSPADM

## 2023-04-06 RX ORDER — EPHEDRINE SULFATE 50 MG/ML
INJECTION INTRAVENOUS PRN
Status: DISCONTINUED | OUTPATIENT
Start: 2023-04-06 | End: 2023-04-06 | Stop reason: SDUPTHER

## 2023-04-06 RX ORDER — ROCURONIUM BROMIDE 10 MG/ML
INJECTION, SOLUTION INTRAVENOUS PRN
Status: DISCONTINUED | OUTPATIENT
Start: 2023-04-06 | End: 2023-04-06 | Stop reason: SDUPTHER

## 2023-04-06 RX ORDER — BUPIVACAINE HYDROCHLORIDE AND EPINEPHRINE 2.5; 5 MG/ML; UG/ML
INJECTION, SOLUTION EPIDURAL; INFILTRATION; INTRACAUDAL; PERINEURAL
Status: COMPLETED | OUTPATIENT
Start: 2023-04-06 | End: 2023-04-06

## 2023-04-06 RX ORDER — OXYCODONE HYDROCHLORIDE 5 MG/1
10 TABLET ORAL PRN
Status: DISCONTINUED | OUTPATIENT
Start: 2023-04-06 | End: 2023-04-06 | Stop reason: HOSPADM

## 2023-04-06 RX ORDER — ONDANSETRON 4 MG/1
4 TABLET, FILM COATED ORAL 3 TIMES DAILY PRN
Qty: 15 TABLET | Refills: 0 | Status: SHIPPED | OUTPATIENT
Start: 2023-04-06

## 2023-04-06 RX ORDER — KETOROLAC TROMETHAMINE 30 MG/ML
INJECTION, SOLUTION INTRAMUSCULAR; INTRAVENOUS PRN
Status: DISCONTINUED | OUTPATIENT
Start: 2023-04-06 | End: 2023-04-06 | Stop reason: SDUPTHER

## 2023-04-06 RX ORDER — MAGNESIUM HYDROXIDE 1200 MG/15ML
LIQUID ORAL CONTINUOUS PRN
Status: COMPLETED | OUTPATIENT
Start: 2023-04-06 | End: 2023-04-06

## 2023-04-06 RX ORDER — ASPIRIN 81 MG/1
81 TABLET, CHEWABLE ORAL 2 TIMES DAILY
Qty: 60 TABLET | Refills: 0 | Status: SHIPPED | OUTPATIENT
Start: 2023-04-06

## 2023-04-06 RX ORDER — VANCOMYCIN HYDROCHLORIDE 1 G/20ML
INJECTION, POWDER, LYOPHILIZED, FOR SOLUTION INTRAVENOUS PRN
Status: DISCONTINUED | OUTPATIENT
Start: 2023-04-06 | End: 2023-04-06 | Stop reason: ALTCHOICE

## 2023-04-06 RX ORDER — SODIUM CHLORIDE 0.9 % (FLUSH) 0.9 %
5-40 SYRINGE (ML) INJECTION EVERY 12 HOURS SCHEDULED
Status: DISCONTINUED | OUTPATIENT
Start: 2023-04-06 | End: 2023-04-06 | Stop reason: HOSPADM

## 2023-04-06 RX ORDER — ONDANSETRON 2 MG/ML
4 INJECTION INTRAMUSCULAR; INTRAVENOUS EVERY 30 MIN PRN
Status: DISCONTINUED | OUTPATIENT
Start: 2023-04-06 | End: 2023-04-06 | Stop reason: HOSPADM

## 2023-04-06 RX ORDER — SODIUM CHLORIDE, SODIUM LACTATE, POTASSIUM CHLORIDE, CALCIUM CHLORIDE 600; 310; 30; 20 MG/100ML; MG/100ML; MG/100ML; MG/100ML
INJECTION, SOLUTION INTRAVENOUS CONTINUOUS
Status: DISCONTINUED | OUTPATIENT
Start: 2023-04-06 | End: 2023-04-06 | Stop reason: HOSPADM

## 2023-04-06 RX ORDER — PROPOFOL 10 MG/ML
INJECTION, EMULSION INTRAVENOUS PRN
Status: DISCONTINUED | OUTPATIENT
Start: 2023-04-06 | End: 2023-04-06 | Stop reason: SDUPTHER

## 2023-04-06 RX ADMIN — SODIUM CHLORIDE, SODIUM LACTATE, POTASSIUM CHLORIDE, AND CALCIUM CHLORIDE: .6; .31; .03; .02 INJECTION, SOLUTION INTRAVENOUS at 08:52

## 2023-04-06 RX ADMIN — SUGAMMADEX 200 MG: 100 INJECTION, SOLUTION INTRAVENOUS at 10:32

## 2023-04-06 RX ADMIN — FENTANYL CITRATE 50 MCG: 50 INJECTION, SOLUTION INTRAMUSCULAR; INTRAVENOUS at 09:28

## 2023-04-06 RX ADMIN — MAGNESIUM SULFATE IN WATER 2000 MG: 40 INJECTION, SOLUTION INTRAVENOUS at 09:15

## 2023-04-06 RX ADMIN — TRANEXAMIC ACID 1000 MG: 100 INJECTION, SOLUTION INTRAVENOUS at 09:01

## 2023-04-06 RX ADMIN — Medication 0.5 MG: at 10:52

## 2023-04-06 RX ADMIN — OXYCODONE 10 MG: 5 TABLET ORAL at 11:18

## 2023-04-06 RX ADMIN — DEXAMETHASONE SODIUM PHOSPHATE 8 MG: 4 INJECTION, SOLUTION INTRAMUSCULAR; INTRAVENOUS at 09:10

## 2023-04-06 RX ADMIN — VANCOMYCIN HYDROCHLORIDE 1000 MG: 1 INJECTION, POWDER, LYOPHILIZED, FOR SOLUTION INTRAVENOUS at 09:24

## 2023-04-06 RX ADMIN — HYDROMORPHONE HYDROCHLORIDE 0.5 MG: 1 INJECTION, SOLUTION INTRAMUSCULAR; INTRAVENOUS; SUBCUTANEOUS at 10:52

## 2023-04-06 RX ADMIN — KETOROLAC TROMETHAMINE 30 MG: 30 INJECTION, SOLUTION INTRAMUSCULAR; INTRAVENOUS at 10:25

## 2023-04-06 RX ADMIN — FENTANYL CITRATE 50 MCG: 50 INJECTION, SOLUTION INTRAMUSCULAR; INTRAVENOUS at 08:56

## 2023-04-06 RX ADMIN — ONDANSETRON 4 MG: 2 INJECTION INTRAMUSCULAR; INTRAVENOUS at 10:25

## 2023-04-06 RX ADMIN — ROCURONIUM BROMIDE 50 MG: 10 SOLUTION INTRAVENOUS at 08:57

## 2023-04-06 RX ADMIN — PROPOFOL 200 MG: 10 INJECTION, EMULSION INTRAVENOUS at 08:57

## 2023-04-06 RX ADMIN — EPHEDRINE SULFATE 10 MG: 50 INJECTION INTRAVENOUS at 10:08

## 2023-04-06 RX ADMIN — LIDOCAINE HYDROCHLORIDE 60 MG: 20 INJECTION, SOLUTION INFILTRATION; PERINEURAL at 08:56

## 2023-04-06 RX ADMIN — SODIUM CHLORIDE, SODIUM LACTATE, POTASSIUM CHLORIDE, AND CALCIUM CHLORIDE: .6; .31; .03; .02 INJECTION, SOLUTION INTRAVENOUS at 10:16

## 2023-04-06 RX ADMIN — EPHEDRINE SULFATE 10 MG: 50 INJECTION INTRAVENOUS at 10:24

## 2023-04-06 RX ADMIN — ACETAMINOPHEN 1000 MG: 500 TABLET ORAL at 08:43

## 2023-04-06 RX ADMIN — BUPIVACAINE HYDROCHLORIDE AND EPINEPHRINE BITARTRATE 20 ML: 2.5; .005 INJECTION, SOLUTION EPIDURAL; INFILTRATION; INTRACAUDAL; PERINEURAL at 08:20

## 2023-04-06 RX ADMIN — CELECOXIB 400 MG: 100 CAPSULE ORAL at 08:43

## 2023-04-06 ASSESSMENT — PAIN DESCRIPTION - DESCRIPTORS: DESCRIPTORS: ACHING

## 2023-04-06 ASSESSMENT — PAIN DESCRIPTION - ORIENTATION: ORIENTATION: LEFT

## 2023-04-06 ASSESSMENT — PAIN - FUNCTIONAL ASSESSMENT: PAIN_FUNCTIONAL_ASSESSMENT: 0-10

## 2023-04-06 ASSESSMENT — PAIN SCALES - GENERAL: PAINLEVEL_OUTOF10: 7

## 2023-04-06 ASSESSMENT — PAIN DESCRIPTION - LOCATION: LOCATION: KNEE

## 2023-04-06 NOTE — ANESTHESIA PROCEDURE NOTES
Peripheral Block    Patient location during procedure: pre-op  Reason for block: post-op pain management and at surgeon's request  Start time: 4/6/2023 8:20 AM  End time: 4/6/2023 8:30 AM  Staffing  Anesthesiologist: Jimenez Massey MD  Preanesthetic Checklist  Completed: patient identified, IV checked, site marked, risks and benefits discussed, surgical/procedural consents, equipment checked, timeout performed and anesthesia consent given  Peripheral Block   Patient position: supine  Prep: ChloraPrep  Provider prep: sterile gloves  Patient monitoring: responsive to questions  Block type: Femoral  Adductor canal  Laterality: left  Injection technique: single-shot  Guidance: ultrasound guided    Needle   Needle type: short-bevel   Needle gauge: 20 G  Needle localization: ultrasound guidance  Needle length: 10 cm  Assessment   Injection assessment: negative aspiration for heme, no paresthesia on injection, local visualized surrounding nerve on ultrasound and no intravascular symptoms  Paresthesia pain: none  Slow fractionated injection: yes  Hemodynamics: stable  Real-time US image taken/store: yes  Outcomes: patient tolerated procedure well    Medications Administered  bupivacaine 0.25%-EPINEPHrine injection 1:[id number] - Perineural   20 mL - 4/6/2023 8:20:00 AM

## 2023-04-06 NOTE — ANESTHESIA POSTPROCEDURE EVALUATION
Department of Anesthesiology  Postprocedure Note    Patient: Jerrell Rodas  MRN: [id number]  YOB: 1965  Date of evaluation: 4/6/2023      Procedure Summary     Date: 04/06/23 Room / Location: [facility]    Anesthesia Start: 6837 Anesthesia Stop: 6934    Procedure: LEFT TOTAL KNEE ARTHROPLASTY                   Aida Mejia (Left: Knee) Diagnosis:       Osteoarthritis of left knee, unspecified osteoarthritis type      (OSTEOARTHRITIS LEFT KNEE)    Surgeons: Arabella Moncada MD Responsible Provider: Wagner Corbin MD    Anesthesia Type: general, regional ASA Status: 2          Anesthesia Type: No value filed.     Joan Phase I: Joan Score: 8    Joan Phase II:        Anesthesia Post Evaluation    Patient location during evaluation: PACU  Level of consciousness: awake and alert  Nausea & Vomiting: no vomiting  Complications: no  Cardiovascular status: blood pressure returned to baseline  Respiratory status: acceptable  Hydration status: stable

## 2023-04-06 NOTE — PROGRESS NOTES
1. Do not eat or drink anything after 12 midnight prior to surgery. This includes no water, chewing gum mints, or ice chips. You may brush your teeth and gargle the day of surgery but DO NOT SWALLOW THE WATER. 2. Please see your family doctor/pediatrician for a history and physical and/or concerning medications. Bring any test results/reports from your physician's office. If you are under the care of a heart doctor or specialist please be aware that you may be asked to see him or her for clearance. 3. You may be asked to stop blood thinners such as Coumadin, Plavix, Fragmin, and Lovenox or Anti-inflammatories such as Aspirin, Ibuprofen, Advil, and Naproxen prior to your surgery. Please check with your doctor before stopping these or any other medications. 4. Do not smoke, and do not drink any alcoholic beverages 24 hours prior to surgery. 5. You MUST make arrangements for a responsible adult to take you home after your surgery. For your safety, you will not be allowed to leave alone or drive yourself home. Your surgery will be cancelled if you do not have a ride home. Also for your safety, it is strongly suggested someone stay with you the first 24 hrs after your surgery. 6. A parent/legal guardian must accompany a child scheduled for surgery and plan to stay at the hospital until the child is discharged. Please do not bring other children with you. 7. For your comfort,please wear simple, loose fitting clothing to the hospital.  Please do not bring valuables (money, credit cards, checkbooks, etc.) Do not wear any makeup (including no eye makeup) or nail polish on your fingers or toes. 8. For your safety, please DO NOT wear any jewelry or piercings on day of surgery. All body piercing jewelry must be removed. 9. If you have dentures, they will be removed before going to the OR; for your convenience we will provide you with a container.   If you wear contact lenses or glasses, they will be removed,
PT ambulated and urinated in urinal
Patient admitted to [facility] 8 in preparation for surgery, VSS. Consent confirmed. IV inserted into RFA. ERAS protocol initiated. Warming blanket applied, sacral foam in place, warm fluids infusing. IS education completed. Belongings on cart. NPO since 2000.
Patient arrived to PACU bay 3, placed on bedside monitor. VSS. Report obtained from OR RN and anesthesia. Patient on O2 via nc at 2  L.
Physical Therapy  Facility/Department: [facility]  Physical Therapy Initial Assessment/Treatment/Discharge     Name: Jesse Mcduffie  : 1965  MRN: [id number]  Date of Service: 2023    Discharge Recommendations:  24 hour supervision or assist   PT Equipment Recommendations  Equipment Needed: Yes  Mobility Devices: Daniel Lacrosse: Rolling      Patient Diagnosis(es): The encounter diagnosis was Arthrofibrosis of knee joint, left. Past Medical History:  has a past medical history of Arthritis, DVT of lower extremity (deep venous thrombosis) ([facility] [address] GERD (gastroesophageal reflux disease), Hx of blood clots, Hyperlipidemia, Hypertension, and Prostate cancer ([facility] [address] Past Surgical History:  has a past surgical history that includes Knee arthroscopy; Colonoscopy; Knee arthroscopy (Right); cervical fusion; Foot Amputation (Right, ); Knee Arthrotomy (Left, 7/15/2022); IR GUIDED IVC FILTER PLACEMENT (2022); and Knee arthroscopy (Left, 2022). Assessment   Assessment: Pt to [facility] for L TKA on . PTA pt lives iwt wife in 2 story house with 1 MEENA. Pt will be able to stay on main floor at [place]. pt was independent with transfers and ambulation with no AD. Pt currently is able to complete bed mobilit with mod I, transfers with supervision, and ambulate and navigate step with RW and SBA. Pt is safe to [place] home with initialy 24/7 assist when deemed medically appropraite, pt does not requires skilled PT services in acute setting at this time. Will need RW for home.   Therapy Prognosis: Excellent  Decision Making: Low Complexity  Barriers to Learning: None  No Skilled PT: Safe to return home  Requires PT Follow-Up: No  Activity Tolerance  Activity Tolerance: Patient tolerated evaluation without incident;Patient tolerated treatment well     Plan   Physcial Therapy Plan  General Plan: Discharge with evaluation only  Safety Devices  Type of Devices: Left in bed, Nurse notified, Gait belt (Left sitting
Pt has been given discharge instructions and verbalizes understanding of the following: Dressing and incision care. Post op medications including when to take and possible side effects of the following medications: Anticoagulation, pain medication, and stool softeners. How to relieve pain and swelling through non- pharmacological comfort measures. When to call the office for questions or concerns, when to follow up with surgeon, and instructions on use of the incentive spirometer. Pt has worked with physical therapy and has received a list of exercises and has discussed precautions. Pt has been educated on use of ambulation aides, bed mobility, fall safety, bathing safety, and when to start physical therapy. Pt has been given instructions on how and when to use knee brace if required by surgeon. Pt has been educated on signs and symptoms of infection, inadequate pain control, and when and who to call for changes in condition. Pt is able to tolerate PO, pt has voided post-op, and pt has ambulated safely with physical therapy post-op. Pt has met discharge criteria.
Pt instructed to drink up to 40 oz of Gatorade type drink the evening prior to surgery.    Pt informed they can have up to 40 oz of water and that it must be completed 2 hours prior to scheduled surgery
Time out performed with Dr. Yanique Ordonez for left adductor nerve block. Patient tolerated well.
PROTEIN ___________    (__) VITAMIN D HYDROXY ___________  (__) BLOOD THINNERS __________    (__) ACE/ ARBS: _____________________     (__) BETABLOCKERS __________________

## 2023-04-06 NOTE — DISCHARGE INSTRUCTIONS
Please contact [facility] Orthopaedic Nurse Navigator with any questions or concerns after your discharge. Mon- Fri [address] [phone]. If you have any issues or concerns after 5pm or on the weekend please call your surgeon's office. I will be contacting you in a few days to follow up. If you need a pain medication refill please contact your surgeon's office. Dr. Shannon Stahl Total Knee Replacement  Discharge Instructions      Activity: The first week after surgery is all about Ice, Elevation and Rest!        [address] way to elevate knee above heart, while keeping the knee straight. Pillows should be placed under the FOOT and leg, such that the leg is held straight. You should feel stretching in the back of the knee. If there is too much pain add pillows under the knee, but the leg should be as straight as possible  Placing pillows under the knee only and keeping the knee bent will make it very difficult to get it straight with physical therapy. Use a walker when ambulating. Physical therapy.    Typically starts 10-14 days after surgery unless otherwise instructed  Referral placed to outpatient location discussed with surgeon or for home PT if you do not have transportation for outpatient PT    To prevent Clot formation, you have been placed on the following medication:   ASA 81mg BID x 20 days    Surgical Site Care:  Keep dressing in place until follow up visit, Call the office if drainage  Showering is permitted with waterproof Mepilex dressing   Will remove dressing at first follow up appointment    Pain Medications  First and foremost you should take Tylenol and Celebrex as prescribed for baseline pain control  If you have medical reasons not to take either medicine they were not prescribed  You were also given Oxycodone as needed  This for is pain despite the other medications and is typically needed for the first 3-5 days after surgery  Be sure to drink plenty of fluids (recommend water)

## 2023-04-06 NOTE — H&P
Update History & Physical    The patient's History and Physical of March 24, 2023 was reviewed with the patient and I examined the patient. There was no change. The surgical site was confirmed by the patient and me. Plan: The risks, benefits, expected outcome, and alternative to the recommended procedure have been discussed with the patient. Patient understands and wants to proceed with the procedure.      Electronically signed by Dalia Ko MD on 0/8/6030 at 2:39 AM

## 2023-04-06 NOTE — OP NOTE
Operative Note      Patient: Roddy Duane  YOB: 1965  MRN: [id number]    Date of Procedure: 4/6/2023    Pre-Op Diagnosis: OSTEOARTHRITIS LEFT KNEE    Post-Op Diagnosis: Same       Procedure(s):  LEFT TOTAL KNEE ARTHROPLASTY                   Elizabeth CLAYTON    Surgeon(s):  Jen Willard MD    Assistant:   Surgical Assistant: Lior Buitrago    Anesthesia: General    Estimated Blood Loss (mL): less than 302     Complications: None    Specimens:   * No specimens in log *    Implants:  Implant Name Type Inv. Item Serial No.  Lot No. LRB No. Used Action   CEMENT BONE 40GM HI VISC PALACOS R - [id number]  CEMENT BONE 40GM HI VISC PALACOS R  [employer]WD [id number] Left 2 Implanted   PSN TIB STM 5 DEG SZ F L - [id number]  PSN TIB STM 5 DEG SZ F L  [employer]- [id number] Left 1 Implanted   COMPONENT FEM SZ 9 RICHY L KNEE CO CHROM KATHRIN CRUCE RET COR - [id number]  COMPONENT FEM SZ 9 RICHY L KNEE CO CHROM KATHRIN CRUCE RET COR  [employer]- [id number] Left 1 Implanted   PSN MC VE ASF L 10MM 8-11 EF - [id number]  PSN MC VE ASF L 10MM 8-11 EF  [employer]- [id number] Left 1 Implanted         Drains: * No LDAs found *    Findings: severe OA and arthrofibrosis, no gross signs of infection    Detailed Description of Procedure:   Clinical Indications: This is a 58 y/o M that was evaluated for L knee post-septic arthritis and arthrofibrosis by myself, noted to have failed extensive conservative measures and desired to have surgical intervention. Risks benefits limitations and alternatives to L TKA with synovectomy and scar excision were discussed with the patient who wished to proceed. The patient was met in the preoperative holding area last-minute questions were answered, the L knee was marked, consent was verified. An adductor canal and IPAC block were administered per anesthesia. Ancef and TXA were administered.   The patient was taken to the

## 2023-04-06 NOTE — ANESTHESIA PRE PROCEDURE
Department of Anesthesiology  Preprocedure Note       Name:  Mitchel Cole   Age:  59 y.o.  :  1965                                          MRN:  [id number]         Date:  2023      Surgeon: Neeraj Blancas):  Maliha Shafer MD    Procedure: Procedure(s):  LEFT TOTAL KNEE ARTHROPLASTY                   ADRIÁN PERSONA NOTE: ADDUCTORT CANAL BLOCK, IPAC    Medications prior to admission:   Prior to Admission medications    Medication Sig Start Date End Date Taking?  Authorizing Provider   omeprazole (PRILOSEC) 20 MG delayed release capsule Take 20 mg by mouth daily ALTERNATES BETWEEN NEXIUM AND PRILOSEC, ONLY ONE A DAY 6/20/10   Historical Provider, MD   losartan-hydroCHLOROthiazide (HYZAAR) 100-25 MG per tablet Take 1 tablet by mouth daily 23   Historical Provider, MD   metoprolol succinate (TOPROL XL) 50 MG extended release tablet Take 100 mg by mouth daily 22   Historical Provider, MD   sildenafil (VIAGRA) 50 MG tablet Take 50 mg by mouth as needed 17   Historical Provider, MD   esomeprazole (NEXIUM) 20 MG delayed release capsule Take 20 mg by mouth every morning (before breakfast) 08   Historical Provider, MD   acetaminophen (TYLENOL) 500 MG tablet Take 500 mg by mouth every 8 hours as needed 22   Historical Provider, MD       Current medications:    Current Facility-Administered Medications   Medication Dose Route Frequency Provider Last Rate Last Admin    lidocaine PF 1 % injection 1 mL  1 mL IntraDERmal Once PRN Mally Guerrero MD        acetaminophen (TYLENOL) tablet 1,000 mg  1,000 mg Oral Once Mally Guerrero MD        celecoxib (CELEBREX) capsule 400 mg  400 mg Oral Once Mally Guerrero MD        lactated ringers IV soln infusion   IntraVENous Continuous Mally Guerrero MD        sodium chloride flush 0.9 % injection 5-40 mL  5-40 mL IntraVENous 2 times per day Mally Guerrero MD        sodium chloride flush 0.9 %

## 2023-04-17 ENCOUNTER — TREATMENT (OUTPATIENT)
Dept: PHYSICAL THERAPY | Age: 58
End: 2023-04-17
Payer: COMMERCIAL

## 2023-04-17 DIAGNOSIS — M25.562 LEFT KNEE PAIN, UNSPECIFIED CHRONICITY: ICD-10-CM

## 2023-04-17 DIAGNOSIS — R26.2 DIFFICULTY WALKING: Primary | ICD-10-CM

## 2023-04-17 DIAGNOSIS — M25.662 DECREASED ROM OF LEFT KNEE: ICD-10-CM

## 2023-04-17 DIAGNOSIS — M17.12 PRIMARY OSTEOARTHRITIS OF LEFT KNEE: ICD-10-CM

## 2023-04-17 PROCEDURE — 97016 VASOPNEUMATIC DEVICE THERAPY: CPT | Performed by: PHYSICAL THERAPIST

## 2023-04-17 PROCEDURE — 97530 THERAPEUTIC ACTIVITIES: CPT | Performed by: PHYSICAL THERAPIST

## 2023-04-17 PROCEDURE — 97110 THERAPEUTIC EXERCISES: CPT | Performed by: PHYSICAL THERAPIST

## 2023-04-17 NOTE — PROGRESS NOTES
Discharge    Electronically signed by: Valerie Griffith, PT, MS,  OMT-C    Physical Therapist [id number] [address] license #[id number]  Physical Therapist [place] license #[id number]           Note: If patient does not return for scheduled/ recommended follow up visits, this note will serve as a discharge from care along with most recent update on progress.

## 2023-04-19 ENCOUNTER — TREATMENT (OUTPATIENT)
Dept: PHYSICAL THERAPY | Age: 58
End: 2023-04-19

## 2023-04-19 DIAGNOSIS — R26.2 DIFFICULTY WALKING: Primary | ICD-10-CM

## 2023-04-19 DIAGNOSIS — M25.562 LEFT KNEE PAIN, UNSPECIFIED CHRONICITY: ICD-10-CM

## 2023-04-19 DIAGNOSIS — M25.662 DECREASED ROM OF LEFT KNEE: ICD-10-CM

## 2023-04-19 DIAGNOSIS — M17.12 PRIMARY OSTEOARTHRITIS OF LEFT KNEE: ICD-10-CM

## 2023-04-19 NOTE — PROGRESS NOTES
by patients Functional Deficits. [] Progressing: [] Met: [] Not Met: [] Adjusted  3. Patient will demonstrate an increase in Strength to good proximal hip strength and control, within 5lb HHD in LE to allow for proper functional mobility as indicated by patients Functional Deficits. [] Progressing: [] Met: [] Not Met: [] Adjusted  4. Patient will return to all functional activities without increased symptoms or restriction. [] Progressing: [] Met: [] Not Met: [] Adjusted  5. Pt will be able to walk 45-60 min w/ normal gt pattern with hiking on uneven surfaces. (patient specific functional goal)    [] Progressing: [] Met: [] Not Met: [] Adjusted    Overall Progression Towards Functional goals/ Treatment Progress Update:  [] Patient is progressing as expected towards functional goals listed. [] Progression is slowed due to complexities/Impairments listed. [] Progression has been slowed due to co-morbidities. [x] Plan just implemented, too soon to assess goals progression <30days   [] Goals require adjustment due to lack of progress  [] Patient is not progressing as expected and requires additional follow up with physician  [] Other    ASSESSMENT:  ROM in L knee ext good on arrival. Edema remaining under control. Quad activation and muscle strength good. Pt is progressing very well after surg w/ controlling symptoms and progressing in motion and function. Continue to monitor closely due to complex condition prior to surg. Treatment/Activity Tolerance:  [x] Patient tolerated treatment well [] Patient limited by fatique  [] Patient limited by pain  [] Patient limited by other medical complications  [] Other:     Prognosis: [x] Good [] Fair  [] Poor    Patient Requires Follow-up: [x] Yes  [] No    PLAN: Cont therapy for rehab after TKA.   [x] Continue per plan of care [] Alter current plan (see comments)  [x] Plan of care initiated [] Hold pending MD visit [] Discharge    Electronically signed by: Sushant Smith

## 2023-04-21 ENCOUNTER — TREATMENT (OUTPATIENT)
Dept: PHYSICAL THERAPY | Age: 58
End: 2023-04-21

## 2023-04-21 DIAGNOSIS — M17.12 PRIMARY OSTEOARTHRITIS OF LEFT KNEE: ICD-10-CM

## 2023-04-21 DIAGNOSIS — M25.662 DECREASED ROM OF LEFT KNEE: ICD-10-CM

## 2023-04-21 DIAGNOSIS — M25.562 LEFT KNEE PAIN, UNSPECIFIED CHRONICITY: ICD-10-CM

## 2023-04-21 DIAGNOSIS — R26.2 DIFFICULTY WALKING: Primary | ICD-10-CM

## 2023-04-21 NOTE — PROGRESS NOTES
[facility]   Phone: [phone]    Fax: [phone]      Physical Therapy Treatment Note/ Progress Report:       Date:  2023    Patient Name:  Parish Torres    :  1965  MRN: [id number]  Restrictions/Precautions:  recent TKA, history of DVT, hx of infection;  Medical/Treatment Diagnosis Information:  Diagnosis: M17.0 Bilat primary OA knee; M25.662 decreased ROM L knee; Z98.890 s/p L knee TKA    Treating Diagnosis: dec ROM, pain in L knee, weakness, gt deviations;   DOS 23 L knee TKA  Insurance/Certification information:  PT Insurance Information: Felipe  Physician Information:  Referring Practitioner: Dr. Ramone العلي  Has the plan of care been signed (Y/N):        []  Yes  [x]  No     Date of Patient follow up with Physician: 2023      Is this a Progress Report:     []  Yes  [x]  No        If Yes:  Date Range for reporting period:  Beginnin23  Ending    Progress report will be due (10 Rx or 30 days whichever is less): 68       Recertification will be due (POC Duration  / 90 days whichever is less): 23         Visit # Insurance Allowable Auth Required   4 BMN []  Yes []  No    He has had 16 visits earlier this year for prehab Rx    Functional Scale: LEFS 30%    Date assessed:  23      Latex Allergy:  [x]NO      []YES  Preferred Language for Healthcare:   [x]English       []other:    Pain level:  3/10     SUBJECTIVE:  2+ weeks post op  Patient reports that overall his knee is feeling pretty good. Only taking Tylenol as needed. Has continued to take his anti biotics and aspirin as prescribed. He does state his calf has been sore this week. OBJECTIVE:    Observation: his incision is covered with waterproof bandage, he has changed it twice and will wear it until MD on Monday.      Flexibility L R Comment   Hamstring Min tightness  2023   Gastroc Min tightness      ITB        Quad                         ROM

## 2023-04-24 ENCOUNTER — OFFICE VISIT (OUTPATIENT)
Dept: ORTHOPEDIC SURGERY | Age: 58
End: 2023-04-24

## 2023-04-24 VITALS — WEIGHT: 195 LBS | HEIGHT: 69 IN | BODY MASS INDEX: 28.88 KG/M2

## 2023-04-24 DIAGNOSIS — M17.11 OSTEOARTHRITIS OF RIGHT KNEE, UNSPECIFIED OSTEOARTHRITIS TYPE: Primary | ICD-10-CM

## 2023-04-24 DIAGNOSIS — Z96.652 S/P TOTAL KNEE ARTHROPLASTY, LEFT: ICD-10-CM

## 2023-04-24 PROCEDURE — 99024 POSTOP FOLLOW-UP VISIT: CPT | Performed by: STUDENT IN AN ORGANIZED HEALTH CARE EDUCATION/TRAINING PROGRAM

## 2023-04-24 NOTE — PROGRESS NOTES
History: 68-year-old male presents for first follow-up visit after left total knee replacement on April 6. This was done for history of septic arthritis. He completed his 10 days of antibiotics postop. He has been doing outpatient physical therapy which has been going well. He says he is able to get full extension of the knee. He is off narcotic pain medication. Pain and swelling have been continuing to improve. He is ambulating without assistive devices. He says that at this point the right knee is the main thing holding back his mobility and he is unable to stand for long periods of time due to the right knee. Exam:     L Knee Exam: Exam of the left knee shows well approximated incision without erythema or drainage. No effusion and minimal soft tissue swelling. Range of motion is 0 to about 110. No ligamentous instability or neurovascular compromise distally. R Knee Exam:  Skin in tact without ulcerations or previous scars  Minimal Effusion  Tender to palpation at medial joint line  ROM: a few degrees short of full extension - 120 with pain and crepitus   Grossly stable to varus / valgus stress and posterior drawer   No evidence of neurovascular compromise distally  Special tests: None      Imaging:     L knee: Postop imaging reviewed with the patient. Previous right knee x-rays are also reviewed which show KL grade 4 varus OA of the right knee with KL grade 4 patellofemoral involvement. Assessment: 68-year-old male status post left TKA April 6. Also with end-stage right knee OA. Plan:   L Knee: Recovering very well. I am very happy with his knee extension today. He will keep working on improving flexion and quad strengthening with PT follow-up in 2 to 3 weeks.   I did discuss the potential for manipulation with him although I am optimistic he will be able to get his flexion with PT.     R Knee: Regarding his right knee this continues to affect his mobility and ability to complete

## 2023-04-25 ENCOUNTER — TREATMENT (OUTPATIENT)
Dept: PHYSICAL THERAPY | Age: 58
End: 2023-04-25
Payer: COMMERCIAL

## 2023-04-25 DIAGNOSIS — M25.562 LEFT KNEE PAIN, UNSPECIFIED CHRONICITY: ICD-10-CM

## 2023-04-25 DIAGNOSIS — M25.662 DECREASED ROM OF LEFT KNEE: ICD-10-CM

## 2023-04-25 DIAGNOSIS — M17.12 PRIMARY OSTEOARTHRITIS OF LEFT KNEE: ICD-10-CM

## 2023-04-25 DIAGNOSIS — R26.2 DIFFICULTY WALKING: Primary | ICD-10-CM

## 2023-04-25 PROCEDURE — 97110 THERAPEUTIC EXERCISES: CPT | Performed by: PHYSICAL THERAPIST

## 2023-04-25 PROCEDURE — 97530 THERAPEUTIC ACTIVITIES: CPT | Performed by: PHYSICAL THERAPIST

## 2023-04-25 NOTE — PROGRESS NOTES
[facility]   Phone: [phone]    Fax: [phone]      Physical Therapy Treatment Note/ Progress Report:       Date:  2023    Patient Name:  Robson Rm    :  1965  MRN: [id number]  Restrictions/Precautions:  recent TKA, history of DVT, hx of infection;  Medical/Treatment Diagnosis Information:  Diagnosis: M17.0 Bilat primary OA knee; M25.662 decreased ROM L knee; Z98.890 s/p L knee TKA    Treating Diagnosis: dec ROM, pain in L knee, weakness, gt deviations;   DOS 23 L knee TKA  Insurance/Certification information:  PT Insurance Information: Felipe  Physician Information:  Referring Practitioner: Dr. Ioana Chew  Has the plan of care been signed (Y/N):        []  Yes  [x]  No     Date of Patient follow up with Physician: 2023      Is this a Progress Report:     []  Yes  [x]  No        If Yes:  Date Range for reporting period:  Beginnin23  Ending    Progress report will be due (10 Rx or 30 days whichever is less):        Recertification will be due (POC Duration  / 90 days whichever is less): 23         Visit # Insurance Allowable Auth Required   5 BMN []  Yes []  No    He has had 16 visits earlier this year for prehab Rx    Functional Scale: LEFS 30%    Date assessed:  23      Latex Allergy:  [x]NO      []YES  Preferred Language for Healthcare:   [x]English       []other:    Pain level:  2-3/10 (23)     SUBJECTIVE:  2+ weeks post op  Pt saw the surgeon yesterday and he was happy with his progress. Pt reports minimal pain in his left knee. Main complaint is soreness and aching in his left calf that is worse at night. He is hardly using his crutch anymore, he mainly only uses it when he goes out of the house. He is still taking aspirin but has finished the antibiotic. He is taking Tylenol as needed. He is sleeping better at night.           OBJECTIVE:    Observation: his incision is covered with waterproof bandage,

## 2023-04-28 ENCOUNTER — TREATMENT (OUTPATIENT)
Dept: PHYSICAL THERAPY | Age: 58
End: 2023-04-28
Payer: COMMERCIAL

## 2023-04-28 DIAGNOSIS — M25.662 DECREASED ROM OF LEFT KNEE: ICD-10-CM

## 2023-04-28 DIAGNOSIS — M25.562 LEFT KNEE PAIN, UNSPECIFIED CHRONICITY: ICD-10-CM

## 2023-04-28 DIAGNOSIS — M17.12 PRIMARY OSTEOARTHRITIS OF LEFT KNEE: ICD-10-CM

## 2023-04-28 DIAGNOSIS — R26.2 DIFFICULTY WALKING: Primary | ICD-10-CM

## 2023-04-28 PROCEDURE — 97140 MANUAL THERAPY 1/> REGIONS: CPT | Performed by: PHYSICAL THERAPIST

## 2023-04-28 PROCEDURE — 97530 THERAPEUTIC ACTIVITIES: CPT | Performed by: PHYSICAL THERAPIST

## 2023-04-28 PROCEDURE — 97016 VASOPNEUMATIC DEVICE THERAPY: CPT | Performed by: PHYSICAL THERAPIST

## 2023-04-28 PROCEDURE — 97110 THERAPEUTIC EXERCISES: CPT | Performed by: PHYSICAL THERAPIST

## 2023-04-28 NOTE — PROGRESS NOTES
Patient limited by other medical complications  [] Other:     Prognosis: [x] Good [] Fair  [] Poor    Patient Requires Follow-up: [x] Yes  [] No    PLAN: Cont therapy for rehab after TKA. [x] Continue per plan of care [] Alter current plan (see comments)  [x] Plan of care initiated [] Hold pending MD visit [] Discharge    Electronically signed by:       Pee Mathew PT      Board Certified Orthopaedic Clinical Specialist  [facility] Certified  Physical Therapist    [place] PT #[id number]  [place] PT #[id number]      Note: If patient does not return for scheduled/ recommended follow up visits, this note will serve as a discharge from care along with most recent update on progress.

## 2023-05-01 ENCOUNTER — TREATMENT (OUTPATIENT)
Dept: PHYSICAL THERAPY | Age: 58
End: 2023-05-01
Payer: COMMERCIAL

## 2023-05-01 DIAGNOSIS — M25.662 DECREASED ROM OF LEFT KNEE: ICD-10-CM

## 2023-05-01 DIAGNOSIS — R26.2 DIFFICULTY WALKING: Primary | ICD-10-CM

## 2023-05-01 DIAGNOSIS — M25.562 LEFT KNEE PAIN, UNSPECIFIED CHRONICITY: ICD-10-CM

## 2023-05-01 DIAGNOSIS — M17.12 PRIMARY OSTEOARTHRITIS OF LEFT KNEE: ICD-10-CM

## 2023-05-01 PROCEDURE — 97016 VASOPNEUMATIC DEVICE THERAPY: CPT | Performed by: PHYSICAL THERAPIST

## 2023-05-01 PROCEDURE — 97530 THERAPEUTIC ACTIVITIES: CPT | Performed by: PHYSICAL THERAPIST

## 2023-05-01 PROCEDURE — 97110 THERAPEUTIC EXERCISES: CPT | Performed by: PHYSICAL THERAPIST

## 2023-05-01 PROCEDURE — 97140 MANUAL THERAPY 1/> REGIONS: CPT | Performed by: PHYSICAL THERAPIST

## 2023-05-01 NOTE — PROGRESS NOTES
[email or web address]   Phone: [phone]    Fax: [phone]      Physical Therapy Treatment Note/ Progress Report:       Date:  2023    Patient Name:  Ignacio Huston    :  1965  MRN: [id number]  Restrictions/Precautions:  recent TKA, history of DVT, hx of infection;  Medical/Treatment Diagnosis Information:  Diagnosis: M17.0 Bilat primary OA knee; M25.662 decreased ROM L knee; Z98.890 s/p L knee TKA ; R22.4 - localized swelling of lower limb   Treating Diagnosis: dec ROM, pain in L knee, weakness, gt deviations;   DOS 23 L knee TKA  Insurance/Certification information:  PT Insurance Information: Felipe  Physician Information:  Referring Practitioner: Dr. Mary Lujan  Has the plan of care been signed (Y/N):        []  Yes  [x]  No     Date of Patient follow up with Physician: 2023      Is this a Progress Report:     []  Yes  [x]  No        If Yes:  Date Range for reporting period:  Beginnin23  Ending    Progress report will be due (10 Rx or 30 days whichever is less): 03       Recertification will be due (POC Duration  / 90 days whichever is less): 23         Visit # Insurance Allowable Auth Required   7 BMN []  Yes []  No    He has had 16 visits earlier this year for prehab Rx    Functional Scale: LEFS 30%    Date assessed:  23      Latex Allergy:  [x]NO      []YES  Preferred Language for Healthcare:   [x]English       []other:    Pain level:  2-3/10      SUBJECTIVE: 4 weeks post op  Patient reports that he is doing well. He is only using cane outside the home for long distances. Pain is well controlled. Compliant w/ HEP. Knee feels looser today than on last session.        OBJECTIVE:    Observation:     Flexibility L R Comment   Hamstring Min tightness  2023   Gastroc Min tightness      ITB        Quad                         ROM  2023 LEFT RIGHT   HIP Flex       HIP Abd       HIP Ext       HIP IR       HIP ER       Knee ext 0

## 2023-05-05 ENCOUNTER — TREATMENT (OUTPATIENT)
Dept: PHYSICAL THERAPY | Age: 58
End: 2023-05-05

## 2023-05-05 DIAGNOSIS — M17.12 PRIMARY OSTEOARTHRITIS OF LEFT KNEE: ICD-10-CM

## 2023-05-05 DIAGNOSIS — M25.562 LEFT KNEE PAIN, UNSPECIFIED CHRONICITY: ICD-10-CM

## 2023-05-05 DIAGNOSIS — M25.662 DECREASED ROM OF LEFT KNEE: ICD-10-CM

## 2023-05-05 DIAGNOSIS — R26.2 DIFFICULTY WALKING: Primary | ICD-10-CM

## 2023-05-05 NOTE — PROGRESS NOTES
[facility]   Phone: [phone]    Fax: [phone]      Physical Therapy Treatment Note/ Progress Report:       Date:  2023    Patient Name:  Ignacio Huston    :  1965  MRN: [id number]  Restrictions/Precautions:  recent TKA, history of DVT, hx of infection;  Medical/Treatment Diagnosis Information:  Diagnosis: M17.0 Bilat primary OA knee; M25.662 decreased ROM L knee; Z98.890 s/p L knee TKA ; R22.4 - localized swelling of lower limb   Treating Diagnosis: dec ROM, pain in L knee, weakness, gt deviations;   DOS 23 L knee TKA  Insurance/Certification information:  PT Insurance Information: Felipe  Physician Information:  Referring Practitioner: Dr. Mary Lujan  Has the plan of care been signed (Y/N):        []  Yes  [x]  No     Date of Patient follow up with Physician: 2023      Is this a Progress Report:     []  Yes  [x]  No        If Yes:  Date Range for reporting period:  Beginnin23  Ending    Progress report will be due (10 Rx or 30 days whichever is less):        Recertification will be due (POC Duration  / 90 days whichever is less): 23         Visit # Insurance Allowable Auth Required   8 BMN []  Yes []  No    He has had 16 visits earlier this year for prehab Rx    Functional Scale: LEFS 30%    Date assessed:  23      Latex Allergy:  [x]NO      []YES  Preferred Language for Healthcare:   [x]English       []other:    Pain level:  2-3/10      SUBJECTIVE: 4+ weeks post op  He states he feels a little better every day. He was out in his yard yesterday doing some light work, may have over done it. Noticed some increase in swelling.         OBJECTIVE:    Observation:     Flexibility L R Comment   Hamstring Min tightness  2023   Gastroc Min tightness      ITB        Quad                         ROM  2023 LEFT RIGHT   HIP Flex       HIP Abd       HIP Ext       HIP IR       HIP ER       Knee ext 0 deg after stretching

## 2023-05-08 ENCOUNTER — TREATMENT (OUTPATIENT)
Dept: PHYSICAL THERAPY | Age: 58
End: 2023-05-08
Payer: COMMERCIAL

## 2023-05-08 DIAGNOSIS — R22.40 LOCALIZED SWELLING OF LOWER LEG: ICD-10-CM

## 2023-05-08 DIAGNOSIS — M25.662 DECREASED ROM OF LEFT KNEE: ICD-10-CM

## 2023-05-08 DIAGNOSIS — R26.2 DIFFICULTY WALKING: Primary | ICD-10-CM

## 2023-05-08 DIAGNOSIS — M25.562 LEFT KNEE PAIN, UNSPECIFIED CHRONICITY: ICD-10-CM

## 2023-05-08 PROCEDURE — 97140 MANUAL THERAPY 1/> REGIONS: CPT | Performed by: PHYSICAL THERAPIST

## 2023-05-08 PROCEDURE — 97530 THERAPEUTIC ACTIVITIES: CPT | Performed by: PHYSICAL THERAPIST

## 2023-05-08 PROCEDURE — 97016 VASOPNEUMATIC DEVICE THERAPY: CPT | Performed by: PHYSICAL THERAPIST

## 2023-05-08 PROCEDURE — 97110 THERAPEUTIC EXERCISES: CPT | Performed by: PHYSICAL THERAPIST

## 2023-05-08 NOTE — PROGRESS NOTES
and ambulation/stair navigation            Written HEP est  [] (40393)Reviewed/Progressed HEP activities related to improving balance, coordination, kinesthetic sense, posture, motor skill, proprioception of core, proximal hip and LE for self care, mobility, lifting, and ambulation/stair navigation      Manual Treatments:  PROM / STM / Oscillations-Mobs:  G-I, II, III, IV (PA's, Inf., Post.)  [x] (19976) Provided manual therapy to mobilize LE, proximal hip and/or LS spine soft tissue/joints for the purpose of modulating pain, promoting relaxation,  increasing ROM, reducing/eliminating soft tissue swelling/inflammation/restriction, improving soft tissue extensibility and allowing for proper ROM for normal function with self care, mobility, lifting and ambulation. Modalities:     [x] GAME READY (VASO)- for significant edema, swelling, pain control w/ elevation. Charges:  Timed Code Treatment Minutes: 45'   Total Treatment Minutes: 72'     [] EVAL (LOW) 455 1011 (typically 20 minutes face-to-face)  [] EVAL (MOD) 34620 (typically 30 minutes face-to-face)  [] EVAL (HIGH) 84950 (typically 45 minutes face-to-face)  [] RE-EVAL   [x] FO(88868) x 1  (20')  [] IONTO  [] NMR (43147) x      [x] VASO x10'  [x] Manual (58131) x  1 (x10')    [] Other:  [x] TA (77379) x 1  (15')   [] Mech Traction (43269)  [] ES(attended) (92267)      [] ES (un) (54325):    GOALS:  Patient stated goal: to get back to all recreational activities and work related activities;  [] Progressing: [] Met: [] Not Met: [] Adjusted    Therapist goals for Patient:   Short Term Goals: To be achieved in: 2 weeks  1. Independent in HEP and progression per patient tolerance, in order to prevent re-injury. [] Progressing: [] Met: [] Not Met: [] Adjusted  2. Patient will have a decrease in pain to facilitate improvement in movement, function, and ADLs as indicated by Functional Deficits. [] Progressing: [] Met: [] Not Met: [] Adjusted    Long Term Goals:  To be

## 2023-05-12 ENCOUNTER — TREATMENT (OUTPATIENT)
Dept: PHYSICAL THERAPY | Age: 58
End: 2023-05-12

## 2023-05-12 DIAGNOSIS — R26.2 DIFFICULTY WALKING: Primary | ICD-10-CM

## 2023-05-12 DIAGNOSIS — R22.40 LOCALIZED SWELLING OF LOWER LEG: ICD-10-CM

## 2023-05-12 DIAGNOSIS — M25.662 DECREASED ROM OF LEFT KNEE: ICD-10-CM

## 2023-05-12 DIAGNOSIS — M25.562 LEFT KNEE PAIN, UNSPECIFIED CHRONICITY: ICD-10-CM

## 2023-05-15 ENCOUNTER — TREATMENT (OUTPATIENT)
Dept: PHYSICAL THERAPY | Age: 58
End: 2023-05-15
Payer: COMMERCIAL

## 2023-05-15 DIAGNOSIS — R26.2 DIFFICULTY WALKING: Primary | ICD-10-CM

## 2023-05-15 DIAGNOSIS — M25.662 DECREASED ROM OF LEFT KNEE: ICD-10-CM

## 2023-05-15 DIAGNOSIS — R22.40 LOCALIZED SWELLING OF LOWER LEG: ICD-10-CM

## 2023-05-15 DIAGNOSIS — M25.562 LEFT KNEE PAIN, UNSPECIFIED CHRONICITY: ICD-10-CM

## 2023-05-15 PROCEDURE — 97140 MANUAL THERAPY 1/> REGIONS: CPT | Performed by: PHYSICAL THERAPIST

## 2023-05-15 PROCEDURE — 97530 THERAPEUTIC ACTIVITIES: CPT | Performed by: PHYSICAL THERAPIST

## 2023-05-15 PROCEDURE — 97110 THERAPEUTIC EXERCISES: CPT | Performed by: PHYSICAL THERAPIST

## 2023-05-15 NOTE — PROGRESS NOTES
[facility]   Phone: [phone]    Fax: [phone]      Physical Therapy Treatment Note/ Progress Report:       Date:  5/15/2023    Patient Name:  Edvin Grimes    :  1965  MRN: [id number]  Restrictions/Precautions:  recent TKA, history of DVT, hx of infection;  Medical/Treatment Diagnosis Information:  Diagnosis: M17.0 Bilat primary OA knee; M25.662 decreased ROM L knee; Z98.890 s/p L knee TKA ; R22.4 - localized swelling of lower limb   Treating Diagnosis: dec ROM, pain in L knee, weakness, gt deviations;   DOS 23 L knee TKA  Insurance/Certification information:  PT Insurance Information: Felipe  Physician Information:  Referring Practitioner: Dr. Mukesh Davey  Has the plan of care been signed (Y/N):        []  Yes  [x]  No     Date of Patient follow up with Physician: 2023      Is this a Progress Report:     []  Yes  [x]  No        If Yes:  Date Range for reporting period:  Beginnin23  Ending    Progress report will be due (10 Rx or 30 days whichever is less): 75       Recertification will be due (POC Duration  / 90 days whichever is less): 23         Visit # Insurance Allowable Auth Required   10 BMN []  Yes []  No    He has had 16 visits earlier this year for prehab Rx    Functional Scale: LEFS 30%    Date assessed:  23      Latex Allergy:  [x]NO      []YES  Preferred Language for Healthcare:   [x]English       []other:    Pain level:  2-3/10      SUBJECTIVE: 5+ weeks post op  Patient reports that he had some pain in posterior calf after last session. He did have some pain over weekend but no swelling or bruising noted in calf. Pt continues to monitor his activities.         OBJECTIVE:    Observation:     Flexibility L R Comment   Hamstring Min tightness  2023   Gastroc Min tightness      ITB        Quad                         ROM  5/15/2023 LEFT RIGHT   HIP Flex       HIP Abd       HIP Ext       HIP IR       HIP ER

## 2023-05-17 ENCOUNTER — TELEPHONE (OUTPATIENT)
Dept: ORTHOPEDIC SURGERY | Age: 58
End: 2023-05-17

## 2023-05-17 NOTE — TELEPHONE ENCOUNTER
Imported medical records for 4/15/21 to date (Adriana Sullivan) into [facility] for [facility] and [facility]

## 2023-05-19 ENCOUNTER — TREATMENT (OUTPATIENT)
Dept: PHYSICAL THERAPY | Age: 58
End: 2023-05-19

## 2023-05-19 DIAGNOSIS — R26.2 DIFFICULTY WALKING: Primary | ICD-10-CM

## 2023-05-19 DIAGNOSIS — M25.562 LEFT KNEE PAIN, UNSPECIFIED CHRONICITY: ICD-10-CM

## 2023-05-19 DIAGNOSIS — R22.40 LOCALIZED SWELLING OF LOWER LEG: ICD-10-CM

## 2023-05-19 DIAGNOSIS — M25.662 DECREASED ROM OF LEFT KNEE: ICD-10-CM

## 2023-05-19 NOTE — PROGRESS NOTES
[facility]   Phone: [phone]    Fax: [phone]     Physical Therapy Re-Certification Plan of Care    Dear Dr Sandra Carter  ,    We had the pleasure of treating the following patient for physical therapy services at [address] [place] [address]. A summary of our findings can be found in the updated assessment below. This includes our plan of care. If you have any questions or concerns regarding these findings, please do not hesitate to contact me at the office phone number checked above.   Thank you for the referral.     Physician Signature:________________________________Date:__________________  By signing above (or electronic signature), therapists plan is approved by physician      Overall Response to Treatment:   [x]Patient is responding well to treatment and improvement is noted with regards  to goals   [x]Patient should continue to improve in reasonable time if they continue HEP   []Patient has plateaued and is no longer responding to skilled PT intervention    []Patient is getting worse and would benefit from return to referring MD   []Patient unable to adhere to initial POC   []Other:     Physical Therapy Treatment Note/ Progress Report:       Date:  2023    Patient Name:  Young Anderson    :  1965  MRN: [id number]  Restrictions/Precautions:  recent TKA, history of DVT, hx of infection;  Medical/Treatment Diagnosis Information:  Diagnosis: M17.0 Bilat primary OA knee; M25.662 decreased ROM L knee; Z98.890 s/p L knee TKA ; R22.4 - localized swelling of lower limb   Treating Diagnosis: dec ROM, pain in L knee, weakness, gt deviations;   DOS 23 L knee TKA  Insurance/Certification information:  PT Insurance Information: Felipe  Physician Information:  Referring Practitioner: Dr. Sandra Carter  Has the plan of care been signed (Y/N):        []  Yes  [x]  No     Date of Patient follow up with Physician: 2023      Is this a Progress

## 2023-05-22 ENCOUNTER — OFFICE VISIT (OUTPATIENT)
Dept: ORTHOPEDIC SURGERY | Age: 58
End: 2023-05-22

## 2023-05-22 VITALS — WEIGHT: 195 LBS | HEIGHT: 69 IN | BODY MASS INDEX: 28.88 KG/M2

## 2023-05-22 DIAGNOSIS — Z96.652 S/P TOTAL KNEE ARTHROPLASTY, LEFT: Primary | ICD-10-CM

## 2023-05-22 PROCEDURE — 99024 POSTOP FOLLOW-UP VISIT: CPT | Performed by: STUDENT IN AN ORGANIZED HEALTH CARE EDUCATION/TRAINING PROGRAM

## 2023-05-23 ENCOUNTER — TREATMENT (OUTPATIENT)
Dept: PHYSICAL THERAPY | Age: 58
End: 2023-05-23
Payer: COMMERCIAL

## 2023-05-23 DIAGNOSIS — R26.2 DIFFICULTY WALKING: Primary | ICD-10-CM

## 2023-05-23 DIAGNOSIS — M25.562 LEFT KNEE PAIN, UNSPECIFIED CHRONICITY: ICD-10-CM

## 2023-05-23 DIAGNOSIS — M25.662 DECREASED ROM OF LEFT KNEE: ICD-10-CM

## 2023-05-23 DIAGNOSIS — R22.40 LOCALIZED SWELLING OF LOWER LEG: ICD-10-CM

## 2023-05-23 PROCEDURE — 97530 THERAPEUTIC ACTIVITIES: CPT | Performed by: PHYSICAL THERAPIST

## 2023-05-23 PROCEDURE — 97110 THERAPEUTIC EXERCISES: CPT | Performed by: PHYSICAL THERAPIST

## 2023-05-23 PROCEDURE — 97140 MANUAL THERAPY 1/> REGIONS: CPT | Performed by: PHYSICAL THERAPIST

## 2023-05-23 PROCEDURE — 97016 VASOPNEUMATIC DEVICE THERAPY: CPT | Performed by: PHYSICAL THERAPIST

## 2023-05-23 NOTE — PROGRESS NOTES
[facility]   Phone: [phone]    Fax: [phone]        Physical Therapy Treatment Note/ Progress Report:       Date:  2023    Patient Name:  Anupama Hernandes    :  1965  MRN: [id number]  Restrictions/Precautions:  recent TKA, history of DVT, hx of infection;  Medical/Treatment Diagnosis Information:  Diagnosis: M17.0 Bilat primary OA knee; M25.662 decreased ROM L knee; Z98.890 s/p L knee TKA ; R22.4 - localized swelling of lower limb   Treating Diagnosis: dec ROM, pain in L knee, weakness, gt deviations;   DOS 23 L knee TKA  Insurance/Certification information:  PT Insurance Information: Felipe  Physician Information:  Referring Practitioner: Dr. Herminia Toussaint  Has the plan of care been signed (Y/N):        []  Yes  [x]  No     Date of Patient follow up with Physician:        Is this a Progress Report:     []  Yes  []  No        If Yes:  Date Range for reporting period:  Beginnin2023  Ending  2023    Progress report will be due (10 Rx or 30 days whichever is less):        Recertification will be due (POC Duration  / 90 days whichever is less): 23         Visit # Insurance Allowable Auth Required   12 BMN []  Yes []  No    He has had 16 visits earlier this year for prehab Rx    Functional Scale: LEFS 30%    Date assessed:  23      Latex Allergy:  [x]NO      []YES  Preferred Language for Healthcare:   [x]English       []other:    Pain level:  2-3/10      SUBJECTIVE: 6+ weeks post op  Patient saw his MD yesterday for a follow up visit. Stated he was pleased with his progress with the left knee. Wants him to go ahead and schedule the right knee replacement. His office will contact the patient to take care of that.             OBJECTIVE:    Observation:     Flexibility L R Comment   Hamstring Min tightness  2023   Gastroc Min tightness      ITB        Quad                         ROM  2023 LEFT RIGHT   HIP Flex

## 2023-05-25 ENCOUNTER — TREATMENT (OUTPATIENT)
Dept: PHYSICAL THERAPY | Age: 58
End: 2023-05-25

## 2023-05-25 DIAGNOSIS — M25.662 DECREASED ROM OF LEFT KNEE: ICD-10-CM

## 2023-05-25 DIAGNOSIS — M25.562 LEFT KNEE PAIN, UNSPECIFIED CHRONICITY: ICD-10-CM

## 2023-05-25 DIAGNOSIS — R22.40 LOCALIZED SWELLING OF LOWER LEG: ICD-10-CM

## 2023-05-25 DIAGNOSIS — R26.2 DIFFICULTY WALKING: Primary | ICD-10-CM

## 2023-05-25 NOTE — PROGRESS NOTES
[facility]   Phone: [phone]    Fax: [phone]        Physical Therapy Treatment Note/ Progress Report:       Date:  2023    Patient Name:  Ana Tellez    :  1965  MRN: [id number]  Restrictions/Precautions:  recent TKA, history of DVT, hx of infection;  Medical/Treatment Diagnosis Information:  Diagnosis: M17.0 Bilat primary OA knee; M25.662 decreased ROM L knee; Z98.890 s/p L knee TKA ; R22.4 - localized swelling of lower limb   Treating Diagnosis: dec ROM, pain in L knee, weakness, gt deviations;   DOS 23 L knee TKA  Insurance/Certification information:  PT Insurance Information: Felipe  Physician Information:  Referring Practitioner: Dr. Rosa Zafar  Has the plan of care been signed (Y/N):        []  Yes  [x]  No     Date of Patient follow up with Physician:        Is this a Progress Report:     []  Yes  []  No        If Yes:  Date Range for reporting period:  Beginnin2023  Ending  2023    Progress report will be due (10 Rx or 30 days whichever is less):        Recertification will be due (POC Duration  / 90 days whichever is less): 23         Visit # Insurance Allowable Auth Required   13 BMN []  Yes []  No    He has had 16 visits earlier this year for prehab Rx    Functional Scale: LEFS 30%    Date assessed:  23      Latex Allergy:  [x]NO      []YES  Preferred Language for Healthcare:   [x]English       []other:    Pain level:  2-3/10      SUBJECTIVE:   7 weeks post op  Patient states he is doing well. Having a fine week and no new changes with his knee. Some swelling here and there but nothing out of control.             OBJECTIVE:    Observation:     Flexibility L R Comment   Hamstring Min tightness  2023   Gastroc Min tightness      ITB        Quad                         ROM  2023 LEFT RIGHT   HIP Flex       HIP Abd       HIP Ext       HIP IR       HIP ER       Knee ext 0 deg after stretching -2

## 2023-06-02 ENCOUNTER — TREATMENT (OUTPATIENT)
Dept: PHYSICAL THERAPY | Age: 58
End: 2023-06-02

## 2023-06-02 DIAGNOSIS — R26.2 DIFFICULTY WALKING: Primary | ICD-10-CM

## 2023-06-02 DIAGNOSIS — M17.12 PRIMARY OSTEOARTHRITIS OF LEFT KNEE: ICD-10-CM

## 2023-06-02 DIAGNOSIS — R22.40 LOCALIZED SWELLING OF LOWER LEG: ICD-10-CM

## 2023-06-02 DIAGNOSIS — M25.662 DECREASED ROM OF LEFT KNEE: ICD-10-CM

## 2023-06-02 DIAGNOSIS — M25.562 LEFT KNEE PAIN, UNSPECIFIED CHRONICITY: ICD-10-CM

## 2023-06-02 NOTE — PROGRESS NOTES
[facility]   Phone: [phone]    Fax: [phone]        Physical Therapy Treatment Note/ Progress Report:       Date:  2023    Patient Name:  Eli Álvarez    :  1965  MRN: [id number]  Restrictions/Precautions:  recent TKA, history of DVT, hx of infection;  Medical/Treatment Diagnosis Information:  Diagnosis: M17.0 Bilat primary OA knee; M25.662 decreased ROM L knee; Z98.890 s/p L knee TKA ; R22.4 - localized swelling of lower limb   Treating Diagnosis: dec ROM, pain in L knee, weakness, gt deviations;   DOS 23 L knee TKA  Insurance/Certification information:  PT Insurance Information: Felipe  Physician Information:  Referring Practitioner: Dr. Renny Zhou  Has the plan of care been signed (Y/N):        []  Yes  [x]  No     Date of Patient follow up with Physician:        Is this a Progress Report:     []  Yes  []  No        If Yes:  Date Range for reporting period:  Beginnin2023  Ending  2023    Progress report will be due (10 Rx or 30 days whichever is less): 35       Recertification will be due (POC Duration  / 90 days whichever is less): 23         Visit # Insurance Allowable Auth Required   14 BMN []  Yes []  No    He has had 16 visits earlier this year for prehab Rx    Functional Scale: LEFS 30%    Date assessed:  23      Latex Allergy:  [x]NO      []YES  Preferred Language for Healthcare:   [x]English       []other:    Pain level:  2-3/10      SUBJECTIVE:   8+ weeks post op  [place] had [place] real issues. He notes that after driving home from the late on Wednesday he was very sore and it was the first time in a while that the knee had actually bothered him. He notes that that soreness has improved and he is feeling \"pretty good\" today.           OBJECTIVE:    Observation:     Flexibility L R Comment   Hamstring Min tightness  2023   Gastroc Min tightness

## 2023-06-05 ENCOUNTER — TREATMENT (OUTPATIENT)
Dept: PHYSICAL THERAPY | Age: 58
End: 2023-06-05
Payer: COMMERCIAL

## 2023-06-05 DIAGNOSIS — M25.562 LEFT KNEE PAIN, UNSPECIFIED CHRONICITY: ICD-10-CM

## 2023-06-05 DIAGNOSIS — M25.662 DECREASED ROM OF LEFT KNEE: ICD-10-CM

## 2023-06-05 DIAGNOSIS — R26.2 DIFFICULTY WALKING: Primary | ICD-10-CM

## 2023-06-05 DIAGNOSIS — R22.40 LOCALIZED SWELLING OF LOWER LEG: ICD-10-CM

## 2023-06-05 PROCEDURE — 97016 VASOPNEUMATIC DEVICE THERAPY: CPT | Performed by: PHYSICAL THERAPIST

## 2023-06-05 PROCEDURE — 97140 MANUAL THERAPY 1/> REGIONS: CPT | Performed by: PHYSICAL THERAPIST

## 2023-06-05 PROCEDURE — 97530 THERAPEUTIC ACTIVITIES: CPT | Performed by: PHYSICAL THERAPIST

## 2023-06-05 PROCEDURE — 97110 THERAPEUTIC EXERCISES: CPT | Performed by: PHYSICAL THERAPIST

## 2023-06-05 NOTE — PROGRESS NOTES
sits     Step ups & over  L3 x15    Lat Step Up L3 x15    1 leg stand        CC TKE 7pl 3x10    Balance     SLS with 2# ball toss 2x20    Gt training         PRE     Extension 30# 1x10  SL ECC  35# 1x10  SL ECC  40# 1x10 SL ECC RANGE:   Flexion 35# 1x10  SL ECC  40# 1x10  SL ECC  45# 1x10 SL ECC RANGE:   Leg Press DL 3x10    120#-140#-160#  Hold into flexion for 5-10 sec RANGE:        Cable Column               Manual/Modalities Patellar mobs all directions x5'     Scar massage x5' Utilizing Hawkgrips to distal quads         Vasopneumatic compression applied to L knee for significant edema, swelling, pain control. ICD-10 code: R60.9 Edema unspecified. R22.4 - localized swelling of lower limb     6/5/2023  Girth LEFT RIGHT POST VASO   Mid Patellar 42.5 cm 38.6 cm   42.1cm   Supra Patellar      Transmalleolar      Figure 8         Therapeutic Exercise and NMR EXR  [x] (90870) Provided verbal/tactile cueing for activities related to strengthening, flexibility, endurance, ROM for improvements in LE, proximal hip, and core control with self care, mobility, lifting, ambulation. [x] (12845) Provided verbal/tactile cueing for activities related to improving balance, coordination, kinesthetic sense, posture, motor skill, proprioception  to assist with LE, proximal hip, and core control in self care, mobility, lifting, ambulation and eccentric single leg control.      NMR and Therapeutic Activities:    [x] (65298 or 53523) Provided verbal/tactile cueing for activities related to improving balance, coordination, kinesthetic sense, posture, motor skill, proprioception and motor activation to allow for proper function of core, proximal hip and LE with self care and ADLs  [] (35023) Gait Re-education- Provided training and instruction to the patient for proper LE, core and proximal hip recruitment and positioning and eccentric body weight control with ambulation re-education including up and down stairs     Home Exercise

## 2023-06-08 ENCOUNTER — TREATMENT (OUTPATIENT)
Dept: PHYSICAL THERAPY | Age: 58
End: 2023-06-08

## 2023-06-08 DIAGNOSIS — R22.40 LOCALIZED SWELLING OF LOWER LEG: ICD-10-CM

## 2023-06-08 DIAGNOSIS — M25.562 LEFT KNEE PAIN, UNSPECIFIED CHRONICITY: ICD-10-CM

## 2023-06-08 DIAGNOSIS — M25.662 DECREASED ROM OF LEFT KNEE: ICD-10-CM

## 2023-06-08 DIAGNOSIS — R26.2 DIFFICULTY WALKING: Primary | ICD-10-CM

## 2023-06-08 NOTE — PROGRESS NOTES
CC TKE 7pl 3x10    Balance     SLS with 2# ball toss 2x20    Gt training         PRE     Extension 30# 1x10  SL ECC  35# 1x10  SL ECC  40# 1x10 SL ECC RANGE:   Flexion 35# 1x10  SL ECC  40# 1x10  SL ECC  45# 1x10 SL ECC RANGE:   Leg Press DL 3x10    120#-140#-160#  Hold into flexion for 5-10 sec RANGE:        Cable Column               Manual/Modalities Patellar mobs all directions x5'     Scar massage x5' Utilizing Hawkgrips to distal quads         Vasopneumatic compression applied to L knee for significant edema, swelling, pain control. ICD-10 code: R60.9 Edema unspecified. R22.4 - localized swelling of lower limb     6/8/2023  Girth LEFT RIGHT POST VASO   Mid Patellar 42.6 cm 38.6 cm   42.2 cm   Supra Patellar      Transmalleolar      Figure 8         Therapeutic Exercise and NMR EXR  [x] (21021) Provided verbal/tactile cueing for activities related to strengthening, flexibility, endurance, ROM for improvements in LE, proximal hip, and core control with self care, mobility, lifting, ambulation. [x] (52477) Provided verbal/tactile cueing for activities related to improving balance, coordination, kinesthetic sense, posture, motor skill, proprioception  to assist with LE, proximal hip, and core control in self care, mobility, lifting, ambulation and eccentric single leg control.      NMR and Therapeutic Activities:    [x] (48843 or 99628) Provided verbal/tactile cueing for activities related to improving balance, coordination, kinesthetic sense, posture, motor skill, proprioception and motor activation to allow for proper function of core, proximal hip and LE with self care and ADLs  [] (28052) Gait Re-education- Provided training and instruction to the patient for proper LE, core and proximal hip recruitment and positioning and eccentric body weight control with ambulation re-education including up and down stairs     Home Exercise Program:    [x] (55647) Reviewed/Progressed HEP activities related to

## 2023-06-15 DIAGNOSIS — M17.11 OSTEOARTHRITIS OF RIGHT KNEE, UNSPECIFIED OSTEOARTHRITIS TYPE: Primary | ICD-10-CM

## 2023-06-26 ENCOUNTER — TELEPHONE (OUTPATIENT)
Dept: ORTHOPEDIC SURGERY | Age: 58
End: 2023-06-26

## 2023-06-27 NOTE — PROGRESS NOTES
Date changed on prescription.   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 Catherine Phan  Has the plan of care been signed (Y/N):        []  Yes  [x]  No     Date of Patient follow up with Physician:  Dr. Liza Bell appt will be in ~4 months;      Is this a Progress Report:     []  Yes  [x]  No        If Yes:  Date Range for reporting period:  Beginning 22  Ending     Progress report will be due (10 Rx or 30 days whichever is less):        Recertification will be due (POC Duration  / 90 days whichever is less): 22        Visit # Insurance Allowable Auth Required   1 BMN []  Yes [x]  No    He had 45 visits for same episode in     Functional Scale: FOTO 29; LEFS 13;    Date assessed:  22 FOTO 43; LEFS 27.2   10/17/22 FOTO 42;  22 FOTO 52, LEFS 38.7    Latex Allergy:  [x]NO      []YES  Preferred Language for Healthcare:   [x]English       []other:    Pain level:  3/10 w/o medication    SUBJECTIVE:   4+ months post op from debridement; 5+ months post op from posterior capsule release  Patient reports that he had to go out of town unexpectedly this week. He also states he took down outside and inside decorations this week. Was up and down the stairs a lot and his knee got a little stiff and sore.         OBJECTIVE:   Stiff gt pattern when walking into clinic w/ [facility] but did loosen up after stretching on bike;  general min to slight edema around L knee region;     ROM PROM AROM Overpressure Comment    L 11/23/22 R L R L R    Flexion 105° on SOB  70° cold 125°      Extension 0° after stretch w/ op  -11° cold   -2°        Girth  7/29/2022 L R   Mid Patella 44 cm 39.3 cm   Suprapatellar     5cm above     15cm above         Strength L R Comment: majority deferred secondary to surgery   Quad 39 lb 49 lb 11/23/22   Hamstring 34 lb 34 lb    Abduction      Quad tone          Special Test Results/Comment: majority deferred secondary to surgery   Homans  8/29/2022 Known DVT in calf       RESTRICTIONS/PRECAUTIONS: 7/18/22 posterior L knee capsule release; Limited ROM In L knee after injection in April that was limited prior to surg for flex and ext;  DVT left lower leg (still on blood thinners till about Oct)    Exercises/Interventions:   Exercise/Equipment Resistance/Repetitions Other comments   Cardio/Warm-up     Bike Rec Seat #8 x10'  Oscillations to full revs   Treadmill          Stretching     Hamstring Seated propped 10\"x10 20# overpressure   Hip Flexion     ITB     Grion    Quad Prone KF TP 20\"x5 Manual stretch   Inclined Calf On floor 10\"x10    Towel Pull 30\"x5 over foam roll 20# overpressure        ROM     Passive    Active     ROM @EOB    Prone knee hang x10'   15# OP on ankle    Weight Hangs     Sheet Pulls     Ankle Pumps           Patellar Glides     Medial x2'    Superior x2'    Inferior x2'         STRENGTH     Supine 3x10 over foam roll 5# Prone     Abduction 5# 3x10    clams  3x10  black band   Ext - prone 5# 3x10 leaning over table    Adducton     SLR+          Isometrics     Quad sets      CKC     Calf raises     Wall sits     Step ups & over L2 on tech x10  L2 lateral step up x10   step down 1 leg stand Squatting       Balance Fitter w/ mini squat fwd/bwd'; static 1' side to side         SLS 20\"x2  SL Reaching forward for 4 cups w/ UE while SLS x5 ea hand  Cones on chair height   Resistive walking backwards    Resisted sidestepping over mini cones X3 laps Black band around knees        PRE     Extension 20# 2x10 B LE conc;L LE ecc RANGE:   Flexion 30-40# 2x10 B LE conc, L LE ecc RANGE:   Leg Press SL #-120# 2x10  # 3x15      SLR + 20\"x5 Cuing and monitoring for full knee ext        Cable Column     Ed given on POC, expectations and goals    Using crutch but focus on knee ext w/ cuing            Manual/Modalities     Manual stretching to L hip into flex, fig 4, add, hip flex x15'    Deep tissue work to HS/scar massage in HS x5'                Therapeutic Exercise and NMR EXR  [x] (73665) Provided verbal/tactile cueing for activities related to strengthening, flexibility, endurance, ROM for improvements in LE, proximal hip, and core control with self care, mobility, lifting, ambulation.  [] (42898) Provided verbal/tactile cueing for activities related to improving balance, coordination, kinesthetic sense, posture, motor skill, proprioception  to assist with LE, proximal hip, and core control in self care, mobility, lifting, ambulation and eccentric single leg control.      NMR and Therapeutic Activities:    [x] (84201 or 87686) Provided verbal/tactile cueing for activities related to improving balance, coordination, kinesthetic sense, posture, motor skill, proprioception and motor activation to allow for proper function of core, proximal hip and LE with self care and ADLs  [] (35259) Gait Re-education- Provided training and instruction to the patient for proper LE, core and proximal hip recruitment and positioning and eccentric body weight control with ambulation re-education including up and down stairs     Home Exercise Program:    [x] (74578) Reviewed/Progressed HEP activities related to strengthening, flexibility, endurance, ROM of core, proximal hip and LE for functional self-care, mobility, lifting and ambulation/stair navigation            Written HEP est    [] (89896)Reviewed/Progressed HEP activities related to improving balance, coordination, kinesthetic sense, posture, motor skill, proprioception of core, proximal hip and LE for self care, mobility, lifting, and ambulation/stair navigation      Manual Treatments:  PROM / STM / Oscillations-Mobs:  G-I, II, III, IV (PA's, Inf., Post.)  [x] (34153) Provided manual therapy to mobilize LE, proximal hip and/or LS spine soft tissue/joints for the purpose of modulating pain, promoting relaxation,  increasing ROM, reducing/eliminating soft tissue swelling/inflammation/restriction, improving soft tissue extensibility and allowing for proper ROM for normal function with self care, mobility, lifting and ambulation. Modalities:   [] GAME READY (VASO)- for significant edema, swelling, pain control. Charges:  Timed Code Treatment Minutes: 50   Total Treatment Minutes: 65     [] EVAL (LOW) 51228 (typically 20 minutes face-to-face)  [] EVAL (MOD) 74120 (typically 30 minutes face-to-face)  [] EVAL (HIGH) 47976 (typically 45 minutes face-to-face)  [] RE-EVAL   [x] GM(27822) 2x  25'   [] IONTO  [] NMR (33983) x     [] VASO x15'  with 10# OP  [x] Manual (13047) x 1  x15'     [] Other: GT x12'  [x] TA (62315) 1x   10'   [] Mech Traction (54256)  [] ES(attended) (74704)      [] ES (un) (59607): EMG/Stim combo x15'    GOALS:  Patient stated goal: return to outdoor activities for walking and climbing, along with work  [] Progressing: [] Met: [] Not Met: [] Adjusted     Therapist goals for Patient:  Short Term Goals: To be achieved in: 2 weeks  1. Independent in HEP and progression per patient tolerance, in order to prevent re-injury. [] Progressing: [] Met: [] Not Met: [] Adjusted  2. Patient will have a decrease in pain to facilitate improvement in movement, function, and ADLs as indicated by Functional Deficits. [] Progressing: [] Met: [] Not Met: [] Adjusted     Long Term Goals:  To be achieved in: 12+ weeks  1. Functional index score of 67 or more for FOTO to assist with reaching prior level of function. [] Progressing: [] Met: [] Not Met: [] Adjusted  2. Patient will demonstrate increased AROM to 0-125 deg to allow for proper joint functioning as indicated by patients Functional Deficits. [] Progressing: [] Met: [] Not Met: [] Adjusted  3. Patient will demonstrate an increase in Strength to good proximal hip strength and control, within 5lb HHD in LE to allow for proper functional mobility as indicated by patients Functional Deficits. [] Progressing: [] Met: [] Not Met: [] Adjusted  4. Patient will return to 90% functional activities without increased symptoms or restriction. [] Progressing: [] Met: [] Not Met: [] Adjusted  5. Pt will be able to walk 45 min on uneven surfaces for hiking and climbing. (patient specific functional goal)    [] Progressing: [] Met: [] Not Met: [] Adjusted         Overall Progression Towards Functional goals/ Treatment Progress Update:  [] Patient is progressing as expected towards functional goals listed. [] Progression is slowed due to complexities/Impairments listed. [] Progression has been slowed due to co-morbidities. [x] Plan just implemented, too soon to assess goals progression <30days   [] Goals require adjustment due to lack of progress  [] Patient is not progressing as expected and requires additional follow up with physician  [] Other    ASSESSMENT:    He has a much easier time making full revolutions on the bike as he is warming up. We worked on adding in some LE neural mobility as we were stretching his hamstrings. As he states he still does get some tingling in his calf and heel area since the posterior debridement surgery. His gait is still a bit slow and deliberate but improved from a couple weeks ago.               Treatment/Activity Tolerance:  [x] Patient tolerated treatment well [] Patient limited by alondra  [] Patient limited by pain  [] Patient limited by other medical complications- tightness  [] Other:     Prognosis: [x] Good [x] Fair  [] Poor    Patient Requires Follow-up: [x] Yes  [] No    PLAN: Cont 2x/week for ROM restoration & focus on gt training for function. [x] Continue per plan of care [] Alter current plan (see comments)  [x] Plan of care initiated [] Hold pending MD visit [] Discharge    Electronically signed by:     Rudy Vasquez PT      Board Certified Orthopaedic Clinical Specialist  [facility] Certified  Physical Therapist    [place] PT #[id number]  [place] PT #[id number]          Note: If patient does not return for scheduled/ recommended follow up visits, this note will serve as a discharge from care along with most recent update on progress.

## 2023-06-30 ENCOUNTER — OFFICE VISIT (OUTPATIENT)
Dept: ORTHOPEDIC SURGERY | Age: 58
End: 2023-06-30

## 2023-06-30 ENCOUNTER — TREATMENT (OUTPATIENT)
Dept: PHYSICAL THERAPY | Age: 58
End: 2023-06-30

## 2023-06-30 VITALS — WEIGHT: 188 LBS | HEIGHT: 69 IN | BODY MASS INDEX: 27.85 KG/M2

## 2023-06-30 DIAGNOSIS — Z96.652 STATUS POST TOTAL LEFT KNEE REPLACEMENT: Primary | ICD-10-CM

## 2023-06-30 DIAGNOSIS — M25.662 DECREASED ROM OF LEFT KNEE: ICD-10-CM

## 2023-06-30 DIAGNOSIS — R26.2 DIFFICULTY WALKING: Primary | ICD-10-CM

## 2023-06-30 DIAGNOSIS — M25.562 LEFT KNEE PAIN, UNSPECIFIED CHRONICITY: ICD-10-CM

## 2023-06-30 DIAGNOSIS — R22.40 LOCALIZED SWELLING OF LOWER LEG: ICD-10-CM

## 2023-06-30 DIAGNOSIS — M17.12 PRIMARY OSTEOARTHRITIS OF LEFT KNEE: ICD-10-CM

## 2023-06-30 PROCEDURE — 99024 POSTOP FOLLOW-UP VISIT: CPT | Performed by: STUDENT IN AN ORGANIZED HEALTH CARE EDUCATION/TRAINING PROGRAM

## 2023-07-03 ENCOUNTER — TELEPHONE (OUTPATIENT)
Dept: ORTHOPEDIC SURGERY | Age: 58
End: 2023-07-03

## 2023-07-03 ENCOUNTER — TREATMENT (OUTPATIENT)
Dept: PHYSICAL THERAPY | Age: 58
End: 2023-07-03
Payer: COMMERCIAL

## 2023-07-03 DIAGNOSIS — M25.662 DECREASED ROM OF LEFT KNEE: ICD-10-CM

## 2023-07-03 DIAGNOSIS — M25.562 LEFT KNEE PAIN, UNSPECIFIED CHRONICITY: ICD-10-CM

## 2023-07-03 DIAGNOSIS — R22.40 LOCALIZED SWELLING OF LOWER LEG: ICD-10-CM

## 2023-07-03 DIAGNOSIS — R26.2 DIFFICULTY WALKING: Primary | ICD-10-CM

## 2023-07-03 PROCEDURE — 97110 THERAPEUTIC EXERCISES: CPT | Performed by: PHYSICAL THERAPIST

## 2023-07-03 PROCEDURE — 97016 VASOPNEUMATIC DEVICE THERAPY: CPT | Performed by: PHYSICAL THERAPIST

## 2023-07-03 PROCEDURE — 97112 NEUROMUSCULAR REEDUCATION: CPT | Performed by: PHYSICAL THERAPIST

## 2023-07-03 PROCEDURE — 97530 THERAPEUTIC ACTIVITIES: CPT | Performed by: PHYSICAL THERAPIST

## 2023-07-03 NOTE — PROGRESS NOTES
[address] [place] [address], [address] [place] [address] Drive   Phone: [phone]    Fax: [phone]        Physical Therapy Treatment Note/ Progress Report:       Date:  7/3/2023    Patient Name:  Deisi Herron    :  1965  MRN: [id number]  Restrictions/Precautions:  recent TKA, history of DVT, hx of infection;  Medical/Treatment Diagnosis Information:  Diagnosis: M17.0 Bilat primary OA knee; M25.662 decreased ROM L knee; Z98.890 s/p L knee TKA ; R22.4 - localized swelling of lower limb   Treating Diagnosis: dec ROM, pain in L knee, weakness, gt deviations;   DOS 23 L knee TKA  Insurance/Certification information:  PT Insurance Information: Felipe  Physician Information:  Referring Practitioner: Dr. Lilo Gonzalez  Has the plan of care been signed (Y/N):        []  Yes  [x]  No     Date of Patient follow up with Physician:        Is this a Progress Report:     []  Yes  []  No        If Yes:  Date Range for reporting period:  Beginnin2023  Ending  2023    Progress report will be due (10 Rx or 30 days whichever is less): 3/48/71       Recertification will be due (POC Duration  / 90 days whichever is less): 23         Visit # Insurance Allowable Auth Required   19 BMN []  Yes []  No    He has had 16 visits earlier this year for prehab Rx    Functional Scale: LEFS 30%    Date assessed:  23    LEFS  2023  65%  (35% deficit)      Latex Allergy:  [x]NO      []YES  Preferred Language for Healthcare:   [x]English       []other:    Pain level:  2-3/10      SUBJECTIVE:   12+ weeks post op  He did see Dr Lilo Gonzalez last week. MD reassured him that his mechanical clicking that he is feeling is normal and he does not have any instability. Patient reports no instability when walking and no pain. Patient reports that unfortunately he had a death in his family and he has to postpone his right TKA that is scheduled for . Plans to call today to see what they have available.

## 2023-07-06 ENCOUNTER — TREATMENT (OUTPATIENT)
Dept: PHYSICAL THERAPY | Age: 58
End: 2023-07-06

## 2023-07-06 DIAGNOSIS — M25.662 DECREASED ROM OF LEFT KNEE: ICD-10-CM

## 2023-07-06 DIAGNOSIS — M25.562 LEFT KNEE PAIN, UNSPECIFIED CHRONICITY: ICD-10-CM

## 2023-07-06 DIAGNOSIS — R26.2 DIFFICULTY WALKING: Primary | ICD-10-CM

## 2023-07-06 DIAGNOSIS — R22.40 LOCALIZED SWELLING OF LOWER LEG: ICD-10-CM

## 2023-07-06 NOTE — PROGRESS NOTES
Follow-up: [x] Yes  [] No    PLAN: Cont therapy for rehab after TKA. He has to reschedule his right TKA     [x] Continue per plan of care [] Alter current plan (see comments)  [x] Plan of care initiated [] Hold pending MD visit [] Discharge    Electronically signed by:      Jacqueline aSnchez, PT      Board Certified Orthopaedic Clinical Specialist  [facility] Certified  Physical Therapist    Tawana biggs PT #[id number]  [place] PT #[id number]        Note: If patient does not return for scheduled/ recommended follow up visits, this note will serve as a discharge from care along with most recent update on progress.

## 2023-07-10 ENCOUNTER — TREATMENT (OUTPATIENT)
Dept: PHYSICAL THERAPY | Age: 58
End: 2023-07-10
Payer: COMMERCIAL

## 2023-07-10 DIAGNOSIS — M25.562 LEFT KNEE PAIN, UNSPECIFIED CHRONICITY: ICD-10-CM

## 2023-07-10 DIAGNOSIS — R22.40 LOCALIZED SWELLING OF LOWER LEG: ICD-10-CM

## 2023-07-10 DIAGNOSIS — M25.662 DECREASED ROM OF LEFT KNEE: ICD-10-CM

## 2023-07-10 DIAGNOSIS — R26.2 DIFFICULTY WALKING: Primary | ICD-10-CM

## 2023-07-10 PROCEDURE — 97530 THERAPEUTIC ACTIVITIES: CPT | Performed by: PHYSICAL THERAPIST

## 2023-07-10 PROCEDURE — 97110 THERAPEUTIC EXERCISES: CPT | Performed by: PHYSICAL THERAPIST

## 2023-07-10 PROCEDURE — 97112 NEUROMUSCULAR REEDUCATION: CPT | Performed by: PHYSICAL THERAPIST

## 2023-07-10 NOTE — PROGRESS NOTES
Treatment/Activity Tolerance:  [x] Patient tolerated treatment well [] Patient limited by fatigue  [] Patient limited by pain  [] Patient limited by other medical complications  [] Other:     Prognosis: [x] Good [] Fair  [] Poor    Patient Requires Follow-up: [x] Yes  [] No    PLAN: Cont therapy for L knee prior to R TKA. He has to reschedule his right TKA - waiting on call from MD for date. [x] Continue per plan of care [] Alter current plan (see comments)  [x] Plan of care initiated [] Hold pending MD visit [] Discharge    Electronically signed by: Richard Gamboa, PT, MS, OMT-C    Physical Therapist [id number] license #[id number]  Physical Therapist [place] license #[id number]           Note: If patient does not return for scheduled/ recommended follow up visits, this note will serve as a discharge from care along with most recent update on progress.

## 2023-07-12 ENCOUNTER — TREATMENT (OUTPATIENT)
Dept: PHYSICAL THERAPY | Age: 58
End: 2023-07-12

## 2023-07-12 DIAGNOSIS — M25.662 DECREASED ROM OF LEFT KNEE: ICD-10-CM

## 2023-07-12 DIAGNOSIS — R22.40 LOCALIZED SWELLING OF LOWER LEG: ICD-10-CM

## 2023-07-12 DIAGNOSIS — M25.562 LEFT KNEE PAIN, UNSPECIFIED CHRONICITY: ICD-10-CM

## 2023-07-12 DIAGNOSIS — R26.2 DIFFICULTY WALKING: Primary | ICD-10-CM

## 2023-07-12 NOTE — PROGRESS NOTES
[address] [place] [address], [address] [place] [address] Drive   Phone: [phone]    Fax: [phone]            Physical Therapy  Cancellation/No-show Note  Patient Name:  Kelin Escalona  :  1965   Date:  2023  Cancelled visits to date: 1  No-shows to date: 0    For today's appointment patient:  [x]  Cancelled  []  Rescheduled appointment  []  No-show     Reason given by patient:  []  Patient ill  []  Conflicting appointment  []  No transportation    []  Conflict with work  []  No reason given  [x]  Other:  going out of town; scheduled follow up for his return;   Comments:      Phone call information:   []  Phone call made today to patient at _ time at number provided:      []  Patient answered, conversation as follows:    []  Patient did not answer, message left as follows:  []  Phone call not made today  []  Phone call not needed - pt contacted us to cancel and provided reason for cancellation.      Electronically signed by:  Simeon Ortiz, PT, [address] [facility] JULIETH Blood    Physical Therapist [place] license #[id number]  Physical Therapist [facility] license #[id number]

## 2023-07-19 NOTE — PROGRESS NOTES
Gilma Sprawls    Age 59 y.o.    male    1965    MRN [id number]    8/3/2023  Arrival Time_____________  OR Time____________[address]     Procedure(s):  RIGHT KNEE TOTAL ARTHROPLASTY                      General   Surgeon(s):  Liz Lopez, MD      DAY ADMIT ___  SDS/OP ___  OUTPT IN BED ___        Phone [phone] (home)     PCP _____________________ Phone_________________ Epic ( ) Epic CE ( ) Appt ________    ADDITIONAL INFO __________________________________ Cardio/Consult _____________    NOTES _____________________________________________________________________    ____________________________________________________________________________    PAT APPT DATE:________ TIME: ________  FAXED QAD: _______  (__) H&P w/ Hospitalist  __________________________________________________________________________  Preop Nurse phone screen complete: _____________  (__) CBC     (__) W/ DIFF ___________     (__) Hgb A1C    ___________  (__) CHEST X RAY   __________  (__) LIPID PROFILE  ___________  (__) EKG   __________  (__) PT-INR / APTT  ___________  (__) PFT's   __________  (__) BMP   ___________  (__) CAROTIDS  __________  (__) CMP   ___________  (__) VEIN MAPPING  __________  (__) U/A   ___________  (__) HISTORY & PHYSICAL __________  (__) URINE C & S  ___________  (__) CARDIAC CLEARANCE __________  (__) U/A W/ FLEX  ___________  (__) PULM.  CLEARANCE __________  (__) SERUM PREGNANCY ___________  (__) Check Epic DOS orders __________  (__) TYPE & SCREEN __________repeat ( ) (__)  __________________ __________  (__) Albumin / Prealbumin ___________  (__)  __________________ __________  (__) TRANSFERRIN  ___________  (__)  __________________ __________  (__) LIVER PROFILE  ___________  (__)  __________________ __________  (__) MRSA NASAL SWAB ___________  (__) URINE PREG DOS __________  (__) SED RATE  ___________  (__) BLOOD SUGAR DOS __________  (__) C-REACTIVE PROTEIN ___________    (__) VITAMIN D

## 2023-07-24 ENCOUNTER — TREATMENT (OUTPATIENT)
Dept: PHYSICAL THERAPY | Age: 58
End: 2023-07-24

## 2023-07-24 DIAGNOSIS — M25.662 DECREASED ROM OF LEFT KNEE: ICD-10-CM

## 2023-07-24 DIAGNOSIS — M17.12 PRIMARY OSTEOARTHRITIS OF LEFT KNEE: ICD-10-CM

## 2023-07-24 DIAGNOSIS — R22.40 LOCALIZED SWELLING OF LOWER LEG: ICD-10-CM

## 2023-07-24 DIAGNOSIS — R26.2 DIFFICULTY WALKING: Primary | ICD-10-CM

## 2023-07-24 DIAGNOSIS — M25.562 LEFT KNEE PAIN, UNSPECIFIED CHRONICITY: ICD-10-CM

## 2023-07-24 NOTE — PROGRESS NOTES
[address] [place] [address], [address] [place] [address] Drive   Phone: [phone]    Fax: [phone]            Physical Therapy  Cancellation/No-show Note  Patient Name:  Thuy Calvillo  :  1965   Date:  2023  Cancelled visits to date: 1  No-shows to date: 1    For today's appointment patient:  []  Cancelled  []  Rescheduled appointment  [x]  No-show     Reason given by patient:  []  Patient ill  []  Conflicting appointment  []  No transportation    []  Conflict with work  []  No reason given  []  Other:     Comments:      Phone call information:   [x]  Phone call made today to patient at _ time at number provided:      []  Patient answered, conversation as follows:    [x]  Patient did not answer, message left as follows: left message letting him know that he missed today's visit, reminded of visit later this week. []  Phone call not made today  []  Phone call not needed - pt contacted us to cancel and provided reason for cancellation.      Electronically signed by:      Knute Fleischer, PT      Board Certified Orthopaedic Clinical Specialist  [facility] Certified  Physical Therapist    [facility] #[id number]  [address] [place] [address] #[phone]

## 2023-07-24 NOTE — PROGRESS NOTES
[address] [place] [address], [address] [place] [address] Drive   Phone: [phone]    Fax: [phone]        Physical Therapy Treatment Note/ Progress Report:       Date:  2023    Patient Name:  Jah Betancourt    :  1965  MRN: [id number]  Restrictions/Precautions:  recent TKA, history of DVT, hx of infection;  Medical/Treatment Diagnosis Information:  Diagnosis: M17.0 Bilat primary OA knee; M25.662 decreased ROM L knee; Z98.890 s/p L knee TKA ; R22.4 - localized swelling of lower limb   Treating Diagnosis: dec ROM, pain in L knee, weakness, gt deviations;   DOS 23 L knee TKA  Insurance/Certification information:  PT Insurance Information: Felipe  Physician Information:  Referring Practitioner: Dr. Tracy Mast  Has the plan of care been signed (Y/N):        []  Yes  [x]  No     Date of Patient follow up with Physician:        Is this a Progress Report:     []  Yes  []  No        If Yes:  Date Range for reporting period:  Beginnin2023  Ending      Progress report will be due (10 Rx or 30 days whichever is less):        Recertification will be due (POC Duration  / 90 days whichever is less): 23         Visit # Insurance Allowable Auth Required   22 BMN []  Yes []  No    He has had 16 visits earlier this year for prehab Rx    Functional Scale: LEFS 30%    Date assessed:  23    LEFS  2023  65%  (35% deficit)      Latex Allergy:  [x]NO      []YES  Preferred Language for Healthcare:   [x]English       []other:    Pain level:  2-3/10      SUBJECTIVE:   3+ months post op                Patient reports that he is waiting to hear of new surgery date for R knee. His L knee feels good and continues to improve. L knee lat pain int and some clicking in knee when walking. OBJECTIVE:    Observation: Atrophy noted in L VMO vs R but overall, quad size on L thigh larger than R; min edema noted in L knee; Normal gt pattern w/o AD; Incision healed;     Flexibility L R Comment   Hamstring Min

## 2023-07-26 RX ORDER — MELOXICAM 10 MG/1
CAPSULE ORAL PRN
Status: ON HOLD | COMMUNITY
End: 2023-08-03 | Stop reason: HOSPADM

## 2023-07-26 RX ORDER — METOPROLOL SUCCINATE 100 MG/1
100 TABLET, EXTENDED RELEASE ORAL DAILY
COMMUNITY

## 2023-07-26 NOTE — PROGRESS NOTES
Preoperative Screening for Elective Surgery/Invasive Procedures While COVID-19 present in the community    Have you had any of the following symptoms? Fever, chills  Cough  Shortness of breath  Muscle aches/pain  Diarrhea  Abdominal pain, nausea, vomiting  Loss or decrease in taste and / or smell  Risk of Exposure  Have you recently been hospitalized for COVID-19 or flu-like illness, if so when? Recently diagnosed with COVID-19, if so when? Recently tested for COVID-19, if so when? Have you been in close contact with a person or family member who currently has or recently had COVID-23? If yes, when and in what context? Do you live with anybody who in the last 14 days has had fever, chills, shortness of breath, muscle aches, flu-like illness? Do you have any close contacts or family members who are currently in the hospital for COVID-19 or flu-like illness? If yes, assess recent close contact with this person. Indicate if the patient has a positive screen by answering yes to one or more of the above questions. Patients who test positive or screen positive prior to surgery or on the day of surgery should be evaluated in conjunction with the surgeon/proceduralist/anesthesiologist to determine the urgency of the procedure.     Pt states is symptom free and denies covid exposure

## 2023-07-26 NOTE — PROGRESS NOTES
Surgery Date and Time: 8/3/23 1215 pm          Arrival Time: 1015 am    The instructions given when and if a patient needs to stop oral intake prior to surgery varies. Follow the instructions you were given by your surgeon or RN during   preop call. _X_Nothing to eat after Midnight the night before the surgery. NO gum, mints, candy or ice chips day of surgery. __X__Carbo-loading or ENHANCED RECOVERY instructions will be given to select patients. if you have been given those instructions, please do the following: The evening before your surgery after dinner before midnight drink 40 ounces (2 - 20 ounce bottles) of gatorade. If you are diabetic  use sugar free. The morning     of surgery drink two (2) - 20 ounce bottle water. This needs to be finished 2 hours prior to your surgery start time. Only take the following medications with a small sip of water the morning of surgery: metoprolol and acid reflux pill                  Hold the following medications (per anesthesia or surgeon request): losartan                Last Dose: 8/2/23 by 12 noon      Aspirin, Ibuprofen, Advil, Naproxen, Vitamin E and other Anti-inflammatory products and supplements should be stopped for 5 -7days before surgery or as  directed     by your physician. Check with your Surgeon/PCP/Cardiologist regarding stopping Plavix, Coumadin, Eliquis, Lovenox, Effient, Pradaxa, Xarelto, Fragmin or other blood thinners and     follow their instructions. Medication: N/A                     Last dose: If you take a long acting-insulin, please ask your Primary Care Physician if you should decrease your dose the night before surgery. Do not smoke or vape, and do not drink any alcoholic beverages 24 hours prior to surgery, this includes NA Beer. Refrain from using any recreational drugs,    including non-prescribed prescription drugs.       You may brush your teeth and gargle

## 2023-07-27 ENCOUNTER — TREATMENT (OUTPATIENT)
Dept: PHYSICAL THERAPY | Age: 58
End: 2023-07-27

## 2023-07-27 DIAGNOSIS — M25.562 LEFT KNEE PAIN, UNSPECIFIED CHRONICITY: ICD-10-CM

## 2023-07-27 DIAGNOSIS — R26.2 DIFFICULTY WALKING: Primary | ICD-10-CM

## 2023-07-27 DIAGNOSIS — R22.40 LOCALIZED SWELLING OF LOWER LEG: ICD-10-CM

## 2023-07-27 DIAGNOSIS — M25.662 DECREASED ROM OF LEFT KNEE: ICD-10-CM

## 2023-07-27 NOTE — PROGRESS NOTES
[address] [place] [address], [address] [place] [address] Drive   Phone: [phone]    Fax: [phone]            Physical Therapy  Cancellation/No-show Note  Patient Name:  Hali Virk  :  1965   Date:  2023  Cancelled visits to date: 2  No-shows to date: 1    For today's appointment patient:  [x]  Cancelled  []  Rescheduled appointment  []  No-show     Reason given by patient:  [x]  Patient ill  []  Conflicting appointment  []  No transportation    []  Conflict with work  []  No reason given  []  Other:     Comments:      Phone call information:   []  Phone call made today to patient at _ time at number provided:      []  Patient answered, conversation as follows:    []  Patient did not answer, message left as follows:   []  Phone call not made today  [x]  Phone call not needed - pt contacted us to cancel and provided reason for cancellation.      Electronically signed by:      Hali Bolton, PT      Board Certified Orthopaedic Clinical Specialist  [facility] Certified  Physical Therapist    [facility] #[id number]  [address] [place] [address] #[phone]
Functional Deficits. [] Progressing: [] Met: [] Not Met: [] Adjusted    Long Term Goals: To be achieved in: 12 weeks- updated 7/10/23  1. Functional index score of 10% on LEFS to assist with reaching prior level of function. [x] Progressing: [] Met: [] Not Met: [] Adjusted  2. Patient will demonstrate increased AROM to L knee 0-125 deg to allow for proper joint functioning as indicated by patients Functional Deficits. [x] Progressing: [x] Met: [] Not Met: [] Adjusted  3. Patient will demonstrate an increase in Strength to good proximal hip strength and control, within 5lb HHD in LE to allow for proper functional mobility as indicated by patients Functional Deficits. [x] Progressing: [x] Met: [] Not Met: [] Adjusted  4. Patient will return to all functional activities without increased symptoms or restriction. [x] Progressing: [] Met: [] Not Met: [] Adjusted  5. Pt will be able to walk 45-60 min w/ normal gt pattern with hiking on uneven surfaces. (patient specific functional goal)    [x] Progressing: [x] Met: [] Not Met: [] Adjusted    Overall Progression Towards Functional goals/ Treatment Progress Update:  [x] Patient is progressing as expected towards functional goals listed. [] Progression is slowed due to complexities/Impairments listed. [] Progression has been slowed due to co-morbidities. [] Plan just implemented, too soon to assess goals progression <30days   [] Goals require adjustment due to lack of progress  [] Patient is not progressing as expected and requires additional follow up with physician  [] Other    ASSESSMENT:                  Patient has good ROM in both knees. Strength measurements w/ hand held dynamometer showed good results and progression on strength. Muscle atrophy noted in L VMO vs R but overall, size of L thigh larger. Pt has good gt skills w/o AD. Dynamic balance progressing on L LE. Progressing well.  Pt will be having R TKA in the next couple of week so striving not to

## 2023-07-28 ENCOUNTER — ANESTHESIA EVENT (OUTPATIENT)
Dept: OPERATING ROOM | Age: 58
End: 2023-07-28
Payer: COMMERCIAL

## 2023-08-01 ENCOUNTER — TREATMENT (OUTPATIENT)
Dept: PHYSICAL THERAPY | Age: 58
End: 2023-08-01
Payer: COMMERCIAL

## 2023-08-01 DIAGNOSIS — R22.40 LOCALIZED SWELLING OF LOWER LEG: ICD-10-CM

## 2023-08-01 DIAGNOSIS — R26.2 DIFFICULTY WALKING: Primary | ICD-10-CM

## 2023-08-01 DIAGNOSIS — M25.562 LEFT KNEE PAIN, UNSPECIFIED CHRONICITY: ICD-10-CM

## 2023-08-01 DIAGNOSIS — M25.662 DECREASED ROM OF LEFT KNEE: ICD-10-CM

## 2023-08-01 PROCEDURE — 97110 THERAPEUTIC EXERCISES: CPT | Performed by: PHYSICAL THERAPIST

## 2023-08-01 PROCEDURE — 97112 NEUROMUSCULAR REEDUCATION: CPT | Performed by: PHYSICAL THERAPIST

## 2023-08-01 PROCEDURE — 97530 THERAPEUTIC ACTIVITIES: CPT | Performed by: PHYSICAL THERAPIST

## 2023-08-01 NOTE — PROGRESS NOTES
[address] [place] [address], [facility] [address]   Phone: [phone]    Fax: [phone]        Physical Therapy Treatment Note/ Progress Report:       Date:  2023    Patient Name:  Juan Burch    :  1965  MRN: [id number]  Restrictions/Precautions:  recent TKA, history of DVT, hx of infection;  Medical/Treatment Diagnosis Information:  Diagnosis: M17.0 Bilat primary OA knee; M25.662 decreased ROM L knee; Z98.890 s/p L knee TKA ; R22.4 - localized swelling of lower limb   Treating Diagnosis: dec ROM, pain in L knee, weakness, gt deviations;   DOS 23 L knee TKA  Insurance/Certification information:  PT Insurance Information: Felipe  Physician Information:  Referring Practitioner: Dr. Chad Andrew  Has the plan of care been signed (Y/N):        []  Yes  [x]  No     Date of Patient follow up with Physician:        Is this a Progress Report:     []  Yes  []  No        If Yes:  Date Range for reporting period:  Beginnin2023  Ending      Progress report will be due (10 Rx or 30 days whichever is less):        Recertification will be due (POC Duration  / 90 days whichever is less): 23         Visit # Insurance Allowable Auth Required   22 BMN []  Yes []  No    He has had 16 visits earlier this year for prehab Rx    Functional Scale: LEFS 30%    Date assessed:  23    LEFS  2023  65%  (35% deficit)      Latex Allergy:  [x]NO      []YES  Preferred Language for Healthcare:   [x]English       []other:    Pain level:  2-3/10      SUBJECTIVE:   3+ months post op  Patient reports that he has been sick the past week. Had a sinus infection and is taking some OTC meds. He did test for Covid and it came back negative. Overall, his left knee has bee feeling really good. Easier time getting in/out of the back of his truck and in/out of his boat. He is ready for his right TKA which is scheduled for Aug 3.             OBJECTIVE:    Observation: Atrophy noted in L VMO vs R

## 2023-08-02 ENCOUNTER — TELEPHONE (OUTPATIENT)
Dept: ORTHOPEDIC SURGERY | Age: 58
End: 2023-08-02

## 2023-08-02 DIAGNOSIS — Z96.651 S/P TOTAL KNEE ARTHROPLASTY, RIGHT: Primary | ICD-10-CM

## 2023-08-02 NOTE — TELEPHONE ENCOUNTER
Patient is having surgery tomorrow and wants to know if she can have an order put into [facility] for [address] .  (She was able to pull everything else she needed for the patient )

## 2023-08-02 NOTE — TELEPHONE ENCOUNTER
ORDERS GIVEN -PLACED INTO EPIC  PER MD PROTOCOL    SPOKE WITH WILLIAMS AT [address] [place] [address]

## 2023-08-03 ENCOUNTER — APPOINTMENT (OUTPATIENT)
Dept: GENERAL RADIOLOGY | Age: 58
End: 2023-08-03
Attending: STUDENT IN AN ORGANIZED HEALTH CARE EDUCATION/TRAINING PROGRAM
Payer: COMMERCIAL

## 2023-08-03 ENCOUNTER — ANESTHESIA (OUTPATIENT)
Dept: OPERATING ROOM | Age: 58
End: 2023-08-03
Payer: COMMERCIAL

## 2023-08-03 ENCOUNTER — HOSPITAL ENCOUNTER (OUTPATIENT)
Age: 58
Setting detail: SURGERY ADMIT
Discharge: HOME | End: 2023-08-03
Attending: STUDENT IN AN ORGANIZED HEALTH CARE EDUCATION/TRAINING PROGRAM | Admitting: STUDENT IN AN ORGANIZED HEALTH CARE EDUCATION/TRAINING PROGRAM
Payer: COMMERCIAL

## 2023-08-03 VITALS
RESPIRATION RATE: 15 BRPM | HEIGHT: 69 IN | BODY MASS INDEX: 28.41 KG/M2 | WEIGHT: 191.8 LBS | OXYGEN SATURATION: 100 % | SYSTOLIC BLOOD PRESSURE: 123 MMHG | HEART RATE: 77 BPM | DIASTOLIC BLOOD PRESSURE: 74 MMHG | TEMPERATURE: 98 F

## 2023-08-03 LAB
ABO + RH BLD: NORMAL
BLD GP AB SCN SERPL QL: NORMAL
GLUCOSE BLD-MCNC: 92 MG/DL (ref 70–99)
PERFORMED ON: NORMAL

## 2023-08-03 PROCEDURE — 86900 BLOOD TYPING SEROLOGIC ABO: CPT

## 2023-08-03 PROCEDURE — 64447 NJX AA&/STRD FEMORAL NRV IMG: CPT | Performed by: ANESTHESIOLOGY

## 2023-08-03 PROCEDURE — 86850 RBC ANTIBODY SCREEN: CPT

## 2023-08-03 PROCEDURE — 2580000003 HC RX 258: Performed by: ANESTHESIOLOGY

## 2023-08-03 PROCEDURE — 6360000002 HC RX W HCPCS: Performed by: NURSE ANESTHETIST, CERTIFIED REGISTERED

## 2023-08-03 PROCEDURE — 27447 TOTAL KNEE ARTHROPLASTY: CPT | Performed by: STUDENT IN AN ORGANIZED HEALTH CARE EDUCATION/TRAINING PROGRAM

## 2023-08-03 PROCEDURE — 97110 THERAPEUTIC EXERCISES: CPT

## 2023-08-03 PROCEDURE — 6360000002 HC RX W HCPCS: Performed by: ANESTHESIOLOGY

## 2023-08-03 PROCEDURE — 3700000000 HC ANESTHESIA ATTENDED CARE: Performed by: STUDENT IN AN ORGANIZED HEALTH CARE EDUCATION/TRAINING PROGRAM

## 2023-08-03 PROCEDURE — 2500000003 HC RX 250 WO HCPCS: Performed by: NURSE ANESTHETIST, CERTIFIED REGISTERED

## 2023-08-03 PROCEDURE — 2720000010 HC SURG SUPPLY STERILE: Performed by: STUDENT IN AN ORGANIZED HEALTH CARE EDUCATION/TRAINING PROGRAM

## 2023-08-03 PROCEDURE — 73562 X-RAY EXAM OF KNEE 3: CPT

## 2023-08-03 PROCEDURE — 3600000015 HC SURGERY LEVEL 5 ADDTL 15MIN: Performed by: STUDENT IN AN ORGANIZED HEALTH CARE EDUCATION/TRAINING PROGRAM

## 2023-08-03 PROCEDURE — 6370000000 HC RX 637 (ALT 250 FOR IP): Performed by: ANESTHESIOLOGY

## 2023-08-03 PROCEDURE — 2709999900 HC NON-CHARGEABLE SUPPLY: Performed by: STUDENT IN AN ORGANIZED HEALTH CARE EDUCATION/TRAINING PROGRAM

## 2023-08-03 PROCEDURE — 86901 BLOOD TYPING SEROLOGIC RH(D): CPT

## 2023-08-03 PROCEDURE — 2500000003 HC RX 250 WO HCPCS: Performed by: ANESTHESIOLOGY

## 2023-08-03 PROCEDURE — 7100000000 HC PACU RECOVERY - FIRST 15 MIN: Performed by: STUDENT IN AN ORGANIZED HEALTH CARE EDUCATION/TRAINING PROGRAM

## 2023-08-03 PROCEDURE — 2500000003 HC RX 250 WO HCPCS: Performed by: STUDENT IN AN ORGANIZED HEALTH CARE EDUCATION/TRAINING PROGRAM

## 2023-08-03 PROCEDURE — 97116 GAIT TRAINING THERAPY: CPT

## 2023-08-03 PROCEDURE — 97161 PT EVAL LOW COMPLEX 20 MIN: CPT

## 2023-08-03 PROCEDURE — 3700000001 HC ADD 15 MINUTES (ANESTHESIA): Performed by: STUDENT IN AN ORGANIZED HEALTH CARE EDUCATION/TRAINING PROGRAM

## 2023-08-03 PROCEDURE — 3600000005 HC SURGERY LEVEL 5 BASE: Performed by: STUDENT IN AN ORGANIZED HEALTH CARE EDUCATION/TRAINING PROGRAM

## 2023-08-03 PROCEDURE — 6360000002 HC RX W HCPCS: Performed by: STUDENT IN AN ORGANIZED HEALTH CARE EDUCATION/TRAINING PROGRAM

## 2023-08-03 PROCEDURE — A4217 STERILE WATER/SALINE, 500 ML: HCPCS | Performed by: STUDENT IN AN ORGANIZED HEALTH CARE EDUCATION/TRAINING PROGRAM

## 2023-08-03 PROCEDURE — 7100000001 HC PACU RECOVERY - ADDTL 15 MIN: Performed by: STUDENT IN AN ORGANIZED HEALTH CARE EDUCATION/TRAINING PROGRAM

## 2023-08-03 PROCEDURE — 7100000011 HC PHASE II RECOVERY - ADDTL 15 MIN: Performed by: STUDENT IN AN ORGANIZED HEALTH CARE EDUCATION/TRAINING PROGRAM

## 2023-08-03 PROCEDURE — 2580000003 HC RX 258: Performed by: STUDENT IN AN ORGANIZED HEALTH CARE EDUCATION/TRAINING PROGRAM

## 2023-08-03 PROCEDURE — C1776 JOINT DEVICE (IMPLANTABLE): HCPCS | Performed by: STUDENT IN AN ORGANIZED HEALTH CARE EDUCATION/TRAINING PROGRAM

## 2023-08-03 PROCEDURE — C1713 ANCHOR/SCREW BN/BN,TIS/BN: HCPCS | Performed by: STUDENT IN AN ORGANIZED HEALTH CARE EDUCATION/TRAINING PROGRAM

## 2023-08-03 PROCEDURE — C9399 UNCLASSIFIED DRUGS OR BIOLOG: HCPCS | Performed by: NURSE ANESTHETIST, CERTIFIED REGISTERED

## 2023-08-03 PROCEDURE — 7100000010 HC PHASE II RECOVERY - FIRST 15 MIN: Performed by: STUDENT IN AN ORGANIZED HEALTH CARE EDUCATION/TRAINING PROGRAM

## 2023-08-03 DEVICE — IMPLANTABLE DEVICE
Type: IMPLANTABLE DEVICE | Site: KNEE | Status: FUNCTIONAL
Brand: PERSONA®

## 2023-08-03 DEVICE — CEMENT BONE 40GM HI VISC PALACOS R: Type: IMPLANTABLE DEVICE | Site: KNEE | Status: FUNCTIONAL

## 2023-08-03 DEVICE — PSN TIB STM 5 DEG SZ F R: Type: IMPLANTABLE DEVICE | Site: KNEE | Status: FUNCTIONAL

## 2023-08-03 DEVICE — IMPLANTABLE DEVICE
Type: IMPLANTABLE DEVICE | Site: KNEE | Status: FUNCTIONAL
Brand: PERSONA® VIVACIT-E®

## 2023-08-03 RX ORDER — SODIUM CHLORIDE, SODIUM LACTATE, POTASSIUM CHLORIDE, CALCIUM CHLORIDE 600; 310; 30; 20 MG/100ML; MG/100ML; MG/100ML; MG/100ML
INJECTION, SOLUTION INTRAVENOUS CONTINUOUS
Status: DISCONTINUED | OUTPATIENT
Start: 2023-08-03 | End: 2023-08-03 | Stop reason: HOSPADM

## 2023-08-03 RX ORDER — BUPIVACAINE HYDROCHLORIDE AND EPINEPHRINE 2.5; 5 MG/ML; UG/ML
INJECTION, SOLUTION EPIDURAL; INFILTRATION; INTRACAUDAL; PERINEURAL
Status: COMPLETED | OUTPATIENT
Start: 2023-08-03 | End: 2023-08-03

## 2023-08-03 RX ORDER — SODIUM CHLORIDE 9 MG/ML
INJECTION, SOLUTION INTRAVENOUS PRN
Status: DISCONTINUED | OUTPATIENT
Start: 2023-08-03 | End: 2023-08-03 | Stop reason: HOSPADM

## 2023-08-03 RX ORDER — ONDANSETRON 2 MG/ML
4 INJECTION INTRAMUSCULAR; INTRAVENOUS EVERY 30 MIN PRN
Status: DISCONTINUED | OUTPATIENT
Start: 2023-08-03 | End: 2023-08-03 | Stop reason: HOSPADM

## 2023-08-03 RX ORDER — LIDOCAINE HYDROCHLORIDE 20 MG/ML
INJECTION, SOLUTION INFILTRATION; PERINEURAL PRN
Status: DISCONTINUED | OUTPATIENT
Start: 2023-08-03 | End: 2023-08-03 | Stop reason: SDUPTHER

## 2023-08-03 RX ORDER — KETAMINE HCL IN NACL, ISO-OSM 100MG/10ML
SYRINGE (ML) INJECTION PRN
Status: DISCONTINUED | OUTPATIENT
Start: 2023-08-03 | End: 2023-08-03 | Stop reason: SDUPTHER

## 2023-08-03 RX ORDER — ONDANSETRON 2 MG/ML
INJECTION INTRAMUSCULAR; INTRAVENOUS PRN
Status: DISCONTINUED | OUTPATIENT
Start: 2023-08-03 | End: 2023-08-03 | Stop reason: SDUPTHER

## 2023-08-03 RX ORDER — LIDOCAINE HYDROCHLORIDE 10 MG/ML
2 INJECTION, SOLUTION INFILTRATION; PERINEURAL
Status: DISCONTINUED | OUTPATIENT
Start: 2023-08-03 | End: 2023-08-03 | Stop reason: HOSPADM

## 2023-08-03 RX ORDER — MIDAZOLAM HYDROCHLORIDE 1 MG/ML
INJECTION INTRAMUSCULAR; INTRAVENOUS
Status: COMPLETED
Start: 2023-08-03 | End: 2023-08-03

## 2023-08-03 RX ORDER — DEXAMETHASONE SODIUM PHOSPHATE 4 MG/ML
INJECTION, SOLUTION INTRA-ARTICULAR; INTRALESIONAL; INTRAMUSCULAR; INTRAVENOUS; SOFT TISSUE PRN
Status: DISCONTINUED | OUTPATIENT
Start: 2023-08-03 | End: 2023-08-03 | Stop reason: SDUPTHER

## 2023-08-03 RX ORDER — PHENYLEPHRINE HCL IN 0.9% NACL 1 MG/10 ML
SYRINGE (ML) INTRAVENOUS PRN
Status: DISCONTINUED | OUTPATIENT
Start: 2023-08-03 | End: 2023-08-03 | Stop reason: SDUPTHER

## 2023-08-03 RX ORDER — CELECOXIB 200 MG/1
200 CAPSULE ORAL 2 TIMES DAILY
Qty: 60 CAPSULE | Refills: 3 | Status: SHIPPED | OUTPATIENT
Start: 2023-08-03

## 2023-08-03 RX ORDER — OXYCODONE HYDROCHLORIDE 5 MG/1
10 TABLET ORAL PRN
Status: DISCONTINUED | OUTPATIENT
Start: 2023-08-03 | End: 2023-08-03 | Stop reason: HOSPADM

## 2023-08-03 RX ORDER — SODIUM CHLORIDE 0.9 % (FLUSH) 0.9 %
5-40 SYRINGE (ML) INJECTION EVERY 12 HOURS SCHEDULED
Status: DISCONTINUED | OUTPATIENT
Start: 2023-08-03 | End: 2023-08-03 | Stop reason: HOSPADM

## 2023-08-03 RX ORDER — MAGNESIUM HYDROXIDE 1200 MG/15ML
LIQUID ORAL CONTINUOUS PRN
Status: DISCONTINUED | OUTPATIENT
Start: 2023-08-03 | End: 2023-08-03 | Stop reason: HOSPADM

## 2023-08-03 RX ORDER — CEFAZOLIN SODIUM IN 0.9 % NACL 2 G/100 ML
2000 PLASTIC BAG, INJECTION (ML) INTRAVENOUS
Status: COMPLETED | OUTPATIENT
Start: 2023-08-03 | End: 2023-08-03

## 2023-08-03 RX ORDER — FENTANYL CITRATE 50 UG/ML
INJECTION, SOLUTION INTRAMUSCULAR; INTRAVENOUS
Status: COMPLETED
Start: 2023-08-03 | End: 2023-08-03

## 2023-08-03 RX ORDER — SODIUM CHLORIDE 0.9 % (FLUSH) 0.9 %
5-40 SYRINGE (ML) INJECTION PRN
Status: DISCONTINUED | OUTPATIENT
Start: 2023-08-03 | End: 2023-08-03 | Stop reason: HOSPADM

## 2023-08-03 RX ORDER — MIDAZOLAM HYDROCHLORIDE 1 MG/ML
INJECTION INTRAMUSCULAR; INTRAVENOUS PRN
Status: DISCONTINUED | OUTPATIENT
Start: 2023-08-03 | End: 2023-08-03 | Stop reason: SDUPTHER

## 2023-08-03 RX ORDER — LABETALOL HYDROCHLORIDE 5 MG/ML
10 INJECTION, SOLUTION INTRAVENOUS
Status: DISCONTINUED | OUTPATIENT
Start: 2023-08-03 | End: 2023-08-03 | Stop reason: HOSPADM

## 2023-08-03 RX ORDER — FENTANYL CITRATE 50 UG/ML
INJECTION, SOLUTION INTRAMUSCULAR; INTRAVENOUS PRN
Status: DISCONTINUED | OUTPATIENT
Start: 2023-08-03 | End: 2023-08-03 | Stop reason: SDUPTHER

## 2023-08-03 RX ORDER — CELECOXIB 100 MG/1
400 CAPSULE ORAL ONCE
Status: COMPLETED | OUTPATIENT
Start: 2023-08-03 | End: 2023-08-03

## 2023-08-03 RX ORDER — OXYCODONE HYDROCHLORIDE 5 MG/1
5 TABLET ORAL PRN
Status: DISCONTINUED | OUTPATIENT
Start: 2023-08-03 | End: 2023-08-03 | Stop reason: HOSPADM

## 2023-08-03 RX ORDER — ROCURONIUM BROMIDE 10 MG/ML
INJECTION, SOLUTION INTRAVENOUS PRN
Status: DISCONTINUED | OUTPATIENT
Start: 2023-08-03 | End: 2023-08-03 | Stop reason: SDUPTHER

## 2023-08-03 RX ORDER — MAGNESIUM SULFATE IN WATER 40 MG/ML
2000 INJECTION, SOLUTION INTRAVENOUS ONCE
Status: COMPLETED | OUTPATIENT
Start: 2023-08-03 | End: 2023-08-03

## 2023-08-03 RX ORDER — PROPOFOL 10 MG/ML
INJECTION, EMULSION INTRAVENOUS PRN
Status: DISCONTINUED | OUTPATIENT
Start: 2023-08-03 | End: 2023-08-03 | Stop reason: SDUPTHER

## 2023-08-03 RX ORDER — ACETAMINOPHEN 500 MG
1000 TABLET ORAL ONCE
Status: COMPLETED | OUTPATIENT
Start: 2023-08-03 | End: 2023-08-03

## 2023-08-03 RX ORDER — LIDOCAINE HYDROCHLORIDE 10 MG/ML
1 INJECTION, SOLUTION EPIDURAL; INFILTRATION; INTRACAUDAL; PERINEURAL
Status: DISCONTINUED | OUTPATIENT
Start: 2023-08-03 | End: 2023-08-03 | Stop reason: HOSPADM

## 2023-08-03 RX ADMIN — Medication 100 MCG: at 13:11

## 2023-08-03 RX ADMIN — PROPOFOL 180 MG: 10 INJECTION, EMULSION INTRAVENOUS at 12:06

## 2023-08-03 RX ADMIN — ONDANSETRON 4 MG: 2 INJECTION INTRAMUSCULAR; INTRAVENOUS at 12:20

## 2023-08-03 RX ADMIN — SUGAMMADEX 200 MG: 100 INJECTION, SOLUTION INTRAVENOUS at 13:22

## 2023-08-03 RX ADMIN — ROCURONIUM BROMIDE 50 MG: 50 INJECTION, SOLUTION INTRAVENOUS at 12:06

## 2023-08-03 RX ADMIN — CELECOXIB 400 MG: 100 CAPSULE ORAL at 11:07

## 2023-08-03 RX ADMIN — FENTANYL CITRATE 25 MCG: 50 INJECTION, SOLUTION INTRAMUSCULAR; INTRAVENOUS at 12:26

## 2023-08-03 RX ADMIN — Medication 20 MG: at 12:37

## 2023-08-03 RX ADMIN — MAGNESIUM SULFATE IN WATER 2000 MG: 40 INJECTION, SOLUTION INTRAVENOUS at 12:29

## 2023-08-03 RX ADMIN — FENTANYL CITRATE 75 MCG: 50 INJECTION, SOLUTION INTRAMUSCULAR; INTRAVENOUS at 12:04

## 2023-08-03 RX ADMIN — MIDAZOLAM 2 MG: 1 INJECTION INTRAMUSCULAR; INTRAVENOUS at 11:08

## 2023-08-03 RX ADMIN — LIDOCAINE HYDROCHLORIDE 80 MG: 20 INJECTION, SOLUTION INFILTRATION; PERINEURAL at 12:06

## 2023-08-03 RX ADMIN — BUPIVACAINE HYDROCHLORIDE AND EPINEPHRINE BITARTRATE 20 ML: 2.5; .005 INJECTION, SOLUTION EPIDURAL; INFILTRATION; INTRACAUDAL; PERINEURAL at 10:50

## 2023-08-03 RX ADMIN — SODIUM CHLORIDE, SODIUM LACTATE, POTASSIUM CHLORIDE, AND CALCIUM CHLORIDE: .6; .31; .03; .02 INJECTION, SOLUTION INTRAVENOUS at 12:02

## 2023-08-03 RX ADMIN — ACETAMINOPHEN 1000 MG: 500 TABLET ORAL at 11:07

## 2023-08-03 RX ADMIN — FENTANYL CITRATE 50 MCG: 50 INJECTION, SOLUTION INTRAMUSCULAR; INTRAVENOUS at 11:10

## 2023-08-03 RX ADMIN — DEXAMETHASONE SODIUM PHOSPHATE 6 MG: 4 INJECTION, SOLUTION INTRAMUSCULAR; INTRAVENOUS at 12:20

## 2023-08-03 RX ADMIN — Medication 10 MG: at 13:21

## 2023-08-03 RX ADMIN — Medication 2000 MG: at 12:13

## 2023-08-03 RX ADMIN — HYDROMORPHONE HYDROCHLORIDE 0.5 MG: 1 INJECTION, SOLUTION INTRAMUSCULAR; INTRAVENOUS; SUBCUTANEOUS at 14:03

## 2023-08-03 ASSESSMENT — PAIN DESCRIPTION - DESCRIPTORS: DESCRIPTORS: ACHING;DISCOMFORT

## 2023-08-03 ASSESSMENT — PAIN SCALES - GENERAL
PAINLEVEL_OUTOF10: 4
PAINLEVEL_OUTOF10: 7

## 2023-08-03 ASSESSMENT — PAIN - FUNCTIONAL ASSESSMENT: PAIN_FUNCTIONAL_ASSESSMENT: 0-10

## 2023-08-03 ASSESSMENT — PAIN DESCRIPTION - LOCATION: LOCATION: KNEE

## 2023-08-03 ASSESSMENT — PAIN DESCRIPTION - ORIENTATION: ORIENTATION: RIGHT

## 2023-08-03 NOTE — PROGRESS NOTES
Patient admitted to [facility] and preparation for surgery completed. VSS. Consent confirmed. IV inserted into L AC. LR connected and clamped. R leg marked by MD. Belongings in labeled bag. Wife at bedside.

## 2023-08-03 NOTE — ANESTHESIA PRE PROCEDURE
POCK, POCCL, POCBUN, POCHEMO, POCHCT in the last 72 hours. Coags:   Lab Results   Component Value Date/Time    PROTIME 13.1 08/05/2022 12:20 PM    INR 1.00 08/05/2022 12:20 PM    APTT 26.2 08/05/2022 12:20 PM       HCG (If Applicable): No results found for: PREGTESTUR, PREGSERUM, HCG, HCGQUANT     ABGs: No results found for: PHART, PO2ART, RMT5PIW, FTT3JXP, BEART, H6VKFQUO     Type & Screen (If Applicable):  No results found for: LABABO, LABRH    Drug/Infectious Status (If Applicable):  No results found for: HIV, HEPCAB    COVID-19 Screening (If Applicable): No results found for: COVID19        Anesthesia Evaluation    Airway: Mallampati: II          Dental: normal exam         Pulmonary: breath sounds clear to auscultation            Patient did not smoke on day of surgery. Cardiovascular:  Exercise tolerance: good (>4 METS),           Rhythm: regular  Rate: normal                 ROS comment: Hx DVT w IVC filter     Neuro/Psych:               GI/Hepatic/Renal:            ROS comment: Hx prostate CA. Endo/Other:                     Abdominal:             Vascular: Other Findings:           Anesthesia Plan      general     ASA 2     (Pt agrees to risks. Benefits and options of GETA. Questions answered. Willing to proceed.)  Induction: intravenous. Anesthetic plan and risks discussed with patient.                         Brinda Favre, MD   8/3/2023

## 2023-08-03 NOTE — H&P
Update History & Physical    The patient's History and Physical of July 20, 2023 was reviewed with the patient and I examined the patient. There was no change. The surgical site was confirmed by the patient and me. Plan: The risks, benefits, expected outcome, and alternative to the recommended procedure have been discussed with the patient. Patient understands and wants to proceed with the procedure.      Electronically signed by Elvis Horne MD on 0/2/4987 at 7:28 AM

## 2023-08-03 NOTE — OP NOTE
Operative Note      Patient: Henry Palomo  YOB: 1965  MRN: [id number]    Date of Procedure: 8/3/2023    Pre-Op Diagnosis Codes:     * Osteoarthritis of right knee, unspecified osteoarthritis type [M17.11]    Post-Op Diagnosis: Same       Procedure(s):  RIGHT KNEE TOTAL ARTHROPLASTY    Surgeon(s):  Jordin Cool MD    Assistant:   Surgical Assistant: Marah Rodriges    Anesthesia: General    Estimated Blood Loss (mL): less than 908     Complications: None    Specimens:   * No specimens in log *    Implants:  Implant Name Type Inv. Item Serial No.  Lot No. LRB No. Used Action   CEMENT BONE 40GM HI VISC PALACOS R - [id number]  CEMENT BONE 40GM HI VISC PALACOS R  [employer]- [id number] Right 2 Implanted   PSN MC VE ASF R 10MM 8-11 EF - [id number]  PSN MC VE ASF R 10MM 8-11 EF  [employer] [id number] Right 1 Implanted   COMPONENT FEM SZ 9 R KNEE CO CHROM RICHY CRUCE RET KATHRIN - [id number]  COMPONENT FEM SZ 9 R KNEE CO CHROM RICHY CRUCE RET KATHRIN  [employer] [id number] Right 1 Implanted   PSN TIB STM 5 DEG SZ F R - [id number]  PSN TIB STM 5 DEG SZ F R  [employer] [id number] Right 1 Implanted         Drains: * No LDAs found *    Findings: severe OA        Detailed Description of Procedure:   Clinical Indications: This is a 60 y/o M that was evaluated for R knee OA by myself, noted to have failed extensive conservative measures and desired to have surgical intervention. 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 Risks benefits limitations and alternatives to R TKA were discussed with the patient who wished to proceed. The patient was met in the preoperative holding area last-minute questions were answered, the R knee was marked, consent was verified. An adductor canal block was performed per anesthesia. The patient was taken to the OR where general anesthesia was administered.  They were positioned in the supine position. The operative extremity was prepped and draped in standard orthopedic fashion. Weight based Ancef and 1 g TXA were administered preoperatively. Timeout was performed according hospital protocol. The operative extremity was exsanguinated and tourniquet inflated. Midline incision marked and made with scalpel. Dissection was carried down to the capsule with electrocautery. Median parapatellar arthrotomy was made with bovie. Medial release was performed. Once adequate exposure was achieved, attention was turned to the femur. The femoral canal was entered at the middle of the notch with the entry reamer. IM guide was inserted. In this case the medial worn distal femoral guide was utilized. Distal femoral resection was made, bone cuts were measured and were appropriately 8mm laterally, 6mm medially. We therefore proceeded to use the femoral sizer to measure a size 9 femur and set our rotation neutrally referencing the posterior condyles. Appropriate bone cuts were completed with the 4-1 block. The posterior condylar resections were noted to be appropriately 8mm medial and lateral prior to proceeding with the remainder of the cuts. Attention was then turned to the tibia. The tibial plateau was exposed and translated anteriorly. Once adequate exposure was achieved, the proximal tibial guide was used to resect the tibia, referencing the base of the tibial spines for equal resection medially and laterally and replicating native tibial slope. We then proceeded with meniscus resection, posterior osteophyte resection and posterior capsular release as needed both medially and laterally. Once this was achieved I assessed our flexion and extension gaps. Additional tibia resection was performed as necessary until gaps were balanced appropriately. Patella was not worn and not resurfaced. Lateral release and circumferential denervation were performed. Trial components were then placed.  Satisfactory ligamentous

## 2023-08-03 NOTE — PROGRESS NOTES
Time out performed with Dr. Briana Boston for nerve block. Patient placed on 2 L NC and continuous pulse oximetry for the procedure. Patient tolerated well.

## 2023-08-03 NOTE — ANESTHESIA PROCEDURE NOTES
Peripheral Block    Patient location during procedure: pre-op  Reason for block: post-op pain management and at surgeon's request  Start time: 8/3/2023 10:50 AM  End time: 8/3/2023 11:00 AM  Staffing  Performed: anesthesiologist   Anesthesiologist: Alfredo Quijano MD  Preanesthetic Checklist  Completed: IV checked, timeout performed and anesthesia consent given  Peripheral Block   Patient position: supine  Prep: ChloraPrep  Provider prep: sterile gloves  Patient monitoring: responsive to questions  Block type: Femoral  Adductor canal  Laterality: right  Injection technique: single-shot  Guidance: ultrasound guided    Needle   Needle type: short-bevel   Needle gauge: 20 G  Needle localization: ultrasound guidance  Needle length: 10 cm  Assessment   Injection assessment: negative aspiration for heme, no paresthesia on injection, local visualized surrounding nerve on ultrasound and no intravascular symptoms  Paresthesia pain: none  Slow fractionated injection: yes  Hemodynamics: stable  Real-time US image taken/store: yes  Outcomes: patient tolerated procedure well    Medications Administered  bupivacaine 0.25%-EPINEPHrine injection 1:[id number] - Perineural   20 mL - 8/3/2023 10:50:00 AM

## 2023-08-03 NOTE — ANESTHESIA POSTPROCEDURE EVALUATION
Department of Anesthesiology  Postprocedure Note    Patient: Cb Loyd  MRN: [id number]  YOB: 1965  Date of evaluation: 8/3/2023      Procedure Summary     Date: 08/03/23 Room / Location: [address] [place] [address]    Anesthesia Start: 3277 Anesthesia Stop: 4132    Procedure: RIGHT KNEE TOTAL ARTHROPLASTY (Right: Knee) Diagnosis:       Osteoarthritis of right knee, unspecified osteoarthritis type      (Osteoarthritis of right knee, unspecified osteoarthritis type [M17.11])    Surgeons: Doyel Storm MD Responsible Provider: Nely Lazo MD    Anesthesia Type: general ASA Status: 2          Anesthesia Type: No value filed.     Joan Phase I: Joan Score: 8    Joan Phase II:        Anesthesia Post Evaluation    Patient location during evaluation: PACU  Level of consciousness: lethargic  Airway patency: patent  Nausea & Vomiting: no vomiting  Complications: no  Cardiovascular status: blood pressure returned to baseline  Respiratory status: acceptable  Hydration status: stable  Pain management: adequate

## 2023-08-03 NOTE — PROGRESS NOTES
Physical Therapy    Physical Therapy Evaluation and Treatment and Discharge    Name: Adore Ruby YOB: 1965  MRN: [id number]  Date of Evaluation: 8/3/2023     Patient Diagnosis(es): There were no encounter diagnoses. Past Medical History:  has a past medical history of Arthritis, DVT of lower extremity (deep venous thrombosis) (HCC), Elevated PSA, Epistaxis, GERD (gastroesophageal reflux disease), Hx of blood clots, Hyperlipidemia, Hypertension, and Prostate cancer ([address]). Past Surgical History:  has a past surgical history that includes Knee arthroscopy; Colonoscopy; Knee arthroscopy (Right); cervical fusion; Foot Amputation (Right, 1983); Knee Arthrotomy (Left, 07/15/2022); IR GUIDED IVC FILTER PLACEMENT (08/05/2022); Knee arthroscopy (Left, 08/05/2022); Total knee arthroplasty (Left, 04/06/2023); Dental surgery; Prostate biopsy; and joint replacement. [facility] score  [facility] 6 Clicks Inpatient Mobility:  AM-PAC Basic Mobility - Inpatient   How much help is needed turning from your back to your side while in a flat bed without using bedrails?: None  How much help is needed moving from lying on your back to sitting on the side of a flat bed without using bedrails?: None  How much help is needed moving to and from a bed to a chair?: A Little  How much help is needed standing up from a chair using your arms?: A Little  How much help is needed walking in hospital room?: A Little  How much help is needed climbing 3-5 steps with a railing?: A Little  [facility] Inpatient Mobility Raw Score : 20  AM-PAC Inpatient T-Scale Score : 47.67  Mobility Inpatient CMS 0-100% Score: 35.83  Mobility Inpatient CMS G-Code Modifier : CJ      Surgery: Right    Restrictions: Full Weight Bearing as Tolerated, No ROM Restrictions    Assessment:   Pt seen for PT evaluation POD#0 s/p Right, Total Knee Arthroplasty.  Pt currently requires SBA for bed mobility, CGA progressing to SBA for transfers, CGA progressing to SBA for BLE exercises including: ankle pumps, quad sets, glute sets, heel slides, SLR, SAQ. Pt required Verbal/Tactile cues for technique  Pt provided written HEP. Disease Specific Education:   Pt educated on weight bearing status, post-op precautions, appropriate DME, and safe mobility with AD. Pt verbalized understanding    Pt educated and provided written instruction on safety with car transfers with use of assistive device:  [x] Pt verbalized understanding of appropriate technique, maintaining any ordered precautions for car transfer training s/p TJR. [] Pt demonstrated independence with simulated car transfer with walker. [x] Pt requires SBA and will have someone at discharge to help with transfers  [] Other:     Assessment: see above    Plan:   [] Pt to be seen BID 7 days/week while in acute care setting for therapeutic exercises, bed mobility, transfers, progressive gait training, balance training, and family/patient education.   [x] No further PT needs at this time; pt safe to d/c home with assist from family and/or friends    PT Goals: to be met by 8/03/23 -- all goals met   Pt will perform bed mobility with SBA/CGA  Pt will perform transfers with SBA/CGA and use of Rolling Walker  Pt will ambulate 120 ft with SBA/CGA and use of Rolling Walker  Pt will perform 1 curb step with SBA/CGA and use of Rolling Walker  Pt will perform 10 reps of BLE exercises     Pt specific goal: \"I want to get up and walk to the bathroom\" -- MET 8/03/23    Therapy Time:  Time In: 7874  Time Out: 1607    Total Treatment Minutes:  24 minutes (10 minute eval)      Electronically signed by Emre Manuel PT on 8/3/2023 at 3:35 PM

## 2023-08-03 NOTE — DISCHARGE INSTRUCTIONS
Please contact [facility] Orthopaedic Nurse Navigator with any questions or concerns after your discharge. Mon- Fri 101 Dates  [phone]. If you have any issues or concerns after 5pm or on the weekend please call your surgeon's office. I will be contacting you in a few days to follow up. If you need a pain medication refill please contact your surgeon's office. Dr. Sylvester Ramirez Total Knee Replacement  Discharge Instructions      Activity: The first week after surgery is all about Ice, Elevation and Rest!        [place] way to elevate knee above heart, while keeping the knee straight. Pillows should be placed under the FOOT and leg, such that the leg is held straight. You should feel stretching in the back of the knee. If there is too much pain add pillows under the knee, but the leg should be as straight as possible  Placing pillows under the knee only and keeping the knee bent will make it very difficult to get it straight with physical therapy. Use a walker when ambulating. Physical therapy.    Typically starts 10-14 days after surgery unless otherwise instructed  Referral placed to outpatient location discussed with surgeon or for home PT if you do not have transportation for outpatient PT    To prevent Clot formation, you have been placed on the following medication:   ASA 81mg BID x 20 days    Surgical Site Care:  Keep dressing in place until follow up visit, Call the office if drainage  Showering is permitted with waterproof Mepilex dressing   Will remove dressing at first follow up appointment    Pain Medications  First and foremost you should take Tylenol and Celebrex as prescribed for baseline pain control  If you have medical reasons not to take either medicine they were not prescribed  You were also given Oxycodone as needed  This for is pain despite the other medications and is typically needed for the first 3-5 days after surgery  Be sure to drink plenty of fluids (recommend

## 2023-08-04 ENCOUNTER — PATIENT MESSAGE (OUTPATIENT)
Dept: ORTHOPEDIC SURGERY | Age: 58
End: 2023-08-04

## 2023-08-04 ENCOUNTER — TELEPHONE (OUTPATIENT)
Dept: ORTHOPEDIC SURGERY | Age: 58
End: 2023-08-04

## 2023-08-04 DIAGNOSIS — Z96.651 S/P TOTAL KNEE ARTHROPLASTY, RIGHT: Primary | ICD-10-CM

## 2023-08-04 NOTE — TELEPHONE ENCOUNTER
Spoke with pt, feeling good. Incision status: No drainage or redness    Edema/Swelling/Teds: Not bad, recommended pt ice, elevate, and rest frequently today. Pain level and status: States not that bad right now. Using tylenol. Use of pain medications: Using as needed. Blood thinner: ASA 81mg BID- no issues    Bowels: Stool softener recommended    Home Care Agency active: NA    Outpatient therapy: OP PT set     Do you have all of your medications: Yes    Changes in medications: no    Instructed pt to call Nurse Navigator or surgeon's office with any questions or concerns.      Follow up appointments:    Future Appointments   Date Time Provider [address]   8/8/2023 10:15 AM Beauty Gallery, PT [facility]   8/10/2023  8:00 AM Beauty Gallery, PT [facility]   8/14/2023  9:15 AM Beauty Gallery, PT [facility]   8/17/2023 11:00 AM Beauty Gallery, PT [facility]   8/21/2023  9:15 AM Beauty Gallery, PT [facility]   8/24/2023 10:15 AM Beauty Gallery, PT [facility]   8/28/2023  9:15 AM Beauty Gallery, PT [facility]   8/30/2023  2:30 PM Hipolito Kim, PT [facility]   9/5/2023  9:30 AM Beauty Gallery, PT [facility]   9/8/2023  9:15 AM Hipolito Kim, PT [facility]   9/11/2023  9:15 AM Hipolito Kim, PT [facility]   9/14/2023  9:30 AM Beauty Gallery, PT [facility]   9/18/2023  9:15 AM Beauty Gallery, PT [facility]   9/21/2023  9:30 AM Beauty Gallery, PT [facility]   9/25/2023  9:15 AM Beauty Gallery, PT [facility]   9/28/2023  9:30 AM Beauty Gallery, PT [facility]

## 2023-08-07 NOTE — TELEPHONE ENCOUNTER
From: Hali Virk  To: Dr. Columba Clemente: 3/1/3174 2:20 PM EDT  Subject: Prescription Celecoxib 200 MG    Hello,    [facility] cannot fill my prescription for Celecoxib that was ordered yesterday after my surgery. Robert said they had sent a fax to your office so I wanted to confirm receipt. My insurance is asking for a pre authorization before they will approve.     Thanks you

## 2023-08-08 ENCOUNTER — TELEPHONE (OUTPATIENT)
Dept: ORTHOPEDIC SURGERY | Age: 58
End: 2023-08-08

## 2023-08-08 ENCOUNTER — EVALUATION (OUTPATIENT)
Dept: PHYSICAL THERAPY | Age: 58
End: 2023-08-08
Payer: COMMERCIAL

## 2023-08-08 DIAGNOSIS — M25.561 ACUTE POSTOPERATIVE PAIN OF RIGHT KNEE: Primary | ICD-10-CM

## 2023-08-08 DIAGNOSIS — M25.661 DECREASED RANGE OF MOTION (ROM) OF RIGHT KNEE: ICD-10-CM

## 2023-08-08 DIAGNOSIS — M25.561 PAIN AND SWELLING OF RIGHT KNEE: ICD-10-CM

## 2023-08-08 DIAGNOSIS — M25.461 PAIN AND SWELLING OF RIGHT KNEE: ICD-10-CM

## 2023-08-08 DIAGNOSIS — G89.18 ACUTE POSTOPERATIVE PAIN OF RIGHT KNEE: Primary | ICD-10-CM

## 2023-08-08 PROCEDURE — 97016 VASOPNEUMATIC DEVICE THERAPY: CPT | Performed by: PHYSICAL THERAPIST

## 2023-08-08 PROCEDURE — 97110 THERAPEUTIC EXERCISES: CPT | Performed by: PHYSICAL THERAPIST

## 2023-08-08 PROCEDURE — 97530 THERAPEUTIC ACTIVITIES: CPT | Performed by: PHYSICAL THERAPIST

## 2023-08-08 PROCEDURE — 97161 PT EVAL LOW COMPLEX 20 MIN: CPT | Performed by: PHYSICAL THERAPIST

## 2023-08-08 NOTE — TELEPHONE ENCOUNTER
Imported a NO RECORDS statement for medical records (Jeremy Dior) 3/15/23 to present into MRO for [employer] [place]

## 2023-08-08 NOTE — PROGRESS NOTES
[address] [place] [address] Drive   Phone: [phone]    Fax: [phone]      Physical Therapy Treatment Note/ Progress Report:           Date:  2023    Patient Name:  Daysi Roque    :  1965  MRN: [id number]  Restrictions/Precautions:    Medical/Treatment Diagnosis Information:  Diagnosis: s/p R TKA  DOS 6238     Insurance/Certification information:  PT Insurance Information: BCBS  Physician Information:  Referring Practitioner: Dr Bo Art  Has the plan of care been signed (Y/N):        []  Yes  [x]  No     Date of Patient follow up with Physician: hasn't made follow up yet      Is this a Progress Report:     []  Yes  [x]  No        If Yes:  Date Range for reporting period:  Beginning 2023  Ending  2023    Progress report will be due (10 Rx or 30 days whichever is less): 8332       Recertification will be due (POC Duration  / 90 days whichever is less): 2023         Visit # Insurance Allowable Auth Required   1 BMN []  Yes []  No    Has had 22 visits s/p left TKA earlier this year    Functional Scale: LEFS =10%  (90% functional limitation)    Date assessed:  2023      Latex Allergy:  [x]NO      []YES  Preferred Language for Healthcare:   [x]English       []other:      Pain level:  4-5/10     SUBJECTIVE:  5 days post op    See eval    OBJECTIVE:       ROM PROM AROM Overpressure Comment    L R L R L R 2023   Flexion  ~80        Extension   0 -3        GIRTH  2023 L R POST VASO   Mid Patella 41 cm 42.8  cm 42.2 cm   5 cm above      Infra Patella      Mid Calf      Malleoli            Strength L R Comment: majority deferred secondary to surgery   Quad      Hamstring      Abduction      Quad tone [facility]IR 2023       Special Test  2023 Results/Comment: majority deferred secondary to surgery   Homans (-)         Access Code: [facility]  URL: [email or web address]  Date: 2023  Prepared by: Kulwinder Miller

## 2023-08-09 ENCOUNTER — TELEPHONE (OUTPATIENT)
Dept: ORTHOPEDIC SURGERY | Age: 58
End: 2023-08-09

## 2023-08-09 NOTE — TELEPHONE ENCOUNTER
Attempted to contact pt, left voicemail with purpose and call back number. Jw Pavithrarao  Orthopedic Nurse Navigator  Phone number: [phone]    Follow up appointments:    Future Appointments   Date Time Provider [address]   8/10/2023  8:00 AM Zach Seen, PT [facility]   8/14/2023  9:15 AM Zach Seen, PT [facility]   8/17/2023 11:00 AM Zach Seen, PT [facility]   8/21/2023  9:15 AM Zach Seen, PT [facility]   8/24/2023 10:15 AM Zach Seen, PT [facility]   8/28/2023  9:15 AM Zach Seen, PT [facility]   8/30/2023  2:30 PM Roylene Spring, PT [facility]   9/5/2023  9:30 AM Zach Seen, PT [facility]   9/8/2023  9:15 AM Roylene Spring, PT [facility]   9/11/2023  9:15 AM Roylene Spring, PT [facility]   9/14/2023  9:30 AM Zach Seen, PT [facility]   9/18/2023  9:15 AM Zach Seen, PT [facility]   9/21/2023  9:30 AM Zach Seen, PT [facility]   9/25/2023  9:15 AM Zach Seen, PT [facility]   9/28/2023  9:30 AM Zach Seen, PT [facility]     Signed off from pt. Instructed pt to call RN or Surgeon's office with any issues or concerns.

## 2023-08-10 ENCOUNTER — TREATMENT (OUTPATIENT)
Dept: PHYSICAL THERAPY | Age: 58
End: 2023-08-10

## 2023-08-10 DIAGNOSIS — R26.2 DIFFICULTY WALKING: ICD-10-CM

## 2023-08-10 DIAGNOSIS — M25.661 DECREASED RANGE OF MOTION (ROM) OF RIGHT KNEE: ICD-10-CM

## 2023-08-10 DIAGNOSIS — M25.561 PAIN AND SWELLING OF RIGHT KNEE: ICD-10-CM

## 2023-08-10 DIAGNOSIS — G89.18 ACUTE POSTOPERATIVE PAIN OF RIGHT KNEE: Primary | ICD-10-CM

## 2023-08-10 DIAGNOSIS — M25.561 ACUTE POSTOPERATIVE PAIN OF RIGHT KNEE: Primary | ICD-10-CM

## 2023-08-10 DIAGNOSIS — M25.461 PAIN AND SWELLING OF RIGHT KNEE: ICD-10-CM

## 2023-08-10 NOTE — PROGRESS NOTES
[address] [place] [address], [address] [place] [address] Drive   Phone: [phone]    Fax: [phone]      Physical Therapy Treatment Note/ Progress Report:           Date:  8/10/2023    Patient Name:  Otto Becker    :  1965  MRN: [id number]  Restrictions/Precautions:    Medical/Treatment Diagnosis Information:  Diagnosis: s/p R TKA  DOS      Insurance/Certification information:  PT Insurance Information: BCBS  Physician Information:  Referring Practitioner: Dr Socrates Posadas  Has the plan of care been signed (Y/N):        []  Yes  [x]  No     Date of Patient follow up with Physician: hasn't made follow up yet      Is this a Progress Report:     []  Yes  [x]  No        If Yes:  Date Range for reporting period:  Beginning 2023  Ending  2023    Progress report will be due (10 Rx or 30 days whichever is less):        Recertification will be due (POC Duration  / 90 days whichever is less): 2023         Visit # Insurance Allowable Auth Required   2 BMN []  Yes []  No    Has had 22 visits s/p left TKA earlier this year    Functional Scale: LEFS =10%  (90% functional limitation)    Date assessed:  2023      Latex Allergy:  [x]NO      []YES  Preferred Language for Healthcare:   [x]English       []other:      Pain level:  4-5/10     SUBJECTIVE:  1 week post op  He states his first week is going ok. He is not sleeping real well. He has been doing his HEP and using crutches whenever he is up on his feet.          OBJECTIVE:       ROM PROM AROM Overpressure Comment    L R L R L R 2023   Flexion  ~80        Extension   0 -3        GIRTH   L R POST VASO  8/10/2023   Mid Patella 41 cm 42.8  cm 42.2 cm   5 cm above      Infra Patella      Mid Calf      Malleoli            Strength L R Comment: majority deferred secondary to surgery   Quad      Hamstring      Abduction      Quad tone [facility]IR 2023       Special Test  2023 Results/Comment: majority deferred secondary to

## 2023-08-14 ENCOUNTER — TREATMENT (OUTPATIENT)
Dept: PHYSICAL THERAPY | Age: 58
End: 2023-08-14
Payer: COMMERCIAL

## 2023-08-14 DIAGNOSIS — M25.561 PAIN AND SWELLING OF RIGHT KNEE: ICD-10-CM

## 2023-08-14 DIAGNOSIS — R26.2 DIFFICULTY WALKING: ICD-10-CM

## 2023-08-14 DIAGNOSIS — M25.661 DECREASED RANGE OF MOTION (ROM) OF RIGHT KNEE: ICD-10-CM

## 2023-08-14 DIAGNOSIS — M25.461 PAIN AND SWELLING OF RIGHT KNEE: ICD-10-CM

## 2023-08-14 DIAGNOSIS — M25.561 ACUTE POSTOPERATIVE PAIN OF RIGHT KNEE: Primary | ICD-10-CM

## 2023-08-14 DIAGNOSIS — G89.18 ACUTE POSTOPERATIVE PAIN OF RIGHT KNEE: Primary | ICD-10-CM

## 2023-08-14 PROCEDURE — 97110 THERAPEUTIC EXERCISES: CPT | Performed by: PHYSICAL THERAPIST

## 2023-08-14 PROCEDURE — 97016 VASOPNEUMATIC DEVICE THERAPY: CPT | Performed by: PHYSICAL THERAPIST

## 2023-08-14 PROCEDURE — 97140 MANUAL THERAPY 1/> REGIONS: CPT | Performed by: PHYSICAL THERAPIST

## 2023-08-14 PROCEDURE — 97530 THERAPEUTIC ACTIVITIES: CPT | Performed by: PHYSICAL THERAPIST

## 2023-08-14 NOTE — PROGRESS NOTES
Treatment Minutes: 75      [] EVAL (LOW) 78212 (typically 20 minutes face-to-face)  [] EVAL (MOD) 59516 (typically 30 minutes face-to-face)  [] EVAL (HIGH) 14307 (typically 45 minutes face-to-face)  [] RE-EVAL     [x] DF(76218) x  2 (x25')   [] IONTO  [] NMR (07117) x      [x] VASO elevated medium pressure x15'  [x] Manual (55753) x 1 (x10')    [] Other:  [x] TA x   1 (x15')   [] Mech Traction (63016)  [] ES(attended) (34132)      [] ES (un) (01630):         ASSESSMENT:    His swelling is less than last week. Improvements noted in his knee flexion as well today. He is a little awkward with standing calf raises due to right partial foot amputation. He is walking much better, not nearly as much of a stiff legged gait is noted. Worked on patellar mobility today, he needed reminders to relax his quads as this was limiting his patellar mobility when contracted. GOALS:  Patient stated goal: improve mobility/restore PLOF  [] Progressing: [] Met: [] Not Met: [] Adjusted    Therapist goals for Patient:   Short Term Goals: To be achieved in: 2 weeks  1. Independent in HEP and progression per patient tolerance, in order to prevent re-injury. [] Progressing: [] Met: [] Not Met: [] Adjusted  2. Patient will have a decrease in pain to facilitate improvement in movement, function, and ADLs as indicated by Functional Deficits. [] Progressing: [] Met: [] Not Met: [] Adjusted  3. Return to driving on his own    Long Term Goals: To be achieved in: 8 weeks  1. Functional index score of 80 or more for LEFS to assist with reaching prior level of function. [] Progressing: [] Met: [] Not Met: [] Adjusted  2. Patient will demonstrate increased AROM to 0->120 to allow for proper joint functioning as indicated by patients Functional Deficits. [] Progressing: [] Met: [] Not Met: [] Adjusted  3.  Patient will demonstrate an increase in Strength to good proximal hip strength and control, within 5lb HHD in LE to

## 2023-08-17 ENCOUNTER — TREATMENT (OUTPATIENT)
Dept: PHYSICAL THERAPY | Age: 58
End: 2023-08-17

## 2023-08-17 DIAGNOSIS — M25.661 DECREASED RANGE OF MOTION (ROM) OF RIGHT KNEE: ICD-10-CM

## 2023-08-17 DIAGNOSIS — G89.18 ACUTE POSTOPERATIVE PAIN OF RIGHT KNEE: Primary | ICD-10-CM

## 2023-08-17 DIAGNOSIS — M25.561 ACUTE POSTOPERATIVE PAIN OF RIGHT KNEE: Primary | ICD-10-CM

## 2023-08-17 DIAGNOSIS — M25.461 PAIN AND SWELLING OF RIGHT KNEE: ICD-10-CM

## 2023-08-17 DIAGNOSIS — R26.2 DIFFICULTY WALKING: ICD-10-CM

## 2023-08-17 DIAGNOSIS — M25.561 PAIN AND SWELLING OF RIGHT KNEE: ICD-10-CM

## 2023-08-17 NOTE — PROGRESS NOTES
for normal function with self-care, mobility, lifting and ambulation. Modalities:     [x] GAME READY (VASO)- for significant edema, swelling, pain control. Charges:  Timed Code Treatment Minutes: 50   Total Treatment Minutes: 75      [] EVAL (LOW) 20203 (typically 20 minutes face-to-face)  [] EVAL (MOD) 88662 (typically 30 minutes face-to-face)  [] EVAL (HIGH) 23869 (typically 45 minutes face-to-face)  [] RE-EVAL     [x] QB(05860) x  2 (x25')   [] IONTO  [] NMR (69267) x      [x] VASO elevated medium pressure x15'  [x] Manual (60437) x 1 (x10')    [] Other:  [x] TA x   1 (x15')   [] Mech Traction (42847)  [] ES(attended) (88811)      [] ES (un) (19783):         ASSESSMENT:    Quad tone is looking good. He demonstrates similar knee extension in both sides with active quad sets. Added 2# for SLR, he held full ext well, fatigued into the 3rd set. He is demonstrating good gait pattern without AD. Challenged with sidestepping over cones with ball toss. Just needs a balance check here and there. His right knee ROM is making nice steady progress over this past week. GOALS:  Patient stated goal: improve mobility/restore PLOF  [] Progressing: [] Met: [] Not Met: [] Adjusted    Therapist goals for Patient:   Short Term Goals: To be achieved in: 2 weeks  1. Independent in HEP and progression per patient tolerance, in order to prevent re-injury. [] Progressing: [] Met: [] Not Met: [] Adjusted  2. Patient will have a decrease in pain to facilitate improvement in movement, function, and ADLs as indicated by Functional Deficits. [] Progressing: [] Met: [] Not Met: [] Adjusted  3. Return to driving on his own    Long Term Goals: To be achieved in: 8 weeks  1. Functional index score of 80 or more for LEFS to assist with reaching prior level of function. [] Progressing: [] Met: [] Not Met: [] Adjusted  2.  Patient will demonstrate increased AROM to 0->120 to allow for proper joint

## 2023-08-21 ENCOUNTER — TREATMENT (OUTPATIENT)
Dept: PHYSICAL THERAPY | Age: 58
End: 2023-08-21
Payer: COMMERCIAL

## 2023-08-21 DIAGNOSIS — R26.2 DIFFICULTY WALKING: ICD-10-CM

## 2023-08-21 DIAGNOSIS — G89.18 ACUTE POSTOPERATIVE PAIN OF RIGHT KNEE: Primary | ICD-10-CM

## 2023-08-21 DIAGNOSIS — M25.561 ACUTE POSTOPERATIVE PAIN OF RIGHT KNEE: Primary | ICD-10-CM

## 2023-08-21 DIAGNOSIS — M25.561 PAIN AND SWELLING OF RIGHT KNEE: ICD-10-CM

## 2023-08-21 DIAGNOSIS — M25.661 DECREASED RANGE OF MOTION (ROM) OF RIGHT KNEE: ICD-10-CM

## 2023-08-21 DIAGNOSIS — M25.461 PAIN AND SWELLING OF RIGHT KNEE: ICD-10-CM

## 2023-08-21 PROCEDURE — 97140 MANUAL THERAPY 1/> REGIONS: CPT | Performed by: PHYSICAL THERAPIST

## 2023-08-21 PROCEDURE — 97530 THERAPEUTIC ACTIVITIES: CPT | Performed by: PHYSICAL THERAPIST

## 2023-08-21 PROCEDURE — 97016 VASOPNEUMATIC DEVICE THERAPY: CPT | Performed by: PHYSICAL THERAPIST

## 2023-08-21 PROCEDURE — 97110 THERAPEUTIC EXERCISES: CPT | Performed by: PHYSICAL THERAPIST

## 2023-08-21 NOTE — PROGRESS NOTES
[address] [place] [address], [address] [place] [address] Drive   Phone: [phone]    Fax: [phone]      Physical Therapy Treatment Note/ Progress Report:           Date:  2023    Patient Name:  Keith Martinez    :  1965  MRN: [id number]  Restrictions/Precautions:    Medical/Treatment Diagnosis Information:  Diagnosis: s/p R TKA  DOS 5990     Insurance/Certification information:  PT Insurance Information: BCBS  Physician Information:  Referring Practitioner: Dr Daljit Wells  Has the plan of care been signed (Y/N):        []  Yes  [x]  No     Date of Patient follow up with Physician: 2023      Is this a Progress Report:     []  Yes  [x]  No        If Yes:  Date Range for reporting period:  Beginning 2023  Ending  2023    Progress report will be due (10 Rx or 30 days whichever is less): 1310       Recertification will be due (POC Duration  / 90 days whichever is less): 2023         Visit # Insurance Allowable Auth Required   5 BMN []  Yes []  No    Has had 22 visits s/p left TKA earlier this year    Functional Scale: LEFS =10%  (90% functional limitation)    Date assessed:  2023      Latex Allergy:  [x]NO      []YES  Preferred Language for Healthcare:   [x]English       []other:      Pain level:  3/10     SUBJECTIVE:  2+ weeks post op  He reports he had a good weekend. Grandkids came over to his house to spend some time. Feels a bit stiff this morning, but other than that feels good.            OBJECTIVE:       ROM PROM AROM Overpressure Comment    L R L R L R 2023   Flexion 123 ERMI 103        Extension   0 -3        GIRTH   L R POST VASO  2023   Mid Patella 40.5 cm 41.5 cm 41.1   5 cm above      Infra Patella      Mid Calf      Malleoli            Strength L R Comment: majority deferred secondary to surgery   Quad      Hamstring      Abduction      Quad tone [facility]IR 2023       Special Test  2023 Results/Comment: majority deferred secondary to

## 2023-08-24 ENCOUNTER — TREATMENT (OUTPATIENT)
Dept: PHYSICAL THERAPY | Age: 58
End: 2023-08-24

## 2023-08-24 DIAGNOSIS — G89.18 ACUTE POSTOPERATIVE PAIN OF RIGHT KNEE: Primary | ICD-10-CM

## 2023-08-24 DIAGNOSIS — M25.561 ACUTE POSTOPERATIVE PAIN OF RIGHT KNEE: Primary | ICD-10-CM

## 2023-08-24 DIAGNOSIS — M25.461 PAIN AND SWELLING OF RIGHT KNEE: ICD-10-CM

## 2023-08-24 DIAGNOSIS — M25.561 PAIN AND SWELLING OF RIGHT KNEE: ICD-10-CM

## 2023-08-24 DIAGNOSIS — R26.2 DIFFICULTY WALKING: ICD-10-CM

## 2023-08-24 DIAGNOSIS — M25.661 DECREASED RANGE OF MOTION (ROM) OF RIGHT KNEE: ICD-10-CM

## 2023-08-24 NOTE — PROGRESS NOTES
Deficits. [] Progressing: [] Met: [] Not Met: [] Adjusted  3. Patient will demonstrate an increase in Strength to good proximal hip strength and control, within 5lb HHD in LE to allow for proper functional mobility as indicated by patients Functional Deficits. [] Progressing: [] Met: [] Not Met: [] Adjusted  4. Patient will return to all prior functional activities without increased symptoms or restriction. [] Progressing: [] Met: [] Not Met: [] Adjusted  5. Able to walk on levels and stairs without use of AD  [] Progressing: [] Met: [] Not Met: [] Adjusted       Overall Progression Towards Functional goals/ Treatment Progress Update:  [] Patient is progressing as expected towards functional goals listed. [] Progression is slowed due to complexities/Impairments listed. [] Progression has been slowed due to co-morbidities. [x] Plan just implemented, too soon to assess goals progression <30days   [] Goals require adjustment due to lack of progress  [] Patient is not progressing as expected and requires additional follow up with physician  [] Other    Prognosis for POC: [x] Good [] Fair  [] Poor      Patient requires continued skilled intervention: [x] Yes  [] No    Treatment/Activity Tolerance:  [x] Patient able to complete treatment  [] Patient limited by fatigue  [] Patient limited by pain    [] Patient limited by other medical complications  [] Other:         PLAN: See eval.   Cont 2x/week for ROM, strength, and functional restoration    [] Continue per plan of care [] Alter current plan   [x] Plan of care initiated [] Hold pending MD visit [] Discharge      Electronically signed by:      Angeline Holstein, PT      Board Certified Orthopaedic Clinical Specialist  [facility] Certified  Physical Therapist    [facility] PT #[id number]  [place] PT #[id number]      Note: If patient does not return for scheduled/ recommended follow up visits, this note will serve as a discharge from care along with most recent update on progress.

## 2023-08-28 ENCOUNTER — TREATMENT (OUTPATIENT)
Dept: PHYSICAL THERAPY | Age: 58
End: 2023-08-28
Payer: COMMERCIAL

## 2023-08-28 DIAGNOSIS — M25.461 PAIN AND SWELLING OF RIGHT KNEE: ICD-10-CM

## 2023-08-28 DIAGNOSIS — M25.561 ACUTE POSTOPERATIVE PAIN OF RIGHT KNEE: Primary | ICD-10-CM

## 2023-08-28 DIAGNOSIS — G89.18 ACUTE POSTOPERATIVE PAIN OF RIGHT KNEE: Primary | ICD-10-CM

## 2023-08-28 DIAGNOSIS — M25.661 DECREASED RANGE OF MOTION (ROM) OF RIGHT KNEE: ICD-10-CM

## 2023-08-28 DIAGNOSIS — M25.561 PAIN AND SWELLING OF RIGHT KNEE: ICD-10-CM

## 2023-08-28 DIAGNOSIS — R26.2 DIFFICULTY WALKING: ICD-10-CM

## 2023-08-28 PROCEDURE — 97530 THERAPEUTIC ACTIVITIES: CPT | Performed by: PHYSICAL THERAPIST

## 2023-08-28 PROCEDURE — 97110 THERAPEUTIC EXERCISES: CPT | Performed by: PHYSICAL THERAPIST

## 2023-08-28 PROCEDURE — 97016 VASOPNEUMATIC DEVICE THERAPY: CPT | Performed by: PHYSICAL THERAPIST

## 2023-08-28 PROCEDURE — 97140 MANUAL THERAPY 1/> REGIONS: CPT | Performed by: PHYSICAL THERAPIST

## 2023-08-28 NOTE — PROGRESS NOTES
[address] [place] [address] Drive   Phone: [phone]    Fax: [phone]      Physical Therapy Treatment Note/ Progress Report:           Date:  2023    Patient Name:  Juan Burch    :  1965  MRN: [id number]  Restrictions/Precautions:    Medical/Treatment Diagnosis Information:  Diagnosis: s/p R TKA  DOS 8/3/2020     Insurance/Certification information:  PT Insurance Information: BCBS  Physician Information:  Referring Practitioner: Dr Chad Andrew  Has the plan of care been signed (Y/N):        []  Yes  [x]  No     Date of Patient follow up with Physician: 2023      Is this a Progress Report:     []  Yes  [x]  No        If Yes:  Date Range for reporting period:  Beginning 2023  Ending  2023    Progress report will be due (10 Rx or 30 days whichever is less): 8357       Recertification will be due (POC Duration  / 90 days whichever is less): 2023         Visit # Insurance Allowable Auth Required   7 BMN []  Yes []  No    Has had 22 visits s/p left TKA earlier this year    Functional Scale: LEFS =10%  (90% functional limitation)   Date assessed:  2023      Latex Allergy:  [x]NO      []YES  Preferred Language for Healthcare:   [x]English       []other:      Pain level:  3/10     SUBJECTIVE:  3+ weeks post op  He reports he drove out to his camper on Saturday, about a 4 hour drive one way. His knee does stiffen up some during the drive. They do stop to walk around and stretch about half way through the drive. Reports that he still feels some discomfort lateral knee but not as bad as last week.         OBJECTIVE:       ROM PROM AROM Overpressure Comment    L R L R L R 2023   Flexion 123 ERMI 114 heel slides 117       Extension   0 -3        GIRTH   L R POST VASO  2023   Mid Patella 40.5 cm 41.4 cm 40.8 cm   5 cm above      Infra Patella      Mid Calf      Malleoli            Strength L R Comment: majority deferred secondary to surgery   Quad

## 2023-08-30 ENCOUNTER — TREATMENT (OUTPATIENT)
Dept: PHYSICAL THERAPY | Age: 58
End: 2023-08-30

## 2023-08-30 DIAGNOSIS — R26.2 DIFFICULTY WALKING: ICD-10-CM

## 2023-08-30 DIAGNOSIS — M25.561 PAIN AND SWELLING OF RIGHT KNEE: ICD-10-CM

## 2023-08-30 DIAGNOSIS — M25.461 PAIN AND SWELLING OF RIGHT KNEE: ICD-10-CM

## 2023-08-30 DIAGNOSIS — G89.18 ACUTE POSTOPERATIVE PAIN OF RIGHT KNEE: Primary | ICD-10-CM

## 2023-08-30 DIAGNOSIS — M25.561 ACUTE POSTOPERATIVE PAIN OF RIGHT KNEE: Primary | ICD-10-CM

## 2023-08-30 DIAGNOSIS — M25.661 DECREASED RANGE OF MOTION (ROM) OF RIGHT KNEE: ICD-10-CM

## 2023-08-30 NOTE — PROGRESS NOTES
[address]   Phone: [phone]    Fax: [phone]      Physical Therapy Treatment Note/ Progress Report:           Date:  2023    Patient Name:  Roland Kahn    :  1965  MRN: [id number]  Restrictions/Precautions:    Medical/Treatment Diagnosis Information:  Diagnosis: s/p R TKA  DOS 2731     Insurance/Certification information:  PT Insurance Information: BCBS  Physician Information:  Referring Practitioner: Dr Maurilio Alexander  Has the plan of care been signed (Y/N):        []  Yes  [x]  No     Date of Patient follow up with Physician: 2023      Is this a Progress Report:     []  Yes  [x]  No        If Yes:  Date Range for reporting period:  Beginning 2023  Ending  2023    Progress report will be due (10 Rx or 30 days whichever is less): 4/3/1759       Recertification will be due (POC Duration  / 90 days whichever is less): 2023         Visit # Insurance Allowable Auth Required   8 BMN []  Yes []  No    Has had 22 visits s/p left TKA earlier this year    Functional Scale: LEFS =10%  (90% functional limitation)   Date assessed:  2023      Latex Allergy:  [x]NO      []YES  Preferred Language for Healthcare:   [x]English       []other:      Pain level:  3/10     SUBJECTIVE:  3+ weeks post op              He reports he drove out to his camper on Saturday, about a 4 hour drive one way. His knee does stiffen up some during the drive. They do stop to walk around and stretch about half way through the drive. Reports that he still feels some discomfort lateral knee but not as bad as last week.         OBJECTIVE:       ROM PROM AROM Overpressure Comment    L R L R L R 2023   Flexion 123 ERMI 114 heel slides 117       Extension   0 -3        GIRTH   L R POST VASO  2023   Mid Patella 40.5 cm 41.4 cm 40.8 cm   5 cm above      Infra Patella      Mid Calf      Malleoli            Strength L R Comment: majority deferred secondary to

## 2023-08-30 NOTE — PROGRESS NOTES
[address] [place] [address], [address] [place] [address] Drive   Phone: [phone]    Fax: [phone]      Physical Therapy  Cancellation/No-show Note  Patient Name:  Lia Delatorre  :  1965   Date:  2023    Cancelled visits to date: 1  No-shows to date: 0    For today's appointment patient:  [x]  Cancelled  []  Rescheduled appointment  []  No-show     Reason given by patient:  [x]  Patient ill  []  Conflicting appointment  []  No transportation    []  Conflict with work  []  No reason given  []  Other:     Comments:  Patient called today to cancel his appt as he has a really bad HA. He is scheduled for next week. Phone call information:   []  Phone call made today to patient at _ time at number provided:      []  Patient answered, conversation as follows:    []  Patient did not answer, message left as follows:  []  Phone call not made today  [x]  Phone call not needed - pt contacted us to cancel and provided reason for cancellation.      Electronically signed by:      Angeline Holstein, PT      Board Certified Orthopaedic Clinical Specialist  [facility] Certified  Physical Therapist    [place] [facility] #[id number]  [address] [place] [address] #[id number]

## 2023-09-05 ENCOUNTER — OFFICE VISIT (OUTPATIENT)
Dept: ORTHOPEDIC SURGERY | Age: 58
End: 2023-09-05

## 2023-09-05 ENCOUNTER — TREATMENT (OUTPATIENT)
Dept: PHYSICAL THERAPY | Age: 58
End: 2023-09-05
Payer: COMMERCIAL

## 2023-09-05 VITALS — HEIGHT: 69 IN | WEIGHT: 191 LBS | BODY MASS INDEX: 28.29 KG/M2

## 2023-09-05 DIAGNOSIS — M25.661 DECREASED RANGE OF MOTION (ROM) OF RIGHT KNEE: ICD-10-CM

## 2023-09-05 DIAGNOSIS — R26.2 DIFFICULTY WALKING: ICD-10-CM

## 2023-09-05 DIAGNOSIS — M25.561 PAIN AND SWELLING OF RIGHT KNEE: ICD-10-CM

## 2023-09-05 DIAGNOSIS — Z96.653 S/P TOTAL KNEE ARTHROPLASTY, BILATERAL: Primary | ICD-10-CM

## 2023-09-05 DIAGNOSIS — M25.561 ACUTE POSTOPERATIVE PAIN OF RIGHT KNEE: Primary | ICD-10-CM

## 2023-09-05 DIAGNOSIS — G89.18 ACUTE POSTOPERATIVE PAIN OF RIGHT KNEE: Primary | ICD-10-CM

## 2023-09-05 DIAGNOSIS — M25.461 PAIN AND SWELLING OF RIGHT KNEE: ICD-10-CM

## 2023-09-05 PROCEDURE — 97530 THERAPEUTIC ACTIVITIES: CPT | Performed by: PHYSICAL THERAPIST

## 2023-09-05 PROCEDURE — 99024 POSTOP FOLLOW-UP VISIT: CPT | Performed by: STUDENT IN AN ORGANIZED HEALTH CARE EDUCATION/TRAINING PROGRAM

## 2023-09-05 PROCEDURE — 97140 MANUAL THERAPY 1/> REGIONS: CPT | Performed by: PHYSICAL THERAPIST

## 2023-09-05 PROCEDURE — 97110 THERAPEUTIC EXERCISES: CPT | Performed by: PHYSICAL THERAPIST

## 2023-09-05 PROCEDURE — 97016 VASOPNEUMATIC DEVICE THERAPY: CPT | Performed by: PHYSICAL THERAPIST

## 2023-09-05 NOTE — PROGRESS NOTES
Dr Nessa Rosario      Date /Time 9/5/2023       1:12 PM EDT  Name Da Romero             1965   Location  [address] [facility] [address] Jovanna Talavera  MRN [id number]                Chief Complaint   Patient presents with    Knee Pain     Right         History of Present Illness  Da Romero is a 59 y.o. male who presents for follow-up after staged bilateral knee replacements. 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 The right knee replacement was August 3. He was recently able to travel to [place] and has been doing very well. He continues to do outpatient physical therapy. He is ambulating without assistive devices currently and pain is controlled without medication. He is inquiring about having a DVT filter placed which appears to have been placed with Dr. Nava Prado after his single provoked DVT which happened around the time of his knee infection. The plan was reportedly to have this placed when he was done having lower extremity surgery.       Past History  Past Medical History:   Diagnosis Date    Arthritis     R knee    DVT of lower extremity (deep venous thrombosis) (HCC)     left lower leg, diagnosed 5/2022    Elevated PSA     Epistaxis     GERD (gastroesophageal reflux disease)     Hx of blood clots     Hyperlipidemia     Hypertension     Prostate cancer ([address])     SURVELLIANCE     Past Surgical History:   Procedure Laterality Date    CERVICAL FUSION      anterior fusion c6-7    COLONOSCOPY      DENTAL SURGERY      FOOT AMPUTATION Right 1983    partial    IR IVC FILTER PLACEMENT W IMAGING  08/05/2022    IR IVC FILTER PLACEMENT W IMAGING 8/5/2022 Jillian Salmeron MD [facility] SPECIAL PROCEDURES    JOINT REPLACEMENT      KNEE ARTHROSCOPY      KNEE ARTHROSCOPY Right     KNEE ARTHROSCOPY Left 08/05/2022    ARTHROSCOPIC INCISION AND DRAINAGE LEFT KNEE performed by Lisa Snyder MD at [address] ARTHROTOMY Left 07/15/2022    OPEN POSTERIOR KNEE DISSECTION,

## 2023-09-08 ENCOUNTER — TREATMENT (OUTPATIENT)
Dept: PHYSICAL THERAPY | Age: 58
End: 2023-09-08

## 2023-09-08 DIAGNOSIS — M25.461 PAIN AND SWELLING OF RIGHT KNEE: ICD-10-CM

## 2023-09-08 DIAGNOSIS — R26.2 DIFFICULTY WALKING: ICD-10-CM

## 2023-09-08 DIAGNOSIS — G89.18 ACUTE POSTOPERATIVE PAIN OF RIGHT KNEE: Primary | ICD-10-CM

## 2023-09-08 DIAGNOSIS — M25.561 PAIN AND SWELLING OF RIGHT KNEE: ICD-10-CM

## 2023-09-08 DIAGNOSIS — M25.561 ACUTE POSTOPERATIVE PAIN OF RIGHT KNEE: Primary | ICD-10-CM

## 2023-09-08 DIAGNOSIS — M25.661 DECREASED RANGE OF MOTION (ROM) OF RIGHT KNEE: ICD-10-CM

## 2023-09-08 NOTE — PROGRESS NOTES
surgery   Quad      Hamstring      Abduction      Quad tone [facility] 9/5/2023       Special Test  8/8/2023 Results/Comment: majority deferred secondary to surgery   Homans (-)         Access Code: [facility]  URL: [email or web address]  Date: 08/08/2023  Prepared by: Chemo Kraft          RESTRICTIONS/PRECAUTIONS: hx of prior DVT, Left TKA    Exercises/Interventions:   Therapeutic Ex (66104) Sets/sec Reps Notes/CUES   BIKE x8'  Recumbent seat at #10, #9, then #8   TREADMILL            STRETCHING      Towel Pull Calf 30\" x5 With foot prop  10# OP   Inclined Calf 30\" x5    Hamstrings 30\" x5 10# OP   Quads 30\" x3 Prone manual stretching each leg   Hip Flexors      ITB w/ towel  10\" x10    Adductors            ROM      Passive 10\" x10 Seated EOB well leg support   ERMI      Active  2x15 Supine feet on ball//rolls   Weight Hangs      Sheet Pulls      LE elevated  With black band   ERMI            STRENGTHENING      Quad Sets 5\" x15    Ham Sets      Hip ADD Sets BS 5\" x15    SLR Flexion 3 x10 3#   SLR Abduction 3 x10 3#   SLR Prone      SLR Adduction      SLR+      Bridge over green ball 2 x15 1 set legs straight, 1 set legs bent         PRE Machines      Knee Extension 3 x10 Machine 20# BLE   Knee Flexion 3 x10 Machine 30# BLE   Leg Press            CKC      Calf Raises 2 x15    Mini Squats 2 x15    Wall Sits      Step ups L3 x10    Step up and over L3  x10    TKE                  Manual Intervention (60686)      Patellar Mobs x5'  All directions   Femoral Tibial mobs      STM x10'  Distal quads and lateral knee/distal ITB   Scar Massage      Foam Roll            NMR re-education (33767)   CUES NEEDED   Biodex Balance x4'  L10 WS sd/sd and diaganols  LOS L10 x2   Tandem Balance      SLB 30\"x3     Rebounder      BOSU                              Therapeutic Activity (42496)      education x10'  Reviewed post op goals and expectations. Discussed WB progressions and swelling control.    Issued written

## 2023-09-11 ENCOUNTER — TREATMENT (OUTPATIENT)
Dept: PHYSICAL THERAPY | Age: 58
End: 2023-09-11
Payer: COMMERCIAL

## 2023-09-11 DIAGNOSIS — M25.561 ACUTE POSTOPERATIVE PAIN OF RIGHT KNEE: Primary | ICD-10-CM

## 2023-09-11 DIAGNOSIS — M25.661 DECREASED RANGE OF MOTION (ROM) OF RIGHT KNEE: ICD-10-CM

## 2023-09-11 DIAGNOSIS — M25.461 PAIN AND SWELLING OF RIGHT KNEE: ICD-10-CM

## 2023-09-11 DIAGNOSIS — G89.18 ACUTE POSTOPERATIVE PAIN OF RIGHT KNEE: Primary | ICD-10-CM

## 2023-09-11 DIAGNOSIS — M25.561 PAIN AND SWELLING OF RIGHT KNEE: ICD-10-CM

## 2023-09-11 DIAGNOSIS — R26.2 DIFFICULTY WALKING: ICD-10-CM

## 2023-09-11 PROCEDURE — 97530 THERAPEUTIC ACTIVITIES: CPT | Performed by: PHYSICAL THERAPIST

## 2023-09-11 PROCEDURE — 97016 VASOPNEUMATIC DEVICE THERAPY: CPT | Performed by: PHYSICAL THERAPIST

## 2023-09-11 PROCEDURE — 97140 MANUAL THERAPY 1/> REGIONS: CPT | Performed by: PHYSICAL THERAPIST

## 2023-09-11 PROCEDURE — 97110 THERAPEUTIC EXERCISES: CPT | Performed by: PHYSICAL THERAPIST

## 2023-09-14 ENCOUNTER — TREATMENT (OUTPATIENT)
Dept: PHYSICAL THERAPY | Age: 58
End: 2023-09-14

## 2023-09-14 DIAGNOSIS — M25.461 PAIN AND SWELLING OF RIGHT KNEE: ICD-10-CM

## 2023-09-14 DIAGNOSIS — M25.561 PAIN AND SWELLING OF RIGHT KNEE: ICD-10-CM

## 2023-09-14 DIAGNOSIS — M25.561 ACUTE POSTOPERATIVE PAIN OF RIGHT KNEE: Primary | ICD-10-CM

## 2023-09-14 DIAGNOSIS — R26.2 DIFFICULTY WALKING: ICD-10-CM

## 2023-09-14 DIAGNOSIS — M25.661 DECREASED RANGE OF MOTION (ROM) OF RIGHT KNEE: ICD-10-CM

## 2023-09-14 DIAGNOSIS — G89.18 ACUTE POSTOPERATIVE PAIN OF RIGHT KNEE: Primary | ICD-10-CM

## 2023-09-14 NOTE — PROGRESS NOTES
[address] [place] [address] Drive   Phone: [phone]    Fax: [phone]    Physical Therapy Treatment Note/ Progress Report:           Date:  2023    Patient Name:  Jazmyn Osullivan    :  1965  MRN: [id number]  Restrictions/Precautions:    Medical/Treatment Diagnosis Information:  Diagnosis: s/p R TKA  DOS 4/3/9455     Insurance/Certification information:  PT Insurance Information: BCBS  Physician Information:  Referring Practitioner: Dr Yun Leal  Has the plan of care been signed (Y/N):        []  Yes  [x]  No     Date of Patient follow up with Physician:       Is this a Progress Report:     []  Yes  []  No        If Yes:  Date Range for reporting period:  Beginning 2023  Ending  2023    Progress report will be due (10 Rx or 30 days whichever is less):        Recertification will be due (POC Duration  / 90 days whichever is less): 2023         Visit # Insurance Allowable Auth Required   10 BMN []  Yes []  No    Has had 22 visits s/p left TKA earlier this year    Functional Scale: LEFS 8/80=10%  (90% functional limitation)   Date assessed:  2023  LEFS 2023   40/80=  50%  (50% functional limitation)          Latex Allergy:  [x]NO      []YES  Preferred Language for Healthcare:   [x]English       []other:      Pain level:  3/10     SUBJECTIVE:  6 weeks post op  He closes on his [place] home on . Planning to move in Oct or so. He states his knees are feeling better than last week. He has been trying to ride his bike every day this week.           OBJECTIVE:       ROM PROM AROM Overpressure Comment    L R L R L R 2023   Flexion 122 ERMI 124  ERMI 117       Extension  0 0 -1        GIRTH   L R POST VASO  2023   Mid Patella 40.5 cm 41.5 cm 40.8 cm R knee   5 cm above      Infra Patella      Mid Calf      Malleoli            Strength L R Comment: majority deferred secondary to surgery   Quad      Hamstring      Abduction      Quad tone [facility]

## 2023-09-19 ENCOUNTER — TREATMENT (OUTPATIENT)
Dept: PHYSICAL THERAPY | Age: 58
End: 2023-09-19
Payer: COMMERCIAL

## 2023-09-19 DIAGNOSIS — M25.561 ACUTE POSTOPERATIVE PAIN OF RIGHT KNEE: Primary | ICD-10-CM

## 2023-09-19 DIAGNOSIS — M25.561 PAIN AND SWELLING OF RIGHT KNEE: ICD-10-CM

## 2023-09-19 DIAGNOSIS — R26.2 DIFFICULTY WALKING: ICD-10-CM

## 2023-09-19 DIAGNOSIS — M25.661 DECREASED RANGE OF MOTION (ROM) OF RIGHT KNEE: ICD-10-CM

## 2023-09-19 DIAGNOSIS — M25.461 PAIN AND SWELLING OF RIGHT KNEE: ICD-10-CM

## 2023-09-19 DIAGNOSIS — G89.18 ACUTE POSTOPERATIVE PAIN OF RIGHT KNEE: Primary | ICD-10-CM

## 2023-09-19 PROCEDURE — 97110 THERAPEUTIC EXERCISES: CPT | Performed by: PHYSICAL THERAPIST

## 2023-09-19 PROCEDURE — 97140 MANUAL THERAPY 1/> REGIONS: CPT | Performed by: PHYSICAL THERAPIST

## 2023-09-19 PROCEDURE — 97530 THERAPEUTIC ACTIVITIES: CPT | Performed by: PHYSICAL THERAPIST

## 2023-09-20 ENCOUNTER — TELEPHONE (OUTPATIENT)
Dept: ORTHOPEDIC SURGERY | Age: 58
End: 2023-09-20

## 2023-09-20 NOTE — TELEPHONE ENCOUNTER
Imported medical records (Jacinta Seth) for [birth year] to date into MRO for [facility]

## 2023-09-21 ENCOUNTER — TREATMENT (OUTPATIENT)
Dept: PHYSICAL THERAPY | Age: 58
End: 2023-09-21

## 2023-09-21 DIAGNOSIS — R26.2 DIFFICULTY WALKING: ICD-10-CM

## 2023-09-21 DIAGNOSIS — M25.561 PAIN AND SWELLING OF RIGHT KNEE: ICD-10-CM

## 2023-09-21 DIAGNOSIS — M25.561 ACUTE POSTOPERATIVE PAIN OF RIGHT KNEE: Primary | ICD-10-CM

## 2023-09-21 DIAGNOSIS — M25.461 PAIN AND SWELLING OF RIGHT KNEE: ICD-10-CM

## 2023-09-21 DIAGNOSIS — G89.18 ACUTE POSTOPERATIVE PAIN OF RIGHT KNEE: Primary | ICD-10-CM

## 2023-09-21 NOTE — PROGRESS NOTES
[address] [place] [address], [address] [place] [address] Drive   Phone: [phone]    Fax: [phone]    Physical Therapy Treatment Note/ Progress Report:           Date:  2023    Patient Name:  aJzmyn Osullivan    :  1965  MRN: [id number]  Restrictions/Precautions:    Medical/Treatment Diagnosis Information:  Diagnosis: s/p R TKA  DOS 2600     Insurance/Certification information:  PT Insurance Information: BCBS  Physician Information:  Referring Practitioner: Dr Yun Leal  Has the plan of care been signed (Y/N):        []  Yes  [x]  No     Date of Patient follow up with Physician:       Is this a Progress Report:     []  Yes  []  No        If Yes:  Date Range for reporting period:  Beginning 2023  Ending  2023    Progress report will be due (10 Rx or 30 days whichever is less): 287       Recertification will be due (POC Duration  / 90 days whichever is less): 2023         Visit # Insurance Allowable Auth Required   12 BMN []  Yes []  No    Has had 22 visits s/p left TKA earlier this year    Functional Scale: LEFS 8/80=10%  (90% functional limitation)   Date assessed:  2023  LEFS 2023   40/80=  50%  (50% functional limitation)          Latex Allergy:  [x]NO      []YES  Preferred Language for Healthcare:   [x]English       []other:      Pain level:  3/10     SUBJECTIVE:  7 weeks post op  Patient states he was ok after last session. His knee did not bother him any worse than usual.   He is still dealing with some LBP when he is on his feet for prolonged periods of time.           OBJECTIVE:       ROM PROM AROM Overpressure Comment    L R L R L R 2023   Flexion 125 ERMI 125  ERMI 117       Extension  0 0 -1        GIRTH   L R POST VASO  2023   Mid Patella 40.5 cm 41.6 cm 41.2 cm   5 cm above      Infra Patella      Mid Calf      Malleoli            Strength L R Comment: majority deferred secondary to surgery   Quad      Hamstring      Abduction      Quad tone [facility]

## 2023-09-25 ENCOUNTER — TREATMENT (OUTPATIENT)
Dept: PHYSICAL THERAPY | Age: 58
End: 2023-09-25
Payer: COMMERCIAL

## 2023-09-25 DIAGNOSIS — M25.661 DECREASED RANGE OF MOTION (ROM) OF RIGHT KNEE: ICD-10-CM

## 2023-09-25 DIAGNOSIS — M25.561 ACUTE POSTOPERATIVE PAIN OF RIGHT KNEE: Primary | ICD-10-CM

## 2023-09-25 DIAGNOSIS — R26.2 DIFFICULTY WALKING: ICD-10-CM

## 2023-09-25 DIAGNOSIS — M25.461 PAIN AND SWELLING OF RIGHT KNEE: ICD-10-CM

## 2023-09-25 DIAGNOSIS — M25.561 PAIN AND SWELLING OF RIGHT KNEE: ICD-10-CM

## 2023-09-25 DIAGNOSIS — G89.18 ACUTE POSTOPERATIVE PAIN OF RIGHT KNEE: Primary | ICD-10-CM

## 2023-09-25 PROCEDURE — 97016 VASOPNEUMATIC DEVICE THERAPY: CPT | Performed by: PHYSICAL THERAPIST

## 2023-09-25 PROCEDURE — 97530 THERAPEUTIC ACTIVITIES: CPT | Performed by: PHYSICAL THERAPIST

## 2023-09-25 PROCEDURE — 97110 THERAPEUTIC EXERCISES: CPT | Performed by: PHYSICAL THERAPIST

## 2023-09-25 PROCEDURE — 97140 MANUAL THERAPY 1/> REGIONS: CPT | Performed by: PHYSICAL THERAPIST

## 2023-09-25 NOTE — PROGRESS NOTES
[address] [place] [address] Drive   Phone: [phone]    Fax: [phone]    Physical Therapy Treatment Note/ Progress Report:           Date:  2023    Patient Name:  Otto Becker    :  1965  MRN: [id number]  Restrictions/Precautions:    Medical/Treatment Diagnosis Information:  Diagnosis: s/p R TKA  DOS      Insurance/Certification information:  PT Insurance Information: BCBS  Physician Information:  Referring Practitioner: Dr Socrates Posadas  Has the plan of care been signed (Y/N):        []  Yes  [x]  No     Date of Patient follow up with Physician:       Is this a Progress Report:     []  Yes  []  No        If Yes:  Date Range for reporting period:  Beginning 2023  Ending  2023    Progress report will be due (10 Rx or 30 days whichever is less):        Recertification will be due (POC Duration  / 90 days whichever is less): 2023         Visit # Insurance Allowable Auth Required   13 BMN []  Yes []  No    Has had 22 visits s/p left TKA earlier this year    Functional Scale: LEFS =10%  (90% functional limitation)   Date assessed:  2023  LEFS 2023   40/80=  50%  (50% functional limitation)          Latex Allergy:  [x]NO      []YES  Preferred Language for Healthcare:   [x]English       []other:      Pain level:  3/10     SUBJECTIVE:  7+ weeks post op  He states at times his left knee is more bothersome than his right knee. He reports that he took it easy over the weekend.            OBJECTIVE:       ROM PROM AROM Overpressure Comment    L R L R L R 2023   Flexion 128 ERMI 130  ERMI 117       Extension  0 0 -1        GIRTH   L R POST VASO  2023   Mid Patella 40.5 cm 41.3 cm 40.8 cm   5 cm above      Infra Patella      Mid Calf      Malleoli            Strength L R Comment: majority deferred secondary to surgery   Quad      Hamstring      Abduction      Quad tone [facility] 2023       Special Test  2023 Results/Comment:

## 2023-10-05 ENCOUNTER — TREATMENT (OUTPATIENT)
Dept: PHYSICAL THERAPY | Age: 58
End: 2023-10-05

## 2023-10-05 DIAGNOSIS — M25.561 ACUTE POSTOPERATIVE PAIN OF RIGHT KNEE: Primary | ICD-10-CM

## 2023-10-05 DIAGNOSIS — M25.561 PAIN AND SWELLING OF RIGHT KNEE: ICD-10-CM

## 2023-10-05 DIAGNOSIS — M25.461 PAIN AND SWELLING OF RIGHT KNEE: ICD-10-CM

## 2023-10-05 DIAGNOSIS — G89.18 ACUTE POSTOPERATIVE PAIN OF RIGHT KNEE: Primary | ICD-10-CM

## 2023-10-05 DIAGNOSIS — R26.2 DIFFICULTY WALKING: ICD-10-CM

## 2023-10-05 DIAGNOSIS — M25.661 DECREASED RANGE OF MOTION (ROM) OF RIGHT KNEE: ICD-10-CM

## 2023-10-05 NOTE — PROGRESS NOTES
[address] [place] [address], [address] [place] [address] Drive   Phone: [phone]    Fax: [phone]    Physical Therapy Treatment Note/ Progress Report:           Date:  10/5/2023    Patient Name:  Damaris Vaughan    :  1965  MRN: [id number]  Restrictions/Precautions:    Medical/Treatment Diagnosis Information:  Diagnosis: s/p R TKA       Insurance/Certification information:  PT Insurance Information: BCBS  Physician Information:  Referring Practitioner: Dr Lillie Jimenez  Has the plan of care been signed (Y/N):        []  Yes  [x]  No     Date of Patient follow up with Physician:       Is this a Progress Report:     []  Yes  []  No        If Yes:  Date Range for reporting period:  Beginning 2023  Ending  2023    Progress report will be due (10 Rx or 30 days whichever is less): 679       Recertification will be due (POC Duration  / 90 days whichever is less): 2023         Visit # Insurance Allowable Auth Required   14 BMN []  Yes []  No    Has had 22 visits s/p left TKA earlier this year    Functional Scale: LEFS 8/80=10%  (90% functional limitation)   Date assessed:  2023  LEFS 2023   40/80=  50%  (50% functional limitation)          Latex Allergy:  [x]NO      []YES  Preferred Language for Healthcare:   [x]English       []other:      Pain level:  3/10     SUBJECTIVE:  9 weeks post op  He was out of town for a family  last week and early this week. So he was traveling a lot and unable to do much with his stretches. States his knee feels a bit stiff this morning.           OBJECTIVE:       ROM PROM AROM Overpressure Comment    L R L R L R 2023   Flexion 128 ERMI 130  ERMI 117       Extension  0 0 -1        GIRTH   L R POST VASO      Mid Patella 40.5 cm 41.3 cm No vaso today   5 cm above      Infra Patella      Mid Calf      Malleoli            Strength L R Comment: majority deferred secondary to surgery   Quad      Hamstring      Abduction      Quad tone GOOD

## 2023-10-12 ENCOUNTER — TREATMENT (OUTPATIENT)
Dept: PHYSICAL THERAPY | Age: 58
End: 2023-10-12

## 2023-10-12 DIAGNOSIS — R26.2 DIFFICULTY WALKING: ICD-10-CM

## 2023-10-12 DIAGNOSIS — M25.461 PAIN AND SWELLING OF RIGHT KNEE: ICD-10-CM

## 2023-10-12 DIAGNOSIS — M25.661 DECREASED RANGE OF MOTION (ROM) OF RIGHT KNEE: ICD-10-CM

## 2023-10-12 DIAGNOSIS — M25.561 ACUTE POSTOPERATIVE PAIN OF RIGHT KNEE: Primary | ICD-10-CM

## 2023-10-12 DIAGNOSIS — M25.561 PAIN AND SWELLING OF RIGHT KNEE: ICD-10-CM

## 2023-10-12 DIAGNOSIS — G89.18 ACUTE POSTOPERATIVE PAIN OF RIGHT KNEE: Primary | ICD-10-CM

## 2023-10-12 NOTE — PROGRESS NOTES
[facility] [place] [address]   Phone: [phone]    Fax: [phone]    Physical Therapy Treatment Note/ Progress Report:           Date:  10/12/2023    Patient Name:  Diana Moses    :  1965  MRN: [id number]  Restrictions/Precautions:    Medical/Treatment Diagnosis Information:  Diagnosis: s/p R TKA  DOS 8630     Insurance/Certification information:  PT Insurance Information: BCBS  Physician Information:  Referring Practitioner: Dr Sylvester Ramirez  Has the plan of care been signed (Y/N):        []  Yes  [x]  No     Date of Patient follow up with Physician:       Is this a Progress Report:     []  Yes  []  No        If Yes:  Date Range for reporting period:  Beginning 2023  Ending  2023    Progress report will be due (10 Rx or 30 days whichever is less): 5779       Recertification will be due (POC Duration  / 90 days whichever is less): 2023         Visit # Insurance Allowable Auth Required   15 BMN []  Yes []  No    Has had 22 visits s/p left TKA earlier this year    Functional Scale: LEFS 880=10%  (90% functional limitation)   Date assessed:  2023  LEFS 2023   40/80=  50%  (50% functional limitation)          Latex Allergy:  [x]NO      []YES  Preferred Language for Healthcare:   [x]English       []other:      Pain level:  3/10     SUBJECTIVE:  10 weeks post op  He returned from his trip to [facility] last night. Had to drive 2 of his vehicles down there in preparation for his move. States that his knees actually held up pretty well. Didn't feel like he stiffened up too much.           OBJECTIVE:       ROM PROM AROM Overpressure Comment    L R L R L R 2023   Flexion 128 ERMI 130  ERMI 117       Extension  0 0 -1        GIRTH   L R POST VASO  10/12/2023      Mid Patella 40.5 cm 41.3 cm 40.8   5 cm above      Infra Patella      Mid Calf      Malleoli            Strength L R Comment: majority deferred secondary to surgery   Quad      Hamstring

## 2023-10-17 ENCOUNTER — TREATMENT (OUTPATIENT)
Dept: PHYSICAL THERAPY | Age: 58
End: 2023-10-17
Payer: COMMERCIAL

## 2023-10-17 DIAGNOSIS — M25.661 DECREASED RANGE OF MOTION (ROM) OF RIGHT KNEE: ICD-10-CM

## 2023-10-17 DIAGNOSIS — G89.18 ACUTE POSTOPERATIVE PAIN OF RIGHT KNEE: Primary | ICD-10-CM

## 2023-10-17 DIAGNOSIS — M25.561 PAIN AND SWELLING OF RIGHT KNEE: ICD-10-CM

## 2023-10-17 DIAGNOSIS — R26.2 DIFFICULTY WALKING: ICD-10-CM

## 2023-10-17 DIAGNOSIS — M25.461 PAIN AND SWELLING OF RIGHT KNEE: ICD-10-CM

## 2023-10-17 DIAGNOSIS — M25.561 ACUTE POSTOPERATIVE PAIN OF RIGHT KNEE: Primary | ICD-10-CM

## 2023-10-17 PROCEDURE — 97530 THERAPEUTIC ACTIVITIES: CPT | Performed by: PHYSICAL THERAPIST

## 2023-10-17 PROCEDURE — 97016 VASOPNEUMATIC DEVICE THERAPY: CPT | Performed by: PHYSICAL THERAPIST

## 2023-10-17 PROCEDURE — 97140 MANUAL THERAPY 1/> REGIONS: CPT | Performed by: PHYSICAL THERAPIST

## 2023-10-17 PROCEDURE — 97110 THERAPEUTIC EXERCISES: CPT | Performed by: PHYSICAL THERAPIST

## 2023-10-17 NOTE — PROGRESS NOTES
[address] [place] [address] Drive   Phone: [phone]    Fax: [phone]    Physical Therapy Treatment Note/ Progress Report:           Date:  10/17/2023    Patient Name:  Salvatore Dior    :  1965  MRN: [id number]  Restrictions/Precautions:    Medical/Treatment Diagnosis Information:  Diagnosis: s/p R TKA       Insurance/Certification information:  PT Insurance Information: BCBS  Physician Information:  Referring Practitioner: Dr Lois Lyn  Has the plan of care been signed (Y/N):        []  Yes  [x]  No     Date of Patient follow up with Physician:       Is this a Progress Report:     []  Yes  []  No        If Yes:  Date Range for reporting period:  Beginning 2023  Ending  2023    Progress report will be due (10 Rx or 30 days whichever is less): 155       Recertification will be due (POC Duration  / 90 days whichever is less): 2023         Visit # Insurance Allowable Auth Required   16 BMN []  Yes []  No    Has had 22 visits s/p left TKA earlier this year    Functional Scale: LEFS 8/80=10%  (90% functional limitation)   Date assessed:  2023  LEFS 2023   40/80=  50%  (50% functional limitation)          Latex Allergy:  [x]NO      []YES  Preferred Language for Healthcare:   [x]English       []other:      Pain level:  3/10     SUBJECTIVE:  10 +weeks post op  He stayed in town this weekend. Continuing to work on packing up and getting rid of things at his house in preparation for the move. Was on his feet a good deal and stated the knees felt ok.            OBJECTIVE:       ROM PROM AROM Overpressure Comment    L R L R L R 2023   Flexion 128 ERMI 130  ERMI 117       Extension  0 0 -1        GIRTH   L R POST VASO  10/17/2023      Mid Patella 41 cm 40.3 cm 40.0   5 cm above      Infra Patella      Mid Calf      Malleoli            Strength L R Comment: majority deferred secondary to surgery   Quad      Hamstring      Abduction      Quad tone

## 2023-10-20 ENCOUNTER — TREATMENT (OUTPATIENT)
Dept: PHYSICAL THERAPY | Age: 58
End: 2023-10-20

## 2023-10-20 DIAGNOSIS — G89.18 ACUTE POSTOPERATIVE PAIN OF RIGHT KNEE: Primary | ICD-10-CM

## 2023-10-20 DIAGNOSIS — M25.561 ACUTE POSTOPERATIVE PAIN OF RIGHT KNEE: Primary | ICD-10-CM

## 2023-10-20 DIAGNOSIS — M25.561 PAIN AND SWELLING OF RIGHT KNEE: ICD-10-CM

## 2023-10-20 DIAGNOSIS — M25.661 DECREASED RANGE OF MOTION (ROM) OF RIGHT KNEE: ICD-10-CM

## 2023-10-20 DIAGNOSIS — R26.2 DIFFICULTY WALKING: ICD-10-CM

## 2023-10-20 DIAGNOSIS — M25.461 PAIN AND SWELLING OF RIGHT KNEE: ICD-10-CM

## 2023-10-20 NOTE — PROGRESS NOTES
[address] [place] [address], [address] [place] [address] Drive   Phone: [phone]    Fax: [phone]      Physical Therapy  Cancellation/No-show Note  Patient Name:  Lia Delatorre  :  1965   Date:  10/20/2023    Cancelled visits to date: 2  No-shows to date: 0    For today's appointment patient:  [x]  Cancelled  []  Rescheduled appointment  []  No-show     Reason given by patient:  []  Patient ill  []  Conflicting appointment  []  No transportation    []  Conflict with work  []  No reason given  [x]  Other:     Comments:  Patient was here briefly, but then got a call from his daughter. Her car broke down on the highway and he had to leave and go help her out. Phone call information:   []  Phone call made today to patient at _ time at number provided:      []  Patient answered, conversation as follows:    []  Patient did not answer, message left as follows:  []  Phone call not made today  [x]  Phone call not needed - pt contacted us to cancel and provided reason for cancellation.      Electronically signed by:      Angeline Holstein, PT      Board Certified Orthopaedic Clinical Specialist  [facility] Certified  Physical Therapist    [facility] PT #[id number]  [address] [place] [address] #[id number]
Therapist    [facility] [place] [facility] #[id number]  [place] [facility] #[id number]        Note: If patient does not return for scheduled/ recommended follow up visits, this note will serve as a discharge from care along with most recent update on progress.

## 2023-10-21 ENCOUNTER — TELEPHONE (OUTPATIENT)
Dept: ORTHOPEDIC SURGERY | Age: 58
End: 2023-10-21

## 2023-10-23 ENCOUNTER — TREATMENT (OUTPATIENT)
Dept: PHYSICAL THERAPY | Age: 58
End: 2023-10-23
Payer: COMMERCIAL

## 2023-10-23 DIAGNOSIS — M25.461 PAIN AND SWELLING OF RIGHT KNEE: ICD-10-CM

## 2023-10-23 DIAGNOSIS — M25.661 DECREASED RANGE OF MOTION (ROM) OF RIGHT KNEE: ICD-10-CM

## 2023-10-23 DIAGNOSIS — R26.2 DIFFICULTY WALKING: ICD-10-CM

## 2023-10-23 DIAGNOSIS — M25.561 PAIN AND SWELLING OF RIGHT KNEE: ICD-10-CM

## 2023-10-23 DIAGNOSIS — G89.18 ACUTE POSTOPERATIVE PAIN OF RIGHT KNEE: Primary | ICD-10-CM

## 2023-10-23 DIAGNOSIS — M25.561 ACUTE POSTOPERATIVE PAIN OF RIGHT KNEE: Primary | ICD-10-CM

## 2023-10-23 PROCEDURE — 97110 THERAPEUTIC EXERCISES: CPT | Performed by: PHYSICAL THERAPIST

## 2023-10-23 PROCEDURE — 97140 MANUAL THERAPY 1/> REGIONS: CPT | Performed by: PHYSICAL THERAPIST

## 2023-10-23 PROCEDURE — 97530 THERAPEUTIC ACTIVITIES: CPT | Performed by: PHYSICAL THERAPIST

## 2023-10-23 NOTE — PROGRESS NOTES
[facility]    Physical Therapist Cindy Cool license #[id number]  Physical Therapist [place] license #[id number]       Note: If patient does not return for scheduled/ recommended follow up visits, this note will serve as a discharge from care along with most recent update on progress.

## 2023-10-25 ENCOUNTER — OFFICE VISIT (OUTPATIENT)
Dept: VASCULAR SURGERY | Age: 58
End: 2023-10-25
Payer: COMMERCIAL

## 2023-10-25 VITALS — WEIGHT: 191 LBS | HEIGHT: 69 IN | HEART RATE: 72 BPM | BODY MASS INDEX: 28.29 KG/M2

## 2023-10-25 DIAGNOSIS — Z95.828 S/P IVC FILTER: Primary | ICD-10-CM

## 2023-10-25 PROCEDURE — 99212 OFFICE O/P EST SF 10 MIN: CPT | Performed by: SURGERY

## 2023-10-25 NOTE — PROGRESS NOTES
Previous pt who underwent posterior popliteal exposure for capsular release by myself over one year ago. He then underwent left knee replacement and apparently developed a peroneal clot. His orthopedic surgeon and discussion with a ID specialist who will see him for infection suggested he have a inferior vena cava filter placed which was done over 1 year ago 8/5/2022 at [facility] by radiology. Returns now for consideration of removal.  Denies any swelling in his legs. Has not been on any anticoagulation. EXAM:  Good ROM L knee    L calf soft, nontender    IMP: S/P IVC filter placement  REC: Considering that this filter has been in place over a year removing it will not be nearly as successful as if it had been   done earlier. Clearly the failure rate complications rate increases when the filter is been present for over a year. I recommended the patient have a CT to see if there is profound penetration of the caval wall which would make retrieval more difficult. Unfortunately he is moving permanently to [place] this coming weekend. Practically speaking I suggested he come in contact with a trained qualified vascular surgeon in [facility]. Patient discussed removal at that facility.

## 2023-10-26 ENCOUNTER — TREATMENT (OUTPATIENT)
Dept: PHYSICAL THERAPY | Age: 58
End: 2023-10-26

## 2023-10-26 DIAGNOSIS — R26.2 DIFFICULTY WALKING: ICD-10-CM

## 2023-10-26 DIAGNOSIS — M25.561 PAIN AND SWELLING OF RIGHT KNEE: ICD-10-CM

## 2023-10-26 DIAGNOSIS — G89.18 ACUTE POSTOPERATIVE PAIN OF RIGHT KNEE: Primary | ICD-10-CM

## 2023-10-26 DIAGNOSIS — M25.461 PAIN AND SWELLING OF RIGHT KNEE: ICD-10-CM

## 2023-10-26 DIAGNOSIS — M25.561 ACUTE POSTOPERATIVE PAIN OF RIGHT KNEE: Primary | ICD-10-CM

## 2023-10-26 DIAGNOSIS — M25.661 DECREASED RANGE OF MOTION (ROM) OF RIGHT KNEE: ICD-10-CM

## 2023-10-26 NOTE — PROGRESS NOTES
[address] [place] [address] Drive   Phone: [phone]    Fax: [phone]    Physical Therapy Treatment Note/ Progress Report:           Date:  10/26/2023    Patient Name:  Daysi Roque    :  1965  MRN: [id number]  Restrictions/Precautions:    Medical/Treatment Diagnosis Information:  Diagnosis: s/p R TKA  DOS 3360     Insurance/Certification information:  PT Insurance Information: BCBS  Physician Information:  Referring Practitioner: Dr Bo Art  Has the plan of care been signed (Y/N):        []  Yes  [x]  No     Date of Patient follow up with Physician:       Is this a Progress Report:     []  Yes  []  No        If Yes:  Date Range for reporting period:  Beginning 2023  Ending      Progress report will be due (10 Rx or 30 days whichever is less): 7863       Recertification will be due (POC Duration  / 90 days whichever is less): 2023         Visit # Insurance Allowable Auth Required   18 BMN []  Yes []  No    Has had 22 visits s/p left TKA earlier this year    Functional Scale: LEFS 880=10%  (90% functional limitation)   Date assessed:  2023  LEFS 2023   40/80=  50%  (50% functional limitation)   LEFS 10/26/2023   49/80=61%   (39% functional limitation)       Latex Allergy:  [x]NO      []YES  Preferred Language for Healthcare:   [x]English       []other:      Pain level:  3/10     SUBJECTIVE:  12 weeks post op  Patient has been selling a lot of his household items in preparation for moving next week. He had to help carry a heavy dresser down his stairs that was a bit of a challenge.                  OBJECTIVE:       ROM PROM AROM Overpressure Comment    L R L R L R 10/26/2023   Flexion 129 ERMI 126  ERMI 117       Extension  0 0 -1        GIRTH  10/26/2023   L R Mid Patella 40.8 cm 40.7 cm 5 cm above      Infra Patella      Mid Calf      Malleoli            Strength L R Comment: majority deferred secondary to surgery   Quad 5/5 5/5    Hamstring 5/5 5/5

## 2024-07-01 ENCOUNTER — TELEPHONE (OUTPATIENT)
Dept: ORTHOPEDIC SURGERY | Age: 59
End: 2024-07-01

## (undated) DEVICE — STRIP,CLOSURE,WOUND,MEDI-STRIP,1/2X4: Brand: MEDLINE

## (undated) DEVICE — BNDG,ELSTC,MATRIX,STRL,6"X5YD,LF,HOOK&LP: Brand: MEDLINE

## (undated) DEVICE — SYRINGE MED 30ML STD CLR PLAS LUERLOCK TIP N CTRL DISP

## (undated) DEVICE — 3.5 X 48MM HEX HEADED SCREW

## (undated) DEVICE — SUTURE VCRL + SZ 2-0 L36IN ABSRB UD L36MM CT-1 1/2 CIR VCP945H

## (undated) DEVICE — LOOP,VESSEL,MAXI,BLUE,2/PK,STERILE: Brand: MEDLINE

## (undated) DEVICE — MASTISOL ADHESIVE LIQ 2/3ML

## (undated) DEVICE — SPLINT KNEE UNIV FOR LESS THAN 36IN L20IN FOAM LAM E CNTCT

## (undated) DEVICE — 3M™ COBAN™ NL STERILE NON-LATEX SELF-ADHERENT WRAP, 2086S, 6 IN X 5 YD (15 CM X 4,5 M), 12 ROLLS/CASE: Brand: 3M™ COBAN™

## (undated) DEVICE — HANDPIECE SET WITH HIGH FLOW TIP AND SUCTION TUBE: Brand: INTERPULSE

## (undated) DEVICE — TOWEL,OR,DSP,ST,BLUE,STD,4/PK,20PK/CS: Brand: MEDLINE

## (undated) DEVICE — 2108 SERIES SAGITTAL BLADE FLARED, GROUND  (18.5 X 1.27 X 90.5MM)

## (undated) DEVICE — SUTURE PERMAHAND SZ 2-0 L12X18IN NONABSORBABLE BLK SILK A185H

## (undated) DEVICE — SUTURE STRATAFIX SPRL SZ 3-0 L12IN ABSRB UD FS-1 L30X30CM SXMP2B410

## (undated) DEVICE — DECANTER FLD 9IN ST BG FOR ASEP TRNSF OF FLD

## (undated) DEVICE — SPONGE,PEANUT,XRAY,ST,SM,3/8",5/CARD: Brand: MEDLINE INDUSTRIES, INC.

## (undated) DEVICE — IMPLANTABLE DEVICE
Type: IMPLANTABLE DEVICE | Site: KNEE | Status: NON-FUNCTIONAL
Brand: PERSONA™
Removed: 2023-04-06

## (undated) DEVICE — ZIMMER® A.T.S. CYCLINDRICAL TOURNIQUET CUFF, SINGLE PORT, SINGLE BLADDER 30 IN. 76 CM)

## (undated) DEVICE — NEEDLE HYPO 20GA L1.5IN YEL POLYPR HUB S STL REG BVL STR

## (undated) DEVICE — GLOVE SURG SZ 75 L12IN FNGR THK79MIL GRN LTX FREE

## (undated) DEVICE — SUTURE PERMAHAND SZ 4-0 L18IN NONABSORBABLE BLK SILK BRAID A183H

## (undated) DEVICE — 3M™ STERI-DRAPE™ ARTHROSCOPY SHEET WITH POUCH 1194: Brand: STERI-DRAPE™

## (undated) DEVICE — GLOVE ORANGE PI 7   MSG9070

## (undated) DEVICE — SUTURE STRATAFIX SPRL SZ 2 0 L14IN ABSRB UD MH L36MM 1 2 CIR SXMD2B401

## (undated) DEVICE — OPTIFOAM GENTLE SA, POSTOP, 4X12: Brand: MEDLINE

## (undated) DEVICE — PENCIL SMK EVAC ALL IN 1 DSGN ENH VISIBILITY IMPROVED AIR

## (undated) DEVICE — ADHESIVE SKIN CLOSURE WND 8.661X1.5 IN 22 CM LIQUIBAND SECUR

## (undated) DEVICE — GOWN,AURORA,NONREINF,RAGLAN,XXL,STERILE: Brand: MEDLINE

## (undated) DEVICE — GLOVE ORTHO 7 1/2   MSG9475

## (undated) DEVICE — CRADLE POS PRONE 24 X 5 X 3 IN ARM N COMPR NO CVR FOAM DISP

## (undated) DEVICE — STERILE PVP: Brand: MEDLINE INDUSTRIES, INC.

## (undated) DEVICE — SUTURE PERMAHAND SZ 3-0 L18IN NONABSORBABLE BLK SILK BRAID A184H

## (undated) DEVICE — T-DRAPE,EXTREMITY,STERILE: Brand: MEDLINE

## (undated) DEVICE — YANKAUER,BULB TIP,W/O VENT,RIGID,STERILE: Brand: MEDLINE

## (undated) DEVICE — SOLUTION IRRIG 2000ML 0.9% SOD CHL USP UROMATIC PLAS CONT

## (undated) DEVICE — APPLIER CLP L9.375IN APER 2.1MM CLS L3.8MM 20 SM TI CLP

## (undated) DEVICE — GOWN SIRUS NONREIN XL W/TWL: Brand: MEDLINE INDUSTRIES, INC.

## (undated) DEVICE — INTENDED FOR TISSUE SEPARATION, AND OTHER PROCEDURES THAT REQUIRE A SHARP SURGICAL BLADE TO PUNCTURE OR CUT.: Brand: BARD-PARKER ® STAINLESS STEEL BLADES

## (undated) DEVICE — SUTURE STRATAFIX SPRL SZ 1 L14IN ABSRB VLT L48CM CTX 1/2 SXPD2B405

## (undated) DEVICE — HEADLESS TROCHAR PIN 75MM: Brand: ZUK

## (undated) DEVICE — SUTURE PROL SZ 6-0 L24IN NONABSORBABLE BLU L13MM C-1 3/8 8726H

## (undated) DEVICE — SUTURE MCRYL + SZ 4-0 L27IN ABSRB UD L19MM PS-2 3/8 CIR MCP426H

## (undated) DEVICE — BANDAGE COMPR W6INXL12FT SMOOTH FOR LIMB EXSANG ESMARCH

## (undated) DEVICE — DYONICS 25 PATIENT TUBE SET MUST                                    BE USED WITH 7211007, 12 PER BOX

## (undated) DEVICE — Device

## (undated) DEVICE — 4.5 MM FULL RADIUS ELITE STRAIGHT                                    DISPOSABLE BLADES, MAROON,PACKAGED 6                                    PER BOX, STERILE

## (undated) DEVICE — SUTURE VCRL UD BR CT 3-0 18IN CT1 J838D

## (undated) DEVICE — GOWN SIRUS NONREIN LG W/TWL: Brand: MEDLINE INDUSTRIES, INC.

## (undated) DEVICE — STOCKINETTE,IMPERVIOUS,12X48,STERILE: Brand: MEDLINE

## (undated) DEVICE — STERILE SURGICAL BLADE SIZE 15: Brand: CARDINAL HEALTH

## (undated) DEVICE — GLOVE ORANGE PI 7 1/2   MSG9075

## (undated) DEVICE — SWAB CULT SGL AMIES W/O CHAR FOR THRT VAG SKIN HRT CULTSWAB

## (undated) DEVICE — BANDAGE COMPR W6INXL10YD ST M E WHITE/BEIGE

## (undated) DEVICE — SUTURE VCRL + SZ 3-0 L18IN ABSRB UD SH 1/2 CIR TAPERCUT NDL VCP864D

## (undated) DEVICE — 3 BONE CEMENT MIXER: Brand: MIXEVAC

## (undated) DEVICE — SYSTEM SKIN CLSR 22CM DERMBND PRINEO

## (undated) DEVICE — SPONGE LAP W18XL18IN WHT COT 4 PLY FLD STRUNG RADPQ DISP ST

## (undated) DEVICE — APPLIER CLP L9.38IN M LIG TI DISP STR RNG HNDL LIGACLP

## (undated) DEVICE — PAD,NON-ADHERENT,3X8,STERILE,LF,1/PK: Brand: MEDLINE

## (undated) DEVICE — SYRINGE 30ML MEDICAL LEUR LOCK TIP WO

## (undated) DEVICE — IMPLANTABLE DEVICE
Type: IMPLANTABLE DEVICE | Site: KNEE | Status: NON-FUNCTIONAL
Brand: PERSONA®
Removed: 2023-04-06

## (undated) DEVICE — DRAIN SURG 15FR RND FULL FLUT

## (undated) DEVICE — DYONICS 25 INFLOW TUBE SET, 3 PER BOX

## (undated) DEVICE — GAUZE,SPONGE,4"X4",8PLY,STRL,LF,10/TRAY: Brand: MEDLINE

## (undated) DEVICE — BANDAGE,GAUZE,BULKEE II,4.5"X4.1YD,STRL: Brand: MEDLINE

## (undated) DEVICE — COLLECTOR SPEC RAYON LIQ STUART DBL FOR THRT VAG SKIN WND

## (undated) DEVICE — HOOD: Brand: FLYTE

## (undated) DEVICE — C-ARM: Brand: UNBRANDED

## (undated) DEVICE — APPLICATOR MEDICATED 26 CC SOLUTION HI LT ORNG CHLORAPREP

## (undated) DEVICE — PADDING CAST W6INXL4YD ST COT RAYON MICROPLEATED HIGHLY

## (undated) DEVICE — HOOD, PEEL-AWAY: Brand: FLYTE

## (undated) DEVICE — DRAPE,U/ SHT,SPLIT,PLAS,STERIL: Brand: MEDLINE

## (undated) DEVICE — SUTURE VCRL SZ 1 L27IN ABSRB VLT L36MM CT-1 1/2 CIR J341H

## (undated) DEVICE — OPTIFOAM GENTLE SA, POSTOP, 4X10: Brand: MEDLINE

## (undated) DEVICE — SUTURE ETHLN SZ 3-0 L18IN NONABSORBABLE BLK PS-2 L19MM 3/8 1669H

## (undated) DEVICE — SHEET,DRAPE,53X77,STERILE: Brand: MEDLINE

## (undated) DEVICE — STERILE PATIENT PROTECTIVE PAD FOR IMP® KNEE POSITIONERS & COHESIVE WRAP (10 / CASE): Brand: DE MAYO KNEE POSITIONER®

## (undated) DEVICE — NEEDLE SPNL L3.5IN PNK HUB S STL REG WALL FIT STYL W/ QNCKE

## (undated) DEVICE — MERCY FAIRFIELD TURNOVER KIT: Brand: MEDLINE INDUSTRIES, INC.

## (undated) DEVICE — SUTURE PERMA-HAND SZ 2-0 L30IN NONABSORBABLE BLK L26MM SH K833H

## (undated) DEVICE — HYPODERMIC SAFETY NEEDLE: Brand: MAGELLAN

## (undated) DEVICE — DRESSING,GAUZE,XEROFORM,CURAD,1"X8",ST: Brand: CURAD

## (undated) DEVICE — BLANKET WRM W29.9XL79.1IN UP BODY FORC AIR MISTRAL-AIR

## (undated) DEVICE — DEVON TUBE HOLDER REMOVABLE TOUCH FASTEN STRAP: Brand: DEVON

## (undated) DEVICE — CONTAINER,SPECIMEN,OR STERILE,4OZ: Brand: MEDLINE

## (undated) DEVICE — SCREW BNE L25MM DIA2.5MM KNEE FULL THRD HEX FEM PERSONA (NOT IMPLANTED)

## (undated) DEVICE — MAJOR SET UP PK

## (undated) DEVICE — GLOVE ORANGE PI 8   MSG9080

## (undated) DEVICE — SUTURE VCRL  SZ 4-0 L18IN ABSRB UD PS-2 L19MM PRIM REV CUT VCP496G

## (undated) DEVICE — GLOVE ORANGE PI 8 1/2   MSG9085

## (undated) DEVICE — TUBE SUCT L10IN MINI FLTR CRV KAMVAC

## (undated) DEVICE — INTENDED TO BE USED TO OCCLUDE, RETRACT AND IDENTIFY ARTERIES, VEINS, TENDONS AND NERVES IN SURGICAL PROCEDURES: Brand: STERION®  VESSEL LOOP

## (undated) DEVICE — SOLUTION IRRIG 3000ML 0.9% SOD CHL USP UROMATIC PLAS CONT

## (undated) DEVICE — BLADE ES L6IN ELASTOMERIC COAT INSUL DURABLE BEND UPTO

## (undated) DEVICE — SOLUTION IV IRRIG 500ML 0.9% SODIUM CHL 2F7123

## (undated) DEVICE — MASC TURNOVER KIT: Brand: MEDLINE INDUSTRIES, INC.